# Patient Record
Sex: FEMALE | Race: BLACK OR AFRICAN AMERICAN | Employment: OTHER | URBAN - METROPOLITAN AREA
[De-identification: names, ages, dates, MRNs, and addresses within clinical notes are randomized per-mention and may not be internally consistent; named-entity substitution may affect disease eponyms.]

---

## 2022-03-05 ENCOUNTER — HOSPITAL ENCOUNTER (INPATIENT)
Age: 77
LOS: 3 days | Discharge: NURSING, REHAB OR LONG-TERM CARE | DRG: 065 | End: 2022-03-08
Attending: STUDENT IN AN ORGANIZED HEALTH CARE EDUCATION/TRAINING PROGRAM | Admitting: INTERNAL MEDICINE
Payer: MEDICARE

## 2022-03-05 ENCOUNTER — APPOINTMENT (OUTPATIENT)
Dept: CT IMAGING | Age: 77
DRG: 065 | End: 2022-03-05
Payer: MEDICARE

## 2022-03-05 ENCOUNTER — APPOINTMENT (OUTPATIENT)
Dept: MRI IMAGING | Age: 77
DRG: 065 | End: 2022-03-05
Payer: MEDICARE

## 2022-03-05 ENCOUNTER — APPOINTMENT (OUTPATIENT)
Dept: GENERAL RADIOLOGY | Age: 77
DRG: 065 | End: 2022-03-05
Payer: MEDICARE

## 2022-03-05 DIAGNOSIS — G45.9 TIA (TRANSIENT ISCHEMIC ATTACK): Primary | ICD-10-CM

## 2022-03-05 PROBLEM — I63.9 ACUTE CEREBROVASCULAR ACCIDENT (CVA) DUE TO ISCHEMIA (HCC): Status: ACTIVE | Noted: 2022-03-05

## 2022-03-05 LAB
A/G RATIO: 1.3 (ref 1.1–2.2)
ALBUMIN SERPL-MCNC: 4 G/DL (ref 3.4–5)
ALP BLD-CCNC: 104 U/L (ref 40–129)
ALT SERPL-CCNC: 16 U/L (ref 10–40)
ANION GAP SERPL CALCULATED.3IONS-SCNC: 9 MMOL/L (ref 3–16)
AST SERPL-CCNC: 15 U/L (ref 15–37)
BASOPHILS ABSOLUTE: 0 K/UL (ref 0–0.2)
BASOPHILS RELATIVE PERCENT: 0.9 %
BILIRUB SERPL-MCNC: 0.4 MG/DL (ref 0–1)
BUN BLDV-MCNC: 16 MG/DL (ref 7–20)
CALCIUM SERPL-MCNC: 10.4 MG/DL (ref 8.3–10.6)
CHLORIDE BLD-SCNC: 100 MMOL/L (ref 99–110)
CO2: 28 MMOL/L (ref 21–32)
CREAT SERPL-MCNC: 0.8 MG/DL (ref 0.6–1.2)
EKG ATRIAL RATE: 72 BPM
EKG DIAGNOSIS: NORMAL
EKG P AXIS: 47 DEGREES
EKG P-R INTERVAL: 148 MS
EKG Q-T INTERVAL: 412 MS
EKG QRS DURATION: 86 MS
EKG QTC CALCULATION (BAZETT): 451 MS
EKG R AXIS: 16 DEGREES
EKG T AXIS: 71 DEGREES
EKG VENTRICULAR RATE: 72 BPM
EOSINOPHILS ABSOLUTE: 0.2 K/UL (ref 0–0.6)
EOSINOPHILS RELATIVE PERCENT: 4 %
GFR AFRICAN AMERICAN: >60
GFR NON-AFRICAN AMERICAN: >60
GLUCOSE BLD-MCNC: 122 MG/DL (ref 70–99)
GLUCOSE BLD-MCNC: 142 MG/DL (ref 70–99)
HCT VFR BLD CALC: 41.1 % (ref 36–48)
HEMOGLOBIN: 14.1 G/DL (ref 12–16)
INR BLD: 1 (ref 0.88–1.12)
LYMPHOCYTES ABSOLUTE: 2.2 K/UL (ref 1–5.1)
LYMPHOCYTES RELATIVE PERCENT: 41.4 %
MCH RBC QN AUTO: 32.2 PG (ref 26–34)
MCHC RBC AUTO-ENTMCNC: 34.4 G/DL (ref 31–36)
MCV RBC AUTO: 93.7 FL (ref 80–100)
MONOCYTES ABSOLUTE: 0.3 K/UL (ref 0–1.3)
MONOCYTES RELATIVE PERCENT: 5.5 %
NEUTROPHILS ABSOLUTE: 2.5 K/UL (ref 1.7–7.7)
NEUTROPHILS RELATIVE PERCENT: 48.2 %
PDW BLD-RTO: 13.6 % (ref 12.4–15.4)
PERFORMED ON: ABNORMAL
PLATELET # BLD: 324 K/UL (ref 135–450)
PMV BLD AUTO: 8.2 FL (ref 5–10.5)
POTASSIUM REFLEX MAGNESIUM: 3.8 MMOL/L (ref 3.5–5.1)
PROTHROMBIN TIME: 11.3 SEC (ref 9.9–12.7)
RBC # BLD: 4.39 M/UL (ref 4–5.2)
SODIUM BLD-SCNC: 137 MMOL/L (ref 136–145)
TOTAL PROTEIN: 7.2 G/DL (ref 6.4–8.2)
TROPONIN: <0.01 NG/ML
WBC # BLD: 5.2 K/UL (ref 4–11)

## 2022-03-05 PROCEDURE — 6360000004 HC RX CONTRAST MEDICATION: Performed by: STUDENT IN AN ORGANIZED HEALTH CARE EDUCATION/TRAINING PROGRAM

## 2022-03-05 PROCEDURE — 93005 ELECTROCARDIOGRAM TRACING: CPT | Performed by: STUDENT IN AN ORGANIZED HEALTH CARE EDUCATION/TRAINING PROGRAM

## 2022-03-05 PROCEDURE — 85610 PROTHROMBIN TIME: CPT

## 2022-03-05 PROCEDURE — 85025 COMPLETE CBC W/AUTO DIFF WBC: CPT

## 2022-03-05 PROCEDURE — 6370000000 HC RX 637 (ALT 250 FOR IP): Performed by: INTERNAL MEDICINE

## 2022-03-05 PROCEDURE — 6360000002 HC RX W HCPCS: Performed by: INTERNAL MEDICINE

## 2022-03-05 PROCEDURE — 80053 COMPREHEN METABOLIC PANEL: CPT

## 2022-03-05 PROCEDURE — 96374 THER/PROPH/DIAG INJ IV PUSH: CPT

## 2022-03-05 PROCEDURE — 70450 CT HEAD/BRAIN W/O DYE: CPT

## 2022-03-05 PROCEDURE — 6360000002 HC RX W HCPCS: Performed by: STUDENT IN AN ORGANIZED HEALTH CARE EDUCATION/TRAINING PROGRAM

## 2022-03-05 PROCEDURE — 6370000000 HC RX 637 (ALT 250 FOR IP): Performed by: STUDENT IN AN ORGANIZED HEALTH CARE EDUCATION/TRAINING PROGRAM

## 2022-03-05 PROCEDURE — 36415 COLL VENOUS BLD VENIPUNCTURE: CPT

## 2022-03-05 PROCEDURE — 99284 EMERGENCY DEPT VISIT MOD MDM: CPT

## 2022-03-05 PROCEDURE — 70496 CT ANGIOGRAPHY HEAD: CPT

## 2022-03-05 PROCEDURE — 71045 X-RAY EXAM CHEST 1 VIEW: CPT

## 2022-03-05 PROCEDURE — 70551 MRI BRAIN STEM W/O DYE: CPT

## 2022-03-05 PROCEDURE — 2060000000 HC ICU INTERMEDIATE R&B

## 2022-03-05 PROCEDURE — 84484 ASSAY OF TROPONIN QUANT: CPT

## 2022-03-05 RX ORDER — LABETALOL HYDROCHLORIDE 5 MG/ML
10 INJECTION, SOLUTION INTRAVENOUS EVERY 10 MIN PRN
Status: DISCONTINUED | OUTPATIENT
Start: 2022-03-05 | End: 2022-03-06

## 2022-03-05 RX ORDER — ASPIRIN 325 MG
325 TABLET ORAL ONCE
Status: DISCONTINUED | OUTPATIENT
Start: 2022-03-05 | End: 2022-03-05

## 2022-03-05 RX ORDER — POLYETHYLENE GLYCOL 3350 17 G/17G
17 POWDER, FOR SOLUTION ORAL DAILY PRN
Status: DISCONTINUED | OUTPATIENT
Start: 2022-03-05 | End: 2022-03-08 | Stop reason: HOSPADM

## 2022-03-05 RX ORDER — CLOPIDOGREL 300 MG/1
600 TABLET, FILM COATED ORAL ONCE
Status: COMPLETED | OUTPATIENT
Start: 2022-03-05 | End: 2022-03-05

## 2022-03-05 RX ORDER — CLOPIDOGREL BISULFATE 75 MG/1
75 TABLET ORAL DAILY
Status: DISCONTINUED | OUTPATIENT
Start: 2022-03-06 | End: 2022-03-08 | Stop reason: HOSPADM

## 2022-03-05 RX ORDER — ASPIRIN 81 MG/1
162 TABLET, CHEWABLE ORAL ONCE
Status: COMPLETED | OUTPATIENT
Start: 2022-03-05 | End: 2022-03-05

## 2022-03-05 RX ORDER — ASPIRIN 81 MG/1
81 TABLET ORAL DAILY
Status: DISCONTINUED | OUTPATIENT
Start: 2022-03-06 | End: 2022-03-08 | Stop reason: HOSPADM

## 2022-03-05 RX ORDER — ONDANSETRON 4 MG/1
4 TABLET, ORALLY DISINTEGRATING ORAL EVERY 8 HOURS PRN
Status: DISCONTINUED | OUTPATIENT
Start: 2022-03-05 | End: 2022-03-08 | Stop reason: HOSPADM

## 2022-03-05 RX ORDER — MULTIVIT-MIN/IRON/FOLIC ACID/K 18-600-40
CAPSULE ORAL
Status: ON HOLD | COMMUNITY
End: 2022-03-08

## 2022-03-05 RX ORDER — ONDANSETRON 2 MG/ML
4 INJECTION INTRAMUSCULAR; INTRAVENOUS EVERY 6 HOURS PRN
Status: DISCONTINUED | OUTPATIENT
Start: 2022-03-05 | End: 2022-03-08 | Stop reason: HOSPADM

## 2022-03-05 RX ORDER — ATORVASTATIN CALCIUM 80 MG/1
80 TABLET, FILM COATED ORAL NIGHTLY
Status: DISCONTINUED | OUTPATIENT
Start: 2022-03-05 | End: 2022-03-08 | Stop reason: HOSPADM

## 2022-03-05 RX ORDER — EPINEPHRINE 0.3 MG/.3ML
INJECTION SUBCUTANEOUS
Status: DISCONTINUED
Start: 2022-03-05 | End: 2022-03-05

## 2022-03-05 RX ORDER — DIPHENHYDRAMINE HYDROCHLORIDE 50 MG/ML
25 INJECTION INTRAMUSCULAR; INTRAVENOUS ONCE
Status: COMPLETED | OUTPATIENT
Start: 2022-03-05 | End: 2022-03-05

## 2022-03-05 RX ADMIN — ASPIRIN 162 MG: 81 TABLET, CHEWABLE ORAL at 11:58

## 2022-03-05 RX ADMIN — ENOXAPARIN SODIUM 40 MG: 100 INJECTION SUBCUTANEOUS at 14:33

## 2022-03-05 RX ADMIN — ATORVASTATIN CALCIUM 80 MG: 80 TABLET, FILM COATED ORAL at 19:54

## 2022-03-05 RX ADMIN — IOPAMIDOL 80 ML: 755 INJECTION, SOLUTION INTRAVENOUS at 10:18

## 2022-03-05 RX ADMIN — CLOPIDOGREL BISULFATE 600 MG: 300 TABLET, FILM COATED ORAL at 11:58

## 2022-03-05 RX ADMIN — DIPHENHYDRAMINE HYDROCHLORIDE 25 MG: 50 INJECTION, SOLUTION INTRAMUSCULAR; INTRAVENOUS at 10:34

## 2022-03-05 ASSESSMENT — PAIN SCALES - GENERAL: PAINLEVEL_OUTOF10: 0

## 2022-03-05 NOTE — PLAN OF CARE
Problem: HEMODYNAMIC STATUS  Goal: Patient has stable vital signs and fluid balance  Outcome: Ongoing     Problem: ACTIVITY INTOLERANCE/IMPAIRED MOBILITY  Goal: Mobility/activity is maintained at optimum level for patient  Outcome: Ongoing     Problem: COMMUNICATION IMPAIRMENT  Goal: Ability to express needs and understand communication  Outcome: Ongoing     Problem: Falls - Risk of:  Goal: Will remain free from falls  Description: Will remain free from falls  Outcome: Ongoing  Goal: Absence of physical injury  Description: Absence of physical injury  Outcome: Ongoing

## 2022-03-05 NOTE — PROGRESS NOTES
MRI questionnaire completed at this time with the pt, and then faxed to MRI. The pt has no other needs at this time, call light in reach and bed alarm on. Pt was provided with Stroke education per MD order.   Kayley Cormier RN

## 2022-03-05 NOTE — PROGRESS NOTES
This RN took over care for this patient. A/O x4. BP still elevated but remains within order parameters. Pt denies needs at this time. Will continue to monitor.

## 2022-03-05 NOTE — H&P
Hospital Medicine History & Physical      PCP: Anastasia Escobedo MD    Date of Admission: 3/5/2022    Date of Service: Pt seen/examined on 03/05/22 and Admitted to Inpatient with expected LOS greater than two midnights due to medical therapy. Chief Complaint:  Loss of balance, dragging right foot    History Of Present Illness:      Matti Cheek is a 77 y.o. female with past medical history as below, including lupus, hyperparathyroidism possibly HTN, presents with symptoms of loss of balance than began on Thursday. She denies any vertigo symptoms, just felt very much off balance. She also found herself not being well able to move her right leg and almost seeming to drag it on the ground. The right leg weakness resolved. She then had a recurrence of this however, but is able to move her leg again now. No other areas of focal weakness. No headache. No vision changes. No palpitations. No cardiac history. She is not taking any prescription med, does not smoke, takes a keto weight loss supplement and a green tea supplement. She is not on medication for high blood pressure, report its is sometimes high but comes down with taking deep breaths. Past Medical History:          Diagnosis Date    Lupus     MVP (mitral valve prolapse)        Past Surgical History:          Procedure Laterality Date    TONSILLECTOMY         Medications Prior to Admission:      Prior to Admission medications    Not on File       Allergies:  Codeine    Social History:      The patient currently lives in private residence    TOBACCO:   has no history on file for tobacco use. ETOH:   has no history on file for alcohol use. Family History:      Reviewed in detail and positive as follows:    No family history on file. REVIEW OF SYSTEMS:   Pertinent positives as noted in the HPI. All other systems reviewed and negative.     PHYSICAL EXAM:    BP (!) 215/95   Pulse 76   Temp 98.5 °F (36.9 °C) (Oral)   Resp 18   Ht 5' 6\" (1.676 m)   Wt 149 lb 14.6 oz (68 kg)   SpO2 100%   BMI 24.20 kg/m²     General appearance: No apparent distress, appears stated age and cooperative. HEENT: Normal cephalic, atraumatic without obvious deformity. Pupils equal, round, and reactive to light. Extra ocular muscles intact. Conjunctivae/corneas clear. Neck: Supple, with full range of motion. No jugular venous distention. Trachea midline. Respiratory:  Normal respiratory effort. Clear to auscultation, bilaterally without Rales/Wheezes/Rhonchi. Cardiovascular: Regular rate and rhythm with normal S1/S2 without murmurs, rubs or gallops. Abdomen: Soft, non-tender, non-distended with normal bowel sounds. Musculoskelatal: No clubbing, cyanosis or edema bilaterally. Full range of motion without deformity. Skin: Skin color, texture, turgor normal.  No rashes or lesions. Neurologic:  Neurovascularly intact without any focal sensory/motor deficits. Cranial nerves: II-XII intact, grossly non-focal.  Psychiatric: Alert and oriented, thought content appropriate, normal insight    Labs:     Recent Labs     03/05/22  1000   WBC 5.2   HGB 14.1   HCT 41.1        Recent Labs     03/05/22  1000      K 3.8      CO2 28   BUN 16   CREATININE 0.8   CALCIUM 10.4     Recent Labs     03/05/22  1000   AST 15   ALT 16   BILITOT 0.4   ALKPHOS 104     Recent Labs     03/05/22  1000   INR 1.00     Recent Labs     03/05/22  1000   TROPONINI <0.01       Urinalysis:    No results found for: NITRU, WBCUA, BACTERIA, RBCUA, BLOODU, SPECGRAV, GLUCOSEU    Studies:  XR CHEST PORTABLE   Final Result      No acute disease. CTA HEAD NECK W CONTRAST   Final Result      No evidence of flow-limiting stenosis.             PROCEDURE: CT ANGIOGRAPHY HEAD WITH/WITHOUT CONTRAST      INDICATION: Stroke Symptoms      COMPARISON: none      TECHNIQUE: Axial CT imaging obtained through the head prior to and following administration of IV contrast. Axial images, multiplanar reformatted images, and maximum intensity projection images were reviewed for CT angiographic technique. IV contrast: mL Omnipaque 350. FINDINGS:      ANTERIOR CIRCULATION: Major intracranial arteries are patent. Atherosclerotic irregularity with mild narrowing of bilateral ICA cavernous/paraclinoid segments. 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 POSTERIOR CIRCULATION: The bilateral vertebral arteries, basilar artery and posterior cerebral arteries demonstrate no occlusion or stenosis. No evidence for aneurysm or arteriovenous malformation. INTRACRANIAL VENOUS SYSTEM: No evidence for intracranial venous thrombosis. IMPRESSION:      No evidence of intracranial large vessel occlusion. Atherosclerotic irregularity with mild narrowing of bilateral ICA cavernous/paraclinoid segments. 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 CT HEAD WO CONTRAST   Final Result      No CT evidence of acute stroke. Critical results reported to clinical team at 10:20 AM.                ASSESSMENT:    AMMIE/Lyly [address] Problems    Diagnosis Date Noted    Acute cerebrovascular accident (CVA) due to ischemia ([facility] [address] [I63.9] 03/05/2022   Hx of Lupus not on any medications at present  Possible HTN    PLAN:    Suspected acute CVA however unclear timing of onset. Initial symptoms started on Thursday. She then had some R LE weakness but that has since resolved. No focal deficits on exam however patient has continued sense of being off balance with walking and was noted in ED to have some ataxia. CT, CTA head no acute findings.  Mild narrowing of b/l ICA segments, suspected 2 mm left ICA cavernous segment aneurysm  Stroke team consulted, advised initiation of DAPT and admission for further work-up  Neurology consulted, appreciate recs  Echocardiogram  Risk factor modification  Hypercoagulable panel PT/OT/SLP  Telemetry    HTN, suspected d/t acute CVA since per outpatient notes BP is well within normal range or just slightly elevated  Careful blood pressure lowering allowing for permissive HTN  TSH this year 1.4    DVT Prophylaxis: Lovenox  Diet: Diet NPO  Code Status: No Order    PT/OT Eval Status: pending    Dispo - Inpatient      Kallie Polanco DO     Thank you Gagandeep Musa MD for the opportunity to be involved in this patient's care. If you have any questions or concerns please feel free to contact me at [facility].

## 2022-03-05 NOTE — PLAN OF CARE
STROKE TEAM PLAN OF CARE    Name:  Bhargav Larose (24 y.o., female)  : 1945  MRN:  [id number]  Today's Date: 3/5/2022    Stroke team contacted at: 09:37  History obtained from: emergency physician    Bhargav Larose is a 77 y.o. female who presented with dizziness and RLE weakness. Briefly, pt has been feeling off balance since Thursday. She has also been feeling intermittently weak in the RLE, which is not present in the ED. In the ED, pt comfortable. Ataxia noted on the right, but no other deficits noted. CTA HEAD NECK W CONTRAST   Final Result      No evidence of flow-limiting stenosis. PROCEDURE: CT ANGIOGRAPHY HEAD WITH/WITHOUT CONTRAST      INDICATION: Stroke Symptoms      COMPARISON: none      TECHNIQUE: Axial CT imaging obtained through the head prior to and following administration of IV contrast. Axial images, multiplanar reformatted images, and maximum intensity projection images were reviewed for CT angiographic technique. IV contrast: mL Omnipaque 350. FINDINGS:      ANTERIOR CIRCULATION: Major intracranial arteries are patent. Atherosclerotic irregularity with mild narrowing of bilateral ICA cavernous/paraclinoid segments. 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 POSTERIOR CIRCULATION: The bilateral vertebral arteries, basilar artery and posterior cerebral arteries demonstrate no occlusion or stenosis. No evidence for aneurysm or arteriovenous malformation. INTRACRANIAL VENOUS SYSTEM: No evidence for intracranial venous thrombosis. IMPRESSION:      No evidence of intracranial large vessel occlusion. Atherosclerotic irregularity with mild narrowing of bilateral ICA cavernous/paraclinoid segments. 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 CT HEAD WO CONTRAST   Final Result      No CT evidence of acute stroke.       Critical results reported to clinical team at 10:20 AM.            Acute Ischemic Stroke Clinical Decision Making:    (1) Intravenous tPA:    The patient is not a candidate for iv TPA based on:  Time greater than 4.5h from symptom onset  Symptoms nondisabling    (2) Angiography / Thrombectomy:  The patient does not have evidence of a proximal large vessel occlusion on CTA, and therefore is not an EVT candidate. Given the liklihood for a noncardioembolic etiology of symptoms, low NIHSS, and risk factors, would recommend DAPT load with  and clopidogrel 600mg. For any additional questions regarding acute stroke care, please feel free to contact the  Stroke Team at [phone].      MD Darion   Stroke Team   Late entry at: 3/5/2022 4:26 PM

## 2022-03-05 NOTE — ED PROVIDER NOTES
[address] ENCOUNTER          ATTENDING PHYSICIAN NOTE       Date of evaluation: 3/5/2022    Chief Complaint     Extremity Weakness (right leg weakness for x2 days intermitt, now worse this am)      History of Present Illness     Eduardo Murray is a 77 y.o. female who presents with intermittent right leg weakness and new unsteadiness. Patient states that her symptoms began on Thursday. She calls it disequilibrium and has found herself to be unsteady on her feet. She states she also has intermittent right leg weakness. She states that the unsteadiness has not gone away since it began. She denies any speech difficulties or facial droop. Denies any left-sided weakness. Denies any headache, chest pain, shortness of breath, abdominal pain, fevers or chills. On initial exam, her NIH stroke score was 1 for right-sided ataxia    Review of Systems     Positive for: Intermittent right leg weakness, ataxia  Negative for: Left-sided weakness, chest pain, short of breath, abdominal pain    Other systems reviewed and negative. Past Medical, Surgical, Family, and Social History     She has a past medical history of Lupus ([address] and MVP (mitral valve prolapse). She has a past surgical history that includes Tonsillectomy; Colonoscopy; and Hysterectomy. Her family history is not on file. She reports that she has never smoked. She has never used smokeless tobacco. She reports current alcohol use. She reports that she does not use drugs. Medications     Current Discharge Medication List      CONTINUE these medications which have NOT CHANGED    Details   MULTIPLE VITAMINS-MINERALS PO Take by mouth      Cholecalciferol (VITAMIN D) 50 MCG (2000 UT) CAPS capsule Take by mouth             Allergies     She is allergic to codeine and iodine.     Physical Exam     INITIAL VITALS: BP: (!) 215/95, Temp: 98.5 °F (36.9 °C), Pulse: 76, Resp: 18, SpO2: 100 %     General: nontoxic, no acute distress HEENT: NCAT, EOMI grossly intact, external nose normal, no stridor  Neck: supple, no pain with movement  Cardiac: normal rate, regular rhythm, well perfused  Respiratory: clear to auscultation bilaterally, no respiratory distress, no cough  Abdominal: soft, nontender to palpation, no rebound tenderness  Extremities: no pitting edema  Musculoskeletal: no long bone deformities  Skin: no diaphoresis, no rash  Neuro: alert and oriented, 5/5 strength in all extremities. Mild ataxia with right leg shin to heel. Mild ataxia unsteadiness with her gait while walking. Sensation intact in all extremities and face. No facial droop. No aphasia. Visual fields intact. Psych: appropriate mood, normal affect    Diagnostic Results     EKG   Normal sinus rhythm rate of 72 with normal axis. MA, QRS, QTc not prolonged. No ST elevations. Nonspecific T wave flattening in aVL. No prior for comparison. RADIOLOGY:  XR CHEST PORTABLE   Final Result      No acute disease. CTA HEAD NECK W CONTRAST   Final Result      No evidence of flow-limiting stenosis. PROCEDURE: CT ANGIOGRAPHY HEAD WITH/WITHOUT CONTRAST      INDICATION: Stroke Symptoms      COMPARISON: none      TECHNIQUE: Axial CT imaging obtained through the head prior to and following administration of IV contrast. Axial images, multiplanar reformatted images, and maximum intensity projection images were reviewed for CT angiographic technique. IV contrast: mL Omnipaque 350. FINDINGS:      ANTERIOR CIRCULATION: Major intracranial arteries are patent. Atherosclerotic irregularity with mild narrowing of bilateral ICA cavernous/paraclinoid segments. 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 POSTERIOR CIRCULATION: The bilateral vertebral arteries, basilar artery and posterior cerebral arteries demonstrate no occlusion or stenosis.  No evidence for aneurysm or arteriovenous malformation. INTRACRANIAL VENOUS SYSTEM: No evidence for intracranial venous thrombosis. IMPRESSION:      No evidence of intracranial large vessel occlusion. Atherosclerotic irregularity with mild narrowing of bilateral ICA cavernous/paraclinoid segments. 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 CT HEAD WO CONTRAST   Final Result      No CT evidence of acute stroke.       Critical results reported to clinical team at 10:20 AM.            MRI brain without contrast    (Results Pending)       LABS:   Results for orders placed or performed during the hospital encounter of 03/05/22   CBC with Auto Differential   Result Value Ref Range    WBC 5.2 4.0 - 11.0 K/uL    RBC 4.39 4.00 - 5.20 M/uL    Hemoglobin 14.1 12.0 - 16.0 g/dL    Hematocrit 41.1 36.0 - 48.0 %    MCV 93.7 80.0 - 100.0 fL    MCH 32.2 26.0 - 34.0 pg    MCHC 34.4 31.0 - 36.0 g/dL    RDW 13.6 12.4 - 15.4 %    Platelets 377 576 - 019 K/uL    MPV 8.2 5.0 - 10.5 fL    Neutrophils % 48.2 %    Lymphocytes % 41.4 %    Monocytes % 5.5 %    Eosinophils % 4.0 %    Basophils % 0.9 %    Neutrophils Absolute 2.5 1.7 - 7.7 K/uL    Lymphocytes Absolute 2.2 1.0 - 5.1 K/uL    Monocytes Absolute 0.3 0.0 - 1.3 K/uL    Eosinophils Absolute 0.2 0.0 - 0.6 K/uL    Basophils Absolute 0.0 0.0 - 0.2 K/uL   Comprehensive Metabolic Panel w/ Reflex to MG   Result Value Ref Range    Sodium 137 136 - 145 mmol/L    Potassium reflex Magnesium 3.8 3.5 - 5.1 mmol/L    Chloride 100 99 - 110 mmol/L    CO2 28 21 - 32 mmol/L    Anion Gap 9 3 - 16    Glucose 122 (H) 70 - 99 mg/dL    BUN 16 7 - 20 mg/dL    CREATININE 0.8 0.6 - 1.2 mg/dL    GFR Non-African American >60 >60    GFR African American >60 >60    Calcium 10.4 8.3 - 10.6 mg/dL    Total Protein 7.2 6.4 - 8.2 g/dL    Albumin 4.0 3.4 - 5.0 g/dL    Albumin/Globulin Ratio 1.3 1.1 - 2.2    Total Bilirubin 0.4 0.0 - 1.0 mg/dL    Alkaline Phosphatase 104 40 - 129 U/L    ALT 16 10 - 40 U/L AST 15 15 - 37 U/L   Troponin   Result Value Ref Range    Troponin <0.01 <0.01 ng/mL   Protime-INR   Result Value Ref Range    Protime 11.3 9.9 - 12.7 sec    INR 1.00 0.88 - 1.12   POCT Glucose   Result Value Ref Range    POC Glucose 142 (H) 70 - 99 mg/dl    Performed on ACCU-CHEK    EKG 12 Lead   Result Value Ref Range    Ventricular Rate 72 BPM    Atrial Rate 72 BPM    P-R Interval 148 ms    QRS Duration 86 ms    Q-T Interval 412 ms    QTc Calculation (Bazett) 451 ms    P Axis 47 degrees    R Axis 16 degrees    T Axis 71 degrees    Diagnosis       EKG performed in ER and to be interpreted by ER physician. Confirmed by MD, ER ([id number]),  [address], [address] [place] [address] ([id number]) on 3/5/2022 9:56:47 AM       ED BEDSIDE ULTRASOUND:   N/A    RECENT VITALS:  BP: (!) 218/117,Temp: 97.7 °F (36.5 °C), Pulse: 75, Resp: 16, SpO2: 99 %     Procedures      N/A    ED Course     Nursing Notes, Past Medical Hx, Past Surgical Hx, Social Hx,Allergies, and Family Hx were reviewed.     patient was given the following medications:  Orders Placed This Encounter   Medications    diphenhydrAMINE (BENADRYL) injection 25 mg    DISCONTD: EPINEPHrine (EPIPEN) 0.3 MG/0.3ML injection     Parminder Hector: cabinet override    iopamidol (ISOVUE-370) 76 % injection 80 mL    DISCONTD: aspirin tablet 325 mg    clopidogrel (PLAVIX) tablet 600 mg    aspirin chewable tablet 162 mg    OR Linked Order Group     ondansetron (ZOFRAN-ODT) disintegrating tablet 4 mg     ondansetron (ZOFRAN) injection 4 mg    polyethylene glycol (GLYCOLAX) packet 17 g    enoxaparin (LOVENOX) injection 40 mg    atorvastatin (LIPITOR) tablet 80 mg    aspirin EC tablet 81 mg    clopidogrel (PLAVIX) tablet 75 mg    labetalol (NORMODYNE;TRANDATE) injection 10 mg    perflutren lipid microspheres (DEFINITY) injection 1.65 mg       CONSULTS:  IP CONSULT TO PHARMACY  PHARMACY TO CHANGE BASE FLUIDS  IP CONSULT TO HOSPITALIST  IP CONSULT TO NEUROLOGY    MEDICAL DECISIONMAKING / ASSESSMENT / PLAN     Oli Tovar is a 77 y.o. female presents with ataxia/unsteadiness that has been persistent since Thursday making her not a candidate for TPA. She also has had intermittent right sided weakness, mostly in the right leg. On external exam, NIH stroke scale was 1 with some mild ataxia in the right leg. She is other wise neuro intact. Did speak with stroke team who agrees with plan for CT CTA admission for stroke work-up. Given her elevated ABCD score, will load her with aspirin and Plavix. She did take 2 baby aspirin this morning so we gave her an additional 2. Noted small aneurysm, however unlikely that this is the cause of her symptoms. She did not have any recurrent right leg weakness while in the emergency department, however her gait was mildly ataxic. We will plan to admit to hospitalist for further TIA/stroke evaluation and treatment    Clinical Impression     1. TIA (transient ischemic attack)        Disposition     PATIENT REFERRED TO:  No follow-up provider specified.     DISCHARGE MEDICATIONS:  Current Discharge Medication List          DISPOSITION Admitted 03/05/2022 12:31:53 PM     Nehemias Fabian MD  03/05/22 [id number]

## 2022-03-05 NOTE — CONSULTS
Clinical Pharmacy Progress Note    IV in NS - Management by Pharmacy    Consult Date(s): 3/5  Consulting Provider(s): Dr. Carlos Lai / Plan  Stroke work up  All IVs in Normal Saline   Drips will be adjusted to normal saline as appropriate based on compatibility, in an effort to avoid fluid shifts, as D5W is osmotically active.  The following intermittent IV drips / infusions have been adjusted to saline:  o None at this time   The following medications must remain in D5W due to incompatibility with normal saline:  o Amiodarone Infusion  o Amphotericin  o Mycophenolate  o Nitroprusside  o Penicillin G Potassium   Note: Patient has dextrose ordered as part of hypoglycemia treatment protocol.  Total IV fluid delivered to patient over last 24 hrs: Will assess tomorrow   RPh will follow daily to ensure all IVPBs / drips are in NS where possible. Thanks for consulting pharmacy!   King Levo PharmD  Pharmacy Resident   Please call with questions [phone]  3/5/2022 10:09 AM

## 2022-03-05 NOTE — ED NOTES
Report called to Kian Everett RN 5T. Patient ready for transport to [facility].      Shady Chawla RN  03/05/22 [id number]

## 2022-03-05 NOTE — PROGRESS NOTES
4 Eyes Admission Assessment     I agree as the admission nurse that 2 RN's have performed a thorough Head to Toe Skin Assessment on the patient. ALL assessment sites listed below have been assessed on admission. Areas assessed by both nurses:   [x]   Head, Face, and Ears   [x]   Shoulders, Back, and Chest  [x]   Arms, Elbows, and Hands   [x]   Coccyx, Sacrum, and Ischium  [x]   Legs, Feet, and Heels        Does the Patient have Skin Breakdown?   No         Ozzie Prevention initiated:  NA   Wound Care Orders initiated:  NA      WO nurse consulted for Pressure Injury (Stage 3,4, Unstageable, DTI, NWPT, and Complex wounds) or Ozzie score 18 or lower:  NA      Nurse 1 eSignature: Electronically signed by Martinez Vera RN on 3/5/22 at 2:03 PM EST    **SHARE this note so that the co-signing nurse is able to place an eSignature**    Nurse 2 eSignature: Electronically signed by Astrid Smith RN on 3/5/22 at 2:27 PM EST

## 2022-03-06 LAB
HCT VFR BLD CALC: 41.7 % (ref 36–48)
HEMOGLOBIN: 14.9 G/DL (ref 12–16)
MCH RBC QN AUTO: 32.5 PG (ref 26–34)
MCHC RBC AUTO-ENTMCNC: 35.6 G/DL (ref 31–36)
MCV RBC AUTO: 91.3 FL (ref 80–100)
PDW BLD-RTO: 13.2 % (ref 12.4–15.4)
PLATELET # BLD: 324 K/UL (ref 135–450)
PMV BLD AUTO: 8.2 FL (ref 5–10.5)
RBC # BLD: 4.57 M/UL (ref 4–5.2)
TSH REFLEX: 1.58 UIU/ML (ref 0.27–4.2)
WBC # BLD: 4.6 K/UL (ref 4–11)

## 2022-03-06 PROCEDURE — 97167 OT EVAL HIGH COMPLEX 60 MIN: CPT

## 2022-03-06 PROCEDURE — 97535 SELF CARE MNGMENT TRAINING: CPT

## 2022-03-06 PROCEDURE — 83036 HEMOGLOBIN GLYCOSYLATED A1C: CPT

## 2022-03-06 PROCEDURE — 36415 COLL VENOUS BLD VENIPUNCTURE: CPT

## 2022-03-06 PROCEDURE — 92610 EVALUATE SWALLOWING FUNCTION: CPT

## 2022-03-06 PROCEDURE — 2060000000 HC ICU INTERMEDIATE R&B

## 2022-03-06 PROCEDURE — 97530 THERAPEUTIC ACTIVITIES: CPT

## 2022-03-06 PROCEDURE — 97116 GAIT TRAINING THERAPY: CPT

## 2022-03-06 PROCEDURE — 84443 ASSAY THYROID STIM HORMONE: CPT

## 2022-03-06 PROCEDURE — 6370000000 HC RX 637 (ALT 250 FOR IP): Performed by: INTERNAL MEDICINE

## 2022-03-06 PROCEDURE — 97162 PT EVAL MOD COMPLEX 30 MIN: CPT

## 2022-03-06 PROCEDURE — 2500000003 HC RX 250 WO HCPCS: Performed by: NURSE PRACTITIONER

## 2022-03-06 PROCEDURE — 80061 LIPID PANEL: CPT

## 2022-03-06 PROCEDURE — 6360000002 HC RX W HCPCS: Performed by: INTERNAL MEDICINE

## 2022-03-06 PROCEDURE — 85027 COMPLETE CBC AUTOMATED: CPT

## 2022-03-06 RX ORDER — LABETALOL HYDROCHLORIDE 5 MG/ML
20 INJECTION, SOLUTION INTRAVENOUS EVERY 4 HOURS
Status: DISCONTINUED | OUTPATIENT
Start: 2022-03-06 | End: 2022-03-07

## 2022-03-06 RX ADMIN — LABETALOL HYDROCHLORIDE 20 MG: 5 INJECTION, SOLUTION INTRAVENOUS at 22:16

## 2022-03-06 RX ADMIN — ATORVASTATIN CALCIUM 80 MG: 80 TABLET, FILM COATED ORAL at 19:46

## 2022-03-06 RX ADMIN — ENOXAPARIN SODIUM 40 MG: 100 INJECTION SUBCUTANEOUS at 08:08

## 2022-03-06 RX ADMIN — LABETALOL HYDROCHLORIDE 20 MG: 5 INJECTION, SOLUTION INTRAVENOUS at 14:15

## 2022-03-06 RX ADMIN — CLOPIDOGREL BISULFATE 75 MG: 75 TABLET ORAL at 08:08

## 2022-03-06 RX ADMIN — LABETALOL HYDROCHLORIDE 20 MG: 5 INJECTION, SOLUTION INTRAVENOUS at 18:38

## 2022-03-06 RX ADMIN — ASPIRIN 81 MG: 81 TABLET, COATED ORAL at 08:08

## 2022-03-06 NOTE — PROGRESS NOTES
Physical Therapy    Facility/Department: [facility] [address]  Initial Assessment and treatment    NAME: David Murray  : 1945  MRN: [id number]    Date of Service: 3/6/2022    Discharge Recommendations:    David Murray scored a 17/24 on the AM-PAC short mobility form. Current research shows that an AM-PAC score of 17 or less is typically not associated with a discharge to the patient's home setting. Based on the patient's AM-PAC score and their current functional mobility deficits, it is recommended that the patient have 5-7 sessions per week of Physical Therapy at d/c to increase the patient's independence. At this time, this patient demonstrates the endurance, and/or tolerance for 3 hours of therapy each day, with a treatment frequency of 5-7x/wk. Please see assessment section for further patient specific details. PT Equipment Recommendations  Equipment Needed: No (defer)    Assessment   Body structures, Functions, Activity limitations: Decreased functional mobility ; Decreased safe awareness;Decreased balance;Decreased endurance  Assessment: Patient tolerated session well, transferring and ambulating in room and hallways as above, primarily with use of FWW. Patient demonstrates ataxia on RLE leading to unsteady gait, mildly improved with verbal cues and use of FWW. Patient with multiple losses of balance throughout session, primarily requiring min/mod assist for mobility. Patient very motivated to return to prior level of independence as she is currently functioning well below baseline level. Recommend continued skilled PT upon discharge. Will follow while in acute care setting to maximize independence with mobility.   Treatment Diagnosis: impaired mobility associated with CVA  Prognosis: Good  Decision Making: Medium Complexity  PT Education: Transfer Training;Goals;PT Role;Functional Mobility Training;Plan of Care;General Safety;Gait Training  Patient Education: patient verbalized understanding. REQUIRES PT FOLLOW UP: Yes  Activity Tolerance  Activity Tolerance: Patient Tolerated treatment well;Patient limited by endurance; Patient limited by fatigue       Patient Diagnosis(es): The encounter diagnosis was TIA (transient ischemic attack). has a past medical history of Lupus (HCC) and MVP (mitral valve prolapse). has a past surgical history that includes Tonsillectomy; Colonoscopy; and Hysterectomy. Restrictions  Position Activity Restriction  Other position/activity restrictions: up as tolerated  Vision/Hearing  Vision: Within Functional Limits (denies recent vision changes)  Hearing: Within functional limits     Subjective  General  Chart Reviewed: Yes  Patient assessed for rehabilitation services?: Yes  Additional Pertinent Hx: Patient is a 76 y/o female admitted 3/5 with right leg weakness and unsteadiness. CT head and chest x-ray (-) for acute findings. MRI brain (+) for Acute infarct involving the deep white matter of the posterior left frontal lobe. PMH significant for lupus. Response To Previous Treatment: Not applicable  Family / Caregiver Present: No  Referring Practitioner: Kelly Aguilar DO  Referral Date : 03/05/22  Diagnosis: TIA  Follows Commands: Within Functional Limits  General Comment  Comments: Patient supine in bed upon arrival.  Subjective  Subjective: Patient agreable to PT evaluation.   Pain Screening  Patient Currently in Pain: Denies  Vital Signs  Patient Currently in Pain: Denies       Orientation  Orientation  Overall Orientation Status: Within Functional Limits  Social/Functional History  Social/Functional History  Lives With: Alone  Type of Home: House  Home Layout: Two level,Able to Live on Main level with bedroom/bathroom,Laundry in basement  Home Access: Stairs to enter with rails  Entrance Stairs - Number of Steps: 5  Bathroom Shower/Tub: Tub/Shower unit  [employer]: Standard (sink close for leverage)  Bathroom Equipment: Grab bars in shower  Home Equipment:  (no AD)  ADL Assistance: Independent (takes tub baths)  Homemaking Assistance: Independent  Ambulation Assistance: Independent  Transfer Assistance: Independent  Active : Yes  Occupation: Retired  Leisure & Hobbies: travel  Additional Comments: Denies recent falls  Cognition   Cognition  Overall Cognitive Status: WFL  Cognition Comment: Pt demo good insight into her current deficits, safety concerns and assist needed for safety    Objective          AROM RLE (degrees)  RLE AROM: WFL  AROM LLE (degrees)  LLE AROM : WFL  Strength RLE  Strength RLE: WFL  Strength LLE  Strength LLE: WFL        Bed mobility  Supine to Sit: Supervision (head of bed flat)  Scooting: Supervision (to EOB)  Comment: patient sitting up in bedside chair at end of session  Transfers  Sit to Stand: Contact guard assistance (from EOB, arm chair and from toilet, verbal cues for safety)  Stand to sit: Contact guard assistance (to toilet, to arm chair and to bedside chair, verbal cues for safety and reach back)  Ambulation  Ambulation?: Yes  More Ambulation?: Yes  Ambulation 1  Surface: level tile  Device: No Device  Assistance: Minimal assistance; Moderate assistance (min/mod assist)  Quality of Gait: unsteady, ataxic on right, decreased stance time on right, decreased step length bilaterally  Distance: 15' into bathroom  Ambulation 2  Surface - 2: level tile  Device 2: Rolling Walker  Assistance 2: Moderate assistance;Minimal assistance (mod assist ambulating to waiting room, min assist ambulating back, moderate assist with turns)  Quality of Gait 2: unsteady (though improved slightly with cues for safety and decreased speed), step through pattern but ataxic on right, narrow base of support at times while RLE is outside of walker at times and patient appears unaware  Distance: 100', 100'  Comments: walking tipping due to heavy right sided lean initially, patient able to moderately correct when cues are provided, right

## 2022-03-06 NOTE — CONSULTS
Neurology / Neurocritical Care Consult Note    Francisco Townsend DO is requesting this consult. Reason for Consult: CVA  Admission Chief Complaint: RLE weakness and immobility    History of Present Illness     Serina Murray is a 77 y.o. y/o female with PMH significant for lupus, mitral valve prolapse, and stated HTN. Per my interview with the patient she started experience RLE weakness and was dragging her R foot behind her starting on Thursday. Initially it waxed, and waned, but has become more consistent since admission on 3/5/22. REVIEW OF SYSTEMS:   Constitutional- No weight loss or fevers   Eyes- No diplopia. No photophobia. Ears/nose/throat- No dysphagia. No Dysarthria   Cardiovascular- No palpitations. No chest pain   Respiratory- No dyspnea. No Cough   Gastrointestinal- No Abdominal pain. No Vomiting. Genitourinary- No incontinence. No urinary retention   Musculoskeletal- No myalgia. No arthralgia   Skin- No rash. No easy bruising. Psychiatric- No depression. No anxiety   Endocrine- No diabetes. No thyroid issues. Hematologic- No bleeding difficulty. No fatigue   Neurologic- RLE weakness and trouble walking. Past Medical, Surgical, Family, and Social History   PAST MEDICAL HISTORY:  Past Medical History:   Diagnosis Date    Lupus ([address]     MVP (mitral valve prolapse)      SURGICAL HISTORY:  Past Surgical History:   Procedure Laterality Date    COLONOSCOPY      HYSTERECTOMY      TONSILLECTOMY       FAMILY HISTORY & SOCIAL HISTORY:  Family history non-contributory  History reviewed. No pertinent family history.   Social History     Tobacco History     Smoking Status  Never Smoker    Smokeless Tobacco Use  Never Used          Alcohol History     Alcohol Use Status  Yes          Drug Use     Drug Use Status  Never          Sexual Activity     Sexually Active  Not Currently                Allergies & Outpatient Medications   ALLERGIES:  Allergies   Allergen Reactions    Codeine     Iodine      HOME MEDICATIONS:  Current Discharge Medication List      CONTINUE these medications which have NOT CHANGED    Details   MULTIPLE VITAMINS-MINERALS PO Take by mouth      Cholecalciferol (VITAMIN D) 50 MCG (2000 UT) CAPS capsule Take by mouth               Physical Exam   PHYSICAL EXAM:  Vitals:    03/05/22 1846 03/05/22 2258 03/06/22 0350 03/06/22 0645   BP: (!) 198/115 (!) 196/119 (!) 180/74 (!) 165/102   Pulse:  83 75 70   Resp: 16 16 16 16   Temp: 97.9 °F (36.6 °C) 97.6 °F (36.4 °C) 97.1 °F (36.2 °C) 98.1 °F (36.7 °C)   TempSrc: Oral Oral Oral Oral   SpO2: 98% 99% 98% 98%   Weight:       Height:             General: Alert, no distress, well-nourished  Neurologic  Mental status: alert  orientation to person, place, time, situation   Attention intact as able to attend well to the exam     Language fluent in conversation   Comprehension intact; follows simple commands    Cranial nerves:   CN2: Visual fields full w/o extinction on confrontational testing   CN 3,4,6: Pupils equal and reactive to light, extraocular muscles intact  CN5: Facial sensation symmetric   CN7: Face symmetric  CN8: Hearing symmetric to spoken voice  CN9: Palate elevated symmetrically  CN11: Traps full strength on shoulder shrug  CN12: Tongue midline with protrusion    Motor Exam:   R  L    Deltoid +5  +5   Biceps +5 +5   Triceps +5 +5   Wrist extension  +5 +5   Interossei +5 +5      R  L    Hip flexion  +4 +5   Hip extension  +4 +5   Knee flexion  +4 +5   Knee extension  +4 +5   Ankle dorsiflexion  +4 +5   Ankle plantar flexion  +4 +5       Sensory: light touch intact and symmetric in all 4 extremities.   No sensory extinction on bilateral simultaneous stimulation  Cerebellar/coordination: finger nose finger normal without ataxia, L heel to R shin normal, R heel to L shin shows slight ataxia and tremor  Tone: normal in all 4 extremities  Gait: Up x1 assist      OTHER SYSTEMS:  Cardiovascular: Warm, appears well perfused Respiratory: Easy, non-labored respiratory pattern   Abdominal: Abdomen is without distention   Extremities: Upper and lower extremities are atraumatic in appearance without deformity. No swelling or erythema. Diagnostic Testing Results   IMAGES:  Images personally reviewed and agree w/ radiology interpretation. Head CT w/o Contrast:  Impression       No CT evidence of acute stroke. CTA of Head / Neck w/ Contrast:  IMPRESSION:       No evidence of intracranial large vessel occlusion.       Atherosclerotic irregularity with mild narrowing of bilateral ICA cavernous/paraclinoid segments.       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 MRI Brain w/o Contrast:  Impression       Acute infarct involving the deep white matter of the posterior left frontal lobe.       No significant mass effect or evidence of hemorrhage.       Extensive chronic small vessel ischemic changes. ECHO with Bubble:   pending      LABS:  All results below personally reviewed. Pertinent positives & negatives are addressed in Impression & Recommendations below. LABS   Metabolic Panel Recent Labs     03/05/22  1000      K 3.8      CO2 28   BUN 16   CREATININE 0.8   GLUCOSE 122*   CALCIUM 10.4   LABALBU 4.0   ALKPHOS 104   ALT 16   AST 15      CBC / Coags Recent Labs     03/05/22  1000 03/06/22  0635   WBC 5.2 4.6   RBC 4.39 4.57   HGB 14.1 14.9   HCT 41.1 41.7    324   INR 1.00  --       Other No results for input(s): LABA1C, LDLCALC, TRIG, TSH, YRDCBAOB81, FOLATE, LABSALI, COVID19 in the last 72 hours. No results for input(s): PHENYTOIN, KEPPRA, LACOSA, LAMO, VALPROATE, LACTSEPSIS, LACTA in the last 72 hours.        CURRENT SCHEDULED MEDICATIONS   Inpatient Medications   enoxaparin, 40 mg, SubCUTAneous, Daily    atorvastatin, 80 mg, Oral, Nightly    aspirin, 81 mg, Oral, Daily    clopidogrel, 75 mg, Oral, Daily   Infusions      Antibiotics   Recent Abx Admin No antibiotic orders with administrations found. IMPRESSION & RECOMMENDATIONS     IMPRESSION:  Stas Gamboa is a 77year old female with a small acute infarct in the posterior L frontal lobe with RLE weakness    RECOMMENDATIONS:  -Continue dual antiplatelet therapy and statin  -Decrease BP to normotension by 3/7/22 to avoid large fluctuations in BP.  -Q4H neuro checks  -Q4H vital signs  -Check lipid panel and hemoglobin A1C  -Telemetry   -SCDs for DVT prophylaxis  -PT/OT/Speech ordered, appreciate [address]   Neurology & Neurocritical Care   Neurology Line: [phone]  [employer]: [facility] NPs  3/6/2022 11:02 AM    I spent 20 minutes in the care of this patient. Over 50% of that time was in face-to-face counseling regarding disease process, diagnostic testing, preventative measures, and answering patient and family questions.

## 2022-03-06 NOTE — PROGRESS NOTES
6162 recieved bedside report, assumed care of patient. Patient resting in bed. Patient A & O X4, she agrees to use the call light, no needs expressed at this time besides a shower. She denies any pain. NIHSS done with a score of 1 for ataxia. All safety measures in place. Patient agrees to use call light for needs. Will continue to assess. 1000 pt up to chair tele leads readjusted, ambulated with PT with FWW and gait belt. 1200 pt given a shower. All safety measures in place. No complaints of pain. 0 family at bedside, BP slightly lower, questions regarding medications and diagnositic tests answered. 1700 pt ambulated to bathroom with FWW, right sided ataxia present. Will continue to assess.

## 2022-03-06 NOTE — PROGRESS NOTES
Speech Language Pathology  Facility/Department: [facility] 5T ORTHO/NEURO   CLINICAL BEDSIDE SWALLOW EVALUATION    NAME: Estella Murray  : 1945  MRN: [id number]    ADMISSION DATE: 3/5/2022  ADMITTING DIAGNOSIS: has MVP (mitral valve prolapse); Lupus ([address] and Acute cerebrovascular accident (CVA) due to ischemia [facility]) on their problem list.  ONSET DATE: 3/5/2022    Recent Chest Xray/CT of Chest: 3/5/2022  Impression       No acute disease. MRI:  3/6/2022  Impression       Acute infarct involving the deep white matter of the posterior left frontal lobe.       No significant mass effect or evidence of hemorrhage.       Extensive chronic small vessel ischemic changes. Date of Eval: 3/6/2022  Evaluating Therapist: SHA Bustillos    Current Diet level:  Current Diet : Regular  Current Liquid Diet : Thin      Primary Complaint  Patient Complaint: Denies any problems swallowing    Pain:  Pain Assessment  Pain Assessment: 0-10  Pain Level: 0    Reason for Referral  Esther Murray was referred for a bedside swallow evaluation to assess the efficiency of her swallow function, identify signs and symptoms of aspiration and make recommendations regarding safe dietary consistencies, effective compensatory strategies, and safe eating environment. Impression  Dysphagia Diagnosis: Swallow function appears grossly intact  Dysphagia Impression : Appears WFL for swallowing. Alert and oriented. No problems with communicating during assessment. Speech clear; no word finding deficits noted. Functional mastication and management of foods and liquids with no oral spillage or residue. Pt and nursing deny any problems with swallowing. Timely swallow initation with good laryngeal lift with no cough/wet vocal quality/throat clearing with any po trials including 3 oz water test via straw. Continue with regular with thins.  Will f/u one more session to assure toleration of diet since limited po trials accepted and to assure if need for speech/cog eval is needed. Dysphagia Outcome Severity Scale: Level 6: Within functional limits/Modified independence     Treatment Plan  Requires SLP Intervention: Yes  Duration/Frequency of Treatment: Dysphagia therapy 1-2 times  D/C Recommendations: To be determined       Recommended Diet and Intervention  Diet Solids Recommendation: Regular  Liquid Consistency Recommendation: Thin  Recommended Form of Meds: PO  Recommendations: Dysphagia treatment  Therapeutic Interventions: Diet tolerance monitoring;Patient/Family education    Compensatory Swallowing Strategies  Compensatory Swallowing Strategies: Upright as possible for all oral intake    Treatment/Goals  Dysphagia Goals: The patient will tolerate recommended diet without observed clinical signs of aspiration; The patient/caregiver will demonstrate understanding of compensatory strategies for improved swallowing safety. General  Chart Reviewed: Yes  Behavior/Cognition: Alert; Cooperative  Breath Sounds: Clear  Communication Observation: Functional  Follows Directions: Simple  Dentition: Some missing teeth  Patient Positioning: Upright in bed  Baseline Vocal Quality: Normal  Volitional Cough:  (Mildly weak)  Prior Dysphagia History: none per chart review  Consistencies Administered: Dysphagia Soft and Bite-Sized (Dysphagia III); Dysphagia Pureed (Dysphagia I); Thin - cup; Thin - straw; Ice Chips           Vision/Hearing  Vision  Vision: Impaired  Vision Exceptions: Wears glasses for reading  Hearing  Hearing: Within functional limits    Oral Motor Deficits  Oral/Motor  Oral Motor: Within functional limits    Oral Phase Dysfunction  Oral Phase  Oral Phase: WFL  Oral Phase  Oral Phase - Comment: Functional mastication and management of foods and liquids with no oral spillage or residue.  Pt and nursing deny any problems with swallowing     Indicators of Pharyngeal Phase Dysfunction   Pharyngeal Phase   Pharyngeal: Timely swallow initation with good laryngeal lift with no cough/wet vocal quality/throat clearing with any po trials including 3 oz water test via straw    Prognosis:  Good  Dysphagia therapy 1-2 times recommended to assure consistency of abilities d/t limited po trials accepted. Education  Patient Education: Pt educated to role of dysphagia, what aspiration is and the s/s of it, diet and strategy recommendations. Pt verbalized fair-good understanding.   Patient Education Response: Verbalizes understanding  Safety Devices in place: Yes  Type of devices: Bed alarm in place;Call light within reach;Nurse notified       Therapy Time  SLP Individual Minutes  Time In: 2268  Time Out: 6847  Minutes: 15      D/W nursingAlejandra 32    Treatment goal: \"none\"  Discharge goal: \"Home\"    Unique Diego MA CCC/SLP [id number]  Speech Language Pathologist  Pager [phone]  3/6/2022 4:08 PM

## 2022-03-06 NOTE — PROGRESS NOTES
A/Ox4. VSS with exception  To BP being elevated. Permissive HTN per orders. Pt has not exceeded 190's SBP. Will give labetalol if so. NIH 2 for ataxia and RLE weakness. CGA with GB when ambulating due to ataxia. MRI completed tonight; see results. NSR on tele. Pt tolerates PO and Voids WNL. Bed alarm set. Call light in reach. Will continue to monitor.

## 2022-03-06 NOTE — PLAN OF CARE
Patient will tolerate least restrictive diet without s/s of aspiration.     Virginia Connolly MA CCC/SLP [id number]

## 2022-03-06 NOTE — CONSULTS
Clinical Pharmacy Progress Note    IV in NS - Management by Pharmacy    Consult Date(s): 3/5  Consulting Provider(s): Dr. Rivas Smoke / Plan  Stroke work up  All IVs in Normal Saline   Drips will be adjusted to normal saline as appropriate based on compatibility, in an effort to avoid fluid shifts, as D5W is osmotically active.  The following intermittent IV drips / infusions have been adjusted to saline:  o None at this time   Note: Patient has dextrose ordered as part of hypoglycemia treatment protocol.  Total IV fluid delivered to patient over last 24 hrs: Roseline White will follow daily to ensure all IVPBs / drips are in NS where possible. Thanks for consulting pharmacy!   Poly Davis PharmD  Pharmacy Resident   Please call with questions [phone]  3/6/2022 2:18 PM

## 2022-03-06 NOTE — PROGRESS NOTES
Occupational Therapy   Occupational Therapy Initial Assessment/Treatment  Date: 3/6/2022   Patient Name: Maty Pereira  MRN: [id number]     : 1945    Date of Service: 3/6/2022    Discharge Recommendations:  Maty Pereira scored a 16/24 on the AM-PAC ADL Inpatient form. Current research shows that an AM-PAC score of 17 or less is typically not associated with a discharge to the patient's home setting. Based on the patient's AM-PAC score and their current ADL deficits, it is recommended that the patient have 5-7 sessions per week of Occupational Therapy at d/c to increase the patient's independence. At this time, this patient demonstrates the endurance, and/or tolerance for 3 hours of therapy each day, with a treatment frequency of 5-7x/wk. Please see assessment section for further patient specific details. If patient discharges prior to next session this note will serve as a discharge summary. Please see below for the latest assessment towards goals. OT Equipment Recommendations  Equipment Needed: No  Other: defer to d/c setting    Assessment   Performance deficits / Impairments: Decreased functional mobility ; Decreased ADL status; Decreased balance;Decreased strength;Decreased fine motor control;Decreased high-level IADLs;Decreased coordination  Assessment: Pt typically independent and lives alone. Pt now presents below her baseline related to motor control deficits on R (LE more affected than UE). Pt requires min-mod assist for balance during ADLs and mobility with walker. Pt motivated to work with therapy and regain independence. Recommend intensive OT tx at d/c  Treatment Diagnosis: Impaired ADLs, mobility and R motor control s/p CVA  Prognosis: Good  Decision Making: High Complexity  REQUIRES OT FOLLOW UP: Yes  Activity Tolerance  Activity Tolerance: Patient Tolerated treatment well  Safety Devices  Safety Devices in place: Yes  Type of devices: Nurse notified; Left in chair;Call light within reach;Chair alarm in place             Treatment Diagnosis: Impaired ADLs, mobility and R motor control s/p CVA      Restrictions  Position Activity Restriction  Other position/activity restrictions: up as tolerated    Subjective   General  Chart Reviewed: Yes  Patient assessed for rehabilitation services?: Yes  Additional Pertinent Hx: Pt presented 3/5 with R sided weakness than began 3/3. MRI: Acute infarct involving the deep white matter of the posterior left frontal lobe  PMHx: lupus  Family / Caregiver Present: No  Referring Practitioner: Ordiana Hernandez  Diagnosis: CVA  Subjective  Subjective: Pt sleeping in bed upon entry, agreeable to therapy. \"I feel like if I start moving and exercising my right side will do better\"   Pain: denied    Social/Functional History  Social/Functional History  Lives With: Alone  Type of Home: House  Home Layout: Two level,Able to Live on Main level with bedroom/bathroom,Laundry in basement  Home Access: Stairs to enter with rails  Entrance Stairs - Number of Steps: 5  Bathroom Shower/Tub: Tub/Shower unit  Bathroom Toilet: Standard (sink close for leverage)  Bathroom Equipment: Grab bars in [address]:  (no AD)  ADL Assistance: Independent (takes tub baths)  Homemaking Assistance: Independent  Ambulation Assistance: Independent  Transfer Assistance: Independent  Active : Yes  Occupation: Retired  Leisure & Hobbies: travel  Additional Comments: Denies recent falls       Objective   Vision: Within Functional Limits (denies recent vision changes)  Hearing: Within functional limits      Orientation  Overall Orientation Status: Within Functional Limits (stated year as 2020- did not correct with cuing)        Balance  Sitting Balance: Stand by assistance  Standing Balance: Minimal assistance (-cga during ADLs, R lean)  Functional Mobility  Functional - Mobility Device: No device  Activity: To/from bathroom  Assist Level:  Moderate assistance  Functional Mobility Comments: mobility to bathroom using no AD w/ L hand held assist- pt required min to mod A for balance; Trialed mobility from bathroom into hallway using RW mod A balance +assist stabilize L side of walker 2/2 R lean; axatic R LE    Toilet Transfers  Toilet - Technique: Ambulating  Equipment Used: Standard toilet  Toilet Transfer: Contact guard assistance  Toilet Transfers Comments: L grab bar, cues for hand placement when provided RW in bathroom    ADL  Feeding:  (reports occasional difficulty sustaining grasp of utensils, takes extra time)  Grooming: Contact guard assistance (wash face, brush teeth w/ min A standing balance at sink)  LE Dressing: Minimal assistance (doff/don underwear)  Toileting: Minimal assistance  Additional Comments: Noted DECREASED motor control and proprioception of R UE during ADLs- tasks require extra time, difficulty sustaining grasp of ADL items     Tone RUE  RUE Tone: Normotonic  Tone LUE  LUE Tone: Normotonic  Coordination  Movements Are Fluid And Coordinated: No  Coordination and Movement description: Right UE; Ataxia  Quality of Movement Other  Comment: Impaired thumb to digit opposition, impaired finger to nose testing, (+) pronator drift        Bed mobility  Supine to Sit: Supervision (HOB flat)     Transfers  Sit to stand: Contact guard assistance (bed, chair)  Stand to sit: Contact guard assistance (bed, chair)  Transfer Comments: cues for safe hand placement using walker        Cognition  Overall Cognitive Status: [facility]  Cognition Comment: Pt demo good insight into her current deficits, safety concerns and assist needed for safety           Sensation  Overall Sensation Status: [facility]        LUE AROM (degrees)  LUE AROM : [facility]  RUE AROM (degrees)  RUE AROM :  (shoulder AROM ~130 (reports pain and limited ROM x2 weeks); WF distally)  LUE Strength  Gross LUE Strength: WNL (5/5)  L Hand General: 5/5  RUE Strength  Gross RUE Strength:  (4+/5)  R Hand General: 4+/5     Hand Dominance  Hand Dominance: Right       Treatment consisted of:  ADLs, transfer training, education on activity promotion and fall precautions.       Plan   Plan  Times per week: 5-7  Current Treatment Recommendations: Functional Mobility Training,Safety Education & Training,Self-Care / ADL,Equipment Evaluation, Education, & procurement,Patient/Caregiver Education & Training,Strengthening,Balance Training,Neuromuscular Re-education      AM-PAC Score        AM-PAC Inpatient Daily Activity Raw Score: 16 (03/06/22 1332)  AM-PAC Inpatient ADL T-Scale Score : 35.96 (03/06/22 1332)  ADL Inpatient CMS 0-100% Score: 53.32 (03/06/22 1332)  ADL Inpatient CMS G-Code Modifier : CK (03/06/22 1332)    Goals  Short term goals  Time Frame for Short term goals: d/c  Short term goal 1: sba standing balance during grooming tasks at sink  Short term goal 2: sba bathroom mobility for ADLs using LRAD  Short term goal 3: independent HEP for R UE coordination and strength  Short term goal 4: sba LB dressing       Therapy Time   Individual Concurrent Group Co-treatment   Time In 0840         Time Out 0925         Minutes 45         Timed Code Treatment Minutes: 30 Minutes +15 min brayan Leon, OT

## 2022-03-06 NOTE — PLAN OF CARE
Problem: HEMODYNAMIC STATUS  Goal: Patient has stable vital signs and fluid balance  3/6/2022 0127 by Randee Call RN  Outcome: Ongoing     Vitals:    03/05/22 2258   BP: (!) 196/119   Pulse: 83   Resp: 16   Temp: 97.6 °F (36.4 °C)   SpO2: 99%       Problem: ACTIVITY INTOLERANCE/IMPAIRED MOBILITY  Goal: Mobility/activity is maintained at optimum level for patient  3/6/2022 0127 by Randee Call RN  Outcome: Ongoing   Ataxic gait RLE. CGA with GB  x1.  Problem: COMMUNICATION IMPAIRMENT  Goal: Ability to express needs and understand communication  3/6/2022 0127 by Randee Call RN  Outcome: Met This Shift   Baseline communication noted with no deficit.

## 2022-03-06 NOTE — PROGRESS NOTES
Hospitalist Progress Note      PCP: Rosa Trejo MD    Date of Admission: 3/5/2022    Chief Complaint: Loss of balance, dragging right foot    Subjective:      notice coordination is not quite right when she tries to brush her hair on the right side. She says she has a similar sensation with her R leg but she is able to move the right leg. Medications:  Reviewed    Infusion Medications   Scheduled Medications    enoxaparin  40 mg SubCUTAneous Daily    atorvastatin  80 mg Oral Nightly    aspirin  81 mg Oral Daily    clopidogrel  75 mg Oral Daily     PRN Meds: ondansetron **OR** ondansetron, polyethylene glycol, labetalol, perflutren lipid microspheres      Intake/Output Summary (Last 24 hours) at 3/6/2022 0842  Last data filed at 3/6/2022 0809  Gross per 24 hour   Intake 265 ml   Output 500 ml   Net -235 ml       Exam:    BP (!) 165/102   Pulse 70   Temp 98.1 °F (36.7 °C) (Oral)   Resp 16   Ht 5' 6\" (1.676 m)   Wt 149 lb 14.6 oz (68 kg)   SpO2 98%   BMI 24.20 kg/m²     General appearance: No apparent distress, appears stated age and cooperative. HEENT: Pupils equal, round, and reactive to light. Conjunctivae/corneas clear. Neck: Supple, with full range of motion. No jugular venous distention. Trachea midline. Respiratory:  Normal respiratory effort. Clear to auscultation, bilaterally without Rales/Wheezes/Rhonchi. Cardiovascular: Regular rate and rhythm with normal S1/S2 without murmurs, rubs or gallops. Abdomen: Soft, non-tender, non-distended with normal bowel sounds. Musculoskelatal: No clubbing, cyanosis or edema bilaterally. Skin: Skin color, texture, turgor normal.  No rashes or lesions. Neurologic:  Cranial nerves: II-XII intact. Strength is good bilaterally maybe slightly less on the RUE compared to left. Noted difficulty with bringing comb to her hair rather than her cheek or ear.    Psychiatric: Alert and oriented, thought content appropriate, normal insight    Labs: Recent Labs     03/05/22  1000 03/06/22  0635   WBC 5.2 4.6   HGB 14.1 14.9   HCT 41.1 41.7    324     Recent Labs     03/05/22  1000      K 3.8      CO2 28   BUN 16   CREATININE 0.8   CALCIUM 10.4     Recent Labs     03/05/22  1000   AST 15   ALT 16   BILITOT 0.4   ALKPHOS 104     Recent Labs     03/05/22  1000   INR 1.00     Recent Labs     03/05/22  1000   TROPONINI <0.01       Studies:  MRI brain without contrast   Final Result      Acute infarct involving the deep white matter of the posterior left frontal lobe. No significant mass effect or evidence of hemorrhage. Extensive chronic small vessel ischemic changes. XR CHEST PORTABLE   Final Result      No acute disease. CTA HEAD NECK W CONTRAST   Final Result      No evidence of flow-limiting stenosis. PROCEDURE: CT ANGIOGRAPHY HEAD WITH/WITHOUT CONTRAST      INDICATION: Stroke Symptoms      COMPARISON: none      TECHNIQUE: Axial CT imaging obtained through the head prior to and following administration of IV contrast. Axial images, multiplanar reformatted images, and maximum intensity projection images were reviewed for CT angiographic technique. IV contrast: mL Omnipaque 350. FINDINGS:      ANTERIOR CIRCULATION: Major intracranial arteries are patent. Atherosclerotic irregularity with mild narrowing of bilateral ICA cavernous/paraclinoid segments. 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 POSTERIOR CIRCULATION: The bilateral vertebral arteries, basilar artery and posterior cerebral arteries demonstrate no occlusion or stenosis. No evidence for aneurysm or arteriovenous malformation. INTRACRANIAL VENOUS SYSTEM: No evidence for intracranial venous thrombosis. IMPRESSION:      No evidence of intracranial large vessel occlusion.       Atherosclerotic irregularity with mild narrowing of bilateral ICA

## 2022-03-07 LAB
CHOLESTEROL, TOTAL: 209 MG/DL (ref 0–199)
ESTIMATED AVERAGE GLUCOSE: 79.6 MG/DL
HBA1C MFR BLD: 4.4 %
HDLC SERPL-MCNC: 70 MG/DL (ref 40–60)
INFLUENZA A: NOT DETECTED
INFLUENZA B: NOT DETECTED
LDL CHOLESTEROL CALCULATED: 126 MG/DL
LV EF: 63 %
LVEF MODALITY: NORMAL
SARS-COV-2 RNA, RT PCR: NOT DETECTED
TRIGL SERPL-MCNC: 66 MG/DL (ref 0–150)
VLDLC SERPL CALC-MCNC: 13 MG/DL

## 2022-03-07 PROCEDURE — 99222 1ST HOSP IP/OBS MODERATE 55: CPT | Performed by: PHYSICAL MEDICINE & REHABILITATION

## 2022-03-07 PROCEDURE — 2500000003 HC RX 250 WO HCPCS: Performed by: NURSE PRACTITIONER

## 2022-03-07 PROCEDURE — 81241 F5 GENE: CPT

## 2022-03-07 PROCEDURE — 87636 SARSCOV2 & INF A&B AMP PRB: CPT

## 2022-03-07 PROCEDURE — C8929 TTE W OR WO FOL WCON,DOPPLER: HCPCS

## 2022-03-07 PROCEDURE — 97530 THERAPEUTIC ACTIVITIES: CPT

## 2022-03-07 PROCEDURE — 2500000003 HC RX 250 WO HCPCS: Performed by: INTERNAL MEDICINE

## 2022-03-07 PROCEDURE — 6360000002 HC RX W HCPCS: Performed by: INTERNAL MEDICINE

## 2022-03-07 PROCEDURE — 86146 BETA-2 GLYCOPROTEIN ANTIBODY: CPT

## 2022-03-07 PROCEDURE — 2060000000 HC ICU INTERMEDIATE R&B

## 2022-03-07 PROCEDURE — 99232 SBSQ HOSP IP/OBS MODERATE 35: CPT | Performed by: NURSE PRACTITIONER

## 2022-03-07 PROCEDURE — 81240 F2 GENE: CPT

## 2022-03-07 PROCEDURE — 85613 RUSSELL VIPER VENOM DILUTED: CPT

## 2022-03-07 PROCEDURE — 81400 MOPATH PROCEDURE LEVEL 1: CPT

## 2022-03-07 PROCEDURE — 81291 MTHFR GENE: CPT

## 2022-03-07 PROCEDURE — 86147 CARDIOLIPIN ANTIBODY EA IG: CPT

## 2022-03-07 PROCEDURE — 6370000000 HC RX 637 (ALT 250 FOR IP): Performed by: NURSE PRACTITIONER

## 2022-03-07 PROCEDURE — 36415 COLL VENOUS BLD VENIPUNCTURE: CPT

## 2022-03-07 PROCEDURE — 85598 HEXAGNAL PHOSPH PLTLT NEUTRL: CPT

## 2022-03-07 PROCEDURE — 85730 THROMBOPLASTIN TIME PARTIAL: CPT

## 2022-03-07 PROCEDURE — 6370000000 HC RX 637 (ALT 250 FOR IP): Performed by: INTERNAL MEDICINE

## 2022-03-07 RX ORDER — CLOPIDOGREL BISULFATE 75 MG/1
75 TABLET ORAL DAILY
Qty: 20 TABLET | Refills: 0 | Status: ON HOLD | OUTPATIENT
Start: 2022-03-07 | End: 2022-03-08

## 2022-03-07 RX ORDER — CLONIDINE HYDROCHLORIDE 0.1 MG/1
0.1 TABLET ORAL EVERY 8 HOURS PRN
Status: DISCONTINUED | OUTPATIENT
Start: 2022-03-07 | End: 2022-03-07

## 2022-03-07 RX ORDER — ASPIRIN 81 MG/1
81 TABLET ORAL DAILY
Qty: 30 TABLET | Refills: 3 | Status: ON HOLD | OUTPATIENT
Start: 2022-03-07 | End: 2022-03-18 | Stop reason: HOSPADM

## 2022-03-07 RX ORDER — HYDROCHLOROTHIAZIDE 25 MG/1
25 TABLET ORAL DAILY
Status: DISCONTINUED | OUTPATIENT
Start: 2022-03-07 | End: 2022-03-08 | Stop reason: HOSPADM

## 2022-03-07 RX ORDER — LABETALOL HYDROCHLORIDE 5 MG/ML
20 INJECTION, SOLUTION INTRAVENOUS EVERY 4 HOURS PRN
Status: DISCONTINUED | OUTPATIENT
Start: 2022-03-07 | End: 2022-03-07

## 2022-03-07 RX ORDER — HYDROCHLOROTHIAZIDE 25 MG/1
25 TABLET ORAL DAILY
Qty: 30 TABLET | Refills: 3 | Status: ON HOLD | OUTPATIENT
Start: 2022-03-07 | End: 2022-03-08

## 2022-03-07 RX ORDER — CLONIDINE HYDROCHLORIDE 0.1 MG/1
0.1 TABLET ORAL EVERY 8 HOURS PRN
Status: DISCONTINUED | OUTPATIENT
Start: 2022-03-07 | End: 2022-03-08 | Stop reason: HOSPADM

## 2022-03-07 RX ORDER — ATORVASTATIN CALCIUM 80 MG/1
80 TABLET, FILM COATED ORAL NIGHTLY
Qty: 30 TABLET | Refills: 3 | Status: SHIPPED | OUTPATIENT
Start: 2022-03-07

## 2022-03-07 RX ORDER — LISINOPRIL 5 MG/1
5 TABLET ORAL DAILY
Status: DISCONTINUED | OUTPATIENT
Start: 2022-03-07 | End: 2022-03-08 | Stop reason: HOSPADM

## 2022-03-07 RX ADMIN — ENOXAPARIN SODIUM 40 MG: 100 INJECTION SUBCUTANEOUS at 09:38

## 2022-03-07 RX ADMIN — LISINOPRIL 5 MG: 5 TABLET ORAL at 13:30

## 2022-03-07 RX ADMIN — LABETALOL HYDROCHLORIDE 20 MG: 5 INJECTION, SOLUTION INTRAVENOUS at 13:31

## 2022-03-07 RX ADMIN — LABETALOL HYDROCHLORIDE 20 MG: 5 INJECTION, SOLUTION INTRAVENOUS at 02:45

## 2022-03-07 RX ADMIN — LABETALOL HYDROCHLORIDE 20 MG: 5 INJECTION, SOLUTION INTRAVENOUS at 09:38

## 2022-03-07 RX ADMIN — ASPIRIN 81 MG: 81 TABLET, COATED ORAL at 09:39

## 2022-03-07 RX ADMIN — CLOPIDOGREL BISULFATE 75 MG: 75 TABLET ORAL at 09:39

## 2022-03-07 RX ADMIN — LABETALOL HYDROCHLORIDE 20 MG: 5 INJECTION, SOLUTION INTRAVENOUS at 06:27

## 2022-03-07 RX ADMIN — ATORVASTATIN CALCIUM 80 MG: 80 TABLET, FILM COATED ORAL at 20:22

## 2022-03-07 RX ADMIN — HYDROCHLOROTHIAZIDE 25 MG: 25 TABLET ORAL at 09:39

## 2022-03-07 ASSESSMENT — PAIN SCALES - GENERAL: PAINLEVEL_OUTOF10: 0

## 2022-03-07 NOTE — CARE COORDINATION
Case Management Assessment           Initial Evaluation                Date / Time of Evaluation: 3/7/2022 11:53 AM                 Assessment Completed by: Diony Smyth RN    Patient Name: Bhargav Larose     YOB: 1945  Diagnosis: TIA (transient ischemic attack) [G45.9]  Acute cerebrovascular accident (CVA) due to ischemia [facility]) [I63.9]     Date / Time: 3/5/2022  9:22 AM    Patient Admission Status: Inpatient    If patient is discharged prior to next notation, then this note serves as note for discharge by case management. Current PCP: Prasanna Cárdenas MD  Clinic Patient: No    Chart Reviewed: Yes  Patient/ Family Interviewed: Yes    Initial assessment completed at bedside with: daughter, Marquis Jackson; pt out of room for test    Hospitalization in the last 30 days: No    Emergency Contacts:  Extended Emergency Contact Information  Primary Emergency Contact: Dimas Buckley, [address] Phone: [phone]  Mobile Phone: [phone]  Relation: Child    Advance Directives:   Code Status: Full Code       Financial  Payor: Chaparro Berrios / Plan: MEDICARE PART A AND B / Product Type: *No Product type* /     Pre-cert required for SNF: No    Pharmacy    [id number] [facility]. - P [phone] - F [phone]  [address] [place] [id number]  Phone: [phone] Fax: [phone]      Potential assistance Purchasing Medications: Potential Assistance Purchasing Medications: No  Does Patient want to participate in local refill/ meds to beds program?:      Meds To Beds General Rules:  1. Can ONLY be done Monday- Friday between 8:30am-5pm  2. Prescription(s) must be in pharmacy by 3pm to be filled same day  3. Copy of patient's insurance/ prescription drug card and patient face sheet must be sent along with the prescription(s)  4. Cost of Rx cannot be added to hospital bill. If financial assistance is needed, please contact unit  or ;   or Social Worker CANNOT provide pharmacy voucher for patients co-pays  5. Patients can then  the prescription on their way out of the hospital at discharge, or pharmacy can deliver to the bedside if staff is available. (payment due at time of pick-up or delivery - cash, check, or card accepted)     Able to afford home medications/ co-pay costs: Yes    ADLS  Support Systems: Family Members    PT AM-PAC: 14 /24  OT AM-PAC: 16 /24    [place]: home with grandedna  Steps:  5    Plans to RETURN to current housing: No  Barriers to RETURNING to current housing: medical stability    Home Care Information  Currently ACTIVE with [address]: No  Home Care Agency: Not Applicable    Currently ACTIVE with [place] on Aging: No  Passport/ Waiver: No  Passport/ Waiver Services: Not Applicable           Durable Medical Equipment  DME Provider:    Equipment: none    Home Oxygen and [address] prior to admission: No  [address]: Not Applicable  Other Respiratory Equipment:        Dialysis  Active with HD/PD prior to admission: No  Nephrologist:      HD Center:  Not Applicable    DISCHARGE PLAN:  Disposition: Inpatient Rehab: [facility]. Phone:   Fax:      Transportation PLAN for discharge: family     Factors facilitating achievement of predicted outcomes: Family support, Motivated, Cooperative and Pleasant    Barriers to discharge: medical stability; still receiving IV labetalol    Additional Case Management Notes:  CM met with daughter, Belkis Canela. Pt off unit for testing. Per Belkis Canela they have discussed ARU and pt is agreeable. Belkis Rola states pt independent prior to admission. Pt still drives, uses no equipment at home. Ana lives with pt. Belkis Canela can stay with patient as well. ARU- pt is appropriate but cannot receive IV labetalol for 24 hours.     The Plan for Transition of Care is related to the following treatment goals of TIA (transient ischemic attack) [G45.9]  Acute cerebrovascular accident (CVA) due to ischemia [facility]) [I63.9]    The Patient and/or patient representative Inderjit Jackson and her family were provided with a choice of provider and agrees with the discharge plan Yes    Freedom of choice list was provided with basic dialogue that supports the patient's individualized plan of care/goals and shares the quality data associated with the providers.  Yes    Care Transition patient: No    Jim Ayala RN  [facility]  Case Management Department  Ph: [phone]

## 2022-03-07 NOTE — PROGRESS NOTES
[facility]   After review, this patient is felt to be:       []  Appropriate for Acute Inpatient Rehab    []  Appropriate for Acute Inpatient Rehab Pending Insurance Authorization    []  Not appropriate for Acute Inpatient Rehab    [x]  Referral received and ARU reviewing patient- pt would need to be free of Labetalol for 24 hrs. Dr. Meryle Olmsted to evaluate patient today 3/7/2022. Will notify CM with further updates.  Thank you for the referral.    Elma IBARRA, OTR/L  Clinical Liaison- [facility]   (P): [phone]  (F): [phone]

## 2022-03-07 NOTE — PROGRESS NOTES
Hospitalist Progress Note      PCP: Sandip Huang MD    Date of Admission: 3/5/2022    Subjective:    Reports weakness of right side as well as instability. No headache. No dizziness. No cp or sob. No n/v.    Medications:  Reviewed    Infusion Medications   Scheduled Medications    hydroCHLOROthiazide  25 mg Oral Daily    lisinopril  5 mg Oral Daily    enoxaparin  40 mg SubCUTAneous Daily    atorvastatin  80 mg Oral Nightly    aspirin  81 mg Oral Daily    clopidogrel  75 mg Oral Daily     PRN Meds: labetalol, ondansetron **OR** ondansetron, polyethylene glycol, perflutren lipid microspheres      Intake/Output Summary (Last 24 hours) at 3/7/2022 1319  Last data filed at 3/6/2022 1641  Gross per 24 hour   Intake    Output 300 ml   Net -300 ml       Physical Exam Performed:    BP (!) 154/102   Pulse 76   Temp 98 °F (36.7 °C) (Oral)   Resp 14   Ht 5' 6\" (1.676 m)   Wt 149 lb 14.6 oz (68 kg)   SpO2 96%   BMI 24.20 kg/m²     General appearance: No apparent distress, appears stated age and cooperative. HEENT: Conjunctivae/corneas clear. Neck: Supple, with full range of motion. Respiratory:  Normal respiratory effort. Clear to auscultation  Cardiovascular: Regular rate and rhythm   Abdomen: Soft, non-tender, non-distended with normal bowel sounds. Musculoskeletal: No clubbing, cyanosis or edema bilaterally. Full range of motion without deformity. Skin: Skin color, texture, turgor normal.  No rashes or lesions.   Neurologic:  Right sided ue 4/5, lower ext 3/5, left wnl  Psychiatric: Alert and oriented  Peripheral Pulses: +2 palpable, equal bilaterally       Labs:   Recent Labs     03/05/22  1000 03/06/22  0635   WBC 5.2 4.6   HGB 14.1 14.9   HCT 41.1 41.7    324     Recent Labs     03/05/22  1000      K 3.8      CO2 28   BUN 16   CREATININE 0.8   CALCIUM 10.4     Recent Labs     03/05/22  1000   AST 15   ALT 16   BILITOT 0.4   ALKPHOS 104     Recent Labs     03/05/22  1000 INR 1.00     Recent Labs     03/05/22  1000   TROPONINI <0.01       Urinalysis:    No results found for: Cathalene Bookman, BACTERIA, RBCUA, BLOODU, [address], GLUCOSEU    Radiology:  MRI brain without contrast   Final Result      Acute infarct involving the deep white matter of the posterior left frontal lobe. No significant mass effect or evidence of hemorrhage. Extensive chronic small vessel ischemic changes. XR CHEST PORTABLE   Final Result      No acute disease. CTA HEAD NECK W CONTRAST   Final Result      No evidence of flow-limiting stenosis. PROCEDURE: CT ANGIOGRAPHY HEAD WITH/WITHOUT CONTRAST      INDICATION: Stroke Symptoms      COMPARISON: none      TECHNIQUE: Axial CT imaging obtained through the head prior to and following administration of IV contrast. Axial images, multiplanar reformatted images, and maximum intensity projection images were reviewed for CT angiographic technique. IV contrast: mL Omnipaque 350. FINDINGS:      ANTERIOR CIRCULATION: Major intracranial arteries are patent. Atherosclerotic irregularity with mild narrowing of bilateral ICA cavernous/paraclinoid segments. 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 POSTERIOR CIRCULATION: The bilateral vertebral arteries, basilar artery and posterior cerebral arteries demonstrate no occlusion or stenosis. No evidence for aneurysm or arteriovenous malformation. INTRACRANIAL VENOUS SYSTEM: No evidence for intracranial venous thrombosis. IMPRESSION:      No evidence of intracranial large vessel occlusion. Atherosclerotic irregularity with mild narrowing of bilateral ICA cavernous/paraclinoid segments. 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 CT HEAD WO CONTRAST   Final Result      No CT evidence of acute stroke.       Critical results reported to clinical team at 10:20 AM. Assessment/Plan:    Active Hospital Problems    Diagnosis     Acute cerebrovascular accident (CVA) due to ischemia [facility]) [I63.9]    MRI confirmed acute posterior left frontal lobe infarct. Stroke team called and pt started on dapt- plavix x 21 days per neuro and then asa only thereafter- appreciate neuro help. PT/OT/ Speech eval and recs noted and appreciated. Echo shows nml ef w/ negative bubble study. HTN emergency:  Likely 2/2 acute cva- hctz started this am/ bp remains elevated requiring iv labetalol- add lisinopril. Change labetalol to prn. DVT Prophylaxis: Lovenox  Diet: ADULT DIET; Regular; Low Sodium (2 gm)  Code Status: Full Code    PT/OT Eval Status: 14/16 respectively    Dispo - ARU oce medically stable w/ bp control not requiring iv meds.       Saeid Tomlin MD

## 2022-03-07 NOTE — CONSULTS
Occupational History    None   Tobacco Use    Smoking status: Never Smoker    Smokeless tobacco: Never Used   Vaping Use    Vaping Use: Never used   Substance and Sexual Activity    Alcohol use: Yes    Drug use: Never    Sexual activity: Not Currently   Other Topics Concern    None   Social History Narrative    None     Social Determinants of Health     Financial Resource Strain:     Difficulty of Paying Living Expenses: Not on file   Food Insecurity:     Worried About Running Out of Food in the Last Year: Not on file    Errol of Food in the Last Year: Not on file   Transportation Needs:     Lack of Transportation (Medical): Not on file    Lack of Transportation (Non-Medical):  Not on file   Physical Activity:     Days of Exercise per Week: Not on file    Minutes of Exercise per Session: Not on file   Stress:     Feeling of Stress : Not on file   Social Connections:     Frequency of Communication with Friends and Family: Not on file    Frequency of Social Gatherings with Friends and Family: Not on file    Attends Faith Services: Not on file    Active Member of [address] [place] [address] or Organizations: Not on file    Attends Club or Organization Meetings: Not on file    Marital Status: Not on file   Intimate Partner Violence:     Fear of Current or Ex-Partner: Not on file    Emotionally Abused: Not on file    Physically Abused: Not on file    Sexually Abused: Not on file   Housing Stability:     Unable to Pay for Housing in the Last Year: Not on file    Number of [place] in the Last Year: Not on file    Unstable Housing in the Last Year: Not on file           REVIEW OF SYSTEMS:   CONSTITUTIONAL: negative for fevers, chills, diaphoresis, appetite change, night sweats, unexpected weight change, fatigue  EYES: negative for blurred vision, eye discharge, visual disturbance and icterus  HEENT: negative for hearing loss, tinnitus, ear drainage, sinus pressure, nasal congestion, epistaxis and snoring  RESPIRATORY: Negative for hemoptysis, cough, sputum production  CARDIOVASCULAR: negative for chest pain, palpitations, exertional chest pressure/discomfort, syncope, edema  GASTROINTESTINAL: negative for nausea, vomiting, diarrhea, blood in stool, abdominal pain, constipation  GENITOURINARY: negative for frequency, dysuria, urinary incontinence, decreased urine volume, and hematuria  HEMATOLOGIC/LYMPHATIC: negative for easy bruising, bleeding and lymphadenopathy  ALLERGIC/IMMUNOLOGIC: negative for recurrent infections, angioedema, anaphylaxis and drug reactions  ENDOCRINE: negative for weight changes and diabetic symptoms including polyuria, polydipsia and polyphagia  MUSCULOSKELETAL: negative for pain, joint swelling, decreased range of motion  NEUROLOGICAL: negative for headaches, slurred speech, unilateral weakness  PSYCHIATRIC/BEHAVIORAL: negative for hallucinations, behavioral problems, confusion and agitation. Physical Examination:  Vitals:   Patient Vitals for the past 24 hrs:   BP Temp Temp src Pulse Resp SpO2   03/07/22 0930 (!) 166/96 98.9 °F (37.2 °C) Oral  16 96 %   03/07/22 0626 (!) 145/80 99 °F (37.2 °C) Oral 89 16 96 %   03/07/22 0315 (!) 151/77 99.1 °F (37.3 °C) Oral 92 16 96 %   03/06/22 2203 (!) 177/99 99.3 °F (37.4 °C) Oral 91 16 97 %   03/06/22 1830 (!) 186/92 99.1 °F (37.3 °C) Oral 86 16 98 %   03/06/22 1530 (!) 191/102 98.2 °F (36.8 °C) Oral 82 18 97 %   03/06/22 1414 (!) 199/102   98       Const: Alert. WDWN. No distress  Eyes: Conjunctiva noninjected, no icterus noted; pupils equal, round, and reactive to light. HENT: Atraumatic, normocephalic; Oral mucosa moist  Neck: Trachea midline, neck supple. No thyromegaly noted. CV: Regular rate and rhythm, no murmur rub or gallop noted  Resp: Lungs clear to auscultation bilaterally, no rales wheezes or ronchi, no retractions. Respirations unlabored. GI: Soft, nontender, nondistended. Normal bowel sounds. No palpable masses. Skin: Normal temperature and turgor. No rashes or breakdown noted. Ext: No significant edema appreciated. No varicosities. MSK: No joint tenderness, erythema, warmth noted. AROM intact. Neuro: Alert, oriented, appropriate. No cranial nerve deficits appreciated. Sensation intact to light touch. Motor examination reveals normal strength in all four limbs diffusely. No abnormalities with finger/nose or heel/shin noted. Reflexes normal and symmetric. Psych: Stable mood, normal judgement, normal affect     Lab Results   Component Value Date    WBC 4.6 03/06/2022    HGB 14.9 03/06/2022    HCT 41.7 03/06/2022    MCV 91.3 03/06/2022     03/06/2022     Lab Results   Component Value Date    INR 1.00 03/05/2022    PROTIME 11.3 03/05/2022     Lab Results   Component Value Date    CREATININE 0.8 03/05/2022    BUN 16 03/05/2022     03/05/2022    K 3.8 03/05/2022     03/05/2022    CO2 28 03/05/2022     Lab Results   Component Value Date    ALT 16 03/05/2022    AST 15 03/05/2022    ALKPHOS 104 03/05/2022    BILITOT 0.4 03/05/2022       Most recent echocardiogram revealed:  Pending    Most recent EKG revealed:  Normal sinus rhythm rate of 72 with normal axis. [place], QRS, QTc not prolonged. No ST elevations. Nonspecific T wave flattening in aVL. No prior for comparison. Most recent imaging studies revealed:  Acute infarct involving the deep white matter of the posterior left frontal lobe. On my review, CXR displays no acute disease. MRI brain without contrast   Final Result      Acute infarct involving the deep white matter of the posterior left frontal lobe. No significant mass effect or evidence of hemorrhage. Extensive chronic small vessel ischemic changes. XR CHEST PORTABLE   Final Result      No acute disease. CTA HEAD NECK W CONTRAST   Final Result      No evidence of flow-limiting stenosis.             PROCEDURE: CT ANGIOGRAPHY HEAD WITH/WITHOUT lowering allowing for permissive HTN  - Monitor    Impairments- Decreased functional mobility, Decreased ADLs    Recommendations:  Admit ARU when medically stable  - will require DC of IV labetalol     Patient with new functional deficits and ongoing medical complexity. Demonstrates ability to tolerate 3 hours therapy/day. Patient is a good candidate for acute inpatient rehab when medically appropriate. Thank you for this consult. Please contact me with any questions or concerns. Lisandra Velasco DO  PGY-2, Internal Medicine    3/7/2022  11:15 AM    This patient has been seen, examined, and discussed with the resident. This note has been altered to reflect my own examination findings, impression, and recommendations.      Tami Ramos D.O. M.P.H  PM&R  3/7/2022  1:09 PM

## 2022-03-07 NOTE — PROGRESS NOTES
to maximize her functional independence. Pt's daughter Davida Daigle) present during therapy session, very supportive of pt & engaged in session--reports being interested in having pt DC to ARU & will assist pt s/p DC from there. Treatment Diagnosis: impaired mobility associated with CVA  Prognosis: Good  PT Education: Transfer Training;PT Role;Functional Mobility Training;General Safety;Gait Training  Patient Education: Pt & pt's family v.u  Barriers to Learning: n/a  REQUIRES PT FOLLOW UP: Yes  Activity Tolerance  Activity Tolerance: Patient Tolerated treatment well  Activity Tolerance: Limited in therapy session 2/2 arrival of transport     Patient Diagnosis(es): The encounter diagnosis was TIA (transient ischemic attack). has a past medical history of Lupus (HCC) and MVP (mitral valve prolapse). has a past surgical history that includes Tonsillectomy; Colonoscopy; and Hysterectomy. Restrictions  Position Activity Restriction  Other position/activity restrictions: up as tolerated  Subjective   General  Chart Reviewed: Yes  Additional Pertinent Hx: Patient is a 76 y/o female admitted 3/5 with right leg weakness and unsteadiness. CT head and chest x-ray (-) for acute findings. MRI brain (+) for Acute infarct involving the deep white matter of the posterior left frontal lobe. PMH significant for lupus. Response To Previous Treatment: Patient with no complaints from previous session. Family / Caregiver Present: Yes (daughter)  Referring Practitioner: Katherine Rodriguez DO  Subjective  Subjective: Patient agreable to PT tx. General Comment  Comments: Patient supine in bed upon arrival. Hospital transport present to take pt to imaging.   Pain Screening  Patient Currently in Pain: Denies  Vital Signs  Patient Currently in Pain: Denies       Orientation  Orientation  Overall Orientation Status: Within Normal Limits       Objective   Bed mobility  Supine to Sit: Contact guard assistance (with HOB elevated.)  Sit to Supine: Minimal assistance (wtih HOB elevated & +assistance for RLE advancement.)  Transfers  Sit to Stand: Minimal Assistance (up to RW. VCs for hand placement & +++safety)  Stand to sit: Minimal Assistance (with cues for hand placement & safety.)  Ambulation  Ambulation?: Yes  WB Status: n/a  More Ambulation?: No  Ambulation 1  Surface: level tile  Device: Rolling Walker  Assistance: Minimal assistance; Moderate assistance (2 person)   Quality of Gait: NBOS with periods of scissoring, [place] assist for RUE placement on RW, variable step length/stride length with signifcant ataxic pattern of the RLE. No overt LOB however pt very unsteady with ambulation  Distance: 5'  Comments: inc'd assistance for RW management, step by step cues for sequencing of the LEs & for safety.      Balance  Posture: Fair  Sitting - Static:  (S at EOB)  Sitting - Dynamic:  (S at EOB)  Standing - Static:  ([place] with RW)  Standing - Dynamic:  ([place]<>ModA wtih RW)                            AM-PAC Score  AM-PAC Inpatient Mobility Raw Score : 14 (03/07/22 1011)  AM-PAC Inpatient T-Scale Score : 38.1 (03/07/22 1011)  Mobility Inpatient CMS 0-100% Score: 61.29 (03/07/22 1011)  Mobility Inpatient CMS G-Code Modifier : CL (03/07/22 1011)          Goals  Short term goals  Time Frame for Short term goals: discharge  Short term goal 1: patient will perform bed mobility with modified independence--ongoing  Short term goal 2: patient will perform transfers sit<>stand with SBA---ongoing  Short term goal 3: patient will ambulate 80' with FWW and CGA--ongoing  Patient Goals   Patient goals : to go home    Plan    Plan  Times per week: 5-7  Current Treatment Recommendations: Strengthening,Transfer Training,Endurance Training,Patient/Caregiver Education & Training,Balance Training,Gait Training,Functional Mobility Training,Safety Education & Zadie Latin Re-education,Stair training,Equipment Evaluation, Education, & procurement  Safety Devices  Type of devices:  (Pt left on stretcher with hospital transporter.  Hospital transporter, bedside RN & family & at bedside)  Restraints  Initially in place: No     Therapy Time   Individual Concurrent Group Co-treatment   Time In 0944         Time Out 0953         Minutes 9         Timed Code Treatment Minutes: 8 [place], PT

## 2022-03-07 NOTE — PROGRESS NOTES
NIH 1 for R upper and lower ataxia. Low grade temp 99. 3. will call if temp meets parameters for tylenol. BP elevated and labetalol given. Pt up x1 walker and GB. Voids WNL. Tolerates diet. Expects ECHO today. Bed alarm set. Call light in reach. Will continue to monitor.   Addendum: BPs trending down

## 2022-03-07 NOTE — PROGRESS NOTES
NEUROLOGY / NEUROCRITICAL CARE PROGRESS NOTE       Patient Name: Lauren Murray YOB: 1945   Sex: Female Age: 77 yrs     CC / Reason for Consult: RLE weakness     Interval Hx / Changes over last 24 hours:     Awaiting Echo. Evaluated by PT/OT yesterday and appropriate for inpatient rehab. ROS: no new complaints    HISTORY   Admission HPI:   Beatris Frye is a 77 y.o. y/o female with PMH significant for lupus, mitral valve prolapse, and stated HTN. Per my interview with the patient she started experience RLE weakness and was dragging her R foot behind her starting on Thursday. Initially it waxed, and waned, but has become more consistent since admission on 3/5/22. PMH Past Medical History:   Diagnosis Date    Lupus ([address]     MVP (mitral valve prolapse)       Allergies Allergies   Allergen Reactions    Codeine     Iodine       Diet ADULT DIET; Regular; Low Sodium (2 gm)   Isolation No active isolations     LABS   Metabolic Panel Recent Labs     03/05/22  1000      K 3.8      CO2 28   BUN 16   CREATININE 0.8   GLUCOSE 122*   CALCIUM 10.4   LABALBU 4.0   ALKPHOS 104   ALT 16   AST 15      CBC / Coags Recent Labs     03/05/22  1000 03/06/22  0635   WBC 5.2 4.6   RBC 4.39 4.57   HGB 14.1 14.9   HCT 41.1 41.7    324   INR 1.00  --       Other No results for input(s): LABA1C, LDLCALC, TRIG, TSH, [id number], FOLATE, LABSALI, COVID19 in the last 72 hours. No results for input(s): PHENYTOIN, KEPPRA, LACOSA, LAMO, VALPROATE, LACTSEPSIS, LACTA in the last 72 hours. CURRENT SCHEDULED MEDICATIONS   Inpatient Medications     labetalol, 20 mg, IntraVENous, Q4H    enoxaparin, 40 mg, SubCUTAneous, Daily    atorvastatin, 80 mg, Oral, Nightly    aspirin, 81 mg, Oral, Daily    clopidogrel, 75 mg, Oral, Daily   Infusions      Antibiotics   Recent Abx Admin      No antibiotic orders with administrations found.                   DIAGNOSTICS   IMAGES:  Images personally reviewed and agree w/ radiology interpretation. CTA of Head / Neck w/ Contrast:  No evidence of intracranial large vessel occlusion. Atherosclerotic irregularity with mild narrowing of bilateral ICA cavernous/paraclinoid segments. 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 MRI Brain w/o Contrast:  Acute infarct involving the deep white matter of the posterior left frontal lobe. No significant mass effect or evidence of hemorrhage. Extensive chronic small vessel ischemic changes.        PHYSICAL EXAMINATION     PHYSICAL EXAM:  Vitals:    03/06/22 1830 03/06/22 2203 03/07/22 0315 03/07/22 0626   BP: (!) 186/92 (!) 177/99 (!) 151/77 (!) 145/80   Pulse: 86 91 92 89   Resp: 16 16 16 16   Temp: 99.1 °F (37.3 °C) 99.3 °F (37.4 °C) 99.1 °F (37.3 °C) 99 °F (37.2 °C)   TempSrc: Oral Oral Oral Oral   SpO2: 98% 97% 96% 96%   Weight:       Height:             General: Alert, no distress, well-nourished  Neurologic  Mental status:   orientation to person, place, time, situation   Attention intact as able to attend well to the exam     Language fluent in conversation   Comprehension intact; follows simple commands    Cranial nerves:   CN2: Visual fields full w/o extinction on confrontational testing   CN 3,4,6: Pupils equal and reactive to light, extraocular muscles intact  CN5: Facial sensation symmetric   CN7: Face symmetric bilaterally   CN8: Hearing symmetric to spoken voice  CN9: Palate elevated symmetrically  CN11: Traps full strength on shoulder shrug  CN12: Tongue midline with protrusion    Motor Exam:   R  L    Deltoid 5 5   Biceps 5 5   Triceps 5 5   Interossei 5 5      R  L    Hip flexion  4 5   Knee extension  4 5   Ankle dorsiflexion  4 5   Ankle plantar flexion  4 5           OTHER SYSTEMS:  Cardiovascular: Warm, appears well perfused   Respiratory: Easy, non-labored respiratory pattern   Abdominal: Abdomen is without distention   Extremities: Upper and lower extremities are atraumatic in appearance without deformity. No swelling or erythema. ASSESSMENT & RECOMMENDATIONS   Assessment: 77year old with PMH of HTN, lupus who presents with right leg weakness and was found to have a deep left sided stroke that appears small vessel in nature. Modification of risk factors will be the most important step moving forward. Plan:  -Echo pending  -HbA1C and Lipid panel in process  -DAPT with 81mg ASA and 75mg plavix PO daily for 21 days, followed by monotherapy with 81mg ASA  -Goal BP < 160/90. Will need to be started on oral medication. Started on 25mg hydrochlorothiazide PO daily  -80mg atorvastatin PO daily  -PT/OT  -PMR consult      Clinic follow-up: Follow up with Dr. Royce Galindo in 3 months.          [facility], [facility] - CNP   Neurology & Neurocritical Care   Neurology Line: [phone]  [employer]: [facility] Neurology & Neuro Critical Care NPs  3/7/2022 8:23 AM

## 2022-03-07 NOTE — CONSULTS
Clinical Pharmacy Progress Note    IV in NS - Management by Pharmacy    Consult Date(s): 3/5  Consulting Provider(s): Dr. Иван Gamble / Plan  Stroke work up  All IVs in Normal Saline   Drips will be adjusted to normal saline as appropriate based on compatibility, in an effort to avoid fluid shifts, as D5W is osmotically active.  The following intermittent IV drips / infusions have been adjusted to saline:  o None at this time   Note: Patient has dextrose ordered as part of hypoglycemia treatment protocol.  Total IV fluid delivered to patient over last 24 hrs: None - just IV flushes. As patient is not being treated for pituitary tumor resection or requiring hypertonic saline (defined as >0.9% NaCl), pharmacy will sign off of IV review consult, per protocol.       Please call with questions--  Anai Eckert PharmD, BCPS  Wireless: [id number]   3/7/2022 12:03 PM

## 2022-03-07 NOTE — PLAN OF CARE
Problem: HEMODYNAMIC STATUS  Goal: Patient has stable vital signs and fluid balance  Outcome: Ongoing     Vitals:    03/06/22 2203   BP: (!) 177/99   Pulse: 91   Resp: 16   Temp: 99.3 °F (37.4 °C)   SpO2: 97%   Labetalol given pr orders    Problem: ACTIVITY INTOLERANCE/IMPAIRED MOBILITY  Goal: Mobility/activity is maintained at optimum level for patient  Outcome: Ongoing   Ataxic gait R side. FWW, GB x1  Problem: Falls - Risk of:  Goal: Will remain free from falls  Description: Will remain free from falls  Outcome: Met This Shift   Pt free from injury this shift and free of falls. 2/4 rails up on bed and bed is in the lowest position. Wheels locked and bed alarm set. Socks on pt and ID bands on pt. Call light in reach of pt and pt educated to call out to get up. Will continue to monitor for safety.

## 2022-03-08 ENCOUNTER — HOSPITAL ENCOUNTER (INPATIENT)
Age: 77
LOS: 11 days | Discharge: HOME | DRG: 057 | End: 2022-03-19
Attending: PHYSICAL MEDICINE & REHABILITATION | Admitting: PHYSICAL MEDICINE & REHABILITATION
Payer: MEDICARE

## 2022-03-08 VITALS
BODY MASS INDEX: 24.09 KG/M2 | HEIGHT: 66 IN | WEIGHT: 149.91 LBS | RESPIRATION RATE: 16 BRPM | DIASTOLIC BLOOD PRESSURE: 98 MMHG | SYSTOLIC BLOOD PRESSURE: 162 MMHG | HEART RATE: 71 BPM | OXYGEN SATURATION: 97 % | TEMPERATURE: 97.6 F

## 2022-03-08 PROBLEM — I63.9 ACUTE CEREBROVASCULAR ACCIDENT (CVA) (HCC): Status: ACTIVE | Noted: 2022-03-08

## 2022-03-08 PROCEDURE — 99232 SBSQ HOSP IP/OBS MODERATE 35: CPT | Performed by: PHYSICAL MEDICINE & REHABILITATION

## 2022-03-08 PROCEDURE — 97116 GAIT TRAINING THERAPY: CPT

## 2022-03-08 PROCEDURE — 97535 SELF CARE MNGMENT TRAINING: CPT

## 2022-03-08 PROCEDURE — 97110 THERAPEUTIC EXERCISES: CPT

## 2022-03-08 PROCEDURE — 92526 ORAL FUNCTION THERAPY: CPT

## 2022-03-08 PROCEDURE — 94150 VITAL CAPACITY TEST: CPT

## 2022-03-08 PROCEDURE — 6360000002 HC RX W HCPCS: Performed by: INTERNAL MEDICINE

## 2022-03-08 PROCEDURE — 6370000000 HC RX 637 (ALT 250 FOR IP): Performed by: PHYSICAL MEDICINE & REHABILITATION

## 2022-03-08 PROCEDURE — 6370000000 HC RX 637 (ALT 250 FOR IP): Performed by: NURSE PRACTITIONER

## 2022-03-08 PROCEDURE — 6370000000 HC RX 637 (ALT 250 FOR IP): Performed by: INTERNAL MEDICINE

## 2022-03-08 PROCEDURE — 1280000000 HC REHAB R&B

## 2022-03-08 RX ORDER — LISINOPRIL 5 MG/1
5 TABLET ORAL ONCE
Status: COMPLETED | OUTPATIENT
Start: 2022-03-08 | End: 2022-03-08

## 2022-03-08 RX ORDER — CLOPIDOGREL BISULFATE 75 MG/1
75 TABLET ORAL DAILY
Status: CANCELLED | OUTPATIENT
Start: 2022-03-08 | End: 2022-03-26

## 2022-03-08 RX ORDER — POLYETHYLENE GLYCOL 3350 17 G/17G
17 POWDER, FOR SOLUTION ORAL DAILY PRN
Status: CANCELLED | OUTPATIENT
Start: 2022-03-08

## 2022-03-08 RX ORDER — CLOPIDOGREL BISULFATE 75 MG/1
75 TABLET ORAL DAILY
Status: DISCONTINUED | OUTPATIENT
Start: 2022-03-08 | End: 2022-03-19 | Stop reason: HOSPADM

## 2022-03-08 RX ORDER — ONDANSETRON 4 MG/1
4 TABLET, ORALLY DISINTEGRATING ORAL EVERY 8 HOURS PRN
Status: CANCELLED | OUTPATIENT
Start: 2022-03-08

## 2022-03-08 RX ORDER — LISINOPRIL 5 MG/1
5 TABLET ORAL DAILY
Status: DISCONTINUED | OUTPATIENT
Start: 2022-03-08 | End: 2022-03-19 | Stop reason: HOSPADM

## 2022-03-08 RX ORDER — ASPIRIN 81 MG/1
81 TABLET ORAL DAILY
Status: CANCELLED | OUTPATIENT
Start: 2022-03-08

## 2022-03-08 RX ORDER — ONDANSETRON 4 MG/1
4 TABLET, ORALLY DISINTEGRATING ORAL EVERY 8 HOURS PRN
Status: DISCONTINUED | OUTPATIENT
Start: 2022-03-08 | End: 2022-03-19 | Stop reason: HOSPADM

## 2022-03-08 RX ORDER — ATORVASTATIN CALCIUM 80 MG/1
80 TABLET, FILM COATED ORAL NIGHTLY
Status: CANCELLED | OUTPATIENT
Start: 2022-03-08

## 2022-03-08 RX ORDER — CLONIDINE HYDROCHLORIDE 0.1 MG/1
0.1 TABLET ORAL EVERY 8 HOURS PRN
Status: DISCONTINUED | OUTPATIENT
Start: 2022-03-08 | End: 2022-03-19 | Stop reason: HOSPADM

## 2022-03-08 RX ORDER — PANTOPRAZOLE SODIUM 40 MG/1
40 TABLET, DELAYED RELEASE ORAL
Status: CANCELLED | OUTPATIENT
Start: 2022-03-09

## 2022-03-08 RX ORDER — HYDROCHLOROTHIAZIDE 25 MG/1
25 TABLET ORAL DAILY
Status: DISCONTINUED | OUTPATIENT
Start: 2022-03-08 | End: 2022-03-19 | Stop reason: HOSPADM

## 2022-03-08 RX ORDER — ONDANSETRON 2 MG/ML
4 INJECTION INTRAMUSCULAR; INTRAVENOUS EVERY 6 HOURS PRN
Status: DISCONTINUED | OUTPATIENT
Start: 2022-03-08 | End: 2022-03-19 | Stop reason: HOSPADM

## 2022-03-08 RX ORDER — ACETAMINOPHEN 325 MG/1
650 TABLET ORAL EVERY 4 HOURS PRN
Status: DISCONTINUED | OUTPATIENT
Start: 2022-03-08 | End: 2022-03-19 | Stop reason: HOSPADM

## 2022-03-08 RX ORDER — CLONIDINE HYDROCHLORIDE 0.1 MG/1
0.1 TABLET ORAL EVERY 8 HOURS PRN
Qty: 60 TABLET | Refills: 3 | Status: ON HOLD | OUTPATIENT
Start: 2022-03-08 | End: 2022-03-08

## 2022-03-08 RX ORDER — ATORVASTATIN CALCIUM 80 MG/1
80 TABLET, FILM COATED ORAL NIGHTLY
Status: DISCONTINUED | OUTPATIENT
Start: 2022-03-08 | End: 2022-03-19 | Stop reason: HOSPADM

## 2022-03-08 RX ORDER — POLYETHYLENE GLYCOL 3350 17 G/17G
17 POWDER, FOR SOLUTION ORAL DAILY PRN
Status: DISCONTINUED | OUTPATIENT
Start: 2022-03-08 | End: 2022-03-13

## 2022-03-08 RX ORDER — LISINOPRIL 5 MG/1
5 TABLET ORAL DAILY
Status: CANCELLED | OUTPATIENT
Start: 2022-03-08

## 2022-03-08 RX ORDER — LISINOPRIL 10 MG/1
10 TABLET ORAL DAILY
Qty: 30 TABLET | Refills: 0 | Status: ON HOLD | OUTPATIENT
Start: 2022-03-09 | End: 2022-03-08

## 2022-03-08 RX ORDER — ACETAMINOPHEN 325 MG/1
650 TABLET ORAL EVERY 4 HOURS PRN
Status: CANCELLED | OUTPATIENT
Start: 2022-03-08

## 2022-03-08 RX ORDER — CLONIDINE HYDROCHLORIDE 0.1 MG/1
0.1 TABLET ORAL EVERY 8 HOURS PRN
Status: CANCELLED | OUTPATIENT
Start: 2022-03-08

## 2022-03-08 RX ORDER — ONDANSETRON 2 MG/ML
4 INJECTION INTRAMUSCULAR; INTRAVENOUS EVERY 6 HOURS PRN
Status: CANCELLED | OUTPATIENT
Start: 2022-03-08

## 2022-03-08 RX ORDER — PANTOPRAZOLE SODIUM 40 MG/1
40 TABLET, DELAYED RELEASE ORAL
Status: DISCONTINUED | OUTPATIENT
Start: 2022-03-09 | End: 2022-03-19 | Stop reason: HOSPADM

## 2022-03-08 RX ORDER — HYDROCHLOROTHIAZIDE 25 MG/1
25 TABLET ORAL DAILY
Status: CANCELLED | OUTPATIENT
Start: 2022-03-08

## 2022-03-08 RX ORDER — ASPIRIN 81 MG/1
81 TABLET ORAL DAILY
Status: DISCONTINUED | OUTPATIENT
Start: 2022-03-08 | End: 2022-03-19 | Stop reason: HOSPADM

## 2022-03-08 RX ADMIN — HYDROCHLOROTHIAZIDE 25 MG: 25 TABLET ORAL at 08:52

## 2022-03-08 RX ADMIN — LISINOPRIL 5 MG: 5 TABLET ORAL at 08:52

## 2022-03-08 RX ADMIN — ENOXAPARIN SODIUM 40 MG: 100 INJECTION SUBCUTANEOUS at 08:52

## 2022-03-08 RX ADMIN — LISINOPRIL 5 MG: 5 TABLET ORAL at 12:43

## 2022-03-08 RX ADMIN — ATORVASTATIN CALCIUM 80 MG: 80 TABLET, FILM COATED ORAL at 21:46

## 2022-03-08 RX ADMIN — BISACODYL 5 MG: 5 TABLET, COATED ORAL at 21:45

## 2022-03-08 RX ADMIN — CLOPIDOGREL BISULFATE 75 MG: 75 TABLET ORAL at 08:52

## 2022-03-08 RX ADMIN — ASPIRIN 81 MG: 81 TABLET, COATED ORAL at 08:52

## 2022-03-08 ASSESSMENT — PAIN SCALES - GENERAL
PAINLEVEL_OUTOF10: 0

## 2022-03-08 NOTE — PROGRESS NOTES
Physical Therapy  Daily Treatment Note    Discharge Recommendations: Julieth Murray scored a 15/24 on the AM-PAC short mobility form. Current research shows that an AM-PAC score of 17 or less is typically not associated with a discharge to the patient's home setting. Based on the patient's AM-PAC score and their current functional mobility deficits, it is recommended that the patient have 5-7 sessions per week of Physical Therapy at d/c to increase the patient's independence. At this time, this patient demonstrates the endurance, and/or tolerance for 3 hours of therapy each day, with a treatment frequency of 5-7x/wk. Please see assessment section for further patient specific details. Assessment:  Pt needing less assist for ambulation today as compared to last therapy session. Continues to demonstrate R LE weakness and ataxia with mobility and exercises. Impulsive at times. Pt needing assist x 1 person for safe mobility at this time and ambulation distance is quite limited. She is currently functioning well below baseline for mobility. From home alone and was independent with mobility prior to admission. Would benefit from continued intensive IP PT at D/C prior to returning home. Plan is for ARU. Equipment Needs: Defer to next level of care    Chart Reviewed: Yes     Other position/activity restrictions: up as tolerated   Additional Pertinent Hx: Patient is a 76 y/o female admitted 3/5 with right leg weakness and unsteadiness. CT head and chest x-ray (-) for acute findings. MRI brain (+) for Acute infarct involving the deep white matter of the posterior left frontal lobe. PMH significant for lupus. WB Status: n/a    Diagnosis: TIA   Treatment Diagnosis: impaired mobility associated with CVA    Subjective: Pt in bed initially. Agreeable to working with therapy. Anxious to get to ARU. Pain: no c/o voiced    Objective:    Bed mobility  Supine to sit: CGA, HOB up with use of rail  Scooting: CGA to EOB.  Cues for R hand placement on bed prior to scooting (pt not aware that R hand was palm-up on bed prior to attempting to scoot)    Transfers  Sit to stand: Min assist x 1 from bed; Min assist x 1 from chair (twice). Cues for hand placement. Stand to sit: Min assist x 1 into chair (x 3 times). Cues for hand placement/reaching back for sitting surface prior to transfer. Ambulation  Assistance Level: Min assist x 1 (+ CGA x 1 for safety initially, progressing to Min assist x 1 + chair follow)  Assistive device: Wheeled walker  Distance: 10 ft (to sink), 15 ft, then seated rest, 20 ft. Quality of gait: Weak; occasional R knee buckling--self-corrected; step-to pattern; decreased pace; mildly unsteady; narrow SHU; ataxic R LE placement. Exercises  While seated in chair:  2 sets x 10 reps B hip flexion (effortful on R), LAQ (mod assist on R--ataxia noted), heel raises (mod assist for increased plantarflexion on R), toe raises (mod assist for increased dorsiflexion on R)    Balance  Sat EOB with SBA  Static stance with walker CGA  Ambulation with wheeled walker Min assist x 1    Patient Education  Hand placement with transfer when using walker. Awareness of R hand position with mobility. Needs cues/reminders. Calling for assist with needs. Not getting up without asisst. Expressed understanding. Safety Devices  Pt left with needs in reach. In chair with chair alarm on. RN updated.      AM-PAC score  AM-PAC Inpatient Mobility Raw Score : 16  AM-PAC Inpatient T-Scale Score : 40.78  Mobility Inpatient CMS 0-100% Score: 54.16  Mobility Inpatient CMS G-Code Modifier : CK    Goals: (as determined and assessed by primary PT)  Time Frame for Short term goals: discharge  Short term goal 1: patient will perform bed mobility with modified independence--ongoing   Short term goal 2: patient will perform transfers sit<>stand with SBA---ongoing   Short term goal 3: patient will ambulate 150' with FWW and CGA--ongoing

## 2022-03-08 NOTE — DISCHARGE INSTR - DIET

## 2022-03-08 NOTE — DISCHARGE SUMMARY
Hospital Medicine Discharge Summary    Patient ID: Brittani Sims      Patient's PCP: Isa Hancock MD    Admit Date: 3/5/2022     Discharge Date:       Admitting Provider: Azalea Rock DO     Discharge Provider: Nilo Wisdom MD     Discharge Diagnoses: Active Hospital Problems    Diagnosis     Acute cerebrovascular accident (CVA) due to ischemia [facility]) [I63.9]        The patient was seen and examined on day of discharge and this discharge summary is in conjunction with any daily progress note from day of discharge. Hospital Course:     Acute CVA:  MRI confirmed acute posterior left frontal lobe infarct. Stroke team called and pt started on dapt- plavix x 21 days per neuro and then asa only thereafter- appreciate neuro help. PT/OT/ Speech eval and recs noted and appreciated. Echo shows nml ef w/ negative bubble study. Evaluated for aru and accepted. HTN emergency:  Likely 2/2 acute cva- initially was requiring scheduled IV labetalol now off x 24 hours. Started on lisinopril and hctz yesterday- increase lisinopril. Cont clonidine po prn for sbp> 180. Physical Exam Performed:     /85   Pulse 85   Temp 98.1 °F (36.7 °C) (Oral)   Resp 16   Ht 5' 6\" (1.676 m)   Wt 149 lb 14.6 oz (68 kg)   SpO2 99%   BMI 24.20 kg/m²       General appearance:  No apparent distress  Neck: Supple, with full range of motion. Respiratory:  Normal respiratory effort. Clear to auscultation, bilaterally without Rales/Wheezes/Rhonchi. Cardiovascular:  Regular rate and rhythm   Abdomen: Soft, non-tender, non-distended   Musculoskeletal:  No clubbing, cyanosis or edema bilaterally. Skin: Skin color, texture, turgor normal.  No rashes or lesions. Neurologic:  Right le weaker than left  Psychiatric:  Alert and oriented, thought content appropriate, normal insight      Labs:  For convenience and continuity at follow-up the following most recent labs are provided:    CBC:    Lab Results   Component Value Date    WBC 4.6 03/06/2022    HGB 14.9 03/06/2022    HCT 41.7 03/06/2022     03/06/2022       Renal:    Lab Results   Component Value Date     03/05/2022    K 3.8 03/05/2022     03/05/2022    CO2 28 03/05/2022    BUN 16 03/05/2022    CREATININE 0.8 03/05/2022    CALCIUM 10.4 03/05/2022         Significant Diagnostic Studies    Radiology:   MRI brain without contrast   Final Result      Acute infarct involving the deep white matter of the posterior left frontal lobe. No significant mass effect or evidence of hemorrhage. Extensive chronic small vessel ischemic changes. XR CHEST PORTABLE   Final Result      No acute disease. CTA HEAD NECK W CONTRAST   Final Result      No evidence of flow-limiting stenosis. PROCEDURE: CT ANGIOGRAPHY HEAD WITH/WITHOUT CONTRAST      INDICATION: Stroke Symptoms      COMPARISON: none      TECHNIQUE: Axial CT imaging obtained through the head prior to and following administration of IV contrast. Axial images, multiplanar reformatted images, and maximum intensity projection images were reviewed for CT angiographic technique. IV contrast: mL Omnipaque 350. FINDINGS:      ANTERIOR CIRCULATION: Major intracranial arteries are patent. Atherosclerotic irregularity with mild narrowing of bilateral ICA cavernous/paraclinoid segments. 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 POSTERIOR CIRCULATION: The bilateral vertebral arteries, basilar artery and posterior cerebral arteries demonstrate no occlusion or stenosis. No evidence for aneurysm or arteriovenous malformation. INTRACRANIAL VENOUS SYSTEM: No evidence for intracranial venous thrombosis. IMPRESSION:      No evidence of intracranial large vessel occlusion. Atherosclerotic irregularity with mild narrowing of bilateral ICA cavernous/paraclinoid segments.       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 CT HEAD WO CONTRAST   Final Result      No CT evidence of acute stroke. Critical results reported to clinical team at 10:20 AM.                   Consults:     IP CONSULT TO PHARMACY  PHARMACY TO CHANGE BASE FLUIDS  IP CONSULT TO HOSPITALIST  IP CONSULT TO NEUROLOGY  IP CONSULT TO PHYSICAL MEDICINE REHAB    Disposition:  ARU     Condition at Discharge: Stable    Discharge Instructions/Follow-up:  Neuro    Code Status:  Full Code     Activity: activity as tolerated    Diet: Low salt    Discharge Medications:     Current Discharge Medication List           Details   cloNIDine (CATAPRES) 0.1 MG tablet Take 1 tablet by mouth every 8 hours as needed for High Blood Pressure (sbp> 180 or DBP> 100)  Qty: 60 tablet, Refills: 3      lisinopril (PRINIVIL;ZESTRIL) 10 MG tablet Take 1 tablet by mouth daily  Qty: 30 tablet, Refills: 0      aspirin 81 MG EC tablet Take 1 tablet by mouth daily  Qty: 30 tablet, Refills: 3      atorvastatin (LIPITOR) 80 MG tablet Take 1 tablet by mouth nightly  Qty: 30 tablet, Refills: 3      hydroCHLOROthiazide (HYDRODIURIL) 25 MG tablet Take 1 tablet by mouth daily  Qty: 30 tablet, Refills: 3      clopidogrel (PLAVIX) 75 MG tablet Take 1 tablet by mouth daily for 20 doses  Qty: 20 tablet, Refills: 0              Details   MULTIPLE VITAMINS-MINERALS PO Take by mouth      Cholecalciferol (VITAMIN D) 50 MCG (2000 UT) CAPS capsule Take by mouth             Time Spent on discharge is more than 30 minutes in the examination, evaluation, counseling and review of medications and discharge plan. Signed:    Gracy Porter MD   3/8/2022      Thank you Sawyer Caldwell MD for the opportunity to be involved in this patient's care.

## 2022-03-08 NOTE — PROGRESS NOTES
Patient arrived on unit via w/c and accompanied by Elissa Cancino [place]. Assisted into bed. Oriented to room 3113, unit, and ARU policies.  Call light in reach and bed in low positon

## 2022-03-08 NOTE — PROGRESS NOTES
I had the pleasure of taking care of Ms. Murray. Bedside report was performed and I introduced myself before updating the white board and performing an initial assessment of the patient and obtaining vitals. Explained the purpose of the white board, today's date, and how to call out for assistance. Patient is comfortable possessions and position adjusted for ease of use. Vitals for today's shift were as follows. .. Vitals:    03/07/22 1930 03/07/22 2243 03/08/22 0354 03/08/22 0630   BP: (!) 158/91 (!) 161/79 (!) 142/62 135/85   Pulse: 80 78 74 85   Resp:  16 16 16   Temp: 98.2 °F (36.8 °C) 98.3 °F (36.8 °C) 98.1 °F (36.7 °C) 98.1 °F (36.7 °C)   TempSrc: Oral Oral Oral Oral   SpO2: 96% 95% 97% 99%   Weight:       Height:            LDAs have been examined and maintained including IVs flushed and patent. Additional concerns from this shift that were addressed include    - Messaged Dr. Sheridan Munson to clarify if they want the additional 5 mg of lisinopril to be given awaiting response.    - Pt taken to ARU at 1738 report given to luan

## 2022-03-08 NOTE — PROGRESS NOTES
RN notified by lab that the Pt had a critically high aptt lab value of 44.4. Heis DO notified at [phone], no new orders at this time. RN will continue to monitor Pt.

## 2022-03-08 NOTE — DISCHARGE INSTR - COC
Continuity of Care Form    Patient Name: Ira Kapadia   :    MRN:  [id number]    Admit date:  3/5/2022  Discharge date:  3/8/22    Code Status Order: Full Code   Advance Directives:      Admitting Physician:  Stephanie Mederos DO  PCP: Toby Manning MD    Discharging Nurse: [address] Unit/Room#: 3867/3576-42  Discharging Unit Phone Number: [phone]    Emergency Contact:   Extended Emergency Contact Information  Primary Emergency Contact: Ray, [address] Phone: [phone]  Mobile Phone: [phone]  Relation: Child    Past Surgical History:  Past Surgical History:   Procedure Laterality Date    COLONOSCOPY      HYSTERECTOMY      TONSILLECTOMY         Immunization History: There is no immunization history on file for this patient.     Active Problems:  Patient Active Problem List   Diagnosis Code    MVP (mitral valve prolapse) I34.1    Lupus (HCC) M32.9    Acute cerebrovascular accident (CVA) due to ischemia ([facility] [address] I63.9       Isolation/Infection:   Isolation            No Isolation          Patient Infection Status       Infection Onset Added Last Indicated Last Indicated By Review Planned Expiration Resolved Resolved By    None active    Resolved    COVID-19 (Rule Out) 22 COVID-19 & Influenza Combo (Ordered)   22 Rule-Out Test Resulted            Nurse Assessment:  Last Vital Signs: /85   Pulse 85   Temp 98.1 °F (36.7 °C) (Oral)   Resp 16   Ht 5' 6\" (1.676 m)   Wt 149 lb 14.6 oz (68 kg)   SpO2 99%   BMI 24.20 kg/m²     Last documented pain score (0-10 scale): Pain Level: 0  Last Weight:   Wt Readings from Last 1 Encounters:   22 149 lb 14.6 oz (68 kg)     Mental Status:  oriented and alert    IV Access:  - Peripheral IV - site  LAC    Nursing Mobility/ADLs:  Walking   Assisted  Transfer  Assisted  Bathing  Assisted  Dressing  Assisted  Toileting  Independent  Feeding  Independent  Med Admin  Assisted  Med Delivery continuing treatment of the diagnosis listed and that she requires Acute Rehab for less 30 days.      Update Admission H&P: No change in H&P    PHYSICIAN SIGNATURE:  Electronically signed by Paul Sung MD on 3/8/22 at 9:26 AM EST

## 2022-03-08 NOTE — PROGRESS NOTES
Speech Language Pathology  Facility/Department: [facility] 5T ORTHO/NEURO  Dysphagia Daily Treatment Note  Discharge     NAME: Valorie Murray  : 1945  MRN: [id number]    Patient Diagnosis(es):   Patient Active Problem List    Diagnosis Date Noted    Acute cerebrovascular accident (CVA) due to ischemia ([facility] [address] 2022    MVP (mitral valve prolapse)     Lupus (HCC)      Allergies: Allergies   Allergen Reactions    Codeine     Iodine      Recent Chest Xray/CT of Chest: 3/5/2022  Impression       No acute disease.            MRI:  3/6/2022  Impression       Acute infarct involving the deep white matter of the posterior left frontal lobe.       No significant mass effect or evidence of hemorrhage.       Extensive chronic small vessel ischemic changes.               Chart reviewed. Medical Diagnosis: TIA (transient ischemic attack) [G45.9]  Acute cerebrovascular accident (CVA) due to ischemia [facility]) [I63.9]   Treatment Diagnosis:dysphagia     BSE Impression 3/6/22   Appears [facility] for swallowing. Alert and oriented. No problems with communicating during assessment. Speech clear; no word finding deficits noted. Functional mastication and management of foods and liquids with no oral spillage or residue. Pt and nursing deny any problems with swallowing. Timely swallow initation with good laryngeal lift with no cough/wet vocal quality/throat clearing with any po trials including 3 oz water test via straw. Continue with regular with thins. Will f/u one more session to assure toleration of diet since limited po trials accepted and to assure if need for speech/cog eval is needed. Dysphagia Impression : Appears [facility] for swallowing. Alert and oriented. Continue with regular with thins. Will f/u one more session to assure toleration of diet and to assure if need for speech/cog eval is needed.   Dysphagia Diagnosis: Swallow function appears grossly intact    MBS results - not indicated     Pain: denied pain    Current Diet : ADULT DIET; Regular; Low Sodium (2 gm)   Recommended Form of Meds: PO  Compensatory Swallowing Strategies: Upright as possible for all oral intake     Treatment:  Pt seen bedside to address the following goals:  1-The patient will tolerate recommended diet without observed clinical signs of aspiration;  3/8- goal met-  RN with no concerns. Pt and daughter reported no concerns. Pt with throat clearing prior to PO trials- noted it was due to allergies and it is typical in the morning. Pt was analyzed with trials of water by cup and straw and cracker. ROM of oral structures was [facility]. Pt had no difficulty closing lips around spoon or drawing liquid up a straw. Pt had no difficulty with mastication with solids. There was no anterior spillage or oral residue noted with any consistency. Pt demonstrated no s/s aspiration with any consistency presented. Pt able to initiate swallow without difficulty with laryngeal movement observed. Vocal quality remained clear throughout. No coughing, throat clearing or choking observed with any consistency. Pt consumed 3oz water uninterrupted by cup without difficulty. Pt's communication was [facility]- no instances of word finding difficulty with clear speech. Daughter reported no concerns with communication or cognition      2- The patient/caregiver will demonstrate understanding of compensatory strategies for improved swallowing safety  3/8- goal met- The pt and daughter educated to purpose of the visit, anatomy and physiology of the swallow, concerns for aspiration, swallowing strategies, diet recommendations and possibility of being made NPO if s/s aspiration emerge. The pt stated comprehension. con't goal      Patient/Family/Caregiver Education:  .see above     Compensatory Strategies:  Compensatory Swallowing Strategies: Upright as possible for all oral intake      Plan:    Pt discharged from Speech Therapy services. Pt met all goals for therapy.     Diet recommendations:regular with thin liquids   DC recommendation:No further follow up needed unless s/s aspiration emerge, make pt NPO and re-refer. Treatment: 15  D/W nursing, Ranulfo Ramirez   Needs met prior to leaving room, call button in reach.     Marcos Spurling, [place], [address]  Speech-Language Pathologist  Pager [phone]      If patient is discharged prior to next treatment, this note will serve as the discharge summary

## 2022-03-08 NOTE — PROGRESS NOTES
Occupational Therapy  Facility/Department: [facility] 5T ORTHO/NEURO  Daily Treatment Note  NAME: Alver Hatchet Cure  : 1945  MRN: [id number]    Date of Service: 3/8/2022    Discharge Recommendations: Dorothy Salomon scored a 16/24 on the AM-PAC ADL Inpatient form. Current research shows that an AM-PAC score of 17 or less is typically not associated with a discharge to the patient's home setting. Based on the patient's AM-PAC score and their current ADL deficits, it is recommended that the patient have 5-7 sessions per week of Occupational Therapy at d/c to increase the patient's independence. At this time, this patient demonstrates the endurance, and/or tolerance for 3 hours of therapy each day, with a treatment frequency of 5-7x/wk. Please see assessment section for further patient specific details. If patient discharges prior to next session this note will serve as a discharge summary. Please see below for the latest assessment towards goals. Assessment        Assessment: Pt still needing min A for funct mob with RW. Showing better progress with RUE coordination, but still needing some A and cues. Pt motivated to work with therapy and asking about when she will d/c for rehab. Would benefit from intensive OT. Cont with POC      OT Education: Transfer Training;ADL Adaptive Strategies;OT Role;Plan of Care  Patient Education: verb understanding  Activity Tolerance  Activity Tolerance: Patient Tolerated treatment well  Safety Devices  Safety Devices in place: Yes  Type of devices: Nurse notified; Left in chair;Call light within reach; Chair alarm in place (PT in room at EOS)         Patient Diagnosis(es): The encounter diagnosis was TIA (transient ischemic attack). has a past medical history of Lupus (HCC) and MVP (mitral valve prolapse). has a past surgical history that includes Tonsillectomy; Colonoscopy; and Hysterectomy.     Restrictions  Position Activity Restriction  Other position/activity restrictions: up as tolerated  Subjective   General  Chart Reviewed: Yes  Patient assessed for rehabilitation services?: Yes  Additional Pertinent Hx: Pt presented 3/5 with R sided weakness than began 3/3. MRI: Acute infarct involving the deep white matter of the posterior left frontal lobe  PMHx: lupus  Family / Caregiver Present: No  Referring Practitioner: Daniel Ivan  Diagnosis: CVA  Subjective  Subjective: Pt in bed upon entry, agreeable to therapy. Vital Signs  Patient Currently in Pain: Denies   Orientation     Objective    ADL  Grooming: Contact guard assistance (CGA for stance at sink. Needing increased time to complete oral care)         Balance  Sitting Balance: Stand by assistance  Standing Balance: Minimal assistance (CGA for static; Min A for dynamic)  Standing Balance  Activity: grooming at sink and funct mob        Functional Mobility  Functional - Mobility Device: Rolling Walker  Activity: To/from bathroom; Other (outside of room<>chair)  Assist Level: Minimal assistance        Bed mobility  Supine to Sit: Contact guard assistance         Transfers  Sit to stand: Minimal assistance (cues for hand placement)              Plan  If pt discharges prior to next treatment session, this note will serve as discharge summary.      Plan  Times per week: 5-7  Current Treatment Recommendations: Functional Mobility Training,Safety Education & Training,Self-Care / ADL,Equipment Evaluation, Education, & procurement,Patient/Caregiver Education & Training,Strengthening,Balance Training,Neuromuscular Re-education    AM-PAC Score        AM-PAC Inpatient Daily Activity Raw Score: 16 (03/08/22 1602)  AM-PAC Inpatient ADL T-Scale Score : 35.96 (03/08/22 1602)  ADL Inpatient CMS 0-100% Score: 53.32 (03/08/22 1602)  ADL Inpatient CMS G-Code Modifier : CK (03/08/22 1602)    Goals (as determined and assessed by primary OT)    Short term goals  Time Frame for Short term goals: d/c  Short term goal 1: sba standing balance during grooming tasks at sink- ongoing  Short term goal 2: sba bathroom mobility for ADLs using LRAD- ongoing  Short term goal 3: independent HEP for R UE coordination and strength- not met  Short term goal 4: sba LB dressing- not met       Therapy Time   Individual Concurrent Group Co-treatment   Time In 1455         Time Out 1514         Minutes 19         Timed Code Treatment Minutes: [address] [employer], DIAS/L

## 2022-03-08 NOTE — PLAN OF CARE
ARU PATIENT TREATMENT PLAN  The [address] [place] [address],[address], [facility] [address]  [address] Cure    : 1945  Acct #: [de-identified]  MRN: [id number]  PHYSICIAN:  Yissel Aggarwal DO  Primary Problem    Patient Active Problem List   Diagnosis    MVP (mitral valve prolapse)    [facility] ([facility]    Acute cerebrovascular accident (CVA) due to ischemia ([facility]    Acute cerebrovascular accident (CVA) ([facility]       Rehabilitation Diagnosis:  Stroke, 1.2, Right Body (L Brain)  ADMIT DATE:3/8/2022    Patient Goals: To improve walking and go home  Admitting Impairments: Decreased functional mobility ; Decreased balance;Decreased endurance;Decreased strength;Decreased ADL status; Decreased coordination;Decreased cognition;Decreased high-level IADLs;Decreased safe awareness  Activity Restrictions: None  Participation Limitations: None   CARE PLAN   NURSING:  Esther Murray while on this unit will:  [x] Be continent of bowel and bladder     [x] Have an adequate number of bowel movements  [x] Urinate with no urinary retention >300ml in bladder  [x] Complete bladder protocol with wood removal  [] Maintain O2 SATs at ___%  [] Have pain managed while on ARU       [] Be pain free by discharge   [x] Have no skin breakdown while on ARU  [] Have improved skin integrity via wound measurements  [] Have no signs/symptoms of infection at the wound site  [x] Be free from injury during hospitalization   [x] Complete education with patient/family with understanding demonstrated for:  [] Adjustment   [] Other:     Nursing Interventions will include:  [x] bowel/bladder training   [x] education for medical assistive devices   [x] medication education   [] O2 saturation management   [x] energy conservation   [x] stress management techniques   [x] fall prevention   [x] alarms protocol   [x] seating and positioning   [x] skin/wound care   [x] pressure relief instruction   [] dressing changes     [x] infection protection   [x] DVT prophylaxis  [x] assistance with in room safety with transfers to bed, toilet, wheelchair, shower   [x] bathroom activities and hygiene  [x] Other:    Patient/Caregiver Education for:  [x] Disease/sustained injury/management     [x] Medication Use  [] Surgical intervention  [x] Safety  [x] Body mechanics and or joint protection  [x] Health maintenance     [] Other:     PHYSICAL THERAPY:  Goals:                  Short term goals  Time Frame for Short term goals: 10 days  Short term goal 1: Pt will perform bed mobility with modified independence  Short term goal 2: Pt will perform transfers sit<>stand with SBA and LRAD  Short term goal 3: Pt will ambulate 150' with CGA and LRAD  Short term goal 4: Pt will ascend/descend 5 stairs with B handrails and SBA  Short term goal 5: Pt will stand without UE support while completing dynamic UE task with CGA            Long term goals  Time Frame for Long term goals : 3 weeks  Long term goal 1: Pt will perform sit<>stand transfers with mod I and LRAD  Long term goal 2: Pt will ambulate 150' with mod I and LRAD  Long term goal 3: Pt will ascend/descend 12 stairs with mod I and UL handrail  Long term goal 4: Pt will stand without UE support while completing dynamic UE task with mod I  These goals were reviewed with this patient at the time of assessment and David Murray is in agreement.      Plan of Care: Pt to be seen 5 out of 7 days per week per ARU protocol (60 minutes with PT)                  Current Treatment Recommendations: Agustina Haskinser Training,Patient/Caregiver Education & Training,Balance Training,Gait Training,Functional Mobility Training,Safety Education & Training,Neuromuscular Re-education,Stair training,Equipment Evaluation, Education, & procurement    OCCUPATIONAL THERAPY:  Goals:             Short term goals  Time Frame for Short term goals: 10 days  Short term goal 1: Pt will complete all bathing componets with spvn-ongoing  Short term goal 2: Pt will complete toilet and tub transfers with CGA-ongoing for toilet, goal met for tub transfer 3/10  Short term goal 3: Pt will complete UB and LB dressing with SBA-ongoing  Short term goal 4: Pt will complete oral hygiene and grooming tasks standing with SBA-ongoing  Short term goal 5: Pt will complete NHPT assessment-goal met 3/10 :  Long term goals  Time Frame for Long term goals : 3 weeks  Long term goal 1: Pt will complete bathing componets with Mod I-ongoing  Long term goal 2: Pt will complete toilet and tub transfers with Mod I-ongoing  Long term goal 3: Pt will complete all dressing componets with Mod I-ongoing  Long term goal 4: Pt will complete oral hygiene/grooming in stance with Mod I-ongoing  Long term goal 5: Pt will complete R hand NHPT and score within 50th percentile for age-ongoing :   These goals were reviewed with this patient at the time of assessment and Julieth Murray is in agreement    Plan of Care:  Pt to be seen 5 out of 7 days per week per ARU protocol (60 minutes with OT)  Patient Education: Pt given resistant stress ball at EOS for R hand, instructed to complete x50 grasp/releases each day on her own, verb understanding    SPEECH THERAPY: Goals:                       Short-term Goals  Timeframe for Short-term Goals: 10 days  Goal 1: Patient will complete higher level/abstract naming tasks to improve mental flexibility at 90% acc given min cues  Goal 2: Patient will complete verbal/visual reasoning tasks at 90% acc given min tasks (meds,times,finance etc)  Goal 3: Patient will complete graded memory tasks with learned strategies at 90% acc given min cues              Timeframe for Short-term Goals: 10 days    Plan of Care:  Pt to be seen 5 out of 7 days per week per ARU protocol (60 minutes with SLP)    Therapy Treatments will include:  [x]  therapeutic exercises    [x]  gait training     [x]  neuromuscular re-ed                            [x]  transfer training             [] community reintegration    [x] bed mobility                          []  w/c mobility and training  [x]  self care    [x]home mgmt    [x]  cognitive training            [x]  energy conservation        []  dysphagia tx    []  speech/language/communication therapy   []  group therapy    [x]  patient/family education    [] Other:    CASE MANAGEMENT:  Goals: Assist patient/family with discharge planning, patient/family counseling, and coordination with insurance during ARU stay.     Admission Period/Goal QM SCORES  QM Admit/Goal Score   Eating CARE Score: 6 / Discharge Goal: Independent   Oral Hygiene CARE Score: 4 / Discharge Goal: Independent   Shower/Bathing CARE Score: 4 / Discharge Goal: Independent   UB Dressing CARE Score: 3 / Discharge Goal: Independent   LB Dressing CARE Score: 3 / Discharge Goal: Independent   Putting on/off Footwear CARE Score: 4 / Discharge Goal: Independent   Toileting Hygiene CARE Score: 4 / Discharge Goal: Independent   Bladder Continence Bladder Continence: Always continent    Bowel Continence Bowel Continence: Frequently incontinent    Toilet Transfers CARE Score: 3 / Discharge Goal: Independent   Shower/Bathe Self  CARE Score: 4 / Discharge Goal: Independent   Rolling Left and Right CARE Score: 4 / Discharge Goal: Independent   Sit to Lying CARE Score: 4 / Discharge Goal: Independent   Lying to Sitting on Bedside CARE Score: 4 / Discharge Goal: Independent   Sit to Stand CARE Score: 4 / Discharge Goal: Independent   Chair/Bed to Chair Transfer CARE Score: 3 / Discharge Goal: Independent   Car Transfers CARE Score: 3 / Discharge Goal: Independent   Walk 10 Feet CARE Score: 4 / Discharge Goal: Independent   Walk 50 Feet with Two Turns CARE Score: 3 / Discharge Goal: Independent   Walk 150 Feet CARE Score: 1 / Discharge Goal: Independent   Walk 10 Feet on Uneven Surfaces CARE Score: 1 / Discharge Goal: Independent   1 Step (Curb) CARE Score: 3 / Discharge Goal: Independent   4 Steps CARE Score: 3 / Discharge Goal: Independent   12 Steps CARE Score: 88 / Discharge Goal: Independent   Picking up Object from Floor CARE Score: 1 /     Wheel 50 Feet with 2 Turns   /     Type         [] Manual        [] Motorized        [] N/A   Wheel 150 Feet   /     Type         [] Manual        [] Motorized        [] N/A        Inderjit Murray will be seen a minimum of 3 hours of therapy per day, a minimum of 5 out of 7 days per week (please see above for specific treatment plan per PT/OT/SLP). [] In this rare instance due to the nature of this patient's medical involvement, this patient will be seen 15 hours per week (900 minutes within a 7day period). In addition, dietician/nutritionist may monitor calorie count as well as intake and collaboratively work with SLP on dietary upgrades. Neuropsychology/Psychology may evaluate and provide necessary support. Medical issues being managed closely and that require 24hour availability of a physician:  [x] Swallowing Precautions  [x] Bowel/Bladder Fx  [] Weight bearing precautions  [] Wound Care    [x] Pain Mgmt   [] Infection Protection  [x] DVT Prophylaxis   [x] Fall Precautions  [x] Fluid/Electrolyte/Nutrition Balance  [] Voice Protection   [] Respiratory  [] Other:    Medical Prognosis: [] Good  [x] Fair    [] Guarded   Total expected IRF days 12  Anticipated discharge destination:   [] Home Independently   [] Home Modified Independent  [x] Home with supervision    []SNF     [] Other                                           Physician anticipated functional outcomes:Anticipate that pt will progress to a level of supervision to MOD I for all transfers, mobility and ADLs to allow for a safe d/c home. IPOC brief synthesis: 77 y. o. female with past medical history lupus, hyperparathyroidism possibly HTN, presents with symptoms of loss of balance than began on Thursday. She denies any vertigo symptoms, just felt very much off balance.  She also found herself not being well able to move her right leg and almost seeming to drag it on the ground. The right leg weakness resolved. She then had a recurrence of this however, but is able to move her leg again now. No other areas of focal weakness. No headache. No vision changes. No palpitations. No cardiac history. She is not taking any prescription med, does not smoke, takes a keto weight loss supplement and a green tea supplement. She is not on medication for high blood pressure, report its is sometimes high but comes down with taking deep breaths.   Admitted for acute CVA. CT, CTA head negative. 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 Weekly patient care conferences are held to evaluate progress towards the initial treatment plan & goals.     I have reviewed this initial plan of care and agree with its contents:    Title   Name    Date    Time    Physician: SHRADDHA KenyonPTyraH  PM&R  3/11/2022  10:37 AM        Case Mgmt: Gato Arredondo RN Case Manager 3/10/22 @4563    OT: GRISEL Diamond 3/9/2022 @1445PM (Supervised by HORACE Osorio/L)    PT: Negin Nowak, DPT [id number] 3/9 @9021    RN: Melody South RN 3/9/2022 2:48 AM    ST: Jenny Mckeon M.A, CCC-SLP/ CBIS     ARU Supervisor:    Other:

## 2022-03-08 NOTE — CARE COORDINATION
Case Management Assessment            Discharge Note                    Date / Time of Note: 3/8/2022 9:45 AM                  Discharge Note Completed by: David Frye RN     Discharge order noted and pt has been off IV labetolol for 24 hours and will be able to d/c to [facility] today. Patient Name: Sofia Dove   YOB: 1945  Diagnosis: TIA (transient ischemic attack) [G45.9]  Acute cerebrovascular accident (CVA) due to ischemia [facility]) [I63.9]   Date / Time: 3/5/2022  9:22 AM    Current PCP: Thea Stewart MD  Clinic patient: No    Hospitalization in the last 30 days: No    Advance Directives:  Code Status: Full Code  [place] DNR form completed and on chart: Not Indicated    Financial:  Payor: MEDICARE / Plan: MEDICARE PART A AND B / Product Type: *No Product type* /      Pharmacy:    [address] [address], [address] [phone] - F [phone]  [address] [place] [address]  Phone: [phone] Fax: [phone]      Assistance purchasing medications?: Potential Assistance Purchasing Medications: No  Assistance provided by Case Management: None at this time    Does patient want to participate in local refill/ meds to beds program?:      Meds To Beds General Rules:  1. Can ONLY be done Monday- Friday between 8:30am-5pm  2. Prescription(s) must be in pharmacy by 3pm to be filled same day  3. Copy of patient's insurance/ prescription drug card and patient face sheet must be sent along with the prescription(s)  4. Cost of Rx cannot be added to hospital bill. If financial assistance is needed, please contact unit  or ;  or  CANNOT provide pharmacy voucher for patients co-pays  5.  Patients can then  the prescription on their way out of the hospital at discharge, or pharmacy can deliver to the bedside if staff is available. (payment due at time of pick-up or delivery - cash, check, or card accepted)     Able to afford home medications/ co-pay costs: Yes    ADLS:  Current PT AM-PAC Score: 14 /24  Current OT AM-PAC Score: 16 /24      DISCHARGE Disposition: Inpatient Rehab: [facility] Phone: [phone] Fax: NA    LOC at discharge: Not Applicable  PATRIA Completed: Yes    Notification completed in HENS/PAS?:  Not Applicable    IMM Completed:   Not Indicated      The Plan for Transition of Care is related to the following treatment goals of TIA (transient ischemic attack) [G45.9]  Acute cerebrovascular accident (CVA) due to ischemia [facility]) [I63.9]    The Patient and/or patient representative Damaris Martel and her family were provided with a choice of provider and agrees with the discharge plan Yes    Freedom of choice list was provided with basic dialogue that supports the patient's individualized plan of care/goals and shares the quality data associated with the providers.  Yes    Care Transitions patient: No    Lucia Douglas RN  [facility]  Case Management Department  Ph: [phone]  Fax: [phone]

## 2022-03-09 LAB
ANION GAP SERPL CALCULATED.3IONS-SCNC: 9 MMOL/L (ref 3–16)
BASOPHILS ABSOLUTE: 0 K/UL (ref 0–0.2)
BASOPHILS RELATIVE PERCENT: 0.4 %
BUN BLDV-MCNC: 19 MG/DL (ref 7–20)
CALCIUM SERPL-MCNC: 10.5 MG/DL (ref 8.3–10.6)
CHLORIDE BLD-SCNC: 98 MMOL/L (ref 99–110)
CO2: 29 MMOL/L (ref 21–32)
CREAT SERPL-MCNC: 0.8 MG/DL (ref 0.6–1.2)
EOSINOPHILS ABSOLUTE: 0.1 K/UL (ref 0–0.6)
EOSINOPHILS RELATIVE PERCENT: 2.3 %
GFR AFRICAN AMERICAN: >60
GFR NON-AFRICAN AMERICAN: >60
GLUCOSE BLD-MCNC: 109 MG/DL (ref 70–99)
HCT VFR BLD CALC: 42 % (ref 36–48)
HEMOGLOBIN: 14.7 G/DL (ref 12–16)
LYMPHOCYTES ABSOLUTE: 2 K/UL (ref 1–5.1)
LYMPHOCYTES RELATIVE PERCENT: 33.7 %
MCH RBC QN AUTO: 32.1 PG (ref 26–34)
MCHC RBC AUTO-ENTMCNC: 34.9 G/DL (ref 31–36)
MCV RBC AUTO: 91.9 FL (ref 80–100)
MONOCYTES ABSOLUTE: 0.5 K/UL (ref 0–1.3)
MONOCYTES RELATIVE PERCENT: 8.6 %
NEUTROPHILS ABSOLUTE: 3.3 K/UL (ref 1.7–7.7)
NEUTROPHILS RELATIVE PERCENT: 55 %
PDW BLD-RTO: 14 % (ref 12.4–15.4)
PLATELET # BLD: 287 K/UL (ref 135–450)
PMV BLD AUTO: 8.7 FL (ref 5–10.5)
POTASSIUM REFLEX MAGNESIUM: 4.2 MMOL/L (ref 3.5–5.1)
RBC # BLD: 4.57 M/UL (ref 4–5.2)
REPORT: NORMAL
SODIUM BLD-SCNC: 136 MMOL/L (ref 136–145)
WBC # BLD: 6.1 K/UL (ref 4–11)

## 2022-03-09 PROCEDURE — 80048 BASIC METABOLIC PNL TOTAL CA: CPT

## 2022-03-09 PROCEDURE — 97530 THERAPEUTIC ACTIVITIES: CPT

## 2022-03-09 PROCEDURE — 1280000000 HC REHAB R&B

## 2022-03-09 PROCEDURE — 85025 COMPLETE CBC W/AUTO DIFF WBC: CPT

## 2022-03-09 PROCEDURE — 6360000002 HC RX W HCPCS: Performed by: PHYSICAL MEDICINE & REHABILITATION

## 2022-03-09 PROCEDURE — 99222 1ST HOSP IP/OBS MODERATE 55: CPT | Performed by: PHYSICAL MEDICINE & REHABILITATION

## 2022-03-09 PROCEDURE — 6370000000 HC RX 637 (ALT 250 FOR IP): Performed by: PHYSICAL MEDICINE & REHABILITATION

## 2022-03-09 PROCEDURE — 97162 PT EVAL MOD COMPLEX 30 MIN: CPT

## 2022-03-09 PROCEDURE — 92523 SPEECH SOUND LANG COMPREHEN: CPT

## 2022-03-09 PROCEDURE — 97535 SELF CARE MNGMENT TRAINING: CPT

## 2022-03-09 PROCEDURE — 97166 OT EVAL MOD COMPLEX 45 MIN: CPT

## 2022-03-09 PROCEDURE — 36415 COLL VENOUS BLD VENIPUNCTURE: CPT

## 2022-03-09 PROCEDURE — 97116 GAIT TRAINING THERAPY: CPT

## 2022-03-09 RX ADMIN — CLOPIDOGREL BISULFATE 75 MG: 75 TABLET ORAL at 10:08

## 2022-03-09 RX ADMIN — ASPIRIN 81 MG: 81 TABLET, COATED ORAL at 10:08

## 2022-03-09 RX ADMIN — ENOXAPARIN SODIUM 40 MG: 100 INJECTION SUBCUTANEOUS at 10:08

## 2022-03-09 RX ADMIN — ATORVASTATIN CALCIUM 80 MG: 80 TABLET, FILM COATED ORAL at 20:50

## 2022-03-09 RX ADMIN — HYDROCHLOROTHIAZIDE 25 MG: 25 TABLET ORAL at 10:08

## 2022-03-09 RX ADMIN — PANTOPRAZOLE SODIUM 40 MG: 40 TABLET, DELAYED RELEASE ORAL at 05:27

## 2022-03-09 RX ADMIN — LISINOPRIL 5 MG: 5 TABLET ORAL at 10:08

## 2022-03-09 ASSESSMENT — PAIN SCALES - GENERAL
PAINLEVEL_OUTOF10: 0

## 2022-03-09 NOTE — PROGRESS NOTES
Patient is in bed and resting at this time, vitals stable, no pain at this time. Patient is a x 1 with walker, contact guard assist and gait belt. Weakness and unsteady with R lower extremity. Call light with in reach and bed alarm on.

## 2022-03-09 NOTE — PROGRESS NOTES
Speech Language Pathology  Facility/Department: [facility] ACUTE REHAB UNIT  Initial Speech/Language/Cognitive Assessment    NAME: Alver Hatchet Cure  : 1945   MRN: [id number]  ADMISSION DATE: 3/8/2022  ADMITTING DIAGNOSIS: has MVP (mitral valve prolapse); Lupus ([facility] [address] Acute cerebrovascular accident (CVA) due to ischemia [facility]); and Acute cerebrovascular accident (CVA) ([facility] [address] on their problem list.     ADMIT DATE:  3/8/22    Date of Eval: 3/9/2022   Evaluating Therapist: SHA Gillespie    RECENT RESULTS  CT OF HEAD/MRI:  Impression       Acute infarct involving the deep white matter of the posterior left frontal lobe.       No significant mass effect or evidence of hemorrhage.       Extensive chronic small vessel ischemic changes.             HPI:  History of Present Illness/Hospital Course:  77 y. o. female with past medical history lupus, hyperparathyroidism possibly HTN, presents with symptoms of loss of balance than began on Thursday. She denies any vertigo symptoms, just felt very much off balance. She also found herself not being well able to move her right leg and almost seeming to drag it on the ground. The right leg weakness resolved. She then had a recurrence of this however, but is able to move her leg again now. No other areas of focal weakness. No headache. No vision changes. No palpitations. No cardiac history. She is not taking any prescription med, does not smoke, takes a keto weight loss supplement and a green tea supplement. She is not on medication for high blood pressure, report its is sometimes high but comes down with taking deep breaths. Admitted for acute CVA. CT, CTA head negative.  Mild narrowing of b/l ICA segments, suspected 2 mm left ICA cavernous segment aneurysmStroke team consulted, advised initiation of DAPT    Pain:  Pain Assessment  Pain Assessment: 0-10  Pain Level: 0    Assessment:  Cognitive Diagnosis: Mild cognitive-linguistic impairment     Patient seen for a cognitive-linguistic assessment. She denies any speech-language or cognitive changes. She utilized her L, non-dominant hand during writing/ marilyn drawing tasks, which was a limiting factor at times. Speech is hoarse, weak  but fully intelligible. No dysarthria appreciated. She reports her voice is raspy at baseline. Word finding in conversation is appropriate. She did have difficulties providing her age. See CLQT scores below. Although she scored normal in the majority of cognitive domains for her age, scores were borderline. Overall, she demonstrates mild cognitive-linguistic deficits and would benefit from ongoing ST services to maximize potential for returning to [facility]. Pt was administered the Cognitive Linguistic Quick Test (CLQT) with the following results:    Attention- 167 WFL (215-160)  Memory- 135 MILD  (115-140)  Executive Functions- 22 WFL (40-19)  Language- 28 WFL (28/37)  Visuospatial Skills- 76 (105-62)   Composite Severity Rating- 3.89= WNL   Clock Drawing Severity Rating-  Mild (Suspect affected d/t difficulty with L, non-dominant hand-writing)     **Of note, she denies all clinical indication of dysphagia. She is tolerating a Regular, thin liquid diet. Per acute ST, no further services indicated re: dysphagia. If s/ of dysphagia arise, please re-consult ST. Recommendations:  Requires SLP Intervention: Yes  Duration/Frequency of Treatment: ST services 5/7 days 60 mins/day  D/C Recommendations:  To be determined    Plan:  ST services 5/7 days 60 mins/day to target the following goals:     Goals:  Short-term Goals  Timeframe for Short-term Goals: 10 days  Goal 1: Patient will complete higher level/abstract naming tasks to improve mental flexibility at 90% acc given min cues  Goal 2: Patient will complete verbal/visual reasoning tasks at 90% acc given min tasks (meds,times,finance etc)  Goal 3: Patient will complete graded memory tasks with learned strategies at 90% acc given min cues  Long-term Goals  Timeframe for Long-term Goals: 21 days  Goal 1: Patient will improve overall cognitive-linguistic ability to level of potential for safe discharge home   Patient/family involved in developing goals and treatment plan: Yes    Subjective:   Previous level of function and limitations: Independent   General  Chart Reviewed: Yes  Patient assessed for rehabilitation services?: Yes  Family / Caregiver Present: No  Subjective  Subjective: Patient pleasant and cooperative. Appeared slightly anxious with assessment. Social/Functional History  Lives With: Daughter  Type of Home: House  ADL Assistance: Independent  Homemaking Assistance: Independent  Homemaking Responsibilities: Yes  Ambulation Assistance: Independent  Transfer Assistance: Independent  Active : Yes  Occupation: Retired  Type of occupation: Cosmotologist  Leisure & Hobbies: travel  Additional Comments: Denies recent falls; daughter able to provide 24/7  Vision  Vision: Impaired (Reports occ difficulty with L eye)  Vision Exceptions: Wears glasses for reading  Hearing  Hearing: Within functional limits    Objective:  Oral/Motor  Oral Motor: Within functional limits  Auditory Comprehension  Comprehension: Within Functional Limits  Expression  Primary Mode of Expression: Verbal  Verbal Expression  Verbal Expression: Within functional limits  Motor Speech  Motor Speech:  Within Functional Limits  Pragmatics/Social Functioning  Pragmatics: Within functional limits    Cognition:   Orientation  Overall Orientation Status: Within Functional Limits  Orientation Level: Oriented X4  Attention  Attention: Exceptions to [facility]  Alternating Attention: Mild  Divided Attention: Mild  Memory  Memory: Exceptions to [facility]  Short-term Memory: Mild  Problem Solving  Problem Solving: Exceptions to [facility]  Verbal Reasoning Skills: Mild  Numeric Reasoning  Numeric Reasoning: Exceptions to [facility]   Calculations: Mild  Abstract Reasoning  Abstract Reasoning: Exceptions to WFL  Divergent Thinking: Mild  Safety/Judgement  Safety/Judgement: Within Functional Limits    Prognosis:  Speech Therapy Prognosis  Prognosis: Excellent  Prognosis Considerations: Participation Level; Potential;Previous Level of Function  Individuals consulted  Consulted and agree with results and recommendations: Patient    Education:  Patient Education: Patient educated on reason for ST evaluation, findings and recommendations. Patient Education Response: Verbalizes understanding;Demonstrated understanding  Safety Devices in place: Yes  Type of devices: Left in chair;Call light within reach; Chair alarm in place    Therapy Time:   Individual Concurrent Group Co-treatment   Time In 1315         Time Out 1415         Minutes [address] [place] [address]  3/9/2022 2:56 PM

## 2022-03-09 NOTE — H&P
Department of Physical Medicine & Rehabilitation  History & Physical      Patient Identification:  Flynn Murray  : 1945  Admit date: 3/8/2022   Attending provider: Heath Verduzco DO        Primary care provider: Jacky Tracy MD     Chief Complaint: CVA    History of Present Illness/Hospital Course:  77 y. o. female with past medical history lupus, hyperparathyroidism possibly HTN, presents with symptoms of loss of balance than began on Thursday. She denies any vertigo symptoms, just felt very much off balance. She also found herself not being well able to move her right leg and almost seeming to drag it on the ground. The right leg weakness resolved. She then had a recurrence of this however, but is able to move her leg again now. No other areas of focal weakness. No headache. No vision changes. No palpitations. No cardiac history. She is not taking any prescription med, does not smoke, takes a keto weight loss supplement and a green tea supplement. She is not on medication for high blood pressure, report its is sometimes high but comes down with taking deep breaths.   Admitted for acute CVA. CT, CTA head negative. 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 Daughter plans on moving in following this hospitalization    Past Medical History:   Diagnosis Date    Lupus ([address]     MVP (mitral valve prolapse)      Fall in past year: No    Past Surgical History:   Procedure Laterality Date    COLONOSCOPY      HYSTERECTOMY      TONSILLECTOMY       Major Surgery in past 100 days: No    No family history on file.     Social History     Socioeconomic History    Marital status:      Spouse name: Not on file    Number of children: Not on file    Years of education: Not on file    Highest education level: Not on file   Occupational History    Not on file   Tobacco Use    Smoking status: Never Smoker    Smokeless tobacco: Never Used   Vaping Use    Vaping Use: Never used   Substance and Sexual Activity    Alcohol use: Yes    Drug use: Never    Sexual activity: Not Currently   Other Topics Concern    Not on file   Social History Narrative    Not on file     Social Determinants of Health     Financial Resource Strain:     Difficulty of Paying Living Expenses: Not on file   Food Insecurity:     Worried About Running Out of Food in the Last Year: Not on file    Errol of Food in the Last Year: Not on file   Transportation Needs:     Lack of Transportation (Medical): Not on file    Lack of Transportation (Non-Medical):  Not on file   Physical Activity:     Days of Exercise per Week: Not on file    Minutes of Exercise per Session: Not on file   Stress:     Feeling of Stress : Not on file   Social Connections:     Frequency of Communication with Friends and Family: Not on file    Frequency of Social Gatherings with Friends and Family: Not on file    Attends Synagogue Services: Not on file    Active Member of [address] [place] [address] [employer] or Organizations: Not on file    Attends Club or Organization Meetings: Not on file    Marital Status: Not on file   Intimate Partner Violence:     Fear of Current or Ex-Partner: Not on file    Emotionally Abused: Not on file    Physically Abused: Not on file    Sexually Abused: Not on file   Housing Stability:     Unable to Pay for Housing in the Last Year: Not on file    Number of [place] in the Last Year: Not on file    Unstable Housing in the Last Year: Not on file       Allergies   Allergen Reactions    Codeine     Iodine          Current Facility-Administered Medications   Medication Dose Route Frequency Provider Last Rate Last Admin    aspirin EC tablet 81 mg  81 mg Oral Daily Dino L Heis, DO   81 mg at 03/09/22 1008    atorvastatin (LIPITOR) tablet 80 mg  80 mg Oral Nightly Dino L Heis, DO   80 mg at 03/08/22 2146    clopidogrel (PLAVIX) tablet 75 mg  75 mg Oral Daily Dino L Heis, DO   75 mg at 03/09/22 1008    ondansetron (ZOFRAN-ODT) disintegrating tablet 4 mg  4 mg Oral Q8H PRN Dino L Heis, DO        Or    ondansetron (ZOFRAN) injection 4 mg  4 mg IntraVENous Q6H PRN Dino L Heis, DO        acetaminophen (TYLENOL) tablet 650 mg  650 mg Oral Q4H PRN Dino L Heis, DO        enoxaparin (LOVENOX) injection 40 mg  40 mg SubCUTAneous Daily Dino L Heis, DO   40 mg at 03/09/22 1008    magnesium hydroxide (MILK OF MAGNESIA) 400 MG/5ML suspension 30 mL  30 mL Oral Daily PRN Dino L Heis, DO        polyethylene glycol (GLYCOLAX) packet 17 g  17 g Oral Daily PRN Dino L Heis, DO        cloNIDine (CATAPRES) tablet 0.1 mg  0.1 mg Oral Q8H PRN Dino L Heis, DO        hydroCHLOROthiazide (HYDRODIURIL) tablet 25 mg  25 mg Oral Daily Dino L Heis, DO   25 mg at 03/09/22 1008    lisinopril (PRINIVIL;ZESTRIL) tablet 5 mg  5 mg Oral Daily Dino L Heis, DO   5 mg at 03/09/22 1008    pantoprazole (PROTONIX) tablet 40 mg  40 mg Oral QAM AC Dino L Heis, DO   40 mg at 03/09/22 8580         REVIEW OF SYSTEMS:   CONSTITUTIONAL: negative for fevers, chills, diaphoresis, appetite change, night sweats, unexpected weight change, fatigue  EYES: negative for blurred vision, eye discharge, visual disturbance and icterus  HEENT: negative for hearing loss, tinnitus, ear drainage, sinus pressure, nasal congestion, epistaxis and snoring  RESPIRATORY: Negative for hemoptysis, cough, sputum production  CARDIOVASCULAR: negative for chest pain, palpitations, exertional chest pressure/discomfort, syncope, edema  GASTROINTESTINAL: negative for nausea, vomiting, diarrhea, blood in stool, abdominal pain, constipation  GENITOURINARY: negative for frequency, dysuria, urinary incontinence, decreased urine volume, and hematuria  HEMATOLOGIC/LYMPHATIC: negative for easy bruising, bleeding and lymphadenopathy  ALLERGIC/IMMUNOLOGIC: negative for recurrent infections, angioedema, anaphylaxis and drug reactions  ENDOCRINE: negative for weight changes and diabetic symptoms including polyuria, polydipsia and polyphagia  MUSCULOSKELETAL: negative for pain, joint swelling, decreased range of motion  NEUROLOGICAL: negative for headaches, slurred speech, +ve for RUE unilateral weakness  PSYCHIATRIC/BEHAVIORAL: negative for hallucinations, behavioral problems, confusion and agitation. All pertinent positives are noted in the HPI. Physical Examination:  Vitals:   Patient Vitals for the past 24 hrs:   BP Temp Temp src Pulse Resp SpO2 Height Weight   03/09/22 0800 136/82 98.1 °F (36.7 °C) Oral 83 16 97 % -- --   03/08/22 2126 -- -- -- -- -- -- 5' 6\" (1.676 m) 143 lb 11.2 oz (65.2 kg)   03/08/22 2114 (!) 142/85 97.9 °F (36.6 °C) Oral 78 18 97 % -- --   03/08/22 1755 (!) 170/82 98 °F (36.7 °C) Oral 81 18 99 % -- --       Const: Alert. WDWN. No distress  Eyes: Conjunctiva noninjected, no icterus noted; pupils equal, round, and reactive to light. HENT: Atraumatic, normocephalic; Oral mucosa moist  Neck: Trachea midline, neck supple. No thyromegaly noted. CV: Regular rate and rhythm, no murmur rub or gallop noted  Resp: Lungs clear to auscultation bilaterally, no rales wheezes or ronchi, no retractions. Respirations unlabored. GI: Soft, nontender, nondistended. Normal bowel sounds. No palpable masses. Skin: Normal temperature and turgor. No rashes or breakdown noted. Ext: No significant edema appreciated. No varicosities. MSK: No joint tenderness, erythema, warmth noted. AROM intact. Neuro: Alert, oriented, appropriate. No cranial nerve deficits appreciated. Sensation intact to light touch. Motor examination reveals 5/5 strength in RUE, LUE, LLE.  Strength 4/5 in RLE upon flexion and extension of hip. No abnormalities with finger/nose or heel/shin noted. Reflexes normal and symmetric. Psych: Stable mood, normal judgement, normal affect     Lab Results   Component Value Date    WBC 6.1 03/09/2022    HGB 14.7 03/09/2022    HCT 42.0 03/09/2022    MCV 91.9 03/09/2022     03/09/2022     Lab Results   Component Value Date    INR 1.00 03/05/2022    PROTIME 11.3 03/05/2022     Lab Results   Component Value Date    CREATININE 0.8 03/09/2022    BUN 19 03/09/2022     03/09/2022    K 4.2 03/09/2022    CL 98 (L) 03/09/2022    CO2 29 03/09/2022     Lab Results   Component Value Date    ALT 16 03/05/2022    AST 15 03/05/2022    ALKPHOS 104 03/05/2022    BILITOT 0.4 03/05/2022         No orders to display       The above laboratory data have been reviewed. The above imaging data have been reviewed. The above medical testing have been reviewed. Body mass index is 23.19 kg/m². POST ADMISSION PHYSICIAN EVALUATION  The patient has agreed to being admitted to our comprehensive inpatient rehabilitation facility and can tolerate the intensity of service consisting of at least:  --180 minutes of therapy a day, 5 out of 7 days a week. OR  --15 hours of intensive therapy within a 7 consecutive day period. The patient/family has a good understanding of our discharge process and will benefit from an interdisciplinary inpatient rehabilitation program. The patient has potential to make improvement and is in need of at least two of the following multidisciplinary therapies including but not limited to physical, occupational, respiratory, and speech, nutritional services, wound care, and prosthetics and orthotics. Given the patients complex condition and risk of further medical complications, rehabilitation services cannot be safely provided at a lower level of care such as a skilled nursing facility.  All of the goals listed below were reviewed with the patient and he/she is in agreement. I have compared the patients medical and functional status at the time of the preadmission screening and the same on this date, and there are no significant changes. By signing this document, I acknowledge that I have personally performed a full physical examination on this patient within 24 hours of admission to this inpatient rehabilitation facility and have determined the patient to be able to tolerate the above course of treatment at an intensive level for a reasonable period of time. I will be completing a detailed individualized  Plan of Care for this patient by day four of the patients stay based upon the Preadmission Screen, this Post-Admission Evaluation, and the therapy evaluations. Barriers: Decreased functional mobility, medical comorbidities  Services Required: PT, OT, SLP  Goals: mod I  Prognosis: Good  Anticipated Dispo: home  ELOS: TBD    Rehabilitation Diagnosis:   Stroke, 1.2, Right Body (L Brain)      Assessment and Plan:  Assessment:  1. Acute CVA   - No focal deficits on exam however patient has continued sense of being off balance with walking and was noted in ED to have some ataxia. - CT, CTA head no acute findings. 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 - Stroke team consulted, advised initiation of DAPT and admission for further work-up  - Neurology following  - Echocardiogram pending  - Risk factor modification  - Hypercoagulable panel   - PT/OT/SLP     2. HTN  - Careful blood pressure lowering allowing for permissive HTN  - Monitor    Impairments: Decreased functional mobility, Decreased ADLs    Bladder - high risk retention - Monitor PVRs, SC prn >300cc    Bowel - high risk constipation - colace BID, PRN miralax and MoM. follow bowel movements. Enema or suppository if needed.      Safety - fall precautions    PPx  DVT: lovenox  GI: pantoprazole    FULL CODE    Linda Mcfarland D.O. M.P.H  PM&R  3/9/2022  10:48 AM

## 2022-03-09 NOTE — CONSULTS
Nutrition Note       NUTRITION RECOMMENDATIONS:   1. PO Diet: Continue regular; low Na diet   2. ONS: Please offer Ensure if po intakes <50% of meals    NUTRITION ASSESSMENT:  Nutritional summary & status: Pt assessed as new adm to ARU. She endorses normal appetite and po intakes although states she \"doesn't eat much\" at baseline. Pt shares that she does not eat much because she has always watched her weight. RD discussed increased nutrition needs with therapy and encouraged adequate po intakes. Encouraged pt to have meals and snacks brought in as needed/to keep in room. 6 lb wt loss noted in 3 days, question if d/t different scales being used to measure wt. Pt denies need for ONS at this time. No po intakes yet noted. RD to monitor po intakes and nutrition status throughout adm.  Admission/PMH: Acute CVA PMH: lupus, hyperparathyroidism, HTN    MALNUTRITION ASSESSMENT  Context of Malnutrition: Acute Illness   Malnutrition Status: Insufficient data    NUTRITION DIAGNOSIS   No nutrition diagnosis at this time related to   as evidenced by      [address] and/or Nutrient Delivery:  Continue Current Diet  Nutrition Education/Counseling:  No recommendation at this time      The patient will still be monitored per nutrition standards of care. Consult dietitian if nutrition interventions essential to patient care is needed.      Romy Velez, [address], [address]:  [phone]  Office:  [phone]

## 2022-03-09 NOTE — PROGRESS NOTES
Physical Therapy    Facility/Department: [facility] ACUTE REHAB UNIT  Initial Assessment    NAME: David Murray  : 1945  MRN: [id number]    Date of Service: 3/9/2022    Discharge Recommendations:  Continue to assess pending progress,24 hour supervision or assist,Home with Home health PT   PT Equipment Recommendations  Other: pt will likely beenfit from RW- continue to assess pending progress    Assessment   Body structures, Functions, Activity limitations: Decreased functional mobility ; Decreased balance;Decreased endurance;Decreased strength;Decreased ADL status; Decreased coordination;Decreased cognition  Assessment: Pt presents to [facility] with above impairments s/p infarct to posterior frontal lobe. Pt currently requires min A for transfers, ambulation, and stairs and demonstrates NBOS, decreased foot clearance and knee buckling during ambulation posing increased risk of falls. Pt would benefit from continued skilled inpatient PT in order to progress towards independent PLOF. Treatment Diagnosis: impaired mobility associated with CVA  Prognosis: Good  Decision Making: Medium Complexity  PT Education: Transfer Training;PT Role;Functional Mobility Training;General Safety;Gait Training;Goals;Plan of Care;Home Exercise Program  Patient Education: Pt educated on beenfits of completing HEP and recommended to complete 3 x daily. Pt verbalizes understanding- would benefot from continud reinforcement. Barriers to Learning: some decreased memory noted  REQUIRES PT FOLLOW UP: Yes  Activity Tolerance  Activity Tolerance: Patient Tolerated treatment well;Patient limited by fatigue       Patient Diagnosis(es): There were no encounter diagnoses. has a past medical history of Lupus (HCC) and MVP (mitral valve prolapse). has a past surgical history that includes Tonsillectomy; Colonoscopy; and Hysterectomy.     Restrictions  Restrictions/Precautions  Restrictions/Precautions: Fall Risk,Seizure  Position Activity Restriction  Other position/activity restrictions: up as tolerated  Vision/Hearing  Vision: Within Functional Limits  Vision Exceptions: Wears glasses for reading  Hearing: Within functional limits     Subjective  General  Chart Reviewed: Yes  Patient assessed for rehabilitation services?: Yes  Additional Pertinent Hx: Patient is a 78 y/o female admitted 3/5 with right leg weakness and unsteadiness. CT head and chest x-ray (-) for acute findings. MRI brain (+) for Acute infarct involving the deep white matter of the posterior left frontal lobe. PMH significant for lupus. Admitted to ARU 3/8. Response To Previous Treatment: Patient with no complaints from previous session. Family / Caregiver Present: No  Diagnosis: TIA  Follows Commands: Within Functional Limits  General Comment  Comments: Pt seated in recliner upon approach and agreeable to PT.   Pain Screening  Patient Currently in Pain: Denies  Vital Signs  Patient Currently in Pain: Denies       Orientation  Orientation  Overall Orientation Status: Within Functional Limits  Social/Functional History  Social/Functional History  Lives With: Daughter (living with dgt at Pt house)  Type of Home: House  Home Layout: Able to Live on Main level with bedroom/bathroom,Laundry in basement,Multi-level  Home Access: Stairs to enter with rails  Entrance Stairs - Number of Steps: 5  Entrance Stairs - Rails: Both  Bathroom Shower/Tub: Tub/Shower unit  Bathroom Toilet: Standard (sink close for leverage)  Bathroom Equipment: Grab bars in shower  Bathroom Accessibility: Accessible  Home Equipment:  (no AD)  ADL Assistance: Independent  Homemaking Assistance: Independent  Homemaking Responsibilities: Yes  Ambulation Assistance: Independent  Transfer Assistance: Independent  Active : Yes  Occupation: Retired  Type of occupation: Cosmotologist  Leisure & Hobbies: travel  Additional Comments: Denies recent falls; daughter able to provide 24/7  Cognition        Objective AROM RLE (degrees)  RLE General AROM: limitations in knee and ankle ROM d/t decreased strength  AROM LLE (degrees)  LLE AROM : WFL  Strength RLE  Comment: Seated MMTs: hip flexion 3+/5; knee extension 2+/5, DF/PF 1/5  Strength LLE  Comment: Seated MMTs: hip flexion/knee extension 4+/5; DF/PF 5/5     Sensation  Overall Sensation Status: WFL  Bed mobility  Bridging: Contact guard assistance  Rolling to Left: Contact guard assistance  Supine to Sit: Contact guard assistance  Sit to Supine: Contact guard assistance  Comment: completed with HOB gale and without handrails, increased time and effort to compelte  Transfers  Sit to Stand: Minimal Assistance (from recliner, EOB, wc up to RW/no device)  Stand to sit: Minimal Assistance (to recliner, EOB, wc with RW/no device)  Stand Pivot Transfers: Minimal Assistance (stand pivot without device from recliner>EOB)  Car Transfer: Minimal Assistance (wc<>car without device)  Ambulation  Ambulation?: Yes  WB Status: n/a  More Ambulation?: No  Ambulation 1  Surface: level tile;uneven;ramp  Device: Rolling Walker  Assistance: Minimal assistance  Quality of Gait: NBOS with periods of scissoring, variable step length/stride length with signifcant ataxic pattern of the RLE. Decreased B step height/length with decreased L foot clearance with fatigue. Gait Deviations: Slow Katharina;Decreased step length;Decreased step height  Distance: 40', 20' (ascent/descent 10' ramp), 30'  Comments: VC for increased SHU, increased step height L LE, and increased knee flexion L LE. Completed with RW which pt does not own for safety d/t report from OT of R knee buckling and mod A for ambulaiton with RW. Stairs/Curb  Stairs?: Yes  Stairs  # Steps : 6  Stairs Height: 6\"  Rails: Bilateral  Assistance: Minimal assistance  Comment: VC for sequencing of step to pattern with L LE leading on ascent ond R LE leading on descent.  Pt demo's ataxic placement of R LE with occassional decreased foot clearance on ascent. 1 instance of knee buckling on descent. Balance  Posture: Fair  Sitting - Static: Good;- (supervision seated EOB)  Sitting - Dynamic: Good;- (sueprvision with weight shifting seated EOB)  Standing - Static: Fair;- (CGA with RW, min A with UL UE support)  Standing - Dynamic: Fair;- (min A for ambulation with RW)  Comments: Pt picks up object from ground with UL UE support on RW and mod A.   Exercises  Hip Flexion: x10 seated marches R LE  Knee Long Arc Quad: x3+ 10 R LE  Ankle Pumps: Attempted with pt demonstrating mm twitch bu not AROM     Plan   Plan  Times per week: 5 days a week, 60 min  Times per day: Daily  Current Treatment Recommendations: Gaylia Melena Training,Patient/Caregiver Education & Training,Balance Training,Gait Training,Functional Mobility Training,Safety Education & Training,Neuromuscular Re-education,Stair training,Equipment Evaluation, Education, & procurement  Safety Devices  Type of devices: Call light within reach,Chair alarm in place,Left in chair                 Goals  Short term goals  Time Frame for Short term goals: 10 days  Short term goal 1: Pt will perform bed mobility with modified independence  Short term goal 2: Pt will perform transfers sit<>stand with SBA and LRAD  Short term goal 3: Pt will ambulate 150' with CGA and LRAD  Short term goal 4: Pt will ascend/descend 5 stairs with B handrails and SBA  Short term goal 5: Pt will stand without UE support while completing dynamic UE task with CGA  Long term goals  Time Frame for Long term goals : 3 weeks  Long term goal 1: Pt will perform sit<>stand transfers with mod I and LRAD  Long term goal 2: Pt will ambulate 150' with mod I and LRAD  Long term goal 3: Pt will ascend/descend 12 stairs with mod I and UL handrail  Long term goal 4: Pt will stand without UE support while completing dynamic UE task with mod I  Patient Goals   Patient goals : get better and get home, improve walking       Therapy Time   Individual Concurrent Group Co-treatment   Time In 1100         Time Out 1200         Minutes 60         Timed Code Treatment Minutes: [place] , [place] [id number]

## 2022-03-09 NOTE — CARE COORDINATION
Case Management Assessment           Initial Evaluation                Date / Time of Evaluation: 3/9/2022 3:41 PM                 Assessment Completed by: PATRICIO Rosen    Patient Name: Madyson Murray     YOB: 1945  Diagnosis: Acute cerebrovascular accident (CVA) [facility]) [I63.9]     Date / Time: 3/8/2022  6:02 PM    Patient Admission Status: Inpatient    If patient is discharged prior to next notation, then this note serves as note for discharge by case management. Current PCP: Yoselyn Ugarte MD  Clinic Patient: No    Chart Reviewed: Yes  Patient/ Family Interviewed: Yes    Initial assessment completed at bedside with: Pt    Hospitalization in the last 30 days: No    Emergency Contacts:  Extended Emergency Contact Information  Primary Emergency Contact: [address] Phone: [phone]  Mobile Phone: [phone]  Relation: Child    Advance Directives:   Code Status: Full Code      Financial  Payor: MEDICARE / Plan: MEDICARE PART A AND B / Product Type: *No Product type* /     Pre-cert required for SNF: No    Pharmacy    [id number] [place] [phone] - F [phone]  [facility] [address]  Phone: [phone] Fax: [phone]      Potential assistance Purchasing Medications: Potential Assistance Purchasing Medications: No  Does Patient want to participate in local refill/ meds to beds program?:      Meds To Beds General Rules:  1. Can ONLY be done Monday- Friday between 8:30am-5pm  2. Prescription(s) must be in pharmacy by 3pm to be filled same day  3. Copy of patient's insurance/ prescription drug card and patient face sheet must be sent along with the prescription(s)  4. Cost of Rx cannot be added to hospital bill. If financial assistance is needed, please contact unit  or ;  or  CANNOT provide pharmacy voucher for patients co-pays  5.  Patients can then pick up the prescription on their way out of the hospital at discharge, or pharmacy can deliver to the bedside if staff is available. (payment due at time of pick-up or delivery - cash, check, or card accepted)     Able to afford home medications/ co-pay costs: Yes    ADLS  Support Systems: Family Members    PT AM-PAC:   /24  OT AM-PAC:   /24    [place]: lives at home with grandedna  Steps:     Plans to RETURN to current housing: Yes  Barriers to RETURNING to current housing: none at this time    Hilary Segovia 79  Currently ACTIVE with [address]: No  Home Care Agency: Not Applicable    Currently ACTIVE with [place] on Aging: No    Durable Medical Equipment  DME Provider:   Equipment: none    Home Oxygen and [address] prior to admission: No  [address]: Not Applicable  Other Respiratory Equipment:     Dialysis  Active with HD/PD prior to admission: No  Nephrologist:     DISCHARGE PLAN:  Disposition: return home with grandson + [facility]: family     SW meet with Pt at bedside. Pt states plans to return home with grandson at ID and is receptive to skilled [address] with no prefernce in agency. SW will also assist with DME needs. Will follow. The Plan for Transition of Care is related to the following treatment goals of Acute cerebrovascular accident (CVA) [facility]) [I63.9]    The Patient and/or patient representative Makayla Watkins and her family were provided with a choice of provider and agrees with the discharge plan Yes    Freedom of choice list was provided with basic dialogue that supports the patient's individualized plan of care/goals and shares the quality data associated with the providers.  Yes    Care Transition patient: No    Mine Handley  Case Management Department  Ph: [phone]

## 2022-03-09 NOTE — PATIENT CARE CONFERENCE
Inpatient Rehabilitation  Weekly Team Conference Note  The [address], [facility] [address]  [phone]  Patient Name: Chelsea Singleton        MRN: [id number]    : 1945  (70 y.o.)  Gender: female   Diagnosis: TIA  The team conference for this patient was held on 3/9/2022 at 10:30am by:  Aldo Friday.  Heis, DO    Current/Goal QM SCORES  QM Current/Goal Score   Eating   / Discharge Goal: Independent   Oral Hygiene CARE Score: 4 / Discharge Goal: Independent   Shower/Bathing CARE Score: 4 / Discharge Goal: Independent   UB Dressing CARE Score: 3 / Discharge Goal: Independent   LB Dressing CARE Score: 3 / Discharge Goal: Independent   Putting on/off Footwear CARE Score: 4 / Discharge Goal: Independent   Toileting Hygiene CARE Score: 4 / Discharge Goal: Independent   Bladder Continence Bladder Continence: Always continent    Bowel Continence Bowel Continence: Frequently incontinent    Toilet Transfers CARE Score: 3 / Discharge Goal: Independent   Shower/Bathe Self  CARE Score: 4 / Discharge Goal: Independent   Rolling Left and Right CARE Score: 4 / Discharge Goal: Independent   Sit to Lying CARE Score: 4 / Discharge Goal: Independent   Lying to Sitting on Bedside CARE Score: 4 / Discharge Goal: Independent   Sit to Stand CARE Score: 3 / Discharge Goal: Independent   Chair/Bed to Chair Transfer CARE Score: 3 / Discharge Goal: Independent   Car Transfers CARE Score: 3 / Discharge Goal: Independent   Walk 10 Feet CARE Score: 3 / Discharge Goal: Independent   Walk 50 Feet with Two Turns CARE Score: 88 / Discharge Goal: Independent   Walk 150 Feet CARE Score: 10 / Discharge Goal: Independent   Walk 10 Feet on Uneven Surfaces CARE Score: 1 / Discharge Goal: Independent   1 Step (Curb) CARE Score: 3 / Discharge Goal: Independent   4 Steps CARE Score: 3 / Discharge Goal: Independent   12 Steps CARE Score: 88 / Discharge Goal: Independent   Picking up Object from Floor CARE Score: 1 /       NURSING:  A&Ox: Level of Consciousness: Alert (0)  Orientation Level: Oriented X4  Navarro Fall Risk Score: Total Score: 60  Admission BIMS: 12  Wounds/Incisions/Ulcers: n/a  Medication Review: Reviewed Meds  Pain: No pain at this time   Consultations: n/a  Imaging:   No orders to display     Active Comorbid Conditions: HTN  Systems Review:   Renal: , Dialysis:  Type: , Frequency:   Neurological: Weak R dorsiflexion and plantar flexion  Musculoskeletal: Weakness and unsteady with R lower extremity, weakness with R upper extremity  Respiratory: R air   Cardiac/Circulatory/Peripheral Vascular: HTN  Abnormal/Relevant Test Results:   Abnormal/Relevant Lab Values:   CBC:   Recent Labs     03/09/22  0549   WBC 6.1   HGB 14.7   HCT 42.0   MCV 91.9        BMP:   Recent Labs     03/09/22  0549      K 4.2   CL 98*   CO2 29   BUN 19   CREATININE 0.8     PT/INR: No results for input(s): PROTIME, INR in the last 72 hours. APTT: No results for input(s): APTT in the last 72 hours. Liver Profile:  Lab Results   Component Value Date    AST 15 03/05/2022    ALT 16 03/05/2022    BILITOT 0.4 03/05/2022    ALKPHOS 104 03/05/2022     Lab Results   Component Value Date    CHOL 209 03/06/2022    HDL 70 03/06/2022    TRIG 66 03/06/2022     Recent Labs     03/09/22  0549   WBC 6.1   HGB 14.7   HCT 42.0      MCV 91.9     Recent Labs     03/09/22  0549      K 4.2   CL 98*   CO2 29   BUN 19   CREATININE 0.8     No results for input(s): AST, ALT, ALB, BILIDIR, BILITOT, ALKPHOS in the last 72 hours. No results for input(s): MG in the last 72 hours. Recent Labs     03/09/22  0549   WBC 6.1   HGB 14.7   HCT 42.0        Recent Labs     03/09/22  0549      K 4.2   CL 98*   CO2 29   BUN 19   CREATININE 0.8   CALCIUM 10.5     No results for input(s): AST, ALT, BILIDIR, BILITOT, ALKPHOS in the last 72 hours. No results for input(s): INR in the last 72 hours.   No results for input(s): Thien Yoon in the last 72 hours. PHYSICAL THERAPY:  Transfers:  Sit to Stand: Minimal Assistance (from recliner, EOB, wc up to RW/no device)  Stand to sit: Minimal Assistance (to recliner, EOB, wc with RW/no device)    WB Status: n/a  Ambulation 1  Surface: level tile,uneven,ramp  Device: Rolling Walker  Assistance: Minimal assistance  Quality of Gait: NBOS with periods of scissoring, variable step length/stride length with signifcant ataxic pattern of the RLE. Decreased B step height/length with decreased L foot clearance with fatigue. Some minor R knee buckling with fatigue which pt able to correct with Fantasma. Gait Deviations: Slow Katharina,Decreased step length,Decreased step height  Distance: 40', 20' (ascent/descent 10' ramp), 30'  Comments: VC for increased SHU, increased step height L LE, and increased knee flexion L LE. Completed with RW which pt does not own for safety d/t report from OT of R knee buckling and mod A for ambulaiton with RW. Stairs  # Steps : 6  Stairs Height: 6\"  Rails: Bilateral  Assistance: Minimal assistance  Comment: VC for sequencing of step to pattern with L LE leading on ascent ond R LE leading on descent. Pt demo's ataxic placement of R LE with occassional decreased foot clearance on ascent. 1 instance of knee buckling on descent. OCCUPATIONAL THERAPY:  ADL  Grooming: Contact guard assistance (Pt completed oral hygiene in stance at sink)  UE Bathing: Stand by assistance (Pt completed all UB bathing seated on TTB)  LE Bathing: Contact guard assistance (Pt wash/dried front pericare seated, washed BLEs using fig 4 and no VCs. Pt washed back arsen by lateral WSing seated, 1 lateral LOB resulting in CGA.  Pt dried back arsen in stance with 1UE on GBs)  UE Dressing: Minimal assistance (Pt donned new shirt seated on TTB, able to thread BUEs thru sleeves and over shoulder, Min A to pull shirt down over back)  LE Dressing: Minimal assistance,Increased time to complete (Pt unthreaded briefs seated on toilet. Threaded new briefs/pants using fig 4 on TTB, competed pants mgmt up requiring Min A to maintain balance w 1 UE support on GBs. Doff/donned socks seated w fig 4 (SBA))  Toileting: Contact guard assistance (Pt completed briefs down in stance with CGA, Pt continent of small bm and completed back pericare seated with SBA.)  Additional Comments: At baseline, Pt takes tub baths at home and has shower GBs. Toilet Transfers: Toilet Transfers  Toilet - Technique: Ambulating  Equipment Used: Standard toilet  Toilet Transfer: Moderate assistance  Toilet Transfers Comments: Pt does not have GBs at home toilet. Required VCs for RW mgmt during 180 turn at toilet. Min A-Mod A for balance and to sit due to poor ecentric control. Tub/ShowerTransfers:  Shower Transfers  Shower - Transfer From: Jackelyn Solis - Transfer Type: To and From  Shower - Transfer To: Transfer tub bench  Shower - Technique: Ambulating  Shower Transfers: Minimal assistance  Shower Transfers Comments: VCs for RW mgmt and 180 turn, use of GBs, Min A for slow controlled descent    SPEECH THERAPY:  Assessment: Speech Therapy Diagnosis  Cognitive Diagnosis: Mild cognitive-linguistic impairment     NUTRITION:  Please see nutrition note for details. Current Weight: 143 lb 11.2 oz (65.2 kg) BMI (Calculated): 23.2  Current diet order: Current diet and supplement order: ADULT DIET; Regular; Low Sodium (2 gm)            Room Service: Selective   NSG Recorded PO:      Malnutrition Status Malnutrition Status: Insufficient data    Please see nutrition evaluation per nutrition standards of care for additional info.      Romy Velez RD, LD  Quan: [phone]  Office:  [phone]    CASE MANAGEMENT:  Psychosocial/Behavioral Issues: none  Assessment:  doing well    Patient/Family Education provided by team:   Role of PT/OT, transfer training, gait training, ADL Adaptive Strategies  Patient educated on reason for ST evaluation, findings and treatment nursing care   [x] The patient requires an intensive rehabilitation therapy program  [x] The patient requires active and ongoing intervention of multiple therapy disciplines  [x] The patient requires continued physician supervision by a rehabilitation physician  [x] The patient requires an intensive and coordinated interdisciplinary team approach to the delivery of rehabilitative care    Medical Necessity-continued close physician medical management is required for:   [x] Cardiac/Circulatory dysfunction  [x] Respiratory/Pulmonary dysfunction  [] Integumentary complications  [] Peripheral Vascular dysfunction  [x] Musculoskeletal dysfunction  [] Neurological dysfunction d/t:  [] CVA  [] SCI  [] TBI  [] Other: __________  [] Renal dysfunction  [] Hematologic dysfunction    [] Endocrine disorders  [] GI disorders     [] Genito-Urinary dysfunction    Assessment/Plan:  [x] The patient is making good progression towards their LTG's, is actively participating in, and has a reasonable expectation to continue to benefit from the intensive rehabilitation program.  [] The estimated discharge date has been changed from initial team conference due to:   [] The estimated discharge destination has been changed from initial team conference due to:     Rehab Team Members in attendance for Team Conference:  ARU Supervisor/PPS Coordinator:  Jabier Cox, PT, DPT    Therapy Manager/ARU :  Lucio Lal PT, DPT    Nursing Manager:  Valentina Wahl RN    Social Work:   Hali Street RN    Nursing: Adrian Phillips, TANISHA Sommers, RN    Therapy:  Arian Bob, PT, DPT  Chino Mcgee PT, DPT     Diony Sepulveda, OTR/L  Javi Chatterjee, OTR/L  Emilee, OTR/L    DONY Castrejon, SLP    Nutrition:  Noah Osborne RD LD      I approve the established interdisciplinary plan of care as documented [facility] the [facility] .     MD Signature Joel Merlin, CHERYLEO. M.P.H  PM&R  3/10/2022  12:11 PM

## 2022-03-09 NOTE — PLAN OF CARE
Pleasant and cooperative. Jasmina Crimes to learn. Problem: Falls - Risk of:  Goal: Will remain free from falls  Description: Will remain free from falls  3/9/2022 1455 by Ludwin Vázquez RN  Outcome: Ongoing  Note: Agrees to make needs known with call light. Problem: ACTIVITY INTOLERANCE/IMPAIRED MOBILITY  Goal: Mobility/activity is maintained at optimum level for patient  3/9/2022 1455 by Ludwin Vázquez RN  Outcome: Ongoing  Note: Pt participating in therapies as well as nursing range of motion.

## 2022-03-09 NOTE — PROGRESS NOTES
Black DO notified via [employer] at 1908 of the Pt's critically high aptt lab value of 44.4 as they were the provider that ordered the hypercoagulation lab that was drawn yesterday. RN currently waiting for a response and will continue to monitor Pt.

## 2022-03-09 NOTE — PROGRESS NOTES
NURSING ASSESSMENT: ARU ADMISSION  The [facility]     Rehab Dx/Hx: Acute cerebrovascular accident (CVA) [facility]) [I63.9]   :1945  OAQ:[id number]  Date of Admit: 3/8/2022  Room #: 3113/3113-01      Drug / Medication Review:   Medications were reviewed by RN at time of admission  [x]  No potential or actual clinically significant medication issues were noted.      []   Yes, a clinically significant medication issue was identified                 []  Adverse Drug Event:                  []  Allergy:                  []  Side Effect:                  []  Ineffective Therapy:                  []  Drug Interaction:                 []  Duplicated Therapy:                 []  Untreated Indication:                  []  Non-adherence:                 []  Other:                  Nursing/Pharmacy contacted the physician:     Date:              Time:                  Actions recommended by physician were completed:   Date :            Time:    4 Eyes Skin Assessment   The patient is being assessed for: Admission     I agree that 2 RN's have performed a thorough Head to Toe Skin Assessment on the patient. ALL assessment sites listed below have been assessed. Areas assessed by both nurses:   [x]   Head, Face, and Ears   [x]   Shoulders, Back, and Chest, Abdomen  [x]   Arms, Elbows, and Hands   [x]   Coccyx, Sacrum, and Ischium  [x]   Legs, Feet, and Heel     Does the Patient have Skin Breakdown?  No, but has bruising on R thigh         Ozzie Prevention initiated:  No  Wound Care Orders initiated:  Not applicable      [facility] nurse consulted for Pressure Injury (Stage 3,4, Unstageable, DTI, NWPT, Complex wounds)and New or Established Ostomies:  Not Applicable    Primary Nurse eSignature: Electronically signed by Montez Meier RN on 3/8/2022 at 10:08 PM    Co-signer eSignature:  Gerardo Cabrales

## 2022-03-09 NOTE — PLAN OF CARE
Problem: Falls - Risk of:  Goal: Will remain free from falls  Description: Will remain free from falls  Outcome: Ongoing  Note: Patient is a fall risk. Patient is a x 1 with walker and gait belt. See Fall Risk assessment for details. Bed is in low, lock position; call light/belongings within reach. No attempts to get out of bed have been made, calls appropriately when assistance is needed. Bed alarm and hourly rounds in place for safety. Goal: Absence of physical injury  Description: Absence of physical injury  Outcome: Ongoing     Problem: Mobility - Impaired:  Goal: Mobility will improve  Description: Mobility will improve  Outcome: Ongoing  Note: Patient is a x 1 with walker and gait belt, bilateral lower extremities are both weak and R lower extremity is unsteady when walking.       Problem: ACTIVITY INTOLERANCE/IMPAIRED MOBILITY  Goal: Mobility/activity is maintained at optimum level for patient  Outcome: Ongoing

## 2022-03-10 PROCEDURE — 97116 GAIT TRAINING THERAPY: CPT

## 2022-03-10 PROCEDURE — 97530 THERAPEUTIC ACTIVITIES: CPT

## 2022-03-10 PROCEDURE — 99233 SBSQ HOSP IP/OBS HIGH 50: CPT | Performed by: PHYSICAL MEDICINE & REHABILITATION

## 2022-03-10 PROCEDURE — 92507 TX SP LANG VOICE COMM INDIV: CPT

## 2022-03-10 PROCEDURE — 6370000000 HC RX 637 (ALT 250 FOR IP): Performed by: PHYSICAL MEDICINE & REHABILITATION

## 2022-03-10 PROCEDURE — 6370000000 HC RX 637 (ALT 250 FOR IP): Performed by: STUDENT IN AN ORGANIZED HEALTH CARE EDUCATION/TRAINING PROGRAM

## 2022-03-10 PROCEDURE — 97130 THER IVNTJ EA ADDL 15 MIN: CPT

## 2022-03-10 PROCEDURE — 6360000002 HC RX W HCPCS: Performed by: PHYSICAL MEDICINE & REHABILITATION

## 2022-03-10 PROCEDURE — 97110 THERAPEUTIC EXERCISES: CPT

## 2022-03-10 PROCEDURE — 97129 THER IVNTJ 1ST 15 MIN: CPT

## 2022-03-10 PROCEDURE — 1280000000 HC REHAB R&B

## 2022-03-10 RX ORDER — GUAIFENESIN 600 MG/1
600 TABLET, EXTENDED RELEASE ORAL 2 TIMES DAILY
Status: DISCONTINUED | OUTPATIENT
Start: 2022-03-10 | End: 2022-03-19 | Stop reason: HOSPADM

## 2022-03-10 RX ORDER — CETIRIZINE HYDROCHLORIDE 10 MG/1
10 TABLET ORAL DAILY
Status: DISCONTINUED | OUTPATIENT
Start: 2022-03-10 | End: 2022-03-19 | Stop reason: HOSPADM

## 2022-03-10 RX ADMIN — ATORVASTATIN CALCIUM 80 MG: 80 TABLET, FILM COATED ORAL at 21:28

## 2022-03-10 RX ADMIN — LISINOPRIL 5 MG: 5 TABLET ORAL at 09:43

## 2022-03-10 RX ADMIN — HYDROCHLOROTHIAZIDE 25 MG: 25 TABLET ORAL at 09:44

## 2022-03-10 RX ADMIN — ASPIRIN 81 MG: 81 TABLET, COATED ORAL at 09:43

## 2022-03-10 RX ADMIN — CETIRIZINE HYDROCHLORIDE 10 MG: 10 TABLET, FILM COATED ORAL at 13:48

## 2022-03-10 RX ADMIN — ENOXAPARIN SODIUM 40 MG: 100 INJECTION SUBCUTANEOUS at 09:43

## 2022-03-10 RX ADMIN — PANTOPRAZOLE SODIUM 40 MG: 40 TABLET, DELAYED RELEASE ORAL at 06:21

## 2022-03-10 RX ADMIN — CLOPIDOGREL BISULFATE 75 MG: 75 TABLET ORAL at 09:43

## 2022-03-10 RX ADMIN — GUAIFENESIN 600 MG: 600 TABLET, EXTENDED RELEASE ORAL at 21:28

## 2022-03-10 ASSESSMENT — PAIN SCALES - GENERAL
PAINLEVEL_OUTOF10: 0

## 2022-03-10 ASSESSMENT — 9 HOLE PEG TEST
TESTTIME_SECONDS: 28
TEST_RESULT: FUNCTIONAL
TESTTIME_SECONDS: 89
TEST_RESULT: IMPAIRED

## 2022-03-10 NOTE — PROGRESS NOTES
Department of Physical Medicine & Rehabilitation  Progress Note    Patient Identification:  Matti Cheek  [id number]  : 1945  Admit date: 3/8/2022    Chief Complaint: Acute cerebrovascular accident (CVA) ([address]    Subjective:   No acute events overnight. Patient seen this am. She is tolerating therapy well. Does feel that she has had increased mucous production, lungs clear and afebrile. Otherwise no acute complaints, sleeping well. Will monitor for signs of infection. Consider adding mucinex. ROS: No f/c, n/v, cp     Objective:  Patient Vitals for the past 24 hrs:   BP Temp Temp src Pulse Resp SpO2   22 2043 (!) 158/97 97.9 °F (36.6 °C) Oral 71 16 96 %   22 1506 -- -- -- 83 -- --   22 1500 (!) 149/83 97.8 °F (36.6 °C) Oral 83 -- 97 %     Const: Alert. WDWN. No distress  Eyes: Conjunctiva noninjected, no icterus noted; pupils equal, round, and reactive to light. HENT: Atraumatic, normocephalic; Oral mucosa moist  Neck: Trachea midline, neck supple. No thyromegaly noted. CV: Regular rate and rhythm, no murmur rub or gallop noted  Resp: Lungs clear to auscultation bilaterally, no rales wheezes or ronchi, no retractions. Respirations unlabored. GI: Soft, nontender, nondistended. Normal bowel sounds. No palpable masses. Skin: Normal temperature and turgor. No rashes or breakdown noted. Ext: No significant edema appreciated. No varicosities. MSK: No joint tenderness, erythema, warmth noted.  AROM intact. Neuro: Alert, oriented, appropriate. No cranial nerve deficits appreciated. Sensation intact to light touch. Motor examination reveals 5/5 strength in RUE, LUE, LLE. Strength 4/5 in RLE upon flexion and extension of hip. No abnormalities with finger/nose or heel/shin noted. Reflexes normal and symmetric. Psych: Stable mood, normal judgement, normal affect     Laboratory data: Available via EMR.    Last 24 hour lab  No results found for this or any previous visit (from the past 24 hour(s)). Therapy progress:  PT  Position Activity Restriction  Other position/activity restrictions: up as tolerated  Objective     Sit to Stand: Minimal Assistance (from recliner, EOB, wc up to RW/no device)  Stand to sit: Minimal Assistance (to recliner, EOB, wc with RW/no device)  Device: Rolling Walker  Assistance: Minimal assistance  Distance: 40', 20' (ascent/descent 10' ramp), 30'  OT  PT Equipment Recommendations  Other: pt will likely beenfit from RW- continue to assess pending progress  Toilet - Technique: Ambulating  Equipment Used: Standard toilet  Toilet Transfers Comments: Pt does not have GBs at home toilet. Required VCs for RW mgmt during 180 turn at toilet. Min A-Mod A for balance and to sit due to poor ecentric control. Assessment        SLP          Body mass index is 23.19 kg/m². Assessment and Plan:  1. Acute CVA   - No focal deficits on exam however patient has continued sense of being off balance with walking and was noted in ED to have some ataxia. - CT, CTA head no acute findings. 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 - Stroke team consulted, advised initiation of DAPT and admission for further work-up  - Neurology following  - Echocardiogram pending  - Risk factor modification  - Hypercoagulable panel   - PT/OT/SLP     2. HTN  - Careful blood pressure lowering allowing for permissive HTN  - Monitor       3. Congestion  - mucinex      Team conference was held today on the patient and discussed directly with the patient utilizing their entire treatment team. Please see separate team note for details. Total treatment time for today's care >35 min. >50% of time spent counseling with patient and coordinating care.      PPx  DVT: lovenox  GI: pantoprazole    Rehab Progress: Improving  Anticipated Dispo: home  Services/DME: TBD  ELOS: TBHAWK June MD PGY1      This patient has been seen, examined, and discussed with the resident. This note has been altered to reflect my own examination findings, impression, and recommendations.        Scot Gonzalez D.O. M.P.H  PM&R  3/10/2022  8:50 AM

## 2022-03-10 NOTE — PLAN OF CARE
Problem: Falls - Risk of:  Goal: Will remain free from falls  Description: Will remain free from falls  3/10/2022 0301 by Leo Elizabeth RN  Outcome: Ongoing  Note: Patient is a fall risk. Patient is a x 1 with walker and gait belt. See Fall Risk assessment for details. Bed is in low, lock position; call light/belongings within reach. No attempts to get out of bed have been made, calls appropriately when assistance is needed. Bed alarm and hourly rounds in place for safety.       Problem: Falls - Risk of:  Goal: Absence of physical injury  Description: Absence of physical injury  Outcome: Ongoing

## 2022-03-10 NOTE — PROGRESS NOTES
Patient is in bed and resting at this time, vitals stable, no pain at this time. Patient is a x 1 with walker and gait belt. Call light with in reach and bed alarm on.

## 2022-03-10 NOTE — PROGRESS NOTES
ACUTE REHAB UNIT  SPEECH/LANGUAGE PATHOLOGY      [x] Daily  [] Weekly Care Conference Note  [] Discharge    Patient:Esther Murray      :1945  YUA:[id number]  Rehab Dx/Hx: Acute cerebrovascular accident (CVA) ([facility] [address] [I63.9]    Precautions: [] Aspiration  [x] Fall risk  [] Sternal  [] Seizure [] Hip  [] Weight Bearing [] Other  ST Dx: [] Aphasia  [] Dysarthria  [] Apraxia   [] Oropharyngeal dysphagia [x] Cognitive-linguistic Impairment  [] Other:   Date of Admit: 3/8/2022  Room #: 3113/3113-01  Date: 3/10/2022          Current Diet Order:ADULT DIET; Regular; Low Sodium (2 gm)     Subjective: Patient pleasant and cooperative. Appeared slightly anxious with assessment. Lives With: Daughter  Homemaking Responsibilities: Yes    Occupation: Retired  Type of occupation: Cosmotologist  Leisure & Hobbies: travel    Elysia Ambrocioraytoby Shane 62: Impaired (Reports occasional difficulty with her L eye)   Vision Exceptions: Wears glasses for reading  Hearing  Hearing: Within functional limits  Barriers toward progress: Cognitive-linguistic deficit, Anxiety and Limited insight into deficits      Date: 3/10/2022     Tx session 1  [id number]   Total Timed Code Min 45   Total Treatment Minutes 60   Individual Treatment Minutes 60   Group Treatment Minutes 0   Co-Treat Minutes 0   Brief Exception: 0   Pain None    Pain Intervention: [] RN notified  [] Repositioned  [] Intervention offered and patient declined  [x] N/A  [] Other:   Subjective:     Pt pleasant and cooperative. Pt.'s daughters present for the session. Objective / Goals:  Short-term Goals  Timeframe for Short-term Goals: 10 days      Goal 1: Patient will complete higher level/abstract naming tasks to improve mental flexibility at 90% acc given min cues <2 episodes of perseveration (Goal adjusted)   Frequent perseveration noted this date with abstract divergent naming tasks. Pt/family educated on perseveration. Pt with reduced awareness of perseveration.     Goal 2: Patient will complete verbal/visual reasoning tasks at 90% acc given min tasks (meds,times,finance etc)   Pt completed a 4x4 visual reasoning matrix task across 2 opp given min-mod cues at approx  80% acc    Goal 3: Patient will complete graded memory tasks with learned strategies at 90% acc given min cues Medication recall: Pt demonstrated recall of her current medications with approx 70% acc. Medications reviewed with pt/ family. Timeframe for Short-term Goals: 10 days        Other areas targeted: Vocal hygiene. Pt/ family report baseline hoarse voice which seems to be worsening with frequent cough/allergies. Pt educated on the importance of hydration and encouraged to avoid caffeine. Education:   Pt/ family educated on reason for SLP role/ eval findings/recs and rationale for various tasks. Safety Devices: [x] Call light within reach  [] Chair alarm activated and connected to nurse call light system  [] Bed alarm activated   [x] Other: Family present    Assessment:  Mild cognitive-linguistic deficits in the areas of verbal/visual reasoning, memory and abstract language. Perseveration more pronounced today during higher level language tasks. Pt and family is very receptive of ST education. Plan: Continue goals per POC      Interventions used this date:  [x] Speech/Language Treatment  [] Instruction in HEP  [] Dysphagia Treatment [x] Cognitive Treatment   [] Other:    Discharge recommendations:  [] Home independently  [x] Home with assistance []  24 hour supervision  [] ECF [] Other  Continued Tx Upon Discharge: ? [x] Yes    [] No    [] TBD based on progress while on ARU     [] Vital Stim indicated     [] Other:   Estimated discharge date: Not yet established     Electronically signed by  Thank you.    Rob Alvarez M.A, SAVANAH-SLP/ CBIS

## 2022-03-10 NOTE — PROGRESS NOTES
Shift assessment complete. VSS. A&Ox4. Denies pain at this time. Fall precautions in place. Patient up to chair for breakfast after therapy session. Chair alarm utilized, call light within reach. Patient reports no complaints at this time. Morning medication administered without complication.      Vitals:    03/10/22 0930   BP: (!) 153/75   Pulse: 83   Resp: 16   Temp: 97.5 °F (36.4 °C)   SpO2: 98%

## 2022-03-10 NOTE — PLAN OF CARE
Problem: Falls - Risk of:  Goal: Will remain free from falls  Description: Will remain free from falls  3/10/2022 1601 by Bel Mcpherson RN  Outcome: Ongoing  Note: Remains free from falls this shift. Fall precautions in place. Patient calls out for assistance, call light within reach. Bed/chair alarm utilized throughout shift. Problem: Mobility - Impaired:  Goal: Mobility will improve  Description: Mobility will improve  Outcome: Ongoing  Note: Calls out for assistance. Patient participating in therapy. Walking to/from restroom with assistance x1 Gb/walker.       Problem: ACTIVITY INTOLERANCE/IMPAIRED MOBILITY  Goal: Mobility/activity is maintained at optimum level for patient  Outcome: Ongoing

## 2022-03-10 NOTE — FLOWSHEET NOTE
03/10/22 1602   Encounter Summary   Services provided to: Patient   Referral/Consult From: Rounding   Continue Visiting   (es 3/10 d)   Complexity of Encounter Low   Length of Encounter 15 minutes   Routine   Type Initial   Assessment Approachable   Intervention Active listening   Outcome Refused/declined;Engaged in conversation

## 2022-03-10 NOTE — PROGRESS NOTES
Occupational Therapy  Facility/Department: [facility] ACUTE REHAB UNIT  Daily Treatment Note  NAME: Cuate Murray  : 1945  MRN: [id number]    Date of Service: 3/10/2022    Discharge Recommendations:  Continue to assess pending progress,24 hour supervision or assist,Home with Home health OT  OT Equipment Recommendations  Equipment Needed: Yes  Mobility Devices: ADL Assistive Devices  ADL Assistive Devices: Transfer Tub Bench;Grab Bars - toilet  Other: continue to assess    Assessment   Performance deficits / Impairments: Decreased functional mobility ; Decreased ADL status; Decreased balance;Decreased strength;Decreased fine motor control;Decreased high-level IADLs;Decreased coordination;Decreased safe awareness    Assessment: Pt completed NHPT today, scored within 50th percentile for L hand, however scored in the less than 10th percentile for R hand. Pt progressed to CGA for STS transfers, however continues to require VCs for hand placement. Pt contiues to be limited by RLE weakness coordination deficits. Pt would benefit from continued skilled OT services to maximize independence and safety with ADL tasks, cont OT per POC. Treatment Diagnosis: Impaired ADLs, mobility and R motor control s/p CVA  Prognosis: Good  OT Education: Transfer Training;OT Role;Plan of Care;Home Exercise Program  Patient Education: Pt given resistant stress ball at EOS for R hand, instructed to complete x50 grasp/releases each day on her own, verb understanding  REQUIRES OT FOLLOW UP: Yes  Activity Tolerance  Activity Tolerance: Patient Tolerated treatment well  Safety Devices  Safety Devices in place: Yes  Type of devices: Call light within reach; Left in chair;Chair alarm in place (left with breakfast tray)         Patient Diagnosis(es): There were no encounter diagnoses. has a past medical history of Lupus (HCC) and MVP (mitral valve prolapse).    has a past surgical history that includes Tonsillectomy; Colonoscopy; and Hysterectomy. Restrictions  Restrictions/Precautions  Restrictions/Precautions: Fall Risk,Seizure  Position Activity Restriction  Other position/activity restrictions: up as tolerated  Subjective   General  Chart Reviewed: Yes  Patient assessed for rehabilitation services?: Yes  Additional Pertinent Hx: Pt is 77 y.o female admitted to [address] [place] [address] with with RLE weakness and balance deficits. Admitted for acute CVA. CT, CTA head negative. 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 Admitted to ARU 3/8. Response to previous treatment: Patient with no complaints from previous session  Family / Caregiver Present: No  Referring Practitioner: Dr. Ta Pino  Diagnosis: CVA  Subjective  Subjective: Pt lying supine in bed upon arrival, pleasant and agreeable to therapy      Orientation     Objective    ADL  Feeding: Independent (left with breakfast tray at EOS, Pt able to open all containers)        Balance  Sitting Balance: Supervision (seated EOB)  Standing Balance: Minimal assistance (CGA static, Min A dynamically)  Standing Balance  Time: ~15 mins total  Activity: functional mobility and transfers, dynamic standing balance/targeted reaching  Comment: Pt completed dynamic targeted reaching task, including mutliple stands, to address RUE coordination and standing balance for ADLs. Pt completed bean bag toss (R hand reaching outside R SHU) while standing on blue airex mat, completing 4/7 tosses and fluncuating between CGA-Min A. 4) Pt then completed second bean bag toss (R hand across midline) and completed 2/7 tosses on blue airex, requiring CGA. Reference GM section for targeted reaching w beanbags. Functional Mobility  Functional - Mobility Device: Rolling Walker  Activity: Other (in room, at start of session and at end of session)  Assist Level: Contact guard assistance  Functional Mobility Comments: Pt ambulated ~12 ft from EOB>wc placed in room with CGA.  Pt then self-propelled ~60 ft from room into hallway using BUEs and SBA and + time. At EOS, Pt propelled back to room by therapist, and completed another ~12 ft walk using RW and CGA back to recliner. No LOB or R knee buckling noted during ambulation. Tub Transfers  Tub - Transfer From: Rolling walker  Tub - Transfer Type: To and From  Tub - Transfer To: Transfer tub bench  Tub - Technique: Ambulating  Tub Transfers: Contact guard  Tub Transfers Comments: Pt compelted dry tub transfer during session to simulate home environment. At home, Pt takes baths and has one GB on long side of tub. Pt completed today with CGA and no use of GBs. Increased time spent educating and recommending TTB for Pt at home. Bed mobility  Supine to Sit: Stand by assistance (HOB slightly elevated, use of handrails)  Transfers  Sit to stand: Contact guard assistance (EOB>RW, wc>RW multiple stands)  Stand to sit: Contact guard assistance (RW>wc, RW>recliner)  Transfer Comments: VCs for hand placement, CGA progressing to SBA for STS        Coordination  Gross Motor: Pt completed the following activities to increase coordination of RUE for functional tasks and ADLs. Pt completed corn hole games standing at RW, fluncuating between CGA-Min A for balance. 1) Pt made 2/7 tosses, instructed to reach w R hand outside R SHU then toss with R hand. 2) Pt then reached across midline with R hand to bean bags placed at various heights before tossing with R hand, completing 3/7 tosses. 4) Pt reached outside R SHU, transfered beanbag from R>L hand to toss with L hand, completing 3/7 tosses with CGA (no airex) 6) Pt repeated same activitiy, however reaching out with L hand to outside L SHU, transfered beanbag to R hand to toss, completing 1/7 tosses with CGA. Pt accuracy with all targeted beanbags after increased time and decreased speed, however demo'd RUE ataxia when reaching. Reference Balance section for upgraded task on airex.                Cognition  Overall Cognitive Status: WFL  Arousal/Alertness: Appropriate responses to stimuli  Following Commands: Follows all commands without difficulty  Attention Span: Attends with cues to redirect  Memory: Appears intact  Safety Judgement: Decreased awareness of need for assistance;Decreased awareness of need for safety  Problem Solving: Able to problem solve independently  Insights: Decreased awareness of deficits  Initiation: Requires cues for some  Sequencing: Requires cues for some  Cognition Comment: Good insights to deficits, impulsive at times and requires cues for safety. Hand Dominance  Hand Dominance: Right  Left 9-Hole Peg Test  Left 9-Hole Peg Test: Functional  Right 9-Hole Peg Test  Right 9-Hole Peg Test: Impaired  Fine Motor Skills  Left 9-Hole Peg Test: Functional  Left 9 Hole Peg Test Time (secs): 28  Right 9-Hole Peg Test: Impaired  Right 9 Hole Peg Test Time (secs): 89  Fine Motor Comment: Pt scored with 50th percentile for L hand, however scored less than 10th percentile with R hand.  Completed initially on 3/10           Plan   Plan  Times per week: 60 mins per day/ 5 days per week  Times per day: Daily  Current Treatment Recommendations: Functional Mobility Training,Safety Education & Training,Self-Care / ADL,Equipment Evaluation, Education, & procurement,Patient/Caregiver Education & Training,Strengthening,Balance Training,Neuromuscular Re-education  G-Code     OutComes Score                                                  AM-PAC Score             Goals  Short term goals  Time Frame for Short term goals: 10 days  Short term goal 1: Pt will complete all bathing componets with spvn-ongoing  Short term goal 2: Pt will complete toilet and tub transfers with CGA-ongoing for toilet, goal met for tub transfer 3/10  Short term goal 3: Pt will complete UB and LB dressing with SBA-ongoing  Short term goal 4: Pt will complete oral hygiene and grooming tasks standing with SBA-ongoing  Short term goal 5: Pt will complete NHPT assessment-goal met 3/10  Long term goals  Time Frame for Long term goals : 3 weeks  Long term goal 1: Pt will complete bathing componets with Mod I-ongoing  Long term goal 2: Pt will complete toilet and tub transfers with Mod I-ongoing  Long term goal 3: Pt will complete all dressing componets with Mod I-ongoing  Long term goal 4: Pt will complete oral hygiene/grooming in stance with Mod I-ongoing  Long term goal 5: Pt will complete R hand NHPT and score within 50th percentile for age-ongoing  Patient Goals   Patient goals : \"to go home\"       Therapy Time   Individual Concurrent Group Co-treatment   Time In 0730         Time Out 0830         Minutes 60             Total Time: 60 mins    GRISEL Diamond

## 2022-03-10 NOTE — PROGRESS NOTES
Physical Therapy  Facility/Department: [facility] ACUTE REHAB UNIT  Daily Treatment Note  NAME: Estella Murray  : 1945  MRN: [id number]    Date of Service: 3/10/2022    Discharge Recommendations:  Continue to assess pending progress,24 hour supervision or assist,Home with Home health PT   PT Equipment Recommendations  Other: pt will likely benefit from RW- continue to assess pending progress    Assessment   Body structures, Functions, Activity limitations: Decreased functional mobility ; Decreased balance;Decreased endurance;Decreased strength;Decreased ADL status; Decreased coordination;Decreased cognition  Assessment: Pt demonstrates improved ambulation/endurance through completing 200' amb with with ace wrap DF assist, RW, w/c follow and CGA-min. Improved foot clearance demo'd in response to ace wrap. Pt also demonstrates improved AROM R LE DF during supine exercises in response to D2 flexion PNF. Pt would benefit from continued skilled PT im order to progress towards PLFO and independence. Treatment Diagnosis: impaired mobility associated with CVA  Prognosis: Good  Decision Making: Medium Complexity  PT Education: Transfer Training;PT Role;Functional Mobility Training;General Safety;Gait Training;Goals;Plan of Care;Home Exercise Program;Disease Specific Education  Patient Education: Benefits of exercises and acewrap. Stroke education. Pt verbalizes understanding- would benefot from continud reinforcement. Barriers to Learning: some decreased memory noted  REQUIRES PT FOLLOW UP: Yes  Activity Tolerance  Activity Tolerance: Patient Tolerated treatment well;Patient limited by fatigue     Patient Diagnosis(es): There were no encounter diagnoses. has a past medical history of Lupus (HCC) and MVP (mitral valve prolapse). has a past surgical history that includes Tonsillectomy; Colonoscopy; and Hysterectomy.     Restrictions  Restrictions/Precautions  Restrictions/Precautions: Fall Risk,Seizure  Position Activity Restriction  Other position/activity restrictions: up as tolerated  Subjective   General  Chart Reviewed: Yes  Additional Pertinent Hx: Patient is a 76 y/o female admitted 3/5 with right leg weakness and unsteadiness. CT head and chest x-ray (-) for acute findings. MRI brain (+) for Acute infarct involving the deep white matter of the posterior left frontal lobe. PMH significant for lupus. Admitted to ARU 3/8. Response To Previous Treatment: Patient with no complaints from previous session. Family / Caregiver Present: No  General Comment  Comments: Pt seated in recliner upon approach and agreeable to PT. Pain Screening  Patient Currently in Pain: Denies  Vital Signs  Patient Currently in Pain: Denies       Orientation     Cognition      Objective   Bed mobility  Supine to Sit: Stand by assistance  Sit to Supine: Stand by assistance  Scooting: Supervision (seated scooting)  Comment: complete on mat table  Transfers  Sit to Stand: Contact guard assistance (from recliner/wc/mat with RW)  Stand to sit: Contact guard assistance (to recliner/wc/mat with RW)  Comment: VC for hand placement  Ambulation  Ambulation?: Yes  WB Status: n/a  More Ambulation?: No  Ambulation 1  Surface: level tile  Device: Rolling Walker  Other Apparatus: Wheelchair follow (Ace wrap DF assist R LE)  Assistance: Contact guard assistance;Minimal assistance (CGA through most of ambulation with a few instance sof min during periods of decreased foot clearance/NBOS)  Quality of Gait: NBOS with 1 instance of scissoring, variable step length/stride length with signifcant ataxic pattern of the RLE. Decreased B step height/length with decreased L foot clearance (toe catch prior to ace wrap DF assist, decreased clearance post application). No knee buckling noted this date.   Gait Deviations: Slow Katharina;Decreased step length;Decreased step height  Distance: 30' (without DF assist), 72' + 200' (with DF assist)  Comments: VC for increased SHU, increased step height R LE, and increased knee flexion R LE. Balance  Posture: Fair  Sitting - Static: Good (indp seated EOM)  Sitting - Dynamic: Good;- (sueprvision with weight shifting seated EOM)  Standing - Static: Fair;- (CGA with RW)  Standing - Dynamic: Fair;- (CGA-min A for ambulation with RW)  Exercises  Bridging: Other(comment) (x10 + x20 (with cognitive component of naming food in alphabetic order on second rounf, pt demo's dome difficulty with cognitive task) VC/TC to prevent R LE IR/ER)  Straight Leg Raise: x10 R LE, combined DF with pt demonstrating minimal AROM  Heelslides: x10 R LE  Hip Abduction: x10 R LE  Comments: VC/TC for technique during all exercises  Other exercises  Other exercises?: Yes  Other exercises 1: x10 + 15 D2 flexion PNF- pt demonstrating increased AROM DF compared to during SLR                        Second Session:   Pt returns to pt room with pt seated in recliner (friend present) to review printout of seated/supine exercises including seated marches, LAQ with combined DF, SLR with combined DF, bridges, and D2 flexion PNF. Explanation given with pt completing 2 reps of each seated exercise (tactile cues given for DF). Demonstration provided for D2 flexion. Pt left in recliner with needs in reach and chair alarm.         Goals  Short term goals  Time Frame for Short term goals: 10 days  Short term goal 1: Pt will perform bed mobility with modified independence  Short term goal 2: Pt will perform transfers sit<>stand with SBA and LRAD  Short term goal 3: Pt will ambulate 150' with CGA and LRAD  Short term goal 4: Pt will ascend/descend 5 stairs with B handrails and SBA  Short term goal 5: Pt will stand without UE support while completing dynamic UE task with CGA  Long term goals  Time Frame for Long term goals : 3 weeks  Long term goal 1: Pt will perform sit<>stand transfers with mod I and LRAD  Long term goal 2: Pt will ambulate 150' with mod I and LRAD  Long term goal 3: Pt will ascend/descend 12 stairs with mod I and UL handrail  Long term goal 4: Pt will stand without UE support while completing dynamic UE task with mod I  Patient Goals   Patient goals : get better and get home, improve walking    Plan    Plan  Times per week: 5 days a week, 60 min  Times per day: Daily  Current Treatment Recommendations: [place] Training,Patient/Caregiver Education & Training,Balance Training,Gait Training,Functional Mobility Training,Safety Education & Training,Neuromuscular Re-education,Stair training,Equipment Evaluation, Education, & procurement  Safety Devices  Type of devices: Call light within reach,Chair alarm in place,Left in chair,Gait belt  Restraints  Initially in place: No     Therapy Time   Individual Concurrent Group Co-treatment   Time In 1245         Time Out 1345         Minutes 60         Timed Code Treatment Minutes: Jania Time:   Individual Concurrent Group Co-treatment   Time In 1415         Time Out 1425         Minutes 10           Timed Code Treatment Minutes:  70    Total Treatment Minutes:  [address]  PT, [address]

## 2022-03-10 NOTE — CARE COORDINATION
CM met with pt and 2 daughters at bedside. Plan is for pt to return home. Pt's daughter plans to stay with her at dc. They are agreeable to home care and do not have a preference of agency. Pt and daughters are aware of dc date of 3/19/22.

## 2022-03-11 LAB
ANION GAP SERPL CALCULATED.3IONS-SCNC: 10 MMOL/L (ref 3–16)
BASOPHILS ABSOLUTE: 0 K/UL (ref 0–0.2)
BASOPHILS RELATIVE PERCENT: 0.6 %
BUN BLDV-MCNC: 25 MG/DL (ref 7–20)
CALCIUM SERPL-MCNC: 10.6 MG/DL (ref 8.3–10.6)
CHLORIDE BLD-SCNC: 99 MMOL/L (ref 99–110)
CO2: 26 MMOL/L (ref 21–32)
CREAT SERPL-MCNC: 0.9 MG/DL (ref 0.6–1.2)
EOSINOPHILS ABSOLUTE: 0.2 K/UL (ref 0–0.6)
EOSINOPHILS RELATIVE PERCENT: 3.5 %
GFR AFRICAN AMERICAN: >60
GFR NON-AFRICAN AMERICAN: >60
GLUCOSE BLD-MCNC: 109 MG/DL (ref 70–99)
HCT VFR BLD CALC: 41.9 % (ref 36–48)
HEMOGLOBIN: 14.6 G/DL (ref 12–16)
LYMPHOCYTES ABSOLUTE: 2.2 K/UL (ref 1–5.1)
LYMPHOCYTES RELATIVE PERCENT: 44 %
MCH RBC QN AUTO: 32.1 PG (ref 26–34)
MCHC RBC AUTO-ENTMCNC: 34.8 G/DL (ref 31–36)
MCV RBC AUTO: 92 FL (ref 80–100)
MONOCYTES ABSOLUTE: 0.4 K/UL (ref 0–1.3)
MONOCYTES RELATIVE PERCENT: 8.6 %
NEUTROPHILS ABSOLUTE: 2.1 K/UL (ref 1.7–7.7)
NEUTROPHILS RELATIVE PERCENT: 43.3 %
PDW BLD-RTO: 13.4 % (ref 12.4–15.4)
PLATELET # BLD: 296 K/UL (ref 135–450)
PMV BLD AUTO: 8.5 FL (ref 5–10.5)
POTASSIUM REFLEX MAGNESIUM: 4.1 MMOL/L (ref 3.5–5.1)
RBC # BLD: 4.56 M/UL (ref 4–5.2)
SODIUM BLD-SCNC: 135 MMOL/L (ref 136–145)
WBC # BLD: 4.9 K/UL (ref 4–11)

## 2022-03-11 PROCEDURE — 97110 THERAPEUTIC EXERCISES: CPT

## 2022-03-11 PROCEDURE — 36415 COLL VENOUS BLD VENIPUNCTURE: CPT

## 2022-03-11 PROCEDURE — 1280000000 HC REHAB R&B

## 2022-03-11 PROCEDURE — 97530 THERAPEUTIC ACTIVITIES: CPT | Performed by: PHYSICAL THERAPIST

## 2022-03-11 PROCEDURE — 99232 SBSQ HOSP IP/OBS MODERATE 35: CPT | Performed by: PHYSICAL MEDICINE & REHABILITATION

## 2022-03-11 PROCEDURE — 6370000000 HC RX 637 (ALT 250 FOR IP): Performed by: STUDENT IN AN ORGANIZED HEALTH CARE EDUCATION/TRAINING PROGRAM

## 2022-03-11 PROCEDURE — 85025 COMPLETE CBC W/AUTO DIFF WBC: CPT

## 2022-03-11 PROCEDURE — 97535 SELF CARE MNGMENT TRAINING: CPT

## 2022-03-11 PROCEDURE — 97116 GAIT TRAINING THERAPY: CPT | Performed by: PHYSICAL THERAPIST

## 2022-03-11 PROCEDURE — 97110 THERAPEUTIC EXERCISES: CPT | Performed by: PHYSICAL THERAPIST

## 2022-03-11 PROCEDURE — 97530 THERAPEUTIC ACTIVITIES: CPT

## 2022-03-11 PROCEDURE — 97542 WHEELCHAIR MNGMENT TRAINING: CPT | Performed by: PHYSICAL THERAPIST

## 2022-03-11 PROCEDURE — 6360000002 HC RX W HCPCS: Performed by: PHYSICAL MEDICINE & REHABILITATION

## 2022-03-11 PROCEDURE — 6370000000 HC RX 637 (ALT 250 FOR IP): Performed by: PHYSICAL MEDICINE & REHABILITATION

## 2022-03-11 PROCEDURE — 80048 BASIC METABOLIC PNL TOTAL CA: CPT

## 2022-03-11 PROCEDURE — 97129 THER IVNTJ 1ST 15 MIN: CPT

## 2022-03-11 PROCEDURE — 97130 THER IVNTJ EA ADDL 15 MIN: CPT

## 2022-03-11 RX ADMIN — ASPIRIN 81 MG: 81 TABLET, COATED ORAL at 08:37

## 2022-03-11 RX ADMIN — CETIRIZINE HYDROCHLORIDE 10 MG: 10 TABLET, FILM COATED ORAL at 08:37

## 2022-03-11 RX ADMIN — GUAIFENESIN 600 MG: 600 TABLET, EXTENDED RELEASE ORAL at 08:38

## 2022-03-11 RX ADMIN — GUAIFENESIN 600 MG: 600 TABLET, EXTENDED RELEASE ORAL at 21:00

## 2022-03-11 RX ADMIN — PANTOPRAZOLE SODIUM 40 MG: 40 TABLET, DELAYED RELEASE ORAL at 06:03

## 2022-03-11 RX ADMIN — CLOPIDOGREL BISULFATE 75 MG: 75 TABLET ORAL at 08:37

## 2022-03-11 RX ADMIN — ATORVASTATIN CALCIUM 80 MG: 80 TABLET, FILM COATED ORAL at 21:00

## 2022-03-11 RX ADMIN — LISINOPRIL 5 MG: 5 TABLET ORAL at 08:38

## 2022-03-11 RX ADMIN — HYDROCHLOROTHIAZIDE 25 MG: 25 TABLET ORAL at 08:38

## 2022-03-11 RX ADMIN — ENOXAPARIN SODIUM 40 MG: 100 INJECTION SUBCUTANEOUS at 08:38

## 2022-03-11 ASSESSMENT — PAIN SCALES - GENERAL
PAINLEVEL_OUTOF10: 0
PAINLEVEL_OUTOF10: 0

## 2022-03-11 NOTE — PLAN OF CARE
Problem: Falls - Risk of:  Goal: Will remain free from falls  Description: Will remain free from falls  3/11/2022 1339 by Agustin Turcios RN  Outcome: Ongoing  Note: RN attempted to educate Pt and her family to use her call light when she needs assistance. RN also attempted to educate Pt and her family that she should not to get up unassisted at this time. Bed alarm on when Pt in bed and chair alarm on when Pt up in chair. Non skid socks on and call light placed within reach. RN will continue to monitor Pt.    3/11/2022 0339 by Shayne Knapp RN  Outcome: Ongoing  Note: Patient is a fall risk. Patient is a x 1 with walker and gait belt. See Fall Risk assessment for details. Bed is in low, lock position; call light/belongings within reach. No attempts to get out of bed have been made, calls appropriately when assistance is needed. Bed alarm and hourly rounds in place for safety.

## 2022-03-11 NOTE — PROGRESS NOTES
Physical Therapy  Facility/Department: [facility] ACUTE REHAB UNIT  Daily Treatment Note  NAME: Lokesh Murray  : 1945  MRN: [id number]    Date of Service: 3/11/2022    Discharge Recommendations:  Continue to assess pending progress,24 hour supervision or assist,Home with Home health PT   PT Equipment Recommendations  Other: pt will likely beenfit from RW- continue to assess pending progress    Assessment   Body structures, Functions, Activity limitations: Decreased functional mobility ; Decreased balance;Decreased endurance;Decreased strength;Decreased ADL status; Decreased coordination;Decreased cognition  Assessment: Pt presented with increased fatigue on this date  to receiving a  shower right before scheduled therapy session. Pt became frustrated with the fatigue and inability to perform quite as well as noted yesterday. PT instructed pt's daughter with HEP to be performed over the weekend. Pt is well below baseline and would benefit from continued therapy to maximze potential and increase functional mobility towards Ind to allow for a safer d/c to home. Treatment Diagnosis: impaired mobility associated with CVA  Prognosis: Good  Decision Making: Medium Complexity  PT Education: Transfer Training;PT Role;Functional Mobility Training;General Safety;Gait Training;Goals;Plan of Care;Home Exercise Program;Disease Specific Education  Patient Education: Benefits of exercises and acewrap. Stroke education. Pt verbalizes understanding- would benefot from continud reinforcement. Barriers to Learning: some decreased memory noted  REQUIRES PT FOLLOW UP: Yes  Activity Tolerance  Activity Tolerance: Patient limited by fatigue;Patient limited by endurance     Patient Diagnosis(es): There were no encounter diagnoses. has a past medical history of Lupus (HCC) and MVP (mitral valve prolapse).    has a past surgical history that includes Tonsillectomy; Colonoscopy; and Hysterectomy. Restrictions  Restrictions/Precautions  Restrictions/Precautions: Fall Risk,Seizure  Position Activity Restriction  Other position/activity restrictions: up as tolerated  Subjective   General  Chart Reviewed: Yes  Additional Pertinent Hx: Patient is a 78 y/o female admitted 3/5 with right leg weakness and unsteadiness. CT head and chest x-ray (-) for acute findings. MRI brain (+) for Acute infarct involving the deep white matter of the posterior left frontal lobe. PMH significant for lupus. Admitted to ARU 3/8. Response To Previous Treatment: Patient with no complaints from previous session. Family / Caregiver Present: Yes (Daughter Elliot Townsend))  Subjective  Subjective: Pt reorts that she just had a \"shower\"  General Comment  Comments: Pt seated in recliner upon approach and agreeable to PT.   Pain Screening  Patient Currently in Pain: Denies  Vital Signs  Patient Currently in Pain: Denies       Orientation  Orientation  Overall Orientation Status: Within Functional Limits  Cognition      Objective   Bed mobility  Bridging:  (Used as an ex and for scooting laterally)  Rolling to Right: Supervision (VC for step by step technique)  Supine to Sit: Supervision;Stand by assistance (VC for step by step technique)  Sit to Supine: Stand by assistance;Supervision (VC for step by step technique)  Scooting: Supervision (seated scooting)  Comment: Bed Mobility completed on a flat bed  Transfers  Sit to Stand: Contact guard assistance (VC for step by step technique (scooting to EOS, hand and foot placement and ant lean) Transf from recliner/wc/mat with RW)  Stand to sit: Contact guard assistance (to recliner/wc/mat with RW)  Bed to Chair: Contact guard assistance (SPT)  Comment: VC for hand and foot placement  for all transf  Ambulation  Ambulation?: Yes  WB Status: No WB restrictions noted  More Ambulation?: No  Ambulation 1  Surface: level tile  Device: Rolling Walker  Other Apparatus:  (Ace wrap DF assist R LE)  Assistance: Minimal assistance  Quality of Gait: NBOS, decreased R swing through with tendency to \"vault\" off of LLE, Increased difficulty with holding R hand on RW  Gait Deviations: Slow Katharina;Decreased step length;Decreased step height  Distance: 60'  Stairs/Curb  Stairs?: No  Wheelchair Activities  Wheelchair Size: 20\" w/c changed to  16\" w/c  Wheelchair Type: Standard  Wheelchair Parts Management: Yes  Left Brakes Level of Assistance: Contact guard assistance  Right Brakes Level of Assistance: Contact guard assistance  Propulsion: Yes (Propelled 50' with BLES in a stepping pattern. Pt struggled with consistency of advancing the RLE. VC and TC req)      Comment: PT instructed pt to regina shoes. Only  set up req   PT instructed pt with the following ex to be performed as a HEP:   1. Bridges  2. SLRs  3. Alternating hip/knee flex. ext'  Mesfin 10 reps for each ex to BLES    PT instructed pt w/ the following dynamic standing balance activities w/o support  1. Bouncing a ball and catching it  2. Dribbling a ball with R hand  3. Dribbling a ball with L hand  4.  Dribbling ball from R<>L   Pt req CGA/ min A   Pt  mesfin 20 reps of each         G-Code     OutComes Score                                                     AM-PAC Score             Goals  Short term goals  Time Frame for Short term goals: 10 days  Short term goal 1: Pt will perform bed mobility with modified independence  Short term goal 2: Pt will perform transfers sit<>stand with SBA and LRAD  Short term goal 3: Pt will ambulate 150' with CGA and LRAD  Short term goal 4: Pt will ascend/descend 5 stairs with B handrails and SBA  Short term goal 5: Pt will stand without UE support while completing dynamic UE task with CGA  Long term goals  Time Frame for Long term goals : 3 weeks  Long term goal 1: Pt will perform sit<>stand transfers with mod I and LRAD  Long term goal 2: Pt will ambulate 150' with mod I and LRAD  Long term goal 3: Pt will ascend/descend 12 stairs with mod I and UL handrail  Long term goal 4: Pt will stand without UE support while completing dynamic UE task with mod I  Patient Goals   Patient goals : get better and get home, improve walking    Plan    Plan  Times per week: 5 days a week, 60 min  Times per day: Daily  Current Treatment Recommendations: Jesenia Gilbert Training,Patient/Caregiver Education & Training,Balance Training,Gait Training,Functional Mobility Training,Safety Education & Training,Neuromuscular Re-education,Stair training,Equipment Evaluation, Education, & procurement  Safety Devices  Type of devices: Call light within reach,Chair alarm in place,Left in chair,Gait belt  Restraints  Initially in place: No     Therapy Time   Individual Concurrent Group Co-treatment   Time In 1100         Time Out 1200         Minutes [address], PT

## 2022-03-11 NOTE — FLOWSHEET NOTE
03/11/22 1441   Encounter Summary   Services provided to: Patient and family together   Referral/Consult From: Rounding   Continue Visiting   (es 3/11 d)   Complexity of Encounter Low   Length of Encounter 15 minutes   Routine   Type Follow up   Assessment Approachable   Intervention Active listening   Outcome Refused/declined;Engaged in conversation

## 2022-03-11 NOTE — PLAN OF CARE
Problem: Falls - Risk of:  Goal: Will remain free from falls  Description: Will remain free from falls  3/11/2022 0339 by Dionisio Myles RN  Outcome: Ongoing  Note: Patient is a fall risk. Patient is a x 1 with walker and gait belt. See Fall Risk assessment for details. Bed is in low, lock position; call light/belongings within reach. No attempts to get out of bed have been made, calls appropriately when assistance is needed. Bed alarm and hourly rounds in place for safety.       Problem: Falls - Risk of:  Goal: Absence of physical injury  Description: Absence of physical injury  Outcome: Ongoing

## 2022-03-11 NOTE — PROGRESS NOTES
Pt awake in recliner eating breakfast. Physical assessment and vital signs as charted. Pt currently denies experiencing any pain at this time. Call light placed within reach. RN will continue to monitor Pt.

## 2022-03-11 NOTE — PROGRESS NOTES
Patient is in bed and resting at this time, vitals stable, no pain at this time. Patient is a x 1 with walker and gait belt. Been continent of bladder. Call light with in reach and bed alarm on.

## 2022-03-11 NOTE — PROGRESS NOTES
Occupational Therapy  Facility/Department: [facility] ACUTE REHAB UNIT  Daily Treatment Note  NAME: Alis Murray  : 1945  MRN: [id number]    Date of Service: 3/11/2022    Discharge Recommendations:  Continue to assess pending progress,24 hour supervision or assist,Home with Home health OT  OT Equipment Recommendations  Equipment Needed: Yes  Mobility Devices: ADL Assistive Devices  ADL Assistive Devices: Transfer Tub Bench;Grab Bars - toilet  Other: continue to assess    Assessment   Performance deficits / Impairments: Decreased functional mobility ; Decreased ADL status; Decreased balance;Decreased strength;Decreased fine motor control;Decreased high-level IADLs;Decreased coordination;Decreased safe awareness    Assessment: Pt completed PVC building tasks in standing, demonstrating mild dysmetria with RUE. Pt progressed to SBA for STS transfers, continues to require cues for hand placement. Pt also given theraband and HEP during session with instructions to complete throughout weekend. Pt continued to be limited by RLE weakness and RUE coordination deficits. Pt would benefit from continued skilled OT services to maximzie independence and safety with functional tasks, cont OT per POC. Treatment Diagnosis: Impaired ADLs, mobility and R motor control s/p CVA  Prognosis: Good  OT Education: Transfer Training;OT Role;Plan of Care;Home Exercise Program  Patient Education: Pt given theraband and HEP. OT and Pt practiced each exercise during session. Pt instructed to complete exercises over weekend. Pt verbalized understanding. REQUIRES OT FOLLOW UP: Yes  Activity Tolerance  Activity Tolerance: Patient Tolerated treatment well  Safety Devices  Safety Devices in place: Yes  Type of devices: Call light within reach; Left in chair;Chair alarm in place         Patient Diagnosis(es): There were no encounter diagnoses. has a past medical history of Lupus (HCC) and MVP (mitral valve prolapse).    has a past surgical history that includes Tonsillectomy; Colonoscopy; and Hysterectomy. Restrictions  Restrictions/Precautions  Restrictions/Precautions: Fall Risk,Seizure  Position Activity Restriction  Other position/activity restrictions: up as tolerated  Subjective   General  Chart Reviewed: Yes  Patient assessed for rehabilitation services?: Yes  Additional Pertinent Hx: Pt is 77 y.o female admitted to [address] [place] [address] with with RLE weakness and balance deficits. Admitted for acute CVA. CT, CTA head negative. 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 Admitted to ARU 3/8. Response to previous treatment: Patient with no complaints from previous session  Family / Caregiver Present: No  Referring Practitioner: Dr. Lelia Francis  Diagnosis: CVA  Subjective  Subjective: Pt lying supine in bed upon arrival, pleasant and agreeable to therapy. Orientation     Objective    ADL  Feeding: Independent (left with breakfast at EOS)  Grooming: Contact guard assistance (Pt completed oral hygiene in stance at sink)        Balance  Sitting Balance: Supervision (seated in wc)  Standing Balance: Contact guard assistance  Standing Balance  Time: ~20 mins total  Activity: functional mobility and transfers, stance for FM activity  Comment: Pt completed two ~4 min stands at tabletop during GM activitiy, requriring CGA  Functional Mobility  Functional - Mobility Device: Rolling Walker  Activity: To/from bathroom; To/From therapy gym  Assist Level: Contact guard assistance  Functional Mobility Comments: Pt ambulated to/from bathroom with RW and CGA. Pt then self-propelled into hallway ~20 ft with wc and BUEs before ambulating another ~40ft to therapy gym with CGA and wc follow. Pt required VCs for RW mgmt during slight turns and VCs for decreased speed.   Bed mobility  Supine to Sit: Stand by assistance (HOB elevated use of handrail)  Transfers  Sit to stand: Stand by assistance (EOB>RW, wc>RW, wc>tabletop two stands)  Stand to sit: Stand by assistance (RW>wc, tabletop stance>wc, RW>recliner)  Transfer Comments: VCs for hand placement        Coordination  Gross Motor: Pt completed the following actvities to increase coordination of RUE for functional tasks and ADLs. 1) While holding a 1# medicine ball with R hand, Pt instructed to complete GM movements to tap targets placed superiorly, inferiorly, and laterally. Pt completed 2x15 targeted reaches seated in wc. Pt with mild dysmetria as Pt demo'd decreased accuracy with hitting the target as well as ataxia as Pt demo'd decreased control of her arm during each reach. Fine Motor: To increase fine motor coordination in RUE for functional tasks, Pt then completed two ~4 min stands at tabletop while assembling 2 PVC builds. Pt completed Build 1 (11 pieces) and then Build 2 (15 pieces). Pt instructed to use R hand to select correct pieces, occasionally using L hand to maintain postion/steady while inserting new piece with R hand. Pt 95% accuracte in selecting the correct pieces, often confusing two similar shapes. Pt demo'd mild dysmetria when selecting a piece and while placing peice into correct spot. VCs to continue using RUE for majority of the task, only using LUE when both hands are necessary. Cognition  Overall Cognitive Status: WFL  Arousal/Alertness: Appropriate responses to stimuli  Following Commands: Follows all commands without difficulty  Attention Span: Attends with cues to redirect  Memory: Appears intact  Safety Judgement: Decreased awareness of need for assistance;Decreased awareness of need for safety  Problem Solving: Able to problem solve independently  Insights: Decreased awareness of deficits  Initiation: Requires cues for some  Sequencing: Requires cues for some  Cognition Comment: Good insights to deficits, impulsive at times and requires cues for safety.                     Type of ROM/Therapeutic Exercise  Type of ROM/Therapeutic Exercise: Resistive Bands  Comment: Pt given light resistance band and HEP for theraband exercises during session. OT and Pt practiced each exercise on handout, along with setting a schedule and number of reps to complete for each exercise. VCs and visual demo for correct technqiues. Pt instructed to complete throughout weekend on her own time.   Exercises  Shoulder Flexion: 1x10  Shoulder ABduction: 1x10  Horizontal ABduction: 1x10  Elbow Flexion: 1x10                    Plan   Plan  Times per week: 60 mins per day/ 5 days per week  Times per day: Daily  Current Treatment Recommendations: Functional Mobility Training,Safety Education & Training,Self-Care / ADL,Equipment Evaluation, Education, & procurement,Patient/Caregiver Education & Training,Strengthening,Balance Training,Neuromuscular Re-education  G-Code     OutComes Score                                                  AM-PAC Score             Goals  Short term goals  Time Frame for Short term goals: 10 days  Short term goal 1: Pt will complete all bathing componets with spvn-ongoing  Short term goal 2: Pt will complete toilet and tub transfers with CGA-ongoing for toilet, goal met for tub transfer 3/10  Short term goal 3: Pt will complete UB and LB dressing with SBA-ongoing  Short term goal 4: Pt will complete oral hygiene and grooming tasks standing with SBA-ongoing  Short term goal 5: Pt will complete NHPT assessment-goal met 3/10  Long term goals  Time Frame for Long term goals : 3 weeks  Long term goal 1: Pt will complete bathing componets with Mod I-ongoing  Long term goal 2: Pt will complete toilet and tub transfers with Mod I-ongoing  Long term goal 3: Pt will complete all dressing componets with Mod I-ongoing  Long term goal 4: Pt will complete oral hygiene/grooming in stance with Mod I-ongoing  Long term goal 5: Pt will complete R hand NHPT and score within 50th percentile for age-ongoing  Patient Goals   Patient goals : \"to go home\"       Therapy Time Individual Concurrent Group Co-treatment   Time In 0730         Time Out 0830         Minutes 60             Total Time: 60 mins    GRISEL Livingston

## 2022-03-11 NOTE — PROGRESS NOTES
ACUTE REHAB UNIT  SPEECH/LANGUAGE PATHOLOGY      [x] Daily  [] Weekly Care Conference Note  [] Discharge    Patient:Esther Murray      :1945  [facility][id number]  Rehab Dx/Hx: Acute cerebrovascular accident (CVA) ([facility] [address] [I63.9]    Precautions: [] Aspiration  [x] Fall risk  [] Sternal  [] Seizure [] Hip  [] Weight Bearing [] Other  ST Dx: [] Aphasia  [] Dysarthria  [] Apraxia   [] Oropharyngeal dysphagia [x] Cognitive-linguistic Impairment  [] Other:   Date of Admit: 3/8/2022  Room #: 3113/3113-01  Date: 3/11/2022          Current Diet Order:ADULT DIET; Regular; Low Sodium (2 gm)     Subjective: Patient pleasant and cooperative. Appeared slightly anxious with assessment. Lives With: Daughter  Homemaking Responsibilities: Yes  Occupation: Retired  Type of occupation: Cosmotologist  Leisure & Hobbies: travel    Elysia ISAACSibrahimastephen 61: Impaired (Reports occasional difficulty with her L eye)   Vision Exceptions: Wears glasses for reading  Hearing  Hearing: Within functional limits  Barriers toward progress: Cognitive-linguistic deficit, Anxiety and Limited insight into deficits      Date: 3/11/2022      Tx session 1 Tx session 2   Total Timed Code Min 30 30   Total Treatment Minutes 30 30   Individual Treatment Minutes 30 30   Group Treatment Minutes 0 0   Co-Treat Minutes 0 0   Brief Exception: N/A N/A   Pain None identified None indicated   Pain Intervention: [] RN notified  [] Repositioned  [] Intervention offered and patient declined  [x] N/A  [] Other: [] RN notified  [] Repositioned  [] Intervention offered and patient declined  [x] N/A  [] Other:   Subjective:     Pt upright in chair and is agreeable to therapy. Pt attends therapy session alone. Pt asleep upright in chair upon clinician arrival to session. Pt demonstrated atypical oral movements which appeared consistent with tardive dyskinesia and included rapid atypical bilateral eye movement.  Spontaneously resolved after a few minutes and pt just endorsed being tired. Throughout session pt very motorically active. Pt participated well in therapy and attended the session alone. Patient will complete higher level/abstract naming tasks to improve mental flexibility at 90% acc given min cues <2 episodes of perseveration    Divergent naming (abstract): 95% accuracy with min cues. Reduced awareness for errors with perseverations and overt errors. No overt anomia was noted. ID abstract category members from list: 85% accuracy with mod cues. Verbal perseverations x2 noted within task. Goal not targeted this session. Patient will complete verbal/visual reasoning tasks at 90% acc given min tasks (meds,times,finance etc)   Medicine labels: 95% accuracy,min cues. Pt moving written material into far left visual field without any awareness. Goal not targeted this session. Patient will complete graded memory tasks with learned strategies at 90% acc given min cues Immediate recall/association task (5 paired black and white line drawings, total 10): 60% accuracy, mod cues. Mod cues for encoding. Goal not targeted this session. Other areas targeted:  Patient was administered portions of the GUERA (Assessment of Language-Related Functional Activities) with the following results:  · Counting Money: 60% accuracy. This correlates with an independent function rating of 2 which indicates a need for some level of assistance on this type of task or need for further exploration. Significantly extra time was required with frequent repetition of items to assist in performance. Pt noted with reduced insight into deficits, when asked about performance on task, pt stated \"I got them all right\". · Understanding Medication Labels:  80% accuracy. This percentage correlates with an Independent Functioning Rating of 1 which indicates a high probability of independent functioning on this task.  Pt required extra time to double check her work, frequently asking clinician for confirmation of decisions. Education:   Education on CVA deficits and how this impacts functioning. Education re: rationale for tasks this date and difficulty identified, recommendations for supervision with finances and medication management. Safety Devices: [x] Call light within reach  [x] Chair alarm activated and connected to nurse call light system  [] Bed alarm activated   [] Other:  [x] Call light within reach  [x] Chair alarm activated and connected to nurse call light system  [] Bed alarm activated   [] Other:    Assessment:  Dysphonia, throat clearing and coughing, apparent nasal drainage and wet vocal quality (per chart review pt/family confirm that this is baseline). Reduced insight into deficits and recall of previous education re: stroke deficits; however participates well in therapy. Left inattention. Recommend supervision with financial and medication management. Plan: Continue goals per POC       Interventions used this date:  [] Speech/Language Treatment  [] Instruction in HEP  [] Dysphagia Treatment [x] Cognitive Treatment   [] Other:    Discharge recommendations:  [] Home independently  [x] Home with assistance []  24 hour supervision  [] ECF [x] Other : Would recommend assist with finances/meds at DC given cognitive-linguistic and visual deficits. Educated pt on this recommendation on 3/11  Continued Tx Upon Discharge: ? [x] Yes    [] No    [] TBD based on progress while on ARU     [] Vital Stim indicated     [] Other:   Estimated discharge date: 3/19/22    Electronically signed by  Zuleima Nayak   Clinician    Ryanne Lucio M.A., Geri  Speech-Language Pathologist    The speech-language pathologist was present, directed the patient's care, made skilled judgment and was responsible for assessment and treatment.

## 2022-03-11 NOTE — PROGRESS NOTES
Department of Physical Medicine & Rehabilitation  Progress Note    Patient Identification:  Ira Kapadia  [id number]  : 1945  Admit date: 3/8/2022    Chief Complaint: Acute cerebrovascular accident (CVA) ([address]    Subjective:   No acute events overnight. Patient seen this am. She is tolerating therapy well. Added mucinex yesterday for congestion and pt reports improvement, will continue to monitor. No other complaints or concerns and addressed all questions    ROS: No f/c, n/v, cp     Objective:  Patient Vitals for the past 24 hrs:   BP Temp Temp src Pulse Resp SpO2   03/10/22 2123 (!) 155/78 97.7 °F (36.5 °C) Oral 90 16 98 %   03/10/22 0930 (!) 153/75 97.5 °F (36.4 °C) Oral 83 16 98 %     Const: Alert. WDWN. No distress  Eyes: Conjunctiva noninjected, no icterus noted; pupils equal, round, and reactive to light. HENT: Atraumatic, normocephalic; Oral mucosa moist  Neck: Trachea midline, neck supple. No thyromegaly noted. CV: Regular rate and rhythm, no murmur rub or gallop noted  Resp: Lungs clear to auscultation bilaterally, no rales wheezes or ronchi, no retractions. Respirations unlabored. GI: Soft, nontender, nondistended. Normal bowel sounds. No palpable masses. Skin: Normal temperature and turgor. No rashes or breakdown noted. Ext: No significant edema appreciated. No varicosities. MSK: No joint tenderness, erythema, warmth noted.  AROM intact. Neuro: Alert, oriented, appropriate. No cranial nerve deficits appreciated. Sensation intact to light touch. Motor examination reveals 5/5 strength in RUE, LUE, LLE. Strength 4/5 in RLE upon flexion and extension of hip. No abnormalities with finger/nose or heel/shin noted. Reflexes normal and symmetric. Psych: Stable mood, normal judgement, normal affect     Laboratory data: Available via EMR.    Last 24 hour lab  Recent Results (from the past 24 hour(s))   Basic Metabolic Panel w/ Reflex to MG    Collection Time: 22  6:16 AM   Result Value Ref Range    Sodium 135 (L) 136 - 145 mmol/L    Potassium reflex Magnesium 4.1 3.5 - 5.1 mmol/L    Chloride 99 99 - 110 mmol/L    CO2 26 21 - 32 mmol/L    Anion Gap 10 3 - 16    Glucose 109 (H) 70 - 99 mg/dL    BUN 25 (H) 7 - 20 mg/dL    CREATININE 0.9 0.6 - 1.2 mg/dL    GFR Non-African American >60 >60    GFR African American >60 >60    Calcium 10.6 8.3 - 10.6 mg/dL   CBC auto differential    Collection Time: 03/11/22  6:16 AM   Result Value Ref Range    WBC 4.9 4.0 - 11.0 K/uL    RBC 4.56 4.00 - 5.20 M/uL    Hemoglobin 14.6 12.0 - 16.0 g/dL    Hematocrit 41.9 36.0 - 48.0 %    MCV 92.0 80.0 - 100.0 fL    MCH 32.1 26.0 - 34.0 pg    MCHC 34.8 31.0 - 36.0 g/dL    RDW 13.4 12.4 - 15.4 %    Platelets 422 876 - 848 K/uL    MPV 8.5 5.0 - 10.5 fL    Neutrophils % 43.3 %    Lymphocytes % 44.0 %    Monocytes % 8.6 %    Eosinophils % 3.5 %    Basophils % 0.6 %    Neutrophils Absolute 2.1 1.7 - 7.7 K/uL    Lymphocytes Absolute 2.2 1.0 - 5.1 K/uL    Monocytes Absolute 0.4 0.0 - 1.3 K/uL    Eosinophils Absolute 0.2 0.0 - 0.6 K/uL    Basophils Absolute 0.0 0.0 - 0.2 K/uL       Therapy progress:  PT  Position Activity Restriction  Other position/activity restrictions: up as tolerated  Objective     Sit to Stand: Contact guard assistance (from recliner/wc/mat with RW)  Stand to sit: Contact guard assistance (to recliner/wc/mat with RW)  Device: Rolling Walker  Other Apparatus: Wheelchair follow (Ace wrap DF assist R LE)  Assistance: Contact guard assistance,Minimal assistance (CGA through most of ambulation with a few instance sof min during periods of decreased foot clearance/NBOS)  Distance: 30' (without DF assist), 72' + 200' (with DF assist)  OT  PT Equipment Recommendations  Other: pt will likely beenfit from RW- continue to assess pending progress  Toilet - Technique: Ambulating  Equipment Used: Standard toilet  Toilet Transfers Comments: Pt does not have GBs at home toilet. Required VCs for RW mgmt during 180 turn at toilet.

## 2022-03-12 PROCEDURE — 97530 THERAPEUTIC ACTIVITIES: CPT

## 2022-03-12 PROCEDURE — 6360000002 HC RX W HCPCS: Performed by: PHYSICAL MEDICINE & REHABILITATION

## 2022-03-12 PROCEDURE — 97116 GAIT TRAINING THERAPY: CPT

## 2022-03-12 PROCEDURE — 6370000000 HC RX 637 (ALT 250 FOR IP): Performed by: STUDENT IN AN ORGANIZED HEALTH CARE EDUCATION/TRAINING PROGRAM

## 2022-03-12 PROCEDURE — 99232 SBSQ HOSP IP/OBS MODERATE 35: CPT | Performed by: PHYSICAL MEDICINE & REHABILITATION

## 2022-03-12 PROCEDURE — 97112 NEUROMUSCULAR REEDUCATION: CPT

## 2022-03-12 PROCEDURE — 1280000000 HC REHAB R&B

## 2022-03-12 PROCEDURE — 97130 THER IVNTJ EA ADDL 15 MIN: CPT

## 2022-03-12 PROCEDURE — 97129 THER IVNTJ 1ST 15 MIN: CPT

## 2022-03-12 PROCEDURE — 6370000000 HC RX 637 (ALT 250 FOR IP): Performed by: PHYSICAL MEDICINE & REHABILITATION

## 2022-03-12 PROCEDURE — 97110 THERAPEUTIC EXERCISES: CPT

## 2022-03-12 RX ADMIN — LISINOPRIL 5 MG: 5 TABLET ORAL at 08:59

## 2022-03-12 RX ADMIN — HYDROCHLOROTHIAZIDE 25 MG: 25 TABLET ORAL at 08:59

## 2022-03-12 RX ADMIN — CETIRIZINE HYDROCHLORIDE 10 MG: 10 TABLET, FILM COATED ORAL at 08:59

## 2022-03-12 RX ADMIN — PANTOPRAZOLE SODIUM 40 MG: 40 TABLET, DELAYED RELEASE ORAL at 07:02

## 2022-03-12 RX ADMIN — CLOPIDOGREL BISULFATE 75 MG: 75 TABLET ORAL at 08:59

## 2022-03-12 RX ADMIN — ATORVASTATIN CALCIUM 80 MG: 80 TABLET, FILM COATED ORAL at 20:53

## 2022-03-12 RX ADMIN — GUAIFENESIN 600 MG: 600 TABLET, EXTENDED RELEASE ORAL at 20:53

## 2022-03-12 RX ADMIN — ASPIRIN 81 MG: 81 TABLET, COATED ORAL at 09:00

## 2022-03-12 RX ADMIN — ENOXAPARIN SODIUM 40 MG: 100 INJECTION SUBCUTANEOUS at 09:00

## 2022-03-12 RX ADMIN — GUAIFENESIN 600 MG: 600 TABLET, EXTENDED RELEASE ORAL at 08:59

## 2022-03-12 ASSESSMENT — PAIN SCALES - GENERAL
PAINLEVEL_OUTOF10: 0
PAINLEVEL_OUTOF10: 0

## 2022-03-12 NOTE — PROGRESS NOTES
Department of Physical Medicine & Rehabilitation  Progress Note    Patient Identification:  Amaury Carpenter  [id number]  : 1945  Admit date: 3/8/2022    Chief Complaint: Acute cerebrovascular accident (CVA) ([address]    Subjective:   Doing well. No new complaints overnight. Improving in therapy daily. Seen and examined in OT today. She slept well. Eating well. Continues to excel in therapy. ROS: No f/c, n/v, cp     Objective:  Patient Vitals for the past 24 hrs:   BP Temp Temp src Pulse Resp SpO2   22 0857 128/77 97.9 °F (36.6 °C) Oral 83 16 97 %   22 2100 (!) 150/82 98.4 °F (36.9 °C) Oral 73 16 97 %     Const: Alert. WDWN. No distress  Eyes: Conjunctiva noninjected, no icterus noted; pupils equal, round, and reactive to light. HENT: Atraumatic, normocephalic; Oral mucosa moist  Neck: Trachea midline, neck supple. No thyromegaly noted. CV: Regular rate and rhythm, no murmur rub or gallop noted  Resp: Lungs clear to auscultation bilaterally, no rales wheezes or ronchi, no retractions. Respirations unlabored. GI: Soft, nontender, nondistended. Normal bowel sounds. No palpable masses. Skin: Normal temperature and turgor. No rashes or breakdown noted. Ext: No significant edema appreciated. No varicosities. MSK: No joint tenderness, erythema, warmth noted.  AROM intact. Neuro: Alert, oriented, appropriate. No cranial nerve deficits appreciated. Sensation intact to light touch. Motor examination reveals 5/5 strength in RUE, LUE, LLE. Strength 4/5 in RLE upon flexion and extension of hip. No abnormalities with finger/nose or heel/shin noted. Reflexes normal and symmetric. Psych: Stable mood, normal judgement, normal affect     Laboratory data: Available via EMR. Last 24 hour lab  No results found for this or any previous visit (from the past 24 hour(s)).     Therapy progress:  PT  Position Activity Restriction  Other position/activity restrictions: up as tolerated  Objective     Sit to Stand: Contact guard assistance (VC for step by step technique (scooting to EOS, hand and foot placement and ant lean) Transf from recliner/wc/mat with RW)  Stand to sit: Contact guard assistance (to recliner/wc/mat with RW)  Bed to Chair: Contact guard assistance (SPT)  Device: Rolling Walker  Other Apparatus:  (Ace wrap DF assist R LE)  Assistance: Minimal assistance  Distance: 61'  OT  PT Equipment Recommendations  Other: pt will likely beenfit from RW- continue to assess pending progress  Toilet - Technique: Ambulating  Equipment Used: Standard toilet  Toilet Transfers Comments: Pt does not have GBs at home toilet. Required VCs for RW mgmt during 180 turn at toilet. Min A-Mod A for balance and to sit due to poor ecentric control. Assessment        SLP          Body mass index is 23.19 kg/m². Assessment and Plan:  1. Acute CVA   - No focal deficits on exam however patient has continued sense of being off balance with walking and was noted in ED to have some ataxia. - CT, CTA head no acute findings. 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 - Stroke team consulted, advised initiation of DAPT and admission for further work-up  - Neurology following  - Echocardiogram pending  - Risk factor modification  - Hypercoagulable panel   - PT/OT/SLP     2. HTN  - Careful blood pressure lowering allowing for permissive HTN  - Monitor       3.  Congestion  - mucinex    PPx  DVT: lovenox  GI: pantoprazole    Rehab Progress: Improving  Anticipated Dispo: home  Services/DME: [employer]: next week    Ramon Ponce D.O. M.P.H  PM&R  3/12/2022  10:52 AM

## 2022-03-12 NOTE — PROGRESS NOTES
Patient awake in bed, VSS, denies pain or discomfort. Shift assessments complete. Standard fall precautions in place. Will continue to monitor.

## 2022-03-12 NOTE — PROGRESS NOTES
Physical Therapy  Facility/Department: [facility] ACUTE REHAB UNIT  Daily Treatment Note  NAME: Anupama Murray  : 1945  MRN: [id number]    Date of Service: 3/12/2022    Discharge Recommendations:  Continue to assess pending progress,24 hour supervision or assist,Home with Home health PT   PT Equipment Recommendations  Other: pt will likely beenfit from RW- continue to assess pending progress    Assessment   Body structures, Functions, Activity limitations: Decreased functional mobility ; Decreased balance;Decreased endurance;Decreased strength;Decreased ADL status; Decreased coordination;Decreased cognition  Assessment: Pt able to increase ambulation distance this session due to increased endurance, however continues to require cues for increased SHU and R foot clearance. Pt also requiring cues for safe stair technique, including improved RLE clearane. Pt will continue to benefit from intensive therapy to maximize safety and independence. Treatment Diagnosis: impaired mobility associated with CVA  Patient Education: Stair safety  REQUIRES PT FOLLOW UP: Yes  Activity Tolerance  Activity Tolerance: Patient limited by fatigue;Patient limited by endurance     Patient Diagnosis(es): There were no encounter diagnoses. has a past medical history of Lupus (HCC) and MVP (mitral valve prolapse). has a past surgical history that includes Tonsillectomy; Colonoscopy; and Hysterectomy. Restrictions  Restrictions/Precautions  Restrictions/Precautions: Fall Risk,Seizure  Position Activity Restriction  Other position/activity restrictions: up as tolerated  Subjective   General  Chart Reviewed: Yes  Additional Pertinent Hx: Patient is a 76 y/o female admitted 3/5 with right leg weakness and unsteadiness. CT head and chest x-ray (-) for acute findings. MRI brain (+) for Acute infarct involving the deep white matter of the posterior left frontal lobe. PMH significant for lupus. Admitted to ARU 3/8.   Family / Caregiver Present: No  Referring Practitioner: Malou Altamirano  Subjective  Subjective: \"I have to keep trying if I want to get better. \"  General Comment  Comments: Pt found seated in recliner upon PT arrival.  Pt agreeable to therapy session. Pain Screening  Patient Currently in Pain: Denies  Vital Signs  Patient Currently in Pain: Denies       Orientation     Cognition      Objective   Bed mobility  Supine to Sit: Supervision  Sit to Supine: Supervision  Comment: on low mat  Transfers  Sit to Stand: Contact guard assistance (from recliner, chair, and low mat to RW, cues for anterior weight shift, increased time for RUE placement)  Stand to sit: Contact guard assistance (cues for hand placement)  Stand Pivot Transfers: Contact guard assistance (chair to low mat, w/c to recliner, with RW, intermittent R knee buckling when backing up to surface)  Ambulation  Ambulation?: Yes  Ambulation 1  Surface: level tile  Device: Rolling Walker  Other Apparatus:  (ACE DF assist RLE)  Assistance: Contact guard assistance;Minimal assistance (pt fluctuating between CGA-Fantasma)  Quality of Gait: slightly decreased zac, narrow SHU, vaulting LLE, decreased foot clearance R, decreased stance time R, decreased step length L, occasional scissor step L, intermittent R knee buckling  Distance: [de-identified]' + 180' (multiple standing rest breaks required)  Comments: Cues for increased SHU and R foot clearance. Pt reporting distances limited by fatigue. Stairs/Curb  Stairs?: Yes  Stairs  # Steps : 6  Stairs Height: 6\"  Rails: Left ascending  Assistance: Minimal assistance  Comment: Step to leading with LLE, occasional poor R foot clearance. Cues for \"up with the good, down with the bad\" technique. Number of stairs limited by fatigue.   Wheelchair Activities  Wheelchair Size: 16\"  Wheelchair Type: Standard  Wheelchair Parts Management: Yes  Left Brakes Level of Assistance: Stand by assistance  Right Brakes Level of Assistance: Stand by Assist  Propulsion: Yes  Propulsion 1  Propulsion: Manual  Level: Level Tile  Method: RUE;LUE;RLE;LLE (inconsistent pattern of all 4 extremities)  Level of Assistance: Stand by assistance  Description/ Details: pt navigating turns and doorways, initial diffciulty sequencing use of RLE that improved with increasing distance, multiple short rest breaks during bout  Distance: 76'     Balance  Comments: Supervision for sitting balance in recliner with no UE support to don shoes and allow PT to apply DF assist.  Exercises  Bridging:  (2x10 reps 10 sec supine bridges, 1st set Fantasma to prevent RLE hip abd.  [id number] supine single leg bridges, Fantasma to prevent RLE hip abd.)  Straight Leg Raise: 2x10 reps supine, cues to slow speed for improved eccentric control, and for avoidance of R hip abd when performing reps with LLE  Ankle Pumps: 3x10 reps in supine with MaxA on RLE, towel roll placed under ankles to reduce friction against low mat, pt substituting with quad contraction  Comments: Verbal and tactile cues for technique.                         G-Code     OutComes Score                                                     AM-PAC Score             Goals  Short term goals  Time Frame for Short term goals: 10 days  Short term goal 1: Pt will perform bed mobility with modified independence -ongoing  Short term goal 2: Pt will perform transfers sit<>stand with SBA and LRAD -ongoing  Short term goal 3: Pt will ambulate 150' with CGA and LRAD -ongoing  Short term goal 4: Pt will ascend/descend 5 stairs with B handrails and SBA -ongoing  Short term goal 5: Pt will stand without UE support while completing dynamic UE task with CGA -ongoing  Long term goals  Time Frame for Long term goals : 3 weeks  Long term goal 1: Pt will perform sit<>stand transfers with mod I and LRAD -ongoing  Long term goal 2: Pt will ambulate 150' with mod I and LRAD -ongoing  Long term goal 3: Pt will ascend/descend 12 stairs with mod I and UL handrail -ongoing  Long term goal 4: Pt will stand without UE support while completing dynamic UE task with mod I -ongoing  Patient Goals   Patient goals : get better and get home, improve walking    Plan    Plan  Times per week: 5 days a week, 60 min  Times per day: Daily  Current Treatment Recommendations: Nikki Owens Training,Patient/Caregiver Education & Training,Balance Training,Gait Training,Functional Mobility Training,Safety Education & Training,Neuromuscular Re-education,Stair training,Equipment Evaluation, Education, & procurement  Safety Devices  Type of devices:  All fall risk precautions in place,Call light within reach,Chair alarm in place,Gait belt,Left in chair  Restraints  Initially in place: No     Therapy Time   Individual Concurrent Group Co-treatment   Time In 0730         Time Out 0830         Minutes [address], PT

## 2022-03-12 NOTE — PROGRESS NOTES
ACUTE REHAB UNIT  SPEECH/LANGUAGE PATHOLOGY      [x] Daily  [] Weekly Care Conference Note  [] Discharge    Patient:Esther Murray      :1945  JJB:[id number]  Rehab Dx/Hx: Acute cerebrovascular accident (CVA) ([facility] [address] [I63.9]    Precautions: [] Aspiration  [x] Fall risk  [] Sternal  [] Seizure [] Hip  [] Weight Bearing [] Other  ST Dx: [] Aphasia  [] Dysarthria  [] Apraxia   [] Oropharyngeal dysphagia [x] Cognitive-linguistic Impairment  [] Other:   Date of Admit: 3/8/2022  Room #: 3113/3113-01  Date: 3/12/2022          Current Diet Order:ADULT DIET; Regular; Low Sodium (2 gm)     Subjective: Patient pleasant and cooperative. Appeared slightly anxious with assessment. Lives With: Daughter  Homemaking Responsibilities: Yes  Occupation: Retired  Type of occupation: Cosmotologist  Leisure & Hobbies: travel    Elysia Preciadomakstephanie ISA[address]: Impaired (Reports occasional difficulty with her L eye)   Vision Exceptions: Wears glasses for reading  Hearing  Hearing: Within functional limits  Barriers toward progress: Cognitive-linguistic deficit, Anxiety and Limited insight into deficits      Date: 3/12/2022      Tx session 1 Tx session 2   Total Timed Code Min 30 30   Total Treatment Minutes 30 30   Individual Treatment Minutes 30 30   Group Treatment Minutes 0 0   Co-Treat Minutes 0 0   Brief Exception: N/A N/A   Pain Denied. None indicated at this time. Pain Intervention: [] RN notified  [] Repositioned  [] Intervention offered and patient declined  [x] N/A  [] Other: [] RN notified  [] Repositioned  [] Intervention offered and patient declined  [x] N/A  [] Other:   Subjective:     RN at bedside up SLP arrival. Pt was visited sitting upright in bedside chair. She was agreeable to therapy this date. Instances of grimacing suspected to be secondary to tardive dyskinesia as pt denied being in pain when asked. Pt was visited sitting upright in chair.  She was on the phone with her daughter upon SLP entry but immediately terminated call stating, \"I've gotta go. Therapy is here. \"   Patient will complete higher level/abstract naming tasks to improve mental flexibility at 90% acc given min cues <2 episodes of perseveration    2-factor naming activity that specified category and letter:  Completed with approximately 85% accuracy. Pt required prompting in order to keep track of where she was on the page. Perseveration noted x3 throughout task. Pt appear unaware of errors and seemed confused when SLP would point out an error. No noted anomia this date. Goal not directly targeted this session. Patient will complete verbal/visual reasoning tasks at 90% acc given min tasks (meds,times,finance etc)   Clock drawing task: All numbers were included and located in the correct space within the [place] (numbers were difficult to read but accurate)  When asked to set hands to \"10 past 11\" the pt karo two hands that were the same length in the position for 5 past 11. When error was pointed out to pt, she disagreed with the SLP's assessment and counted by 5s from the number 11 to the number 5. \"I don't understand how that's not right. I got 10 right here. \"   Simple deductive reasoning task:  100% accuracy for using clues to cross out items to deduct the answer. More complex deductive reasoning task:  Significant difficulty. Required moderate prompting to deduct answers based on clues. Pt wrote answers but writing was illegible. Unclear if due to visuospatial difficulty or possible agraphia. Pt with difficulty writing words in the correct box and words often took up multiple boxes. Pt attempted skills necessary for card game that required her to determine if a card matched the shape, color, or number. Pt with successful completion of the task given mod cues throughout to recall what she was looking for. Pt required additional time to decide on her choice. Required prompting to recognize mistakes.    Patient will complete graded memory tasks with learned strategies at 90% acc given min cues Delayed Recall (approx. 5 min delay):  Pt completed 2 different tasks with different stimuli. 4/4 lined black/white drawings  3/4 lined black/white drawings increasing to 4/4 given category cue. Goal not directly targeted this session. Other areas targeted: n/a n/a   Education:   Pt was educated to rational for each tx task. Pt was educated to rational for each tx task and to current progress on goals. Safety Devices: [x] Call light within reach  [x] Chair alarm activated and connected to nurse call light system  [] Bed alarm activated   [] Other:  [x] Call light within reach  [x] Chair alarm activated and connected to nurse call light system  [] Bed alarm activated   [] Other:    Assessment: Significantly decreased insight to deficits and mild impulsivity noted this date. Suspect decreased visuospatial and executive functioning skills. Plan:  Continue per POC. Interventions used this date:  [] Speech/Language Treatment  [] Instruction in HEP  [] Dysphagia Treatment [x] Cognitive Treatment   [] Other:    Discharge recommendations:  [] Home independently  [x] Home with assistance []  24 hour supervision  [] ECF [x] Other : Would recommend assist with finances/meds at [facility] given cognitive-linguistic and visual deficits.  Educated pt on this recommendation on 3/11  Continued Tx Upon Discharge: ? [x] Yes    [] No    [] TBD based on progress while on ARU     [] Vital Stim indicated     [] Other:   Estimated discharge date: 3/19/22    Electronically signed by:  Chris Bar, [id number] [facility]; [id number]  Speech-Language Pathologist

## 2022-03-12 NOTE — PROGRESS NOTES
Occupational Therapy  Facility/Department: [facility] ACUTE REHAB UNIT  Daily Treatment Note  NAME: David Murray  : 1945  MRN: [id number]    Date of Service: 3/12/2022    Discharge Recommendations:  Continue to assess pending progress,24 hour supervision or assist,Home with Home health OT  OT Equipment Recommendations  Equipment Needed: Yes  ADL Assistive Devices: Transfer Tub Bench;Grab Bars - toilet  Other: continue to assess    Assessment   Performance deficits / Impairments: Decreased functional mobility ; Decreased ADL status; Decreased balance;Decreased strength;Decreased fine motor control;Decreased high-level IADLs;Decreased coordination;Decreased safe awareness  Assessment: Pt completed dynamic reaching tasks with RUE in stance with improved coordination and decreased dysmetria compared to previous sessions. Pt demonstrated favoring of LLE in stance due to decreased strength and ataxia of R-hemibody. Pt with decreased RUE [address] resulting in poor legibility during handwriting tasks. Pt highly motivated and eager to return to PLOF. Cont per OT POC. Treatment Diagnosis: Impaired ADLs, mobility and R motor control s/p CVA  Prognosis: Good  OT Education: Transfer Training;Plan of Care  Patient Education: pt educated on importance of placement of RLE for transfers and functional mobility as well as increasing WBing/even distribution of weight when in stance-pt verb understanding, continue to reinforce  REQUIRES OT FOLLOW UP: Yes  Activity Tolerance  Activity Tolerance: Patient Tolerated treatment well  Safety Devices  Safety Devices in place: Yes  Type of devices: Call light within reach; Left in chair;Chair alarm in place         Patient Diagnosis(es): There were no encounter diagnoses. has a past medical history of Lupus (HCC) and MVP (mitral valve prolapse).    has a past surgical history that includes Tonsillectomy; Colonoscopy; and Hysterectomy. Restrictions  Restrictions/Precautions  Restrictions/Precautions: Fall Risk,Seizure  Position Activity Restriction  Other position/activity restrictions: up as tolerated  Subjective   General  Chart Reviewed: Yes  Patient assessed for rehabilitation services?: Yes  Additional Pertinent Hx: Pt is 77 y.o female admitted to [address] [place] [address] with with RLE weakness and balance deficits. Admitted for acute CVA. CT, CTA head negative. 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 Admitted to ARU 3/8. Response to previous treatment: Patient with no complaints from previous session  Family / Caregiver Present: No  Referring Practitioner: Dr. Mauricio West  Diagnosis: CVA  Subjective  Subjective: Pt sitting in bedside recliner upon arrival, pleasant and agreeable to OT session.   Vital Signs  Patient Currently in Pain: Denies     Orientation  Orientation  Overall Orientation Status: Within Functional Limits  Objective    ADL  Grooming: Contact guard assistance (oral hygiene in stance at sink)        Balance  Standing Balance: Contact guard assistance (- min A dynamically during RLE WBing activity)  Standing Balance  Time: ~20 mins total  Activity: functional mobility, functional transfers, oral hygiene, dynamic reaching/standing activities  Comment: Pt completed the following standing balance activities in order to increase self righting reactions and improve safety and independence with ADLs and functional mobility: 1) In stance using full length mirror, pt completed x 2 trials of dynamic reaching ipsilaterally and contralaterally within and outside SHU using RUE without UE support-on first trial pt instructed to target random letters verbalized by OT, pt with CGA and no overt LOB with 100% accuracy and minimal dysmetria noted, pt required VCs to widen SHU-on second trial pt instructed to retrieve letters ipsilaterally and contralaterally with RUE to spell words from a choice of 12 letters and place on top of mirror, pt spelled 3 letters without difficulty req CGA and no overt LOB, however pt observed to favor LLE with minimal WBing through RLE throughout activity; 2) In order to increase WBing through RLE, pt completed standing balance activity with LLE placed on airex mat while simultaneously working on RUE gross and fine motor coordination during functional task to retrieve utensils from  utensil basket and place into utensil drawer organizer; pt completed with 0-1 UE suport on tabletop with CGA and no overt LOB but pt was still compensating with LLE at times requiring cues to engage R quad; in order to upgrade task, pt utilized LLE onto 4 inch step; pt req CGA initially but after 1 minute pt reported increased challenge and fatigue requiring min A to maintain standing balance and demo RLE instability, no overt buckling noted; throughout task pt req cues for improved grasping technique when using RUE to handle the utensils, pt was attempting to retrieve and hold 4-5 utensils vs completing one at a a time, resulting in dropping of utensils and poor management of organizer dropping utensils in versus strategic/intentional placement, pt req + time to place utensils back into basket due to dysmetria  Functional Mobility  Functional - Mobility Device: Rolling Walker  Activity: To/from bathroom; To/From therapy gym  Assist Level: Contact guard assistance  Functional Mobility Comments: VCs to increase R step height, occasional toe drag noted; pt also propelled self ~60 feet in hallway using BLEs only in stepping pattern to propel self as a strengthening activity for BLE however pt often compensating with LLE and dragging RLE resulting in running RLE into wheel of w/c, pt able to self correct with cues, VCs for pacing to slow down and focus on RLE placement, SBA overall     Transfers  Sit to stand: Stand by assistance (from recliner and w/c)  Stand to sit: Stand by assistance  Transfer Comments: VCs for RLE positioning prior to stance        Coordination  Fine Motor: In order to increase [address] to increase independence with ADLs and functional tasks, pt engaged in handwriting task seated at tabletop; pt was instructed to write name using the following techniques: 1) no adaptations, pt with 30% legibility, pt demo lateral tripod grasp/cross thumb grasp at times; 2) use of  adaptive pencil  for improved placement of tripod grasp, pt with 85% legibility and reported slightly more coorindation with use of adaptive pencil , more control of sizing of letters noted; 3) using pencil  and slant board for increased shoulder and wrist stability during writing tasks, pt demo 100% legibility however pt with decreased control of sizing of letters and 4) using slant board only pt with most controlled and coordinated letter strokes, consistent letter sizing and 100% legibility, however pt restorted back to cross thumb pencil grasp positioning more distally on pencil, pt unable to recall how she typically holds a pencil but stated, \"I'm probably compensating though\"; pt rated her handwriting as a 2 on a scale of 1-10 with 10 being exactly similar to baseline handwriting and 1 being nothing like baseline handwriting; 2) Pt instructed to write letters of the alphabet using slant board, pt demo 56% legibility with poor/inconsistent control of letter sizing again with static control of pencil due to positioning between thumb webspace and index finger more distally on utensil              Cognition  Overall Cognitive Status: WFL  Arousal/Alertness: Appropriate responses to stimuli  Following Commands:  Follows all commands without difficulty  Attention Span: Attends with cues to redirect  Memory: Appears intact  Safety Judgement: Decreased awareness of need for assistance;Decreased awareness of need for safety  Problem Solving: Able to problem solve independently  Insights: Decreased awareness of deficits  Initiation: Requires cues for some  Sequencing: Requires cues for some                                         Plan   Plan  Times per week: 60 mins per day/ 5 days per week  Times per day: Daily  Current Treatment Recommendations: Functional Mobility Training,Safety Education & Training,Self-Care / ADL,Equipment Evaluation, Education, & procurement,Patient/Caregiver Education & Training,Strengthening,Balance Training,Neuromuscular Re-education  G-Code     OutComes Score                                                  AM-PAC Score             Goals  Short term goals  Time Frame for Short term goals: 10 days  Short term goal 1: Pt will complete all bathing componets with spvn-ongoing  Short term goal 2: Pt will complete toilet and tub transfers with CGA-ongoing for toilet, goal met for tub transfer 3/10  Short term goal 3: Pt will complete UB and LB dressing with SBA-ongoing  Short term goal 4: Pt will complete oral hygiene and grooming tasks standing with SBA-ongoing  Short term goal 5: Pt will complete NHPT assessment-goal met 3/10  Long term goals  Time Frame for Long term goals : 3 weeks  Long term goal 1: Pt will complete bathing componets with Mod I-ongoing  Long term goal 2: Pt will complete toilet and tub transfers with Mod I-ongoing  Long term goal 3: Pt will complete all dressing componets with Mod I-ongoing  Long term goal 4: Pt will complete oral hygiene/grooming in stance with Mod I-ongoing  Long term goal 5: Pt will complete R hand NHPT and score within 50th percentile for age-ongoing  Patient Goals   Patient goals : \"to go home\"       Therapy Time   Individual Concurrent Group Co-treatment   Time In 0930         Time Out 1030         Minutes 60         Timed Code Treatment Minutes: [address] [facility] Sam, OT

## 2022-03-13 PROCEDURE — 6370000000 HC RX 637 (ALT 250 FOR IP): Performed by: PHYSICAL MEDICINE & REHABILITATION

## 2022-03-13 PROCEDURE — 1280000000 HC REHAB R&B

## 2022-03-13 PROCEDURE — 6360000002 HC RX W HCPCS: Performed by: PHYSICAL MEDICINE & REHABILITATION

## 2022-03-13 PROCEDURE — 99232 SBSQ HOSP IP/OBS MODERATE 35: CPT | Performed by: PHYSICAL MEDICINE & REHABILITATION

## 2022-03-13 PROCEDURE — 6370000000 HC RX 637 (ALT 250 FOR IP): Performed by: STUDENT IN AN ORGANIZED HEALTH CARE EDUCATION/TRAINING PROGRAM

## 2022-03-13 RX ORDER — DOCUSATE SODIUM 100 MG/1
100 CAPSULE, LIQUID FILLED ORAL DAILY
Status: DISCONTINUED | OUTPATIENT
Start: 2022-03-13 | End: 2022-03-19 | Stop reason: HOSPADM

## 2022-03-13 RX ORDER — POLYETHYLENE GLYCOL 3350 17 G/17G
17 POWDER, FOR SOLUTION ORAL DAILY
Status: DISCONTINUED | OUTPATIENT
Start: 2022-03-13 | End: 2022-03-19 | Stop reason: HOSPADM

## 2022-03-13 RX ORDER — SENNA PLUS 8.6 MG/1
1 TABLET ORAL NIGHTLY
Status: DISCONTINUED | OUTPATIENT
Start: 2022-03-13 | End: 2022-03-19 | Stop reason: HOSPADM

## 2022-03-13 RX ADMIN — SENNOSIDES 8.6 MG: 8.6 TABLET, COATED ORAL at 20:38

## 2022-03-13 RX ADMIN — DOCUSATE SODIUM 100 MG: 100 CAPSULE, LIQUID FILLED ORAL at 15:02

## 2022-03-13 RX ADMIN — CETIRIZINE HYDROCHLORIDE 10 MG: 10 TABLET, FILM COATED ORAL at 08:56

## 2022-03-13 RX ADMIN — ATORVASTATIN CALCIUM 80 MG: 80 TABLET, FILM COATED ORAL at 20:38

## 2022-03-13 RX ADMIN — GUAIFENESIN 600 MG: 600 TABLET, EXTENDED RELEASE ORAL at 20:38

## 2022-03-13 RX ADMIN — ENOXAPARIN SODIUM 40 MG: 100 INJECTION SUBCUTANEOUS at 08:56

## 2022-03-13 RX ADMIN — ASPIRIN 81 MG: 81 TABLET, COATED ORAL at 08:56

## 2022-03-13 RX ADMIN — GUAIFENESIN 600 MG: 600 TABLET, EXTENDED RELEASE ORAL at 08:56

## 2022-03-13 RX ADMIN — HYDROCHLOROTHIAZIDE 25 MG: 25 TABLET ORAL at 09:00

## 2022-03-13 RX ADMIN — LISINOPRIL 5 MG: 5 TABLET ORAL at 09:00

## 2022-03-13 RX ADMIN — PANTOPRAZOLE SODIUM 40 MG: 40 TABLET, DELAYED RELEASE ORAL at 05:59

## 2022-03-13 RX ADMIN — CLOPIDOGREL BISULFATE 75 MG: 75 TABLET ORAL at 08:56

## 2022-03-13 ASSESSMENT — PAIN SCALES - GENERAL
PAINLEVEL_OUTOF10: 0
PAINLEVEL_OUTOF10: 0

## 2022-03-13 NOTE — PROGRESS NOTES
Shift assessment complete. VSS. A/Ox4. Fall precautions in place. Denies pain or discomforts. Up to chair for breakfast. Call light in reach. Will continue to monitor.      Vitals:    03/13/22 0900   BP: 123/80   Pulse: 94   Resp: 17   Temp: 98 °F (36.7 °C)   SpO2: 98%

## 2022-03-13 NOTE — PROGRESS NOTES
Department of Physical Medicine & Rehabilitation  Progress Note    Patient Identification:  Cuong Castellano  [id number]  : 1945  Admit date: 3/8/2022    Chief Complaint: Acute cerebrovascular accident (CVA) ([address]    Subjective:   No new complaints overnight. Improving in therapy. She continues to perform well and even participate in bed mobility without therapy. Seen in her chair this morning. ROS: No f/c, n/v, cp     Objective:  Patient Vitals for the past 24 hrs:   BP Temp Temp src Pulse Resp SpO2   22 0900 123/80 98 °F (36.7 °C) Oral 94 17 98 %   22 2000 135/83 98.2 °F (36.8 °C) Oral 74 16 97 %     Const: Alert. WDWN. No distress  Eyes: Conjunctiva noninjected, no icterus noted; pupils equal, round, and reactive to light. HENT: Atraumatic, normocephalic; Oral mucosa moist  Neck: Trachea midline, neck supple. No thyromegaly noted. CV: Regular rate and rhythm, no murmur rub or gallop noted  Resp: Lungs clear to auscultation bilaterally, no rales wheezes or ronchi, no retractions. Respirations unlabored. GI: Soft, nontender, nondistended. Normal bowel sounds. No palpable masses. Skin: Normal temperature and turgor. No rashes or breakdown noted. Ext: No significant edema appreciated. No varicosities. MSK: No joint tenderness, erythema, warmth noted.  AROM intact. Neuro: Alert, oriented, appropriate. No cranial nerve deficits appreciated. Sensation intact to light touch. Motor examination reveals 5/5 strength in RUE, LUE, LLE. Strength 4/5 in RLE upon flexion and extension of hip. No abnormalities with finger/nose or heel/shin noted. Reflexes normal and symmetric. Psych: Stable mood, normal judgement, normal affect     Laboratory data: Available via EMR. Last 24 hour lab  No results found for this or any previous visit (from the past 24 hour(s)).     Therapy progress:  PT  Position Activity Restriction  Other position/activity restrictions: up as tolerated  Objective     Sit to Stand: Contact guard assistance (from recliner, chair, and low mat to RW, cues for anterior weight shift, increased time for RUE placement)  Stand to sit: Contact guard assistance (cues for hand placement)  Bed to Chair: Contact guard assistance (SPT)  Device: Rolling Walker  Other Apparatus:  (ACE DF assist RLE)  Assistance: Contact guard assistance,Minimal assistance (pt fluctuating between CGA-Fantasma)  Distance: [de-identified]' + 180' (multiple standing rest breaks required)  OT  PT Equipment Recommendations  Other: pt will likely beenfit from RW- continue to assess pending progress  Toilet - Technique: Ambulating  Equipment Used: Standard toilet  Toilet Transfers Comments: Pt does not have GBs at home toilet. Required VCs for RW mgmt during 180 turn at toilet. Min A-Mod A for balance and to sit due to poor ecentric control. Assessment        SLP          Body mass index is 23.19 kg/m². Assessment and Plan:  1. Acute CVA   - No focal deficits on exam however patient has continued sense of being off balance with walking and was noted in ED to have some ataxia. - CT, CTA head no acute findings. 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 - Stroke team consulted, advised initiation of DAPT and admission for further work-up  - Neurology following  - Echocardiogram pending  - Risk factor modification  - Hypercoagulable panel   - PT/OT/SLP     2. HTN  - Careful blood pressure lowering allowing for permissive HTN  - Monitor       3.  Congestion  - mucinex  -improved    PPx  DVT: lovenox  GI: pantoprazole    Rehab Progress: Improving  Anticipated Dispo: home  Services/DME:   ELOS: next week    Tristan Lentz D.O. M.P.H  PM&R  3/13/2022  1:46 PM

## 2022-03-13 NOTE — PLAN OF CARE
Problem: Falls - Risk of:  Goal: Will remain free from falls  Description: Will remain free from falls  3/13/2022 0951 by Guero Hoang RN  Outcome: Ongoing  Note: Patient at risk for falls due to recent stroke. Fall precautions in place. Patient is A/Ox4. Calls out for assistance when needed. Call light in reach. None skid footwear on. Problem: Mobility - Impaired:  Goal: Mobility will improve  Description: Mobility will improve  Outcome: Ongoing  Note: Patients mobility continues to improve. Minor right sided weakness. Only requires contact assist with rolling walker with ambulation. Problem: Skin Integrity:  Goal: Will show no infection signs and symptoms  Description: Will show no infection signs and symptoms  Outcome: Ongoing  Note: Skin is clean/dry/intact. No signs of infection.

## 2022-03-14 LAB
ANION GAP SERPL CALCULATED.3IONS-SCNC: 9 MMOL/L (ref 3–16)
BASOPHILS ABSOLUTE: 0 K/UL (ref 0–0.2)
BASOPHILS RELATIVE PERCENT: 0.8 %
BUN BLDV-MCNC: 30 MG/DL (ref 7–20)
CALCIUM SERPL-MCNC: 10.9 MG/DL (ref 8.3–10.6)
CHLORIDE BLD-SCNC: 98 MMOL/L (ref 99–110)
CO2: 27 MMOL/L (ref 21–32)
CREAT SERPL-MCNC: 1 MG/DL (ref 0.6–1.2)
EOSINOPHILS ABSOLUTE: 0.2 K/UL (ref 0–0.6)
EOSINOPHILS RELATIVE PERCENT: 2.9 %
GFR AFRICAN AMERICAN: >60
GFR NON-AFRICAN AMERICAN: 54
GLUCOSE BLD-MCNC: 109 MG/DL (ref 70–99)
HCT VFR BLD CALC: 40.6 % (ref 36–48)
HEMOGLOBIN: 14.3 G/DL (ref 12–16)
LYMPHOCYTES ABSOLUTE: 2.4 K/UL (ref 1–5.1)
LYMPHOCYTES RELATIVE PERCENT: 40.6 %
MCH RBC QN AUTO: 32.5 PG (ref 26–34)
MCHC RBC AUTO-ENTMCNC: 35.3 G/DL (ref 31–36)
MCV RBC AUTO: 92 FL (ref 80–100)
MONOCYTES ABSOLUTE: 0.4 K/UL (ref 0–1.3)
MONOCYTES RELATIVE PERCENT: 7 %
NEUTROPHILS ABSOLUTE: 2.9 K/UL (ref 1.7–7.7)
NEUTROPHILS RELATIVE PERCENT: 48.7 %
PDW BLD-RTO: 13.7 % (ref 12.4–15.4)
PLATELET # BLD: 290 K/UL (ref 135–450)
PMV BLD AUTO: 8.7 FL (ref 5–10.5)
POTASSIUM REFLEX MAGNESIUM: 3.9 MMOL/L (ref 3.5–5.1)
RBC # BLD: 4.41 M/UL (ref 4–5.2)
SODIUM BLD-SCNC: 134 MMOL/L (ref 136–145)
WBC # BLD: 6 K/UL (ref 4–11)

## 2022-03-14 PROCEDURE — 97130 THER IVNTJ EA ADDL 15 MIN: CPT

## 2022-03-14 PROCEDURE — 97110 THERAPEUTIC EXERCISES: CPT | Performed by: PHYSICAL THERAPIST

## 2022-03-14 PROCEDURE — 6370000000 HC RX 637 (ALT 250 FOR IP): Performed by: STUDENT IN AN ORGANIZED HEALTH CARE EDUCATION/TRAINING PROGRAM

## 2022-03-14 PROCEDURE — 97129 THER IVNTJ 1ST 15 MIN: CPT

## 2022-03-14 PROCEDURE — 97530 THERAPEUTIC ACTIVITIES: CPT

## 2022-03-14 PROCEDURE — 6370000000 HC RX 637 (ALT 250 FOR IP): Performed by: PHYSICAL MEDICINE & REHABILITATION

## 2022-03-14 PROCEDURE — 85025 COMPLETE CBC W/AUTO DIFF WBC: CPT

## 2022-03-14 PROCEDURE — 1280000000 HC REHAB R&B

## 2022-03-14 PROCEDURE — 6360000002 HC RX W HCPCS: Performed by: PHYSICAL MEDICINE & REHABILITATION

## 2022-03-14 PROCEDURE — 36415 COLL VENOUS BLD VENIPUNCTURE: CPT

## 2022-03-14 PROCEDURE — 97535 SELF CARE MNGMENT TRAINING: CPT

## 2022-03-14 PROCEDURE — 97530 THERAPEUTIC ACTIVITIES: CPT | Performed by: PHYSICAL THERAPIST

## 2022-03-14 PROCEDURE — 80048 BASIC METABOLIC PNL TOTAL CA: CPT

## 2022-03-14 PROCEDURE — 99231 SBSQ HOSP IP/OBS SF/LOW 25: CPT | Performed by: PHYSICAL MEDICINE & REHABILITATION

## 2022-03-14 PROCEDURE — 97116 GAIT TRAINING THERAPY: CPT

## 2022-03-14 RX ADMIN — LISINOPRIL 5 MG: 5 TABLET ORAL at 07:56

## 2022-03-14 RX ADMIN — ATORVASTATIN CALCIUM 80 MG: 80 TABLET, FILM COATED ORAL at 19:52

## 2022-03-14 RX ADMIN — POLYETHYLENE GLYCOL 3350 17 G: 17 POWDER, FOR SOLUTION ORAL at 07:54

## 2022-03-14 RX ADMIN — GUAIFENESIN 600 MG: 600 TABLET, EXTENDED RELEASE ORAL at 07:55

## 2022-03-14 RX ADMIN — HYDROCHLOROTHIAZIDE 25 MG: 25 TABLET ORAL at 07:55

## 2022-03-14 RX ADMIN — PANTOPRAZOLE SODIUM 40 MG: 40 TABLET, DELAYED RELEASE ORAL at 06:35

## 2022-03-14 RX ADMIN — DOCUSATE SODIUM 100 MG: 100 CAPSULE, LIQUID FILLED ORAL at 07:56

## 2022-03-14 RX ADMIN — ASPIRIN 81 MG: 81 TABLET, COATED ORAL at 07:55

## 2022-03-14 RX ADMIN — ENOXAPARIN SODIUM 40 MG: 100 INJECTION SUBCUTANEOUS at 07:57

## 2022-03-14 RX ADMIN — CETIRIZINE HYDROCHLORIDE 10 MG: 10 TABLET, FILM COATED ORAL at 07:56

## 2022-03-14 RX ADMIN — GUAIFENESIN 600 MG: 600 TABLET, EXTENDED RELEASE ORAL at 19:52

## 2022-03-14 RX ADMIN — CLOPIDOGREL BISULFATE 75 MG: 75 TABLET ORAL at 07:56

## 2022-03-14 ASSESSMENT — PAIN SCALES - GENERAL
PAINLEVEL_OUTOF10: 0
PAINLEVEL_OUTOF10: 0

## 2022-03-14 NOTE — PROGRESS NOTES
Occupational Therapy  Facility/Department: [facility] ACUTE REHAB UNIT  Daily Treatment Note  NAME: Inderjit Murray  : 1945  MRN: [id number]    Date of Service: 3/14/2022    Discharge Recommendations:  Continue to assess pending progress,24 hour supervision or assist,Home with Home health OT  OT Equipment Recommendations  Equipment Needed: Yes  Mobility Devices: ADL Assistive Devices  ADL Assistive Devices: Transfer Tub Bench;Grab Bars - toilet  Other: continue to assess    Assessment   Performance deficits / Impairments: Decreased functional mobility ; Decreased ADL status; Decreased balance;Decreased strength;Decreased fine motor control;Decreased high-level IADLs;Decreased coordination;Decreased safe awareness    Assessment: Pt met two of her ADL goals today, as evidence by completing UB and LB dressing componets and oral hygiene with SBA. Pt limited by RUE fine and gross motor coordination as she demo's mild ataxia and dsymetria during movements. Pt is highly motivated to return to OF and would benefit from continued OT services to maximzie independence and safety with ADLs, cont OT per POC. Treatment Diagnosis: Impaired ADLs, mobility and R motor control s/p CVA  Prognosis: Good  OT Education: Transfer Training;Plan of Care;ADL Adaptive Strategies;OT Role  Patient Education: verb understanding  REQUIRES OT FOLLOW UP: Yes  Activity Tolerance  Activity Tolerance: Patient Tolerated treatment well  Safety Devices  Safety Devices in place: Yes  Type of devices: Call light within reach; Left in chair;Chair alarm in place         Patient Diagnosis(es): There were no encounter diagnoses. has a past medical history of Lupus (HCC) and MVP (mitral valve prolapse). has a past surgical history that includes Tonsillectomy; Colonoscopy; and Hysterectomy.     Restrictions  Restrictions/Precautions  Restrictions/Precautions: Fall Risk,Seizure  Position Activity Restriction  Other position/activity restrictions: up as tolerated  Subjective   General  Chart Reviewed: Yes  Patient assessed for rehabilitation services?: Yes  Additional Pertinent Hx: Pt is 77 y.o female admitted to [facility] with with RLE weakness and balance deficits. Admitted for acute CVA. CT, CTA head negative. 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 Admitted to ARU 3/8. Response to previous treatment: Patient with no complaints from previous session  Family / Caregiver Present: No  Referring Practitioner: Dr. Akshat Gaffney  Diagnosis: CVA  Subjective  Subjective: Pt sitting in recliner upon arrival, pleasant and agreeable to therapy. Requesting a shower with OT      Orientation     Objective    ADL  Grooming: Stand by assistance (Pt completed oral hygiene in stance at sink. Pt applied deodorant seated on TTB after donning shirt.)  UE Bathing: Supervision (Pt completed all UB bathing seated on TTB)  LE Bathing: Stand by assistance (Pt wash/dried BLEs in fig 4 on TTB, wash/dried front arsen seated, wash/dried back arsen in stance after shower at GBs with SBA.)  UE Dressing: Supervision (Pt doffed/don shirt seated on TTB.)  LE Dressing: Stand by assistance (Completed pants mgmt up/down in stance w Min use of GBs, rqd SBA. Pt unthreaded/threaded briefs and pants in fig 4. Doffed/donned socks in fig 4 seated on TTB. Pt donned shoes seated on couch with SBA.)        Balance  Sitting Balance: Stand by assistance (seated in recliner and wc spvn , seated on TTB SBA)  Standing Balance: Contact guard assistance (CGA progressing to SBA)  Standing Balance  Time: ~20 mins total  Activity: functional mobility and functional transfers, oral hygiene, stance for pants mgmt, dynamic reaching/standing tasks  Comment: Pt completed standing balance and gross motor activity in stance at tabletop to increase standing tolerance and gross motor coordination with RUE.  Pt completed 3 stands ~2mins each with CGA progressing to SBA to maintain balance and min UE support from tabletop. See Gross Motor section for more details about activitiy. Functional Mobility  Functional - Mobility Device: Rolling Walker  Activity: To/from bathroom; To/From therapy gym  Assist Level: Contact guard assistance  Functional Mobility Comments: Pt ambulated to/from bathroom and to/from gym using RW and CGA. Pt required VCs to increase R step height, occasional dorsiflexion noted. VCs to maintain slow pace during ambulation  Shower Transfers  Shower - Transfer From: [place] - Transfer Type: To and From  Shower - Transfer To: Transfer tub bench  Shower - Technique: Ambulating  Shower Transfers: Contact Guard  Shower Transfers Comments: VCs for [employer] and sequencing 180 turn     Transfers  Sit to stand: Stand by assistance (recliner>RW, wc>tabletop, TTB>GBs)  Stand to sit: Stand by assistance (GBs>TTB, tabletop stance>wc)  Transfer Comments: VCs for hand placement        Coordination  Gross Motor: Pt commpleted the following activities to increase coordination of RUE for functional tasks. 1) In stance at tabletop, Pt instructed to reach across midline with RUE to retrieve cone, then return back to center and place cone in various heights outside R SHU. Pt able to transfer all x12 cones in one stand with CGA. 2) Pt completed same task, with this time reaching outside R SHU to retrieve cone and then cross midline to place cone in various heights. Pt transfered x8 cones before requiring seated rest break. After break, Pt able to transfer x4 cones with CGA. Pt continues to demonstrate mild dysmetria when accurately placing cone onto target and mild ataxia when controlling RUE. Fine Motor: In order to increase fine motor coordination of RUE for ADLs and functional tasks, Pt completed the following vertical dowel tatyana pattern tasks seated at tabletop. Pt completed 4, 8-piece dowel tatyana patterns with 1\" blocks and spheres.  Pt completed each task within normal time and 100% accuracy on color/shapes and demo'd three-jaw kasia grasp when retrieveing each square blocks. Mild dsymetria when placing blocks on dowel tatyana. Cognition  Overall Cognitive Status: WFL  Arousal/Alertness: Appropriate responses to stimuli  Following Commands: Follows all commands without difficulty  Attention Span: Attends with cues to redirect  Memory: Appears intact  Safety Judgement: Decreased awareness of need for assistance;Decreased awareness of need for safety  Problem Solving: Able to problem solve independently  Insights: Decreased awareness of deficits  Sequencing: Requires cues for some  Cognition Comment: Good insights to deficits, impulsive at times and requires cues for safety.                                          Plan   Plan  Times per week: 60 mins per day/ 5 days per week  Times per day: Daily  Current Treatment Recommendations: Functional Mobility Training,Safety Education & Training,Self-Care / ADL,Equipment Evaluation, Education, & procurement,Patient/Caregiver Education & Training,Strengthening,Balance Training,Neuromuscular Re-education  G-Code     OutComes Score                                                  AM-PAC Score             Goals  Short term goals  Time Frame for Short term goals: 10 days  Short term goal 1: Pt will complete all bathing componets with spvn-ongoing  Short term goal 2: Pt will complete toilet and tub transfers with CGA-ongoing for toilet, goal met for tub transfer 3/10  Short term goal 3: Pt will complete UB and LB dressing with SBA-goal met 3/14  Short term goal 4: Pt will complete oral hygiene and grooming tasks standing with SBA-goal met 3/14  Short term goal 5: Pt will complete NHPT assessment-goal met 3/10  Long term goals  Time Frame for Long term goals : 3 weeks  Long term goal 1: Pt will complete bathing componets with Mod I-ongoing  Long term goal 2: Pt will complete toilet and tub transfers with Mod I-ongoing  Long term goal 3: Pt will complete all dressing componets with Mod I-ongoing  Long term goal 4: Pt will complete oral hygiene/grooming in stance with Mod I-ongoing  Long term goal 5: Pt will complete R hand NHPT and score within 50th percentile for age-ongoing  Patient Goals   Patient goals : \"to go home\"       Therapy Time   Individual Concurrent Group Co-treatment   Time In 0930         Time Out 1030         Minutes 60             Total Time: 60 mins    GRISEL Gordon

## 2022-03-14 NOTE — PROGRESS NOTES
ACUTE REHAB UNIT  SPEECH/LANGUAGE PATHOLOGY      [x] Daily  [] Weekly Care Conference Note  [] Discharge    Patient:Esther Murray      :1945  IMO:[id number]  Rehab Dx/Hx: Acute cerebrovascular accident (CVA) ([facility] [address] [I63.9]    Precautions: [] Aspiration  [x] Fall risk  [] Sternal  [] Seizure [] Hip  [] Weight Bearing [] Other  ST Dx: [] Aphasia  [] Dysarthria  [] Apraxia   [] Oropharyngeal dysphagia [x] Cognitive-linguistic Impairment  [x] Other: Dysphonia (baseline)  Date of Admit: 3/8/2022  Room #: 3113/3113-01  Date: 3/14/2022          Current Diet Order:ADULT DIET; Regular; Low Sodium (2 gm)     Subjective: Patient pleasant and cooperative. Appeared slightly anxious with assessment. Lives With: Daughter  Homemaking Responsibilities: Yes  Occupation: Retired  Type of occupation: Cosmotologist  Leisure & Hobbies: travel    Elysia Preciadomakstephanie ISAACSjacek 61: Impaired (Reports occasional difficulty with her L eye)   Vision Exceptions: Wears glasses for reading  Hearing  Hearing: Within functional limits  Barriers toward progress: Cognitive-linguistic deficit, Anxiety and Limited insight into deficits      Date: 3/14/2022      Tx session 1 Tx session 2   Total Timed Code Min 30 30   Total Treatment Minutes 30 30   Individual Treatment Minutes 30 30   Group Treatment Minutes 0 0   Co-Treat Minutes 0 0   Brief Exception: N/A N/A   Pain None indicated via any means  None indicated via any means. Pain Intervention: [] RN notified  [] Repositioned  [] Intervention offered and patient declined  [x] N/A  [] Other: [] RN notified  [] Repositioned  [] Intervention offered and patient declined  [x] N/A  [] Other:   Subjective:     Pt upright in chair after completing breakfast. Continue to note atypical oral movements without clear etiology. Pt upright in chair positioned sideways with one foot out of shoe asking \"Do I have to stay up for this? \".  Agreeable to remain upright for speech therapy session and transfer to bed at end of session   Patient will complete higher level/abstract naming tasks to improve mental flexibility at 90% acc given min cues <2 episodes of perseveration    Category differentiation (abstract): 100% accuracy with min cues. Perseveration x1. Pt agreeable to alternative reasoning within activity. Synonyms/antonyms: 90% accuracy with mod cues. Pt benefited from first letter and descriptive prompts for word finding. Goal not targeted this session. Patient will complete verbal/visual reasoning tasks at 90% acc given min tasks (meds,times,finance etc)   Writing synonyms and antonyms: Pt with reduced attention to details for correct placement of words in columns. Pt required mod cues for attention and repetition of instructions. Spatial reasonin% accuracy, min/mod cues required. Impulsivity and verbal perseveration x2 noted. Numerical reasoning puzzle (basic): Mod cues; impulsivity impacted overall success with task, pt with decreased ability to verbalize reasoning. Patient will complete graded memory tasks with learned strategies at 90% acc given min cues Goal not targeted this session. 5 paired black and white line drawings, 10 total: Delayed recall ~10 min; 100% accuracy with no cues. Other areas targeted: N/A N/A   Education:   Education re: skills targeted this date. Education continues to focus on rationale for tasks. Safety Devices: [x] Call light within reach  [x] Chair alarm activated and connected to nurse call light system  [] Bed alarm activated   [] Other:  [x] Call light within reach  [] Chair alarm activated and connected to nurse call light system  [x] Bed alarm activated   [] Other:    Assessment: Ongoing cognitive linguistic impairment marked by deficits with attention to detail and visual reasoning. Pt continues to present with atypical oral movements. No overt R inattention noted this date. Plan:  Continue per POC.      Interventions used this date:  [] Speech/Language Treatment  [] Instruction in HEP  [] Dysphagia Treatment [x] Cognitive Treatment   [] Other:    Discharge recommendations:  [] Home independently  [x] Home with assistance []  24 hour supervision  [] ECF [x] Other : Would recommend assist with finances/meds at DC given cognitive-linguistic and visual deficits. Educated pt on this recommendation on 3/11  Continued Tx Upon Discharge: ? [x] Yes    [] No    [] TBD based on progress while on ARU     [] Vital Stim indicated     [] Other:   Estimated discharge date: 3/19/22    Electronically signed by:  Harriet Miles   Clinician     Heather Pablo M.A. [facility]  Speech-Language Pathologist    The speech-language pathologist was present, directed the patient's care, made skilled judgment and was responsible for assessment and treatment.

## 2022-03-14 NOTE — PLAN OF CARE
Problem: Falls - Risk of:  Goal: Will remain free from falls  Description: Will remain free from falls  Outcome: Ongoing  Note: Client remains free from falls, bed/chair alarm in place, door open, encouraged to use call light for needs, call light is within reach, bed locked in lowest position,  Will continue to monitor. Problem: Mobility - Impaired:  Goal: Mobility will improve  Description: Mobility will improve  Outcome: Ongoing  Note: See Ozzie scale. Encourage/assist pt to turn and reposition every two hours and as needed. Heels elevated off bed. Protective barrier placed as needed. Patient kept clean and dry. Pillows used for positioning. Will continue to monitor for skin breakdown. Problem: Skin Integrity:  Goal: Absence of new skin breakdown  Description: Absence of new skin breakdown  Outcome: Ongoing  Note:  See Ozzie scale. Encourage/assist pt to turn and reposition every two hours and as needed. Heels elevated off bed. Protective barrier placed as needed. Patient kept clean and dry. Pillows used for positioning. Will continue to monitor for skin breakdown.

## 2022-03-14 NOTE — PROGRESS NOTES
Physical Therapy  Facility/Department: [facility] ACUTE REHAB UNIT  Daily Treatment Note  NAME: Makayla Murray  : 1945  MRN: [id number]    Date of Service: 3/14/2022    Discharge Recommendations:  Continue to assess pending progress,24 hour supervision or assist,Home with Home health PT   PT Equipment Recommendations  Other: continue to assess pending progress    Assessment   Body structures, Functions, Activity limitations: Decreased functional mobility ; Decreased balance;Decreased endurance;Decreased strength;Decreased ADL status; Decreased coordination;Decreased cognition  Assessment: Pt demo's improved endurance through increased ambulation distance. Pt continues to require CGA-min for ambulation with RW and ace wrap DF assist and demo decreased R foot clearance and decreased SHU. Pt would benefit from continued skilled PT to progress towards idependent PLOF. Treatment Diagnosis: impaired mobility associated with CVA  Prognosis: Good  Decision Making: Medium Complexity  PT Education: General Safety;Gait Training;Goals;Plan of Care;Energy Conservation  Barriers to Learning: some decreased memory noted  REQUIRES PT FOLLOW UP: Yes  Activity Tolerance  Activity Tolerance: Patient limited by fatigue;Patient limited by endurance; Patient Tolerated treatment well     Patient Diagnosis(es): There were no encounter diagnoses. has a past medical history of Lupus (HCC) and MVP (mitral valve prolapse). has a past surgical history that includes Tonsillectomy; Colonoscopy; and Hysterectomy. Restrictions  Restrictions/Precautions  Restrictions/Precautions: Fall Risk,Seizure  Position Activity Restriction  Other position/activity restrictions: up as tolerated  Subjective   General  Chart Reviewed: Yes  Additional Pertinent Hx: Patient is a 76 y/o female admitted 3/5 with right leg weakness and unsteadiness. CT head and chest x-ray (-) for acute findings.   MRI brain (+) for Acute infarct involving the deep white matter of the posterior left frontal lobe. PMH significant for lupus. Admitted to ARU 3/8. Response To Previous Treatment: Patient with no complaints from previous session. Family / Caregiver Present: No  Referring Practitioner: Jasen Altamirano  General Comment  Comments: Pt found seated in recliner upon PT arrival.  Pt agreeable to therapy session. Pain Screening  Patient Currently in Pain: Denies  Vital Signs  Patient Currently in Pain: Denies       Orientation     Cognition      Objective      Transfers  Sit to Stand: Contact guard assistance (from recliner/commode/wc with RW)  Stand to sit: Contact guard assistance (to recliner/commode/wc with RW)  Ambulation  Ambulation?: Yes  More Ambulation?: No  Ambulation 1  Surface: level tile;uneven;ramp  Device: Rolling Walker  Other Apparatus:  (ACE DF assist RLE)  Assistance: Contact guard assistance;Minimal assistance (pt fluctuating between CGA-Fantasma)  Quality of Gait: slightly decreased katharina, narrow SHU, decreased foot clearance R, decreased stance time R, decreased knee flexion in R LE swing phase,  occasional scissor step L  Gait Deviations: Slow Katharina;Decreased step length;Decreased step height  Distance: 325', 200' (including ascent/descent of 10' ramp)  Comments: Cues for increased SHU, R foot clearance and R knee flexion. Pt reporting distances limited by fatigue. Stairs/Curb  Stairs?: Yes  Stairs  # Steps : 6  Stairs Height: 6\"  Rails: Left ascending  Assistance: Minimal assistance  Comment: Step to leading with LLE, occasional poor R foot clearance. Cues for \"up with the good, down with the bad\" technique. Number of stairs limited by fatigue.   Wheelchair Activities  Wheelchair Size: 16\"  Wheelchair Type: Standard  Wheelchair Parts Management: Yes  Left Brakes Level of Assistance: Stand by assistance  Right Brakes Level of Assistance: Stand by Assist  Propulsion: Yes  Propulsion 1  Propulsion: Manual  Level: Level Tile  Method: RUE;LUE;RLE;LLE (inconsistent pattern of all 4 extremities)  Level of Assistance: Stand by assistance  Description/ Details: pt navigating turns and doorways, initial diffciulty sequencing use of RLE that improved with increasing distance, multiple short rest breaks during bout  Distance: 75'     Balance  Posture: Fair  Sitting - Static: Good (indp seated on commode)  Sitting - Dynamic: Good;- (sueprvision with weight shifting for pericare seated in commode)  Standing - Static: Fair (CGA without device)  Standing - Dynamic: Fair;- (CGA-min A for ambulation with RW, CGA for crief/pants mangement without RW)  Comments: Urgent need to use bthroom at end of session with in continence of stool related to medication - RN notified       Second session: Pt sitting in BS chair when PT arrived. Pt agreeable to therapy. Pt immediately reported that she had taken a laxative earlier and requested to use the restroom. Transfers  Sit to Stand: Contact guard assistance (from bed, BS chair and /commode to  RW)  Stand to sit: Contact guard assistance ( From RW to bed, BS chair and /commode )  Ambulation 1  Surface: level tile;  Device: Rolling Walker  Assistance: Contact guard assistance;  Quality of Gait: slightly decreased zac, narrow SHU, decreased foot clearance R, decreased stance time R, decreased knee flexion in R LE swing phase, )  Gait Deviations: Slow Zac;Decreased step length;Decreased step height  Distance: 20' x2  While in bathroom,pt used the GB and was able to lower pants on the L but req A with the R. Pt needed briefs changed. For time management, PT assisted pt with change including doffing and donning shorts. Pt req set up for [facility]. Pt was then able to side step and wash hands with CGA only. PT instructed pt with  The following standing ex using  BUE support on the back of a high back chair to facilitate a more erect posture. 1. Toe raises/heel raises  2. Marching   3. Hip abd( alternating)  4.  Hamstring curls ( alternating)  X 6 reps   Lesli 10 reps of each unless otherwise indicated  with seated  rests between     Pt presented with SOB , O2 sats assessed ( 98% noted with P101)   Pt remained in the bed with call light and phone placed within reach. Chair alarm reactivated. Goals  Short term goals  Time Frame for Short term goals: 10 days  Short term goal 1: Pt will perform bed mobility with modified independence -ongoing  Short term goal 2: Pt will perform transfers sit<>stand with SBA and LRAD -ongoing  Short term goal 3: Pt will ambulate 150' with CGA and LRAD -ongoing  Short term goal 4: Pt will ascend/descend 5 stairs with B handrails and SBA -ongoing  Short term goal 5: Pt will stand without UE support while completing dynamic UE task with CGA -ongoing  Long term goals  Time Frame for Long term goals : 3 weeks  Long term goal 1: Pt will perform sit<>stand transfers with mod I and LRAD -ongoing  Long term goal 2: Pt will ambulate 150' with mod I and LRAD -ongoing  Long term goal 3: Pt will ascend/descend 12 stairs with mod I and UL handrail -ongoing  Long term goal 4: Pt will stand without UE support while completing dynamic UE task with mod I -ongoing  Patient Goals   Patient goals : get better and get home, improve walking    Plan    Plan  Times per week: 5 days a week, 60 min  Times per day: Daily  Current Treatment Recommendations: Strengthening,Transfer Training,Endurance Training,Patient/Caregiver Education & Training,Balance Training,Gait Training,Functional Mobility Training,Safety Education & Training,Neuromuscular Re-education,Stair training,Equipment Evaluation, Education, & procurement  Safety Devices  Type of devices:  All fall risk precautions in place,Call light within reach,Chair alarm in place,Gait belt,Left in chair  Restraints  Initially in place: No     Therapy Time   Individual Concurrent Group Co-treatment   Time In 1140         Time Out 1220         Minutes 40         Timed Code Treatment Minutes: 40 Minutes    Second Session Therapy Time:   Individual Concurrent Group Co-treatment   Time In 4113         Time Out 6299         Minutes 30           Timed Code Treatment Minutes:  40+30    Total Treatment Minutes:  Sahil LIMA, Nadege Manzo [phone] ( treating therapist for 2nd session)

## 2022-03-14 NOTE — PROGRESS NOTES
Department of Physical Medicine & Rehabilitation  Progress Note    Patient Identification:  Sofia Dove  [id number]  : 1945  Admit date: 3/8/2022    Chief Complaint: Acute cerebrovascular accident (CVA) ([address]    Subjective:   No new complaints overnight. Improving in therapy. Seen in her chair this morning and no new complaints this AM.     ROS: No f/c, n/v, cp     Objective:  Patient Vitals for the past 24 hrs:   BP Temp Temp src Pulse Resp SpO2   22 0750 (!) 142/83 97.8 °F (36.6 °C) Oral 80 16 98 %   22 (!) 152/89 -- -- 68 -- --   22 (!) 159/92 97.7 °F (36.5 °C) Oral 78 16 100 %     Const: Alert. WDWN. No distress  Eyes: Conjunctiva noninjected, no icterus noted; pupils equal, round, and reactive to light. HENT: Atraumatic, normocephalic; Oral mucosa moist  Neck: Trachea midline, neck supple. No thyromegaly noted. CV: Regular rate and rhythm, no murmur rub or gallop noted  Resp: Lungs clear to auscultation bilaterally, no rales wheezes or ronchi, no retractions. Respirations unlabored. GI: Soft, nontender, nondistended. Normal bowel sounds. No palpable masses. Skin: Normal temperature and turgor. No rashes or breakdown noted. Ext: No significant edema appreciated. No varicosities. MSK: No joint tenderness, erythema, warmth noted.  AROM intact. Neuro: Alert, oriented, appropriate. No cranial nerve deficits appreciated. Sensation intact to light touch. Motor examination reveals 5/5 strength in RUE, LUE, LLE. Strength 4/5 in RLE upon flexion and extension of hip. 0/5 strength in toe extension on RLE. No abnormalities with finger/nose or heel/shin noted. Reflexes normal and symmetric. Psych: Stable mood, normal judgement, normal affect     Laboratory data: Available via EMR.    Last 24 hour lab  Recent Results (from the past 24 hour(s))   Basic Metabolic Panel w/ Reflex to MG    Collection Time: 22  7:02 AM   Result Value Ref Range    Sodium 134 (L) 136 - 145 mmol/L    Potassium reflex Magnesium 3.9 3.5 - 5.1 mmol/L    Chloride 98 (L) 99 - 110 mmol/L    CO2 27 21 - 32 mmol/L    Anion Gap 9 3 - 16    Glucose 109 (H) 70 - 99 mg/dL    BUN 30 (H) 7 - 20 mg/dL    CREATININE 1.0 0.6 - 1.2 mg/dL    GFR Non-African American 54 (A) >60    GFR African American >60 >60    Calcium 10.9 (H) 8.3 - 10.6 mg/dL   CBC auto differential    Collection Time: 03/14/22  7:02 AM   Result Value Ref Range    WBC 6.0 4.0 - 11.0 K/uL    RBC 4.41 4.00 - 5.20 M/uL    Hemoglobin 14.3 12.0 - 16.0 g/dL    Hematocrit 40.6 36.0 - 48.0 %    MCV 92.0 80.0 - 100.0 fL    MCH 32.5 26.0 - 34.0 pg    MCHC 35.3 31.0 - 36.0 g/dL    RDW 13.7 12.4 - 15.4 %    Platelets 448 885 - 453 K/uL    MPV 8.7 5.0 - 10.5 fL    Neutrophils % 48.7 %    Lymphocytes % 40.6 %    Monocytes % 7.0 %    Eosinophils % 2.9 %    Basophils % 0.8 %    Neutrophils Absolute 2.9 1.7 - 7.7 K/uL    Lymphocytes Absolute 2.4 1.0 - 5.1 K/uL    Monocytes Absolute 0.4 0.0 - 1.3 K/uL    Eosinophils Absolute 0.2 0.0 - 0.6 K/uL    Basophils Absolute 0.0 0.0 - 0.2 K/uL       Therapy progress:  PT  Position Activity Restriction  Other position/activity restrictions: up as tolerated  Objective     Sit to Stand: Contact guard assistance (from recliner, chair, and low mat to RW, cues for anterior weight shift, increased time for RUE placement)  Stand to sit: Contact guard assistance (cues for hand placement)  Bed to Chair: Contact guard assistance (SPT)  Device: Rolling Walker  Other Apparatus:  (ACE DF assist RLE)  Assistance: Contact guard assistance,Minimal assistance (pt fluctuating between CGA-Fantasma)  Distance: [de-identified]' + 180' (multiple standing rest breaks required)  OT  PT Equipment Recommendations  Other: pt will likely beenfit from RW- continue to assess pending progress  Toilet - Technique: Ambulating  Equipment Used: Standard toilet  Toilet Transfers Comments: Pt does not have GBs at home toilet. Required VCs for RW mgmt during 180 turn at toilet.  Roselia Spangler A-Mod A for balance and to sit due to poor ecentric control. Assessment        SLP          Body mass index is 23.19 kg/m². Assessment and Plan:  1. Acute CVA   - No focal deficits on exam however patient has continued sense of being off balance with walking and was noted in ED to have some ataxia. - CT, CTA head no acute findings. 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 - Stroke team consulted, advised initiation of DAPT and admission for further work-up  - Neurology following  - Echocardiogram pending  - Risk factor modification  - Hypercoagulable panel   - PT/OT/SLP     2. HTN  - Careful blood pressure lowering allowing for permissive HTN  - Monitor       3. Congestion  - mucinex  -improved    PPx  DVT: lovenox  GI: pantoprazole    Rehab Progress: Improving  Anticipated Dispo: home  Services/DME:   ELOS: next week    Paolo Humphrey DO, PGY-1   PM&R  3/14/2022  10:39 AM     Patient has been staffed and discussed with attending physician, Dr. Milagros Carter DO. This patient has been seen, examined, and discussed with the resident. This note has been altered to reflect my own examination findings, impression, and recommendations.      Milagros Carter D.O. [email or web address]  PM&R  3/14/2022  10:59 AM

## 2022-03-14 NOTE — CARE COORDINATION
Cm following pt's progess. Plan is for dc home with family. Possible equipment needs for RW & tub-transfer bench & grab bars; Pt will need home care.

## 2022-03-14 NOTE — PROGRESS NOTES
Pt alert and oriented, Denied pain throughout the shift. Rested well. Contact guard assist to bathroom. Tolerated ambulating. No issues overnight. In chair at this time with bedside table and call light within reach. Will monitor.

## 2022-03-15 PROCEDURE — 99232 SBSQ HOSP IP/OBS MODERATE 35: CPT | Performed by: PHYSICAL MEDICINE & REHABILITATION

## 2022-03-15 PROCEDURE — 6370000000 HC RX 637 (ALT 250 FOR IP): Performed by: PHYSICAL MEDICINE & REHABILITATION

## 2022-03-15 PROCEDURE — 97530 THERAPEUTIC ACTIVITIES: CPT

## 2022-03-15 PROCEDURE — 97535 SELF CARE MNGMENT TRAINING: CPT

## 2022-03-15 PROCEDURE — 1280000000 HC REHAB R&B

## 2022-03-15 PROCEDURE — 6370000000 HC RX 637 (ALT 250 FOR IP): Performed by: STUDENT IN AN ORGANIZED HEALTH CARE EDUCATION/TRAINING PROGRAM

## 2022-03-15 PROCEDURE — 97129 THER IVNTJ 1ST 15 MIN: CPT

## 2022-03-15 PROCEDURE — 97130 THER IVNTJ EA ADDL 15 MIN: CPT

## 2022-03-15 PROCEDURE — 97116 GAIT TRAINING THERAPY: CPT

## 2022-03-15 RX ADMIN — GUAIFENESIN 600 MG: 600 TABLET, EXTENDED RELEASE ORAL at 20:52

## 2022-03-15 RX ADMIN — GUAIFENESIN 600 MG: 600 TABLET, EXTENDED RELEASE ORAL at 07:54

## 2022-03-15 RX ADMIN — HYDROCHLOROTHIAZIDE 25 MG: 25 TABLET ORAL at 07:54

## 2022-03-15 RX ADMIN — ASPIRIN 81 MG: 81 TABLET, COATED ORAL at 07:54

## 2022-03-15 RX ADMIN — ATORVASTATIN CALCIUM 80 MG: 80 TABLET, FILM COATED ORAL at 20:52

## 2022-03-15 RX ADMIN — CETIRIZINE HYDROCHLORIDE 10 MG: 10 TABLET, FILM COATED ORAL at 07:55

## 2022-03-15 RX ADMIN — LISINOPRIL 5 MG: 5 TABLET ORAL at 07:54

## 2022-03-15 RX ADMIN — CLOPIDOGREL BISULFATE 75 MG: 75 TABLET ORAL at 07:54

## 2022-03-15 ASSESSMENT — PAIN SCALES - GENERAL: PAINLEVEL_OUTOF10: 0

## 2022-03-15 NOTE — PROGRESS NOTES
Physical Therapy  Facility/Department: [facility] ACUTE REHAB UNIT  Daily Treatment Note  NAME: Eduardo Murray  : 1945  MRN: [id number]    Date of Service: 3/15/2022    Discharge Recommendations:  Continue to assess pending progress,24 hour supervision or assist,Home with Home health PT   PT Equipment Recommendations  Other: continue to assess pending progress- would likely benefit from R AFO and RW at discharge    Assessment   Body structures, Functions, Activity limitations: Decreased functional mobility ; Decreased balance;Decreased endurance;Decreased strength;Decreased ADL status; Decreased coordination;Decreased cognition  Assessment: Pt demo's improved endurance through completing ascent/descent of 12 steps with B handrails and CGA. Pt also demo's progress in balance on uneven surfaces throughout session through progressing from 3 seconds max without UE on airex to 1 min max. Pt would benefit from continued skilled PT in order to progress towards PLFO and independence. Treatment Diagnosis: impaired mobility associated with CVA  Prognosis: Good  Decision Making: Medium Complexity  PT Education: General Safety;Gait Training;Goals;Plan of Care;Energy Conservation  Barriers to Learning: some decreased memory noted  REQUIRES PT FOLLOW UP: Yes  Activity Tolerance  Activity Tolerance: Patient limited by fatigue;Patient limited by endurance; Patient Tolerated treatment well     Patient Diagnosis(es): There were no encounter diagnoses. has a past medical history of Lupus (HCC) and MVP (mitral valve prolapse). has a past surgical history that includes Tonsillectomy; Colonoscopy; and Hysterectomy. Restrictions  Restrictions/Precautions  Restrictions/Precautions: Fall Risk,Seizure  Position Activity Restriction  Other position/activity restrictions: up as tolerated  Subjective   General  Chart Reviewed: Yes  Additional Pertinent Hx: Patient is a 76 y/o female admitted 3/5 with right leg weakness and unsteadiness. CT head and chest x-ray (-) for acute findings. MRI brain (+) for Acute infarct involving the deep white matter of the posterior left frontal lobe. PMH significant for lupus. Admitted to ARU 3/8. Response To Previous Treatment: Patient with no complaints from previous session. Family / Caregiver Present: No  Referring Practitioner: Jasen Altamirano  General Comment  Comments: Pt found seated in recliner upon PT arrival.  Pt agreeable to therapy session. Orientation     Cognition      Objective      Transfers  Sit to Stand: Contact guard assistance (from recliner/commode/bar height chair with RW)  Stand to sit: Contact guard assistance (to recliner/commode/bar height chair with RW)  Comment: VC for hand and foot placement  Ambulation  Ambulation?: Yes  WB Status: No WB restrictions noted  More Ambulation?: No  Ambulation 1  Surface: level tile;uneven;ramp  Device: Rolling Walker  Other Apparatus: AFO; Right (ACE DF assist RLE for all except last round of ambulation (completed with R AFO))  Assistance: Contact guard assistance  Quality of Gait: slightly decreased zac, narrow SHU, decreased foot clearance R, decreased stance time R, decreased knee flexion in R LE swing phase  Gait Deviations: Slow Zac;Decreased step length;Decreased step height  Distance: 125' x 2 , small distances in gym, 30' (to trial AFO)  Comments: Cues for increased SHU, R foot clearance and R knee flexion. R AFO trialed for increased foot clearance/safety when OTs/RNs completing ambulaiton outside of room with pt- OT/RN alerted. Stairs/Curb  Stairs?: Yes  Stairs  # Steps : 12  Stairs Height: 6\"  Rails: Left ascending  Assistance: Contact guard assistance  Comment: Step to pattern. Min VC for sequencing. Mod VC for increased R LE clearance and foot placement.   Wheelchair Activities  Wheelchair Size: 16\"     Balance  Posture: Fair  Sitting - Static: Good (indp seated on commode)  Sitting - Dynamic: Good;- (sueprvision with weight shifting for pericare seated in commode)  Standing - Static: Fair (CGA without device, SBA with RW)  Standing - Dynamic: Fair;- (CGA for ambulation with RW, CGA for brief/pants mangement without RW)  Comments: Need to use bathroom at beginning of session with incontinence of stool related to medication - RN aware. Pt completes x10 stair taps B LE onto 4\" tap UL UE support with VC for increased step height R LE and CGA. Pt completes balance activity on airex beginning with UE support on RW and proressing to no UE support with goal to hold no UE support for longest period possible without UE righting reaction for 3 rounds of ~ 1 min 30 sec with 3 seconds longest first attempt, 25 seconds longest on second attempt and 1 min at longest last attempt. Requires CGA-min throughout exercise.                                 Goals  Short term goals  Time Frame for Short term goals: 10 days  Short term goal 1: Pt will perform bed mobility with modified independence -ongoing  Short term goal 2: Pt will perform transfers sit<>stand with SBA and LRAD -ongoing  Short term goal 3: Pt will ambulate 150' with CGA and LRAD -ongoing  Short term goal 4: Pt will ascend/descend 5 stairs with B handrails and SBA -ongoing  Short term goal 5: Pt will stand without UE support while completing dynamic UE task with CGA -met 3/15  Long term goals  Time Frame for Long term goals : 3 weeks  Long term goal 1: Pt will perform sit<>stand transfers with mod I and LRAD -ongoing  Long term goal 2: Pt will ambulate 150' with mod I and LRAD -ongoing  Long term goal 3: Pt will ascend/descend 12 stairs with mod I and UL handrail -ongoing  Long term goal 4: Pt will stand without UE support while completing dynamic UE task with mod I -ongoing  Patient Goals   Patient goals : get better and get home, improve walking    Plan    Plan  Times per week: 5 days a week, 60 min  Times per day: Daily  Current Treatment Recommendations: Strengthening,Transfer Training,Endurance Training,Patient/Caregiver Education & Training,Balance Training,Gait Training,Functional Mobility Training,Safety Education & Training,Neuromuscular Re-education,Stair training,Equipment Evaluation, Education, & procurement  Safety Devices  Type of devices:  All fall risk precautions in place,Call light within reach,Chair alarm in place,Gait belt,Left in chair  Restraints  Initially in place: No     Therapy Time   Individual Concurrent Group Co-treatment   Time In 0900         Time Out 1000         Minutes 60         Timed Code Treatment Minutes: Mary Kay, [address] [place] [address]

## 2022-03-15 NOTE — PROGRESS NOTES
Department of Physical Medicine & Rehabilitation  Progress Note    Patient Identification:  Pradeep Remy  [id number]  : 1945  Admit date: 3/8/2022    Chief Complaint: Acute cerebrovascular accident (CVA) ([address]    Subjective:   No new complaints overnight or this AM. Improving in therapy. Seen in therapy this morning and making good progress. Patient is very happy with her progress. Naz nd examined this morning in therapy. ROS: No f/c, n/v, cp     Objective:  Patient Vitals for the past 24 hrs:   BP Temp Temp src Pulse Resp SpO2   03/15/22 0745 118/68 97.7 °F (36.5 °C) Oral 79 17 97 %   22 1955 137/77 97.6 °F (36.4 °C) Oral 80 16 98 %     Const: Alert. WDWN. No distress  Eyes: Conjunctiva noninjected, no icterus noted; pupils equal, round, and reactive to light. HENT: Atraumatic, normocephalic; Oral mucosa moist  Neck: Trachea midline, neck supple. No thyromegaly noted. CV: Regular rate and rhythm, no murmur rub or gallop noted  Resp: Lungs clear to auscultation bilaterally, no rales wheezes or ronchi, no retractions. Respirations unlabored. GI: Soft, nontender, nondistended. Normal bowel sounds. No palpable masses. Skin: Normal temperature and turgor. No rashes or breakdown noted. Ext: No significant edema appreciated. No varicosities. MSK: No joint tenderness, erythema, warmth noted.  AROM intact. Neuro: Alert, oriented, appropriate. No cranial nerve deficits appreciated. Sensation intact to light touch. Motor examination reveals 5/5 strength in RUE, LUE, LLE. Strength 4/5 in RLE upon flexion and extension of hip. 0/5 strength in toe extension on RLE. No abnormalities with finger/nose or heel/shin noted. Reflexes normal and symmetric. Psych: Stable mood, normal judgement, normal affect     Laboratory data: Available via EMR. Last 24 hour lab  No results found for this or any previous visit (from the past 24 hour(s)).     Therapy progress:  PT  Position Activity Restriction  Other position/activity restrictions: up as tolerated  Objective     Sit to Stand: Contact guard assistance (from recliner/commode/wc with RW)  Stand to sit: Contact guard assistance (to recliner/commode/wc with RW)  Bed to Chair: Contact guard assistance (SPT)  Device: Rolling Walker  Other Apparatus:  (ACE DF assist RLE)  Assistance: Contact guard assistance,Minimal assistance (pt fluctuating between CGA-Fantasma)  Distance: 325', 200' (including ascent/descent of 10' ramp)  OT  PT Equipment Recommendations  Other: continue to assess pending progress  Toilet - Technique: Ambulating  Equipment Used: Standard toilet  Toilet Transfers Comments: Pt does not have GBs at home toilet. Required VCs for RW mgmt during 180 turn at toilet. Min A-Mod A for balance and to sit due to poor ecentric control. Assessment        SLP          Body mass index is 23.19 kg/m². Assessment and Plan:  1. Acute CVA   - No focal deficits on exam however patient has continued sense of being off balance with walking and was noted in ED to have some ataxia. - CT, CTA head no acute findings. 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 - Stroke team consulted, advised initiation of DAPT and admission for further work-up  - Neurology following  - Echocardiogram pending  - Risk factor modification  - Hypercoagulable panel   - PT/OT/SLP     2. HTN  - Careful blood pressure lowering allowing for permissive HTN  - Monitor       3. Congestion  - mucinex  -improved    PPx  DVT: lovenox  GI: pantoprazole    Rehab Progress: Improving  Anticipated Dispo: home  Services/DME: HH  ELOS: next week    Antonio Meneses DO, PGY-1   PM&R  3/15/2022  10:53 AM     Patient has been staffed and discussed with attending physician, Dr. Kevin Street DO. This patient has been seen, examined, and discussed with the resident.  This note has been altered to reflect my own examination findings, impression, and recommendations.      SHRADDHA MilnerP.H  PM&R  3/15/2022  10:53 AM

## 2022-03-15 NOTE — PROGRESS NOTES
Patient is alert and oriented. VSS. No complaints of pain. Patient up to chair several times throughout shift. Patient requires standby/contact guard assistance when ambulating to the bathroom with walker and gait belt. Patient's daughter expressed frustration with the patient's current showering schedule and the inability to shower at the moment that she requests a shower. Advised that the patient can request a shower whenever she would like, but we cannot guarantee that it will be first thing in the morning. Advised that with morning report, assessments, medications, and vitals, her shower may be done in the afternoon. OT at bedside and advised daughter that they would make alterations to her shower schedule. Call light within reach. Will continue to monitor.

## 2022-03-15 NOTE — PROGRESS NOTES
Occupational Therapy  Facility/Department: [facility] ACUTE REHAB UNIT  Daily Treatment Note  NAME: Pino Murray  : 1945  MRN: [id number]    Date of Service: 3/15/2022    Discharge Recommendations:  Continue to assess pending progress,24 hour supervision or assist,Home with Home health OT  OT Equipment Recommendations  Equipment Needed: Yes  Mobility Devices: ADL Assistive Devices  ADL Assistive Devices: Transfer Tub Bench;Grab Bars - toilet  Other: continue to assess    Assessment   Performance deficits / Impairments: Decreased functional mobility ; Decreased ADL status; Decreased balance;Decreased strength;Decreased fine motor control;Decreased high-level IADLs;Decreased coordination;Decreased safe awareness    Assessment: Pt completed shower, requiring SBA for all bathing and dressing componets. Pt required Min A to don shoe with R AFO. Pt given resistance theraputty during session to increase bilateral hand coordination and strength. Pt would benefit from continued skilled OT services to maximize independence and safety with ADLs, cont OT per POC. Treatment Diagnosis: Impaired ADLs, mobility and R motor control s/p CVA  Prognosis: Good  OT Education: Transfer Training;Plan of Care;ADL Adaptive Strategies;OT Role  Patient Education: verb understanding  REQUIRES OT FOLLOW UP: Yes  Activity Tolerance  Activity Tolerance: Patient Tolerated treatment well  Safety Devices  Safety Devices in place: Yes  Type of devices: Call light within reach; Left in chair;Chair alarm in place         Patient Diagnosis(es): There were no encounter diagnoses. has a past medical history of Lupus (HCC) and MVP (mitral valve prolapse). has a past surgical history that includes Tonsillectomy; Colonoscopy; and Hysterectomy.     Restrictions  Restrictions/Precautions  Restrictions/Precautions: Fall Risk,Seizure  Position Activity Restriction  Other position/activity restrictions: up as tolerated  Subjective   General  Chart Reviewed: Yes  Patient assessed for rehabilitation services?: Yes  Additional Pertinent Hx: Pt is 77 y.o female admitted to [address] [place] [address] with with RLE weakness and balance deficits. Admitted for acute CVA. CT, CTA head negative. 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 Admitted to ARU 3/8. Response to previous treatment: Patient with no complaints from previous session  Family / Caregiver Present: Yes (dgt)  Referring Practitioner: Dr. Kimberly Patton  Diagnosis: CVA  Subjective  Subjective: Pt seated in recliner upon arrival. Daughter present requesting Pt is given shower. RN and OT discussed Pt's shower schedule with daughter during session. OT discussed with charge RN to alternate morning shower days with OT/RN to accommodate daughter's request.      Orientation     Objective    ADL  Grooming: Stand by assistance (Pt completed oral hygiene in stance at sink, Pt also brushed hair in stance. Applied deodorant seated on TTB.)  UE Bathing: Stand by assistance (all UB bathing componets seated on TTB)  LE Bathing: Stand by assistance (Pt washed BLEs in fig 4, washed/dried front arsen seated. Washed back arsen by lateral WSing and dried back arsen in stance with SBA and 1 UE on GB)  UE Dressing: Stand by assistance (Pt doffed shirt, donned hospital gown seated on TTB. Dgt brought clean clothes at EOS, Pt changed into personal clothing seated in recliner.)  LE Dressing: Minimal assistance;Stand by assistance;Verbal cueing; Increased time to complete (Pt completed pants mgmt down in stance w SBA. Unthreaded/threaded pants/briefs using fig 4 seated on TTB. Doffed socks &AFO with fig 4, required Min A to position AFO into shoe prior to donning.  Pt donned personal clothes at EOS seated in recliner.)  Additional Comments: Pt given tensoshape compression sock for RLE, Pt able to don compression sock with SBA seated on TTB        Balance  Sitting Balance: Stand by assistance (seated on TTB)  Standing Balance: Contact guard assistance (CGA during ambulation, SBA during pants mgmt/oral hygiene)  Standing Balance  Time: ~15 mins total  Activity: functional mobility and transfers, oral hygiene, stance for pants mgmt  Comment: Pt completed multiple stands for pants mgmt, completed grooming task in stance at sink  Functional Mobility  Functional - Mobility Device: Rolling Walker  Activity: To/from bathroom; To/From therapy gym  Assist Level: Contact guard assistance  Functional Mobility Comments: Pt ambulated to/from bathroom and therapy gym using RW and CGA. Pt also had R AFO during ambulation, maintained slow pace and gave herself personal VCs to increase R step height. Shower Transfers  Shower - Transfer From: Horace Marcus - Transfer Type: To and From  Shower - Transfer To: Transfer tub bench  Shower - Technique: Ambulating  Shower Transfers: Contact Guard  Shower Transfers Comments: VCs for [employer] and sequencing 180 turn     Transfers  Sit to stand: Stand by assistance (recliner>RW, TTB>GB x2, standard chair>RW)  Stand to sit: Stand by assistance (RW>standard chair, RW>recliner)  Transfer Comments: VCs for hand placement        Coordination  Fine Motor: In order to increase fine motor coordination of BUEs for ADLs and functional tasks, Pt given medium resistance theraputty. OT placed x6 small beads inside putty, instructing Pt to use BUEs to retrieve all x6 beads. Pt completed seated at tabletop within ~10 mins. Pt demo'd pincer grasp when retrieveing and transfering beads with R hand into small bucket. Once Pt found all beads, Pt instructed to make putty into the shape of a ball using BUEs to increase functional hand strength. Cognition  Overall Cognitive Status: WFL  Arousal/Alertness: Appropriate responses to stimuli  Following Commands:  Follows all commands without difficulty  Attention Span: Attends with cues to redirect  Memory: Appears intact  Safety Judgement: Decreased awareness of need for assistance;Decreased awareness of need for safety  Problem Solving: Able to problem solve independently  Insights: Decreased awareness of deficits  Initiation: Requires cues for some  Sequencing: Requires cues for some  Cognition Comment: Good insights to deficits, impulsive at times and requires cues for safety.                                          Plan   Plan  Times per week: 60 mins per day/ 5 days per week  Times per day: Daily  Current Treatment Recommendations: Functional Mobility Training,Safety Education & Training,Self-Care / ADL,Equipment Evaluation, Education, & procurement,Patient/Caregiver Education & Training,Strengthening,Balance Training,Neuromuscular Re-education  G-Code     OutComes Score                                                  AM-PAC Score             Goals  Short term goals  Time Frame for Short term goals: 10 days  Short term goal 1: Pt will complete all bathing componets with spvn-ongoing  Short term goal 2: Pt will complete toilet and tub transfers with CGA-ongoing for toilet, goal met for tub transfer 3/10  Short term goal 3: Pt will complete UB and LB dressing with SBA-goal met 3/14  Short term goal 4: Pt will complete oral hygiene and grooming tasks standing with SBA-goal met 3/14  Short term goal 5: Pt will complete NHPT assessment-goal met 3/10  Long term goals  Time Frame for Long term goals : 3 weeks  Long term goal 1: Pt will complete bathing componets with Mod I-ongoing  Long term goal 2: Pt will complete toilet and tub transfers with Mod I-ongoing  Long term goal 3: Pt will complete all dressing componets with Mod I-ongoing  Long term goal 4: Pt will complete oral hygiene/grooming in stance with Mod I-ongoing  Long term goal 5: Pt will complete R hand NHPT and score within 50th percentile for age-ongoing  Patient Goals   Patient goals : \"to go home\"       Therapy Time   Individual Concurrent Group Co-treatment   Time In 1100         Time Out 1200         Minutes 60 Total Time: 60 mins    Katie Godinez, OTS

## 2022-03-15 NOTE — PLAN OF CARE
Problem: Falls - Risk of:  Goal: Will remain free from falls  Description: Will remain free from falls  3/15/2022 1917 by Martinez Pascal RN  Outcome: Ongoing  Note: Client remains free from falls, bed/chair alarm in place, door open, encouraged to use call light for needs, call light is within reach, bed locked in lowest position,  Will continue to monitor. Problem: Mobility - Impaired:  Goal: Mobility will improve  Description: Mobility will improve  3/15/2022 1917 by Martinez Pascal RN  Outcome: Ongoing  Note: Mobility to improve through active participation in therapy     Problem: Skin Integrity:  Goal: Absence of new skin breakdown  Description: Absence of new skin breakdown  Outcome: Ongoing  Note:   See Ozzie scale. Encourage/assist pt to turn and reposition every two hours and as needed. Heels elevated off bed. Protective barrier placed as needed. Patient kept clean and dry. Pillows used for positioning. Will continue to monitor for skin breakdown.

## 2022-03-15 NOTE — PROGRESS NOTES
ACUTE REHAB UNIT  SPEECH/LANGUAGE PATHOLOGY      [x] Daily  [] Weekly Care Conference Note  [] Discharge    Patient:Esther Murray      :1945  XNU:[id number]  Rehab Dx/Hx: Acute cerebrovascular accident (CVA) ([facility] [address] [I63.9]    Precautions: [] Aspiration  [x] Fall risk  [] Sternal  [] Seizure [] Hip  [] Weight Bearing [] Other  ST Dx: [] Aphasia  [] Dysarthria  [] Apraxia   [] Oropharyngeal dysphagia [x] Cognitive-linguistic Impairment  [x] Other: Dysphonia (baseline)  Date of Admit: 3/8/2022  Room #: 3113/3113-01  Date: 3/15/2022          Current Diet Order:ADULT DIET; Regular; Low Sodium (2 gm)     Subjective: Patient pleasant and cooperative. Appeared slightly anxious with assessment. Lives With: Daughter  Homemaking Responsibilities: Yes  Occupation: Retired  Type of occupation: Cosmotologist  Leisure & Hobbies: travel    YasmineTyra Ambrocioraytoby Shane 62: Impaired (Reports occasional difficulty with her L eye)   Vision Exceptions: Wears glasses for reading  Hearing  Hearing: Within functional limits  Barriers toward progress: Cognitive-linguistic deficit, Anxiety and Limited insight into deficits      Date: 3/15/2022      Tx session 1 Tx session 2   Total Timed Code Min 31 30   Total Treatment Minutes 31 30   Individual Treatment Minutes 31 30   Group Treatment Minutes 0 0   Co-Treat Minutes 0 0   Brief Exception: N/A N/A   Pain None indicated None indicated   Pain Intervention: [] RN notified  [] Repositioned  [] Intervention offered and patient declined  [x] N/A  [] Other: [] RN notified  [] Repositioned  [] Intervention offered and patient declined  [x] N/A  [] Other:   Subjective:     Pt in chair upon entry, pleasant and cooperative. Endorsed dislike of tx task despite similarities to preferred leisure task (crosswords)   Pt in chair with family member at side. Family member excused self prior to session starting. Pt cooperative.    Patient will complete higher level/abstract naming tasks to improve mental flexibility at 90% acc given min cues <2 episodes of perseveration    Naming items to description with initial letter constraints: 90% accuracy with mod cues. x2 episodes of perseveration. Naming items to description to complete acrostic: <90% accuracy with mod cues. Decreased mental flexibility throughout task, easily redirected. Describe without naming: Mod cues; difficulty with flexibility of thought and impulsivity noted. Perseverations noted with tasks detailed below. Patient will complete verbal/visual reasoning tasks at 90% acc given min tasks (meds,times,finance etc)   Information transfer task requiring alternating attention between naming and letter transfer (acrostic): <90% accuracy with mod/max cues. Comparing / contrasting leisure activities (word puzzles): Mod cues required. Moderate perseveration with tangential and circuitous speech. Pt with word substitutions and reduced thought organization with fast rate of speech. Medication management task: min cues for questions r/t novel Rx labels. Perseveration exhibited x 1. Patient will complete graded memory tasks with learned strategies at 90% acc given min cues Independent recall of x 2 specific tasks completed in previous ST sessions. Goal not targeted   Other areas targeted: N/A N/A   Education:   Skills targeted, rationale for tx tasks, relation of tasks selected to pt's preferred activities   Skills targeted with ST   Safety Devices: [x] Call light within reach  [x] Chair alarm activated and connected to nurse call light system  [] Bed alarm activated   [] Other:  [x] Call light within reach  [x] Chair alarm activated and connected to nurse call light system  [] Bed alarm activated   [] Other:    Assessment: Cognitive linguistic impairment marked by attention deficits and decreased mental flexibility. Difficulty with comprehensibility at times due to pt's fast rate of speech. Plan:  Continue per POC.      Interventions used this date:  [] Speech/Language Treatment  [] Instruction in HEP  [] Dysphagia Treatment [x] Cognitive Treatment   [] Other:    Discharge recommendations:  [] Home independently  [x] Home with assistance []  24 hour supervision  [] ECF [x] Other : Would recommend assist with finances/meds at DC given cognitive-linguistic and visual deficits. Educated pt on this recommendation on 3/11  Continued Tx Upon Discharge: ? [x] Yes    [] No    [] TBD based on progress while on ARU     [] Vital Stim indicated     [] Other:   Estimated discharge date: 3/19/22    Antoinette Jackson   Clinician     Manuelito Long CCC-SLP; [id number]  Speech-Language Pathologist    The speech-language pathologist was present, directed the patient's care, made skilled judgment and was responsible for assessment and treatment.

## 2022-03-16 LAB
ANION GAP SERPL CALCULATED.3IONS-SCNC: 9 MMOL/L (ref 3–16)
BASOPHILS ABSOLUTE: 0 K/UL (ref 0–0.2)
BASOPHILS RELATIVE PERCENT: 0.6 %
BUN BLDV-MCNC: 39 MG/DL (ref 7–20)
CALCIUM SERPL-MCNC: 10.7 MG/DL (ref 8.3–10.6)
CHLORIDE BLD-SCNC: 98 MMOL/L (ref 99–110)
CO2: 30 MMOL/L (ref 21–32)
CREAT SERPL-MCNC: 1.1 MG/DL (ref 0.6–1.2)
EOSINOPHILS ABSOLUTE: 0.2 K/UL (ref 0–0.6)
EOSINOPHILS RELATIVE PERCENT: 4.2 %
GFR AFRICAN AMERICAN: 59
GFR NON-AFRICAN AMERICAN: 48
GLUCOSE BLD-MCNC: 109 MG/DL (ref 70–99)
HCT VFR BLD CALC: 39.1 % (ref 36–48)
HEMOGLOBIN: 13.6 G/DL (ref 12–16)
LYMPHOCYTES ABSOLUTE: 1.9 K/UL (ref 1–5.1)
LYMPHOCYTES RELATIVE PERCENT: 38.3 %
MCH RBC QN AUTO: 32 PG (ref 26–34)
MCHC RBC AUTO-ENTMCNC: 34.8 G/DL (ref 31–36)
MCV RBC AUTO: 92 FL (ref 80–100)
MONOCYTES ABSOLUTE: 0.4 K/UL (ref 0–1.3)
MONOCYTES RELATIVE PERCENT: 8.7 %
NEUTROPHILS ABSOLUTE: 2.4 K/UL (ref 1.7–7.7)
NEUTROPHILS RELATIVE PERCENT: 48.2 %
PDW BLD-RTO: 13.3 % (ref 12.4–15.4)
PLATELET # BLD: 283 K/UL (ref 135–450)
PMV BLD AUTO: 8.8 FL (ref 5–10.5)
POTASSIUM REFLEX MAGNESIUM: 4 MMOL/L (ref 3.5–5.1)
RBC # BLD: 4.25 M/UL (ref 4–5.2)
SODIUM BLD-SCNC: 137 MMOL/L (ref 136–145)
WBC # BLD: 5 K/UL (ref 4–11)

## 2022-03-16 PROCEDURE — 36415 COLL VENOUS BLD VENIPUNCTURE: CPT

## 2022-03-16 PROCEDURE — 97110 THERAPEUTIC EXERCISES: CPT

## 2022-03-16 PROCEDURE — 99232 SBSQ HOSP IP/OBS MODERATE 35: CPT | Performed by: PHYSICAL MEDICINE & REHABILITATION

## 2022-03-16 PROCEDURE — 6360000002 HC RX W HCPCS: Performed by: PHYSICAL MEDICINE & REHABILITATION

## 2022-03-16 PROCEDURE — 6370000000 HC RX 637 (ALT 250 FOR IP): Performed by: STUDENT IN AN ORGANIZED HEALTH CARE EDUCATION/TRAINING PROGRAM

## 2022-03-16 PROCEDURE — 85025 COMPLETE CBC W/AUTO DIFF WBC: CPT

## 2022-03-16 PROCEDURE — 97130 THER IVNTJ EA ADDL 15 MIN: CPT

## 2022-03-16 PROCEDURE — 97535 SELF CARE MNGMENT TRAINING: CPT

## 2022-03-16 PROCEDURE — 97530 THERAPEUTIC ACTIVITIES: CPT

## 2022-03-16 PROCEDURE — 80048 BASIC METABOLIC PNL TOTAL CA: CPT

## 2022-03-16 PROCEDURE — 1280000000 HC REHAB R&B

## 2022-03-16 PROCEDURE — 6370000000 HC RX 637 (ALT 250 FOR IP): Performed by: PHYSICAL MEDICINE & REHABILITATION

## 2022-03-16 PROCEDURE — 97116 GAIT TRAINING THERAPY: CPT

## 2022-03-16 PROCEDURE — 97129 THER IVNTJ 1ST 15 MIN: CPT

## 2022-03-16 RX ADMIN — HYDROCHLOROTHIAZIDE 25 MG: 25 TABLET ORAL at 09:21

## 2022-03-16 RX ADMIN — ENOXAPARIN SODIUM 40 MG: 100 INJECTION SUBCUTANEOUS at 11:25

## 2022-03-16 RX ADMIN — CLOPIDOGREL BISULFATE 75 MG: 75 TABLET ORAL at 09:21

## 2022-03-16 RX ADMIN — GUAIFENESIN 600 MG: 600 TABLET, EXTENDED RELEASE ORAL at 20:12

## 2022-03-16 RX ADMIN — ATORVASTATIN CALCIUM 80 MG: 80 TABLET, FILM COATED ORAL at 20:12

## 2022-03-16 RX ADMIN — GUAIFENESIN 600 MG: 600 TABLET, EXTENDED RELEASE ORAL at 09:21

## 2022-03-16 RX ADMIN — ASPIRIN 81 MG: 81 TABLET, COATED ORAL at 09:21

## 2022-03-16 RX ADMIN — CETIRIZINE HYDROCHLORIDE 10 MG: 10 TABLET, FILM COATED ORAL at 09:21

## 2022-03-16 RX ADMIN — LISINOPRIL 5 MG: 5 TABLET ORAL at 09:21

## 2022-03-16 RX ADMIN — SENNOSIDES 8.6 MG: 8.6 TABLET, COATED ORAL at 20:12

## 2022-03-16 ASSESSMENT — PAIN SCALES - GENERAL: PAINLEVEL_OUTOF10: 0

## 2022-03-16 NOTE — PROGRESS NOTES
Nutrition Note       NUTRITION RECOMMENDATIONS:   1. PO Diet: Continue regular; low Na diet   2. ONS: Please offer Ensure if po intakes <50% of meals  3. Please obtain updated weight. NUTRITION ASSESSMENT:  Nutritional summary & status: Pt with good po intakes, consuming >76% majority of meals. No noted c/o nausea or vomiting. Ordering updated wt as none taken in 8 days. Noted plans for dc this weekend.  Admission/PMH: Acute CVA PMH: lupus, hyperparathyroidism, HTN    MALNUTRITION ASSESSMENT  Context of Malnutrition: Acute Illness   Malnutrition Status: Insufficient data    NUTRITION DIAGNOSIS   No nutrition diagnosis at this time     [address] and/or Nutrient Delivery:  Continue Current Diet  Nutrition Education/Counseling:  No recommendation at this time      The patient will still be monitored per nutrition standards of care. Consult dietitian if nutrition interventions essential to patient care is needed.      Jacquelyn Pavon, [address], [address] [place] [address] Drive:  [phone]  Office:  [phone]

## 2022-03-16 NOTE — PROGRESS NOTES
Occupational Therapy  Facility/Department: [facility] ACUTE REHAB UNIT  Daily Treatment Note  NAME: Joby Murray  : 1945  MRN: [id number]    Date of Service: 3/16/2022    Discharge Recommendations:  Continue to assess pending progress,24 hour supervision or assist,Home with Home health OT  OT Equipment Recommendations  Equipment Needed: Yes  Mobility Devices: ADL Assistive Devices  ADL Assistive Devices: Transfer Tub Bench;Grab Bars - toilet  Other: continue to assess    Assessment   Performance deficits / Impairments: Decreased functional mobility ; Decreased ADL status; Decreased balance;Decreased strength;Decreased fine motor control;Decreased high-level IADLs;Decreased coordination;Decreased safe awareness    Assessment: Pt demo'd good FM coordination while stringing tiny beads onto string, demonstrating pincer grasp to thread/unthread beads. Pt demo'd improvement with footwear today as evidence by donning shoe and MYA LYNCH using fig 4 with SBA. Pt continues to be limited by decreased functional endurance and minor coordination deficits of R hand. Pt would benefit from continued skilled OT services to maximzie independence and safety with functional tasks, cont OT per POC. Treatment Diagnosis: Impaired ADLs, mobility and R motor control s/p CVA  Prognosis: Good  OT Education: Transfer Training;Plan of Care;ADL Adaptive Strategies;OT Role  Patient Education: verb understanding  REQUIRES OT FOLLOW UP: Yes  Activity Tolerance  Activity Tolerance: Patient Tolerated treatment well  Safety Devices  Safety Devices in place: Yes  Type of devices: Call light within reach; Left in chair;Chair alarm in place         Patient Diagnosis(es): There were no encounter diagnoses. has a past medical history of Lupus (HCC) and MVP (mitral valve prolapse). has a past surgical history that includes Tonsillectomy; Colonoscopy; and Hysterectomy.     Restrictions  Restrictions/Precautions  Restrictions/Precautions: Fall Risk,Seizure  Position Activity Restriction  Other position/activity restrictions: up as tolerated  Subjective   General  Chart Reviewed: Yes  Patient assessed for rehabilitation services?: Yes  Additional Pertinent Hx: Pt is 77 y.o female admitted to [address] [place] [address] with with RLE weakness and balance deficits. Admitted for acute CVA. CT, CTA head negative. 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 Admitted to ARU 3/8. Response to previous treatment: Patient with no complaints from previous session  Family / Caregiver Present: No  Referring Practitioner: Dr. Ayaan Araya  Diagnosis: CVA  Subjective  Subjective: Pt seated in recliner upon arrival, planning shower with Pt. Pleasant and agreeable to OT therapy      Orientation     Objective    ADL  Feeding: Independent (left with breakfast tray at EOS, Pt able to open all containers)  Grooming: Stand by assistance (Pt completed oral hygiene and brushed hair in stance at sink)  UE Bathing: Stand by assistance (all UB bathing completed on TTB, IV sleeve donned on L hand)  LE Bathing: Stand by assistance (Washed/dried BLEs in fig 4 seated, Pt wash back arsen by lateral WSing, dried in stance at GBs with SBA. Pt washed/dried front arsen seated.)  UE Dressing: Supervision (Pt doffed/donned shirt seated on TTB)  LE Dressing: Stand by assistance (Pt completed pants mgmt down/up in stance with SBA. Pt able to unthread/thread pants/brief with fig 4.  Pt doffed socks in fig 4, donned R compression sock/R AFO, and shoes with fig 4 and SBA.)        Balance  Sitting Balance: Stand by assistance (seated on TTB)  Standing Balance: Contact guard assistance (CGA during ambulation, SBA during pants mgmt/oral hygiene)  Standing Balance  Time: ~15 mins total  Activity: functional mobility and transfers, oral hygiene and pants mgmt, stance at tabletop for dowel tatyana/FM activities  Comment: Pt completed multiple stands for pants mgmt, completed grooming task in stance at sink. Pt also completed standing dowel tatyana exercises and FM activitiy in standing w CGA  and min UE support from tabletop to increase standing balance tolerance. Functional Mobility  Functional - Mobility Device: Rolling Walker  Activity: To/from bathroom; To/From therapy gym  Assist Level: Contact guard assistance  Functional Mobility Comments: Pt ambulated to/from bathroom and therapy gym using RW and CGA. Pt also had R AFO during ambulation, maintained slow pace and gave herself personal VCs to increase R step height. Shower Transfers  Shower - Transfer From: Christelle Valdes - Transfer Type: To and From  Shower - Transfer To: Transfer tub bench  Shower - Technique: Ambulating  Shower Transfers: Contact Guard  Shower Transfers Comments: VCs for [employer] and sequencing 180 turn     Transfers  Sit to stand: Stand by assistance (recliner>RW, TTB>GBs, standard chair>tabletop stance)  Stand to sit: Stand by assistance (RW>standard chair, stance at tabletop>chair, RW>recliner)  Transfer Comments: VCs for hand placement        Coordination  Fine Motor: In order to increase fine motor coordination of R hand for ADLs, Pt given string and x15 tiny beads. While sitting, Pt instructed to thread all beads onto string with R hand pincer grasp. Pt then unthreaded all beads from string and placed back into their individual color slots. Pt completed in standing w CGA, and demo'd mild ataxia when placing beads back into correct containers. Pt misplaced x2 beads, VCs to correct bead placement. Pt demo'd pincer grasp when removing and placing beads in slots. Cognition  Overall Cognitive Status: WFL  Arousal/Alertness: Appropriate responses to stimuli  Following Commands:  Follows all commands without difficulty  Attention Span: Attends with cues to redirect  Memory: Appears intact  Safety Judgement: Decreased awareness of need for assistance;Decreased awareness of need for safety  Problem Solving: Able to problem solve independently  Insights: Decreased awareness of deficits  Initiation: Requires cues for some  Sequencing: Requires cues for some  Cognition Comment: During standing dowel tatyana exercises, Pt instructed to name animals/items of food during the second set. Pt required + time to think of animal/food, often repeating item multiple times. VCs to choose another item. Type of ROM/Therapeutic Exercise  Type of ROM/Therapeutic Exercise: Free weights  Comment: Pt completed the following BUE 2# dowel tatyana exercises in standing to increase BUE strength and endurance to increase independence in functional tasks.   Exercises  Shoulder Flexion: 2x10, noted uneven dowel tatyana d/t RUE weakness  Elbow Flexion: 2x10, VCs to correct technique                    Plan   Plan  Times per week: 60 mins per day/ 5 days per week  Times per day: Daily  Current Treatment Recommendations: Functional Mobility Training,Safety Education & Training,Self-Care / ADL,Equipment Evaluation, Education, & procurement,Patient/Caregiver Education & Training,Strengthening,Balance Training,Neuromuscular Re-education  G-Code     OutComes Score                                                  AM-PAC Score             Goals  Short term goals  Time Frame for Short term goals: 10 days  Short term goal 1: Pt will complete all bathing componets with spvn-ongoing  Short term goal 2: Pt will complete toilet and tub transfers with CGA-ongoing for toilet, goal met for tub transfer 3/10  Short term goal 3: Pt will complete UB and LB dressing with SBA-goal met 3/14  Short term goal 4: Pt will complete oral hygiene and grooming tasks standing with SBA-goal met 3/14  Short term goal 5: Pt will complete NHPT assessment-goal met 3/10  Long term goals  Time Frame for Long term goals : 3 weeks  Long term goal 1: Pt will complete bathing componets with Mod I-ongoing  Long term goal 2: Pt will complete toilet and tub transfers with Mod I-ongoing  Long term goal 3: Pt will complete all dressing componets with Mod I-ongoing  Long term goal 4: Pt will complete oral hygiene/grooming in stance with Mod I-ongoing  Long term goal 5: Pt will complete R hand NHPT and score within 50th percentile for age-ongoing  Patient Goals   Patient goals : \"to go home\"       Therapy Time   Individual Concurrent Group Co-treatment   Time In 0730         Time Out 0830         Minutes [address], OTS

## 2022-03-16 NOTE — PROGRESS NOTES
ACUTE REHAB UNIT  SPEECH/LANGUAGE PATHOLOGY      [x] Daily  [] Weekly Care Conference Note  [] Discharge    Patient:Esther Murray      :1945  [facility]:[id number]  Rehab Dx/Hx: Acute cerebrovascular accident (CVA) ([facility] [address] [I63.9]    Precautions: [] Aspiration  [x] Fall risk  [] Sternal  [] Seizure [] Hip  [] Weight Bearing [] Other  ST Dx: [] Aphasia  [] Dysarthria  [] Apraxia   [] Oropharyngeal dysphagia [x] Cognitive-linguistic Impairment  [x] Other: Dysphonia (baseline)  Date of Admit: 3/8/2022  Room #: 3113/3113-01  Date: 3/16/2022          Current Diet Order:ADULT DIET; Regular; Low Sodium (2 gm)     Subjective: Patient pleasant and cooperative. Appeared slightly anxious with assessment. Lives With: Daughter  Homemaking Responsibilities: Yes  Occupation: Retired  Type of occupation: Cosmotologist  Leisure & Hobbies: travel    Elysia Ambrocioraytoby Shane 62: Impaired (Reports occasional difficulty with her L eye)   Vision Exceptions: Wears glasses for reading  Hearing  Hearing: Within functional limits  Barriers toward progress: Cognitive-linguistic deficit, Anxiety and Limited insight into deficits      Date: 3/16/2022      Tx session 1 Tx session 2   Total Timed Code Min 30 30   Total Treatment Minutes 30 30   Individual Treatment Minutes 30 30   Group Treatment Minutes 0 0   Co-Treat Minutes 0 0   Brief Exception: N/A N/A   Pain None indicated via any means  None indicated. Pain Intervention: [] RN notified  [] Repositioned  [] Intervention offered and patient declined  [x] N/A  [] Other: [] RN notified  [] Repositioned  [] Intervention offered and patient declined  [x] N/A  [] Other:   Subjective:     Pt upright in chair and agreeable to therapy. Pts daughter present for session. Pt resting in chair upon arrival. Participates well in therapy tasks.     Patient will complete higher level/abstract naming tasks to improve mental flexibility at 90% acc given min cues <2 episodes of perseveration    Abstract reasoning (comparing and contrasting): Mod cues, pt require encouragement to continue task. Category Exclusion: Pt noted with increased instances of unintelligible speech- appeared to be related to fast rate of speech. Continues to demonstrate decreased mental flexibility stating, \"that's how I would do it\". Pt easily redirected. Patient will complete verbal/visual reasoning tasks at 90% acc given min tasks (meds,times,finance etc)   Deductive reasoning: <90% accuracy with mod/max cues. Pt had difficulty with reasoning written cues. Perseverations noted throughout task. Completion of deductive reasoning task: >90% accuracy with min cues; Pt noted with overall increased attention to task and accurate verbalizations for rationale. Sequencing (6 photographs): Mod cues required; Pt with mild tangential speech. Perseveration x2. Patient will complete graded memory tasks with learned strategies at 90% acc given min cues Goal not targeted. Goal not targeted. Other areas targeted:     Education:   Educated re: rationale for treatment tasks, skills targeted Ongoing re: skills targeted during session   Safety Devices: [x] Call light within reach  [x] Chair alarm activated and connected to nurse call light system  [] Bed alarm activated   [] Other:  [x] Call light within reach  [x] Chair alarm activated and connected to nurse call light system  [] Bed alarm activated   [] Other:    Assessment: Cognitive-linguistic impairment marked by deficits with verbal reasoning. Reduced flexibility of thought and  awareness of deficits continues to limit patient. Good attention maintained throughout tasks. Plan:  Continue per POC.      Interventions used this date:  [] Speech/Language Treatment  [] Instruction in HEP  [] Dysphagia Treatment [x] Cognitive Treatment   [] Other:    Discharge recommendations:  [] Home independently  [x] Home with assistance []  24 hour supervision  [] ECF [x] Other : Would recommend assist with finances/meds at DC given cognitive-linguistic and visual deficits. Educated pt on this recommendation on 3/11  Continued Tx Upon Discharge: ? [x] Yes    [] No    [] TBD based on progress while on ARU     [] Vital Stim indicated     [] Other:   Estimated discharge date: 3/19/22    Anthony Dawkins   Clinician     Santana Avendano M.A., [facility]  Speech-Language Pathologist    The speech-language pathologist was present, directed the patient's care, made skilled judgment and was responsible for assessment and treatment.

## 2022-03-16 NOTE — PROGRESS NOTES
Physical Therapy  Facility/Department: [facility] ACUTE REHAB UNIT  Daily Treatment Note  NAME: Anupama Murray  : 1945  MRN: [id number]    Date of Service: 3/16/2022    Discharge Recommendations:  Continue to assess pending progress,24 hour supervision or assist,Home with Home health PT   PT Equipment Recommendations  Equipment Needed: Yes  Mobility Devices: Marianne Mattock: Rolling    Assessment   Body structures, Functions, Activity limitations: Decreased functional mobility ; Decreased balance;Decreased endurance;Decreased strength;Decreased ADL status; Decreased coordination;Decreased cognition  Assessment: Pt demo's improved DF strength this date during supine/seated exercises and ambulation with decreased need for R AFO however contiues to demonstrate decreased R foot clearance with fatigue during extended ambulation. Pt meets short term goal to ambulate 150' with CGA. Demonstrates difficulty qith baalnce activity with narrow base of support and no UE support. Treatment Diagnosis: impaired mobility associated with CVA  Prognosis: Good  Decision Making: Medium Complexity  PT Education: General Safety;Gait Training;Goals;Plan of Care;Energy Conservation  Barriers to Learning: some decreased memory noted  REQUIRES PT FOLLOW UP: Yes  Activity Tolerance  Activity Tolerance: Patient limited by fatigue;Patient limited by endurance; Patient Tolerated treatment well     Patient Diagnosis(es): There were no encounter diagnoses. has a past medical history of Lupus (HCC) and MVP (mitral valve prolapse). has a past surgical history that includes Tonsillectomy; Colonoscopy; and Hysterectomy. Restrictions  Restrictions/Precautions  Restrictions/Precautions: Fall Risk,Seizure  Position Activity Restriction  Other position/activity restrictions: up as tolerated  Subjective   General  Chart Reviewed: Yes  Additional Pertinent Hx: Patient is a 78 y/o female admitted 3/5 with right leg weakness and unsteadiness.   CT head and chest x-ray (-) for acute findings. MRI brain (+) for Acute infarct involving the deep white matter of the posterior left frontal lobe. PMH significant for lupus. Admitted to ARU 3/8. Response To Previous Treatment: Patient with no complaints from previous session. Family / Caregiver Present: No  Referring Practitioner: Jasen Altamirano  General Comment  Comments: Pt found seated in recliner upon PT arrival.  Pt agreeable to therapy session. Pain Screening  Patient Currently in Pain: Denies  Vital Signs  Patient Currently in Pain: Denies       Orientation     Cognition      Objective      Transfers  Sit to Stand: Contact guard assistance (from recliner/EOM/wc up to RW)  Stand to sit: Contact guard assistance; Moderate Assistance (to recliner/EOM/wc with RW, 1 instance of mod d/t posterior LOB during balance exercise)  Ambulation 1  Surface: level tile;uneven;ramp  Device: Rolling Walker  Assistance: Contact guard assistance (CGA for ambulation on flat surfaces)  Quality of Gait: slightly decreased zac, narrow SHU, decreased foot clearance R with fatigue, decreased stance time R, decreased knee flexion in R LE swing phase  Distance: 115', 200' (includng ascent/descent of 10' ramp)  Comments: Cues for increased SHU, R foot clearance and R ankle DF. Improved R DF noted this date with increased foot clearance/ decreased foot drop     Balance  Posture: Fair  Sitting - Static: Good (indp seated EOM)  Sitting - Dynamic: Good;- (sueprvision for seated scooting EOM)  Standing - Static: Fair (CGA without device, SBA with RW)  Standing - Dynamic: Fair;- (CGA for ambulation with RW, CGA-mod for balance activity (see below))  Comments: Pt completes activity picking socks scattered around gym off of ground with UL UE support on RW and CGA-min. Increase assist required for balance in instance of decreased SHU and with fatigue.  Pt completes 3 x ~ 1 min bean bag toss balance activity with NBOS including reaching for beanbags outside SHU and crossbody with CGA-min throughout most of activity except for 1 posterior LOB requiring mod A. Exercises  Bridging:  (2 x 16, VC/TC to prevent R hip IR/ER)  Straight Leg Raise: x 10 R LE (to demonstrate technique she has been using when she completes then on her own in her room) VC for technique  Hip Abduction: 2x10 abduction R LE  Ankle Pumps: x30 supine (VC to prevent knee flexion compensation) , x20 seated                                  Goals  Short term goals  Time Frame for Short term goals: 10 days  Short term goal 1: Pt will perform bed mobility with modified independence -ongoing  Short term goal 2: Pt will perform transfers sit<>stand with SBA and LRAD -ongoing  Short term goal 3: Pt will ambulate 150' with CGA and LRAD - met 3/16  Short term goal 4: Pt will ascend/descend 5 stairs with B handrails and SBA -ongoing  Short term goal 5: Pt will stand without UE support while completing dynamic UE task with CGA -met 3/15  Long term goals  Time Frame for Long term goals : 3 weeks  Long term goal 1: Pt will perform sit<>stand transfers with mod I and LRAD -ongoing  Long term goal 2: Pt will ambulate 150' with mod I and LRAD -ongoing  Long term goal 3: Pt will ascend/descend 12 stairs with mod I and UL handrail -ongoing  Long term goal 4: Pt will stand without UE support while completing dynamic UE task with mod I -ongoing  Patient Goals   Patient goals : get better and get home, improve walking    Plan    Plan  Times per week: 5 days a week, 60 min  Times per day: Daily  Current Treatment Recommendations: Strengthening,Transfer Training,Endurance Training,Patient/Caregiver Education & Training,Balance Training,Gait Training,Functional Mobility Training,Safety Education & Training,Neuromuscular Re-education,Stair training,Equipment Evaluation, Education, & procurement  Safety Devices  Type of devices:  All fall risk precautions in place,Call light within reach,Chair alarm in place,Gait belt,Left in chair  Restraints  Initially in place: No     Therapy Time   Individual Concurrent Group Co-treatment   Time In 1245         Time Out 1345         Minutes 60         Timed Code Treatment Minutes: Mary Kay, [facility]

## 2022-03-16 NOTE — CARE COORDINATION
OT asked SW this afternoon to arrange Family Training with daughter for this Friday and to make sure dgt or grandson will be at home with Pt 24/7. SW called Pt's dgt, Paresh Vázquez, to discuss but went to voicemail so left above info and SW phone # and requested a call back. Will follow and advise.      Dagoberto Ortiz, [place]  Case Management  [phone]

## 2022-03-16 NOTE — PATIENT CARE CONFERENCE
Inpatient Rehabilitation  Weekly Team Conference Note  The [address]  [phone]  Patient Name: Harpreet Barcenas        MRN: [id number]    : 1945  (70 y.o.)  Gender: female   Referring Practitioner: Cristiane Altamirano  Diagnosis: TIA  The team conference for this patient was held on 3/16/2022 at 10:30am by:  Leilani Aggarwal, DO    Current/Goal QM SCORES  QM Current/Goal Score   Eating CARE Score: 6 / Discharge Goal: Independent   Oral Hygiene CARE Score: 4 / Discharge Goal: Independent   Shower/Bathing CARE Score: 4 / Discharge Goal: Independent   UB Dressing CARE Score: 4 / Discharge Goal: Independent   LB Dressing CARE Score: 4 / Discharge Goal: Independent   Putting on/off Footwear CARE Score: 4 / Discharge Goal: Independent   Toileting Hygiene CARE Score: 4 / Discharge Goal: Independent   Bladder Continence Bladder Continence: Always continent    Bowel Continence Bowel Continence: Frequently incontinent    Toilet Transfers CARE Score: 4 / Discharge Goal: Independent   Shower/Bathe Self  CARE Score: 4 / Discharge Goal: Independent   Rolling Left and Right CARE Score: 4 / Discharge Goal: Independent   Sit to Lying CARE Score: 4 / Discharge Goal: Independent   Lying to Sitting on Bedside CARE Score: 4 / Discharge Goal: Independent   Sit to Stand CARE Score: 4 / Discharge Goal: Independent   Chair/Bed to Chair Transfer CARE Score: 4 / Discharge Goal: Independent   Car Transfers CARE Score: 3 / Discharge Goal: Independent   Walk 10 Feet CARE Score: 4 / Discharge Goal: Independent   Walk 50 Feet with Two Turns CARE Score: 4 / Discharge Goal: Independent   Walk 150 Feet CARE Score: 4 / Discharge Goal: Independent   Walk 10 Feet on Uneven Surfaces CARE Score: 4 / Discharge Goal: Independent   1 Step (Curb) CARE Score: 4 / Discharge Goal: Independent   4 Steps CARE Score: 4 / Discharge Goal: Independent   12 Steps CARE Score: 4 / Discharge Goal: Independent Picking up Object from Floor CARE Score: 1 /       NURSING:  A&Ox: Level of Consciousness: Alert (0)  Orientation Level: Oriented X4  Navarro Fall Risk Score: Total Score: 70  Admission BIMS: 12  Wounds/Incisions/Ulcers: none mild bruising, burn scar  Medication Review: yes  Pain:  none  Consultations: none  Imaging:   No orders to display     Active Comorbid Conditions: CVA, hx lupus  Systems Review:   Renal: N/A, Dialysis: N/A Type: N/A, Frequency: N/A  Neurological: WDL  Musculoskeletal: R sided weakness, improving  Respiratory: WDL  Cardiac/Circulatory/Peripheral Vascular: WDL  Abnormal/Relevant Test Results: N/A  Abnormal/Relevant Lab Values:   CBC:   Recent Labs     03/14/22  0702 03/16/22 0621   WBC 6.0 5.0   HGB 14.3 13.6   HCT 40.6 39.1   MCV 92.0 92.0    283     BMP:   Recent Labs     03/14/22  0702 03/16/22 0621   * 137   K 3.9 4.0   CL 98* 98*   CO2 27 30   BUN 30* 39*   CREATININE 1.0 1.1     PT/INR: No results for input(s): PROTIME, INR in the last 72 hours. APTT: No results for input(s): APTT in the last 72 hours. Liver Profile:  Lab Results   Component Value Date    AST 15 03/05/2022    ALT 16 03/05/2022    BILITOT 0.4 03/05/2022    ALKPHOS 104 03/05/2022     Lab Results   Component Value Date    CHOL 209 03/06/2022    HDL 70 03/06/2022    TRIG 66 03/06/2022     Recent Labs     03/14/22  0702 03/16/22 0621   WBC 6.0 5.0   HGB 14.3 13.6   HCT 40.6 39.1    283   MCV 92.0 92.0     Recent Labs     03/14/22  0702 03/16/22 0621   * 137   K 3.9 4.0   CL 98* 98*   CO2 27 30   BUN 30* 39*   CREATININE 1.0 1.1     No results for input(s): AST, ALT, ALB, BILIDIR, BILITOT, ALKPHOS in the last 72 hours. No results for input(s): MG in the last 72 hours.   Recent Labs     03/14/22  0702 03/16/22  0621   WBC 6.0 5.0   HGB 14.3 13.6   HCT 40.6 39.1    283     Recent Labs     03/14/22  0702 03/16/22  0621   * 137   K 3.9 4.0   CL 98* 98*   CO2 27 30   BUN 30* 39* CREATININE 1.0 1.1   CALCIUM 10.9* 10.7*     No results for input(s): AST, ALT, BILIDIR, BILITOT, ALKPHOS in the last 72 hours. No results for input(s): INR in the last 72 hours. No results for input(s): Benny Dike in the last 72 hours. PHYSICAL THERAPY:  Bed Mobility: Scooting: Supervision (seated scooting)    Transfers:  Sit to Stand: Contact guard assistance (from recliner/EOM/wc up to RW)  Stand to sit: Contact guard assistance,Moderate Assistance (to recliner/EOM/wc with RW, 1 instance of mod d/t posterior LOB during balance exercise)  Bed to Chair: Contact guard assistance (SPT)  Comment: PT instructed pt to regina shoes. Only  set up req    WB Status: No WB restrictions noted  Ambulation 1  Surface: level tile,uneven,ramp  Device: Rolling Walker  Other Apparatus: AFO,Right (ACE DF assist RLE for all except last round of ambulation (completed with R AFO))  Assistance: Contact guard assistance (CGA for ambulation on flat surfaces)  Quality of Gait: slightly decreased katharina, narrow SHU, decreased foot clearance R with fatigue, decreased stance time R, decreased knee flexion in R LE swing phase  Gait Deviations: Slow Katharina,Decreased step length,Decreased step height  Distance: 115', 200' (includng ascent/descent of 10' ramp)  Comments: Cues for increased SHU, R foot clearance and R ankle DF. Improved R DF noted this date with increased foot clearance/ decreased foot drop    Stairs  # Steps : 12  Stairs Height: 6\"  Rails: Left ascending  Assistance: Contact guard assistance  Comment: Step to pattern. Min VC for sequencing. Mod VC for increased R LE clearance and foot placement.     OCCUPATIONAL THERAPY:  ADL  Feeding: Independent (left with breakfast tray at EOS, Pt able to open all containers)  Grooming: Stand by assistance (Pt completed oral hygiene and brushed hair in stance at sink)  UE Bathing: Stand by assistance (all UB bathing completed on TTB, IV sleeve donned on L hand)  LE Bathing: Stand by assistance (Washed/dried BLEs in fig 4 seated, Pt wash back arsen by lateral WSing, dried in stance at GBs with SBA. Pt washed/dried front arsen seated.)  UE Dressing: Supervision (Pt doffed/donned shirt seated on TTB)  LE Dressing: Stand by assistance (Pt completed pants mgmt down/up in stance with SBA. Pt able to unthread/thread pants/brief with fig 4. Pt doffed socks in fig 4, donned R compression sock/R AFO, and shoes with fig 4 and SBA.)  Toileting: Contact guard assistance (Pt completed briefs down in stance with CGA, Pt continent of small bm and completed back pericare seated with SBA.)  Additional Comments: Pt given tensoshape compression sock for RLE, Pt able to don compression sock with SBA seated on TTB    Toilet Transfers: Toilet Transfers  Toilet - Technique: Ambulating  Equipment Used: Standard toilet  Toilet Transfer: Moderate assistance  Toilet Transfers Comments: Pt does not have GBs at home toilet. Required VCs for RW mgmt during 180 turn at toilet. Min A-Mod A for balance and to sit due to poor ecentric control. Tub/ShowerTransfers:  Tub Transfers  Tub - Transfer From: Rolling walker  Tub - Transfer Type: To and From  Tub - Transfer To: Transfer tub bench  Tub - Technique: Ambulating  Tub Transfers: Contact guard  Tub Transfers Comments: Pt compelted dry tub transfer during session to simulate home environment. At home, Pt takes baths and has one GB on long side of tub. Pt completed today with CGA and no use of GBs. Increased time spent educating and recommending TTB for Pt at home. Shower Transfers  Shower - Transfer From: Usman Fuentes - Transfer Type: To and From  Shower - Transfer To:  Transfer tub bench  Shower - Technique: Ambulating  Shower Transfers: Contact Guard  Shower Transfers Comments: VCs for [employer] and sequencing 180 turn    SPEECH THERAPY:  Assessment: Speech Therapy Diagnosis  Cognitive Diagnosis: Mild cognitive-linguistic impairment    NUTRITION:  Please see nutrition note for details. Current Weight: 143 lb 11.2 oz (65.2 kg) BMI (Calculated): 23.2  Current diet order: Current diet and supplement order: ADULT DIET; Regular; Low Sodium (2 gm)     Feeding: Able to feed self  Room Service: Selective   NSG Recorded PO: PO Meals Eaten (%): 76 - 100%    Malnutrition Status Malnutrition Status: Insufficient data  Please see nutrition evaluation per nutrition standards of care for additional info. Nabila Sultana RD, LD  [place]: [phone]  Office:  [phone]    CASE MANAGEMENT:  Psychosocial/Behavioral Issues: none  Assessment:  Pt will be returning home with grandson and dgt. SW will arrange skilled Home Care and any needed DME. Patient/Family Education provided by team:  Role of PT/OT/ST, Transfer Training, ADL Adaptive Strategies    Patient Goals for Rehab stay:  1. To get stronger and improve independence   2. To go home     Rehab Team Goals for patient for the Upcoming Week:  1. Increase independence with transfers and ambulation   2. Pt will complete all dressing components with spvn    Barriers to Progress/Attainment of Goals & Efforts/Interventions to remove Barriers:  1. Decreased R LE strength   2.  Minor coordination deficits with R hand - ongoing therapy    Discharge Plan:  Estimated Length of Stay: 12 days  Destination: home health  Services at Discharge[facility] [address] [place], [place] [address] and [facility] week  Equipment at Discharge:   Rolling Walker, Tub Transfer [employer] Resources:   Factors facilitating achievement of predicted outcomes: Family support, Cooperative and Pleasant  Barriers to the achievement of predicted outcomes: Limited safety awareness, Decreased endurance, Decreased proprioception, Upper extremity weakness, Lower extremity weakness and Stairs at home Limited insight into impairments    Special Needs in the Upcoming Week:   [x] Family/Caregiver Education  [] Home visit  []Therapeutic Pass [] conference due to:   [] The estimated discharge destination has been changed from initial team conference due to:     Rehab Team Members in attendance for Team Conference:  ARU Supervisor/PPS Coordinator:  Jabier Cox, PT, DPT    Therapy Manager/ARU :  Lucio Lal, PT, DPT    Nursing Manager:  Valentina Wahl, TANISHA    Social Work:  Timo Ernandez, RN    Nursing:  Chayo Puga, TANISHA Sommers, RN    Therapy:  Anel Mendoza, PT  Arian Bob, PT, DPT  Chino Mcgee PT, DPT     Diony Sepulveda, OTR/L  Javi Chatterjee, OTR/L  Emilee, OTR/L    DONY Castrejon, SLP    Nutrition:  Noah Osborne, GLORY LD    I approve the established interdisciplinary plan of care as documented within the medical record of Alie Cardenas.     MD Signature Joel Merlin, D.O. M.P.H  PM&R  3/18/2022  7:42 AM

## 2022-03-16 NOTE — PROGRESS NOTES
Department of Physical Medicine & Rehabilitation  Progress Note    Patient Identification:  Yefri Ahmadi  [id number]  : 1945  Admit date: 3/8/2022    Chief Complaint: Acute cerebrovascular accident (CVA) ([address]    Subjective:   No new complaints overnight or this AM. Improving in therapy and strength improving. Patient has not had BM since Monday, typically has one every other day. Will monitor. Doing very well. Plan for discharge Saturday. ROS: No f/c, n/v, cp     Objective:  Patient Vitals for the past 24 hrs:   BP Temp Temp src Pulse Resp SpO2   22 0900 133/73 98.5 °F (36.9 °C) Oral 84 20 99 %   03/15/22 1941 114/65 98.5 °F (36.9 °C) Oral 88 18 98 %     Const: Alert. WDWN. No distress  Eyes: Conjunctiva noninjected, no icterus noted; pupils equal, round, and reactive to light. HENT: Atraumatic, normocephalic; Oral mucosa moist  Neck: Trachea midline, neck supple. No thyromegaly noted. CV: Regular rate and rhythm, no murmur rub or gallop noted  Resp: Lungs clear to auscultation bilaterally, no rales wheezes or ronchi, no retractions. Respirations unlabored. GI: Soft, nontender, nondistended. Normal bowel sounds. No palpable masses. Skin: Normal temperature and turgor. No rashes or breakdown noted. Ext: No significant edema appreciated. No varicosities. MSK: No joint tenderness, erythema, warmth noted.  AROM intact. Neuro: Alert, oriented, appropriate. No cranial nerve deficits appreciated. Sensation intact to light touch. Motor examination reveals 5/5 strength in RUE, LUE, LLE. Strength 5/5 in RLE upon flexion and extension of hip. No abnormalities with finger/nose or heel/shin noted. Reflexes normal and symmetric. Psych: Stable mood, normal judgement, normal affect     Laboratory data: Available via EMR.    Last 24 hour lab  Recent Results (from the past 24 hour(s))   Basic Metabolic Panel w/ Reflex to MG    Collection Time: 22  6:21 AM   Result Value Ref Range    Sodium 137 136 - 145 mmol/L    Potassium reflex Magnesium 4.0 3.5 - 5.1 mmol/L    Chloride 98 (L) 99 - 110 mmol/L    CO2 30 21 - 32 mmol/L    Anion Gap 9 3 - 16    Glucose 109 (H) 70 - 99 mg/dL    BUN 39 (H) 7 - 20 mg/dL    CREATININE 1.1 0.6 - 1.2 mg/dL    GFR Non- 48 (A) >60    GFR  59 (A) >60    Calcium 10.7 (H) 8.3 - 10.6 mg/dL   CBC auto differential    Collection Time: 03/16/22  6:21 AM   Result Value Ref Range    WBC 5.0 4.0 - 11.0 K/uL    RBC 4.25 4.00 - 5.20 M/uL    Hemoglobin 13.6 12.0 - 16.0 g/dL    Hematocrit 39.1 36.0 - 48.0 %    MCV 92.0 80.0 - 100.0 fL    MCH 32.0 26.0 - 34.0 pg    MCHC 34.8 31.0 - 36.0 g/dL    RDW 13.3 12.4 - 15.4 %    Platelets 681 665 - 491 K/uL    MPV 8.8 5.0 - 10.5 fL    Neutrophils % 48.2 %    Lymphocytes % 38.3 %    Monocytes % 8.7 %    Eosinophils % 4.2 %    Basophils % 0.6 %    Neutrophils Absolute 2.4 1.7 - 7.7 K/uL    Lymphocytes Absolute 1.9 1.0 - 5.1 K/uL    Monocytes Absolute 0.4 0.0 - 1.3 K/uL    Eosinophils Absolute 0.2 0.0 - 0.6 K/uL    Basophils Absolute 0.0 0.0 - 0.2 K/uL       Therapy progress:  PT  Position Activity Restriction  Other position/activity restrictions: up as tolerated  Objective     Sit to Stand: Contact guard assistance (from recliner/commode/bar height chair with RW)  Stand to sit: Contact guard assistance (to recliner/commode/bar height chair with RW)  Bed to Chair: Contact guard assistance (SPT)  Device: Rolling Walker  Other Apparatus: AFO,Right (ACE DF assist RLE for all except last round of ambulation (completed with R AFO))  Assistance: Contact guard assistance  Distance: 125' x 2 , small distances in gym, 30' (to trial AFO)  OT  PT Equipment Recommendations  Other: continue to assess pending progress- would likely benefit from R AFO and RW at discharge  Toilet - Technique: Ambulating  Equipment Used: Standard toilet  Toilet Transfers Comments: Pt does not have GBs at home toilet.  Required VCs for RW mgmt during 180 turn at toilet. Min A-Mod A for balance and to sit due to poor ecentric control. Assessment        SLP          Body mass index is 23.19 kg/m². Assessment and Plan:  1. Acute CVA   - No focal deficits on exam however patient has continued sense of being off balance with walking and was noted in ED to have some ataxia. - CT, CTA head no acute findings. 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 - Stroke team consulted, advised initiation of DAPT and admission for further work-up  - Neurology following  - Echocardiogram pending  - Risk factor modification  - Hypercoagulable panel   - PT/OT/SLP     2. HTN  - Careful blood pressure lowering allowing for permissive HTN  - Monitor       3. Congestion  - mucinex  -improved    PPx  DVT: lovenox  GI: pantoprazole    Rehab Progress: Improving  Anticipated Dispo: home  Services/DME:   ELOS: next week    Noa Pavon DO, PGY-1   PM&R  3/16/2022  10:56 AM     Patient has been staffed and discussed with attending physician, Dr. Enmanuel Miller DO. This patient has been seen, examined, and discussed with the resident. This note has been altered to reflect my own examination findings, impression, and recommendations.      Enmanuel Miller D.O. M.P.H  PM&R  3/16/2022  10:56 AM

## 2022-03-17 PROCEDURE — 99233 SBSQ HOSP IP/OBS HIGH 50: CPT | Performed by: PHYSICAL MEDICINE & REHABILITATION

## 2022-03-17 PROCEDURE — 6370000000 HC RX 637 (ALT 250 FOR IP): Performed by: STUDENT IN AN ORGANIZED HEALTH CARE EDUCATION/TRAINING PROGRAM

## 2022-03-17 PROCEDURE — 97129 THER IVNTJ 1ST 15 MIN: CPT

## 2022-03-17 PROCEDURE — 97116 GAIT TRAINING THERAPY: CPT

## 2022-03-17 PROCEDURE — 97110 THERAPEUTIC EXERCISES: CPT

## 2022-03-17 PROCEDURE — 6370000000 HC RX 637 (ALT 250 FOR IP): Performed by: PHYSICAL MEDICINE & REHABILITATION

## 2022-03-17 PROCEDURE — 6360000002 HC RX W HCPCS: Performed by: PHYSICAL MEDICINE & REHABILITATION

## 2022-03-17 PROCEDURE — 97530 THERAPEUTIC ACTIVITIES: CPT

## 2022-03-17 PROCEDURE — 1280000000 HC REHAB R&B

## 2022-03-17 PROCEDURE — 92507 TX SP LANG VOICE COMM INDIV: CPT

## 2022-03-17 RX ADMIN — CETIRIZINE HYDROCHLORIDE 10 MG: 10 TABLET, FILM COATED ORAL at 09:17

## 2022-03-17 RX ADMIN — ASPIRIN 81 MG: 81 TABLET, COATED ORAL at 09:17

## 2022-03-17 RX ADMIN — ENOXAPARIN SODIUM 40 MG: 100 INJECTION SUBCUTANEOUS at 09:17

## 2022-03-17 RX ADMIN — PANTOPRAZOLE SODIUM 40 MG: 40 TABLET, DELAYED RELEASE ORAL at 06:35

## 2022-03-17 RX ADMIN — LISINOPRIL 5 MG: 5 TABLET ORAL at 09:17

## 2022-03-17 RX ADMIN — ATORVASTATIN CALCIUM 80 MG: 80 TABLET, FILM COATED ORAL at 22:32

## 2022-03-17 RX ADMIN — GUAIFENESIN 600 MG: 600 TABLET, EXTENDED RELEASE ORAL at 09:17

## 2022-03-17 RX ADMIN — CLOPIDOGREL BISULFATE 75 MG: 75 TABLET ORAL at 09:17

## 2022-03-17 RX ADMIN — HYDROCHLOROTHIAZIDE 25 MG: 25 TABLET ORAL at 09:16

## 2022-03-17 RX ADMIN — GUAIFENESIN 600 MG: 600 TABLET, EXTENDED RELEASE ORAL at 22:32

## 2022-03-17 ASSESSMENT — PAIN SCALES - GENERAL
PAINLEVEL_OUTOF10: 0

## 2022-03-17 NOTE — PROGRESS NOTES
a past medical history of Lupus ([address] and MVP (mitral valve prolapse). has a past surgical history that includes Tonsillectomy; Colonoscopy; and Hysterectomy. Restrictions  Restrictions/Precautions  Restrictions/Precautions: Fall Risk,Seizure  Position Activity Restriction  Other position/activity restrictions: up as tolerated  Subjective   General  Chart Reviewed: Yes  Patient assessed for rehabilitation services?: Yes  Additional Pertinent Hx: Pt is 77 y.o female admitted to [facility] with with RLE weakness and balance deficits. Admitted for acute CVA. CT, CTA head negative. 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 Admitted to ARU 3/8. Response to previous treatment: Patient with no complaints from previous session  Family / Caregiver Present: No  Referring Practitioner: Dr. Nghia Marie  Diagnosis: CVA  Subjective  Subjective: Pt seated in recliner upon arrival, pleasant and agreeable to therapy. Dgt present upon arrival      Orientation     Objective    ADL  Toileting: Contact guard assistance (Pt completed pants mgm up/down in stance with CGA. Pt continent of bowel/bladder and completed nitza pericare with SBA)        Balance  Sitting Balance: Stand by assistance (seated in recliner and standard chair, SBA seated on toilet)  Standing Balance: Contact guard assistance  Standing Balance  Time: ~15 mins total  Activity: functional mobility and transfers, stance for pants mgmt, functional mobility item retrieveal  Comment: Pt completed two stands for pants mgmt with CGA  Functional Mobility  Functional - Mobility Device: Rolling Walker  Activity: To/from bathroom; To/From therapy gym  Assist Level: Contact guard assistance  Functional Mobility Comments: Pt completed item retrieval task in therapy gym to increase mobility and endurance for functional tasks. Pt completed item retrieveal of x16 bean bags placed throughout therapy gym in low places.  OT provided Pt with reacher to avoid bending down too far and creating a fall risk. OT also provided Pt with walker basket to avoid carrying items in hands during ambulation. Pt retrieved all x16 items from gym using combonation of reacher and hand. Pt used RUE to manipulate the reacher to increase RUE hand function. Pt required CGA and completed task in one standing. Toilet Transfers  Toilet - Technique: Ambulating  Equipment Used: Standard toilet  Toilet Transfer: Contact guard assistance  Toilet Transfers Comments: Pt required VCs for RW mgmt during 180 turn, VCs for hand placement     Transfers  Sit to stand: Stand by assistance (recliner>RW, standard chair>RW)  Stand to sit: Stand by assistance (RW>recliner and standard chair)  Transfer Comments: VCs for hand placement                       Cognition  Overall Cognitive Status: WFL  Arousal/Alertness: Appropriate responses to stimuli  Following Commands: Follows all commands without difficulty  Attention Span: Attends with cues to redirect  Memory: Appears intact  Safety Judgement: Decreased awareness of need for assistance;Decreased awareness of need for safety  Problem Solving: Able to problem solve independently  Insights: Decreased awareness of deficits  Initiation: Requires cues for some  Sequencing: Requires cues for some                    Type of ROM/Therapeutic Exercise  Type of ROM/Therapeutic Exercise: Resistive Bands  Comment: Pt reviewed HEP theraband exercises with OT seated in standard chair to increase independence with BUE strength and endurance for functional tasks. VCs for correct tech  Exercises  Shoulder Flexion: x10  Shoulder ABduction: x10  Horizontal ABduction: x10  Elbow Flexion: x10  Other: x10 diagnonal up/ x10 diagnonal down            First Session ([id number]): Pt seated in recliner upon arrival, pleasant and agreeable to therapy. Functional Transfers: Pt completed sit>stand from recliner>RW with SBA.  Pt completed multiple sit><stands from standard chair to

## 2022-03-17 NOTE — PROGRESS NOTES
ACUTE REHAB UNIT  SPEECH/LANGUAGE PATHOLOGY      [x] Daily  [] Weekly Care Conference Note  [] Discharge    Patient:Esther Murray      :1945  TVM:[id number]  Rehab Dx/Hx: Acute cerebrovascular accident (CVA) ([facility] [address] [I63.9]    Precautions: [] Aspiration  [x] Fall risk  [] Sternal  [] Seizure [] Hip  [] Weight Bearing [] Other  ST Dx: [] Aphasia  [] Dysarthria  [] Apraxia   [] Oropharyngeal dysphagia [x] Cognitive-linguistic Impairment  [x] Other: Dysphonia (baseline)  Date of Admit: 3/8/2022  Room #: 3113/3113-01  Date: 3/17/2022          Current Diet Order:ADULT DIET; Regular; Low Sodium (2 gm)     Subjective: Patient pleasant and cooperative. Appeared slightly anxious with assessment. Lives With: Daughter  Homemaking Responsibilities: Yes  Occupation: Retired  Type of occupation: Cosmotologist  Leisure & Hobbies: travel    Elysia Amb[address]ioraymakstephanie [address] 62: Impaired (Reports occasional difficulty with her L eye)   Vision Exceptions: Wears glasses for reading  Hearing  Hearing: Within functional limits  Barriers toward progress: Cognitive-linguistic deficit, Anxiety and Limited insight into deficits      Date: 3/17/2022       Tx session 1 Tx session 2 Weekly Summary    Total Timed Code Min 15 15    Total Treatment Minutes 30 30    Individual Treatment Minutes 30 30    Group Treatment Minutes 0 0    Co-Treat Minutes 0 0    Brief Exception: N/A N/A    Pain Denies  Denies     Pain Intervention: [] RN notified  [] Repositioned  [] Intervention offered and patient declined  [x] N/A  [] Other: [] RN notified  [] Repositioned  [] Intervention offered and patient declined  [x] N/A  [] Other:    Subjective:     Pt pleasant and cooperative, sitting upright in her chair. Pt pleasant and cooperative, sitting upright in her chair.     Patient will complete higher level/abstract naming tasks to improve mental flexibility at 90% acc given min cues <2 episodes of perseveration    No perseveration noted with abstract naming, h/w divergent naming not targeted. She demonstrated abstract naming with 80% acc (I) increasing to 90% given cues. Abstract divergent namin% with occasional perseveration  Matching definitions approx 75% with increased time and consistent cues  Making progress. Continues to perseverate during divergent thinking tasks. Continue goal.    Patient will complete verbal/visual reasoning tasks at 90% acc given min tasks (meds,times,finance etc)     Did not target    Indirectly targeted with matching definition task. Required increased time to scan columns and successfully complete the task. Making progress. Difficulty reasoning with written cues. Will benefit from continued medication review. Continue goal.    Patient will complete graded memory tasks with learned strategies at 90% acc given min cues Immed recall ST facts 2/3 (I). 3/3 after 10 min delay. Strategies reviewed. She did not recall this ST, although had previously seen the pt on 2 occasions. Recall for pattern on a grid utilizing [employer] regina.   5 items: 3/3   6 items: 2/3  Recalled 3/3 ST facts from earlier session. Making progress. Demonstrated good STM this date between sessions. She is receptive of strategies. Continue goal.    Other areas targeted: N/A N/A    Education:   Education re: rationale for ST tasks, internal memory strategies   Ongoing education re: rationale for ST tasks & strategies to reduced perseveration     Safety Devices: [x] Call light within reach  [x] Chair alarm activated and connected to nurse call light system  [] Bed alarm activated   [] Other:  [x] Call light within reach  [x] Chair alarm activated and connected to nurse call light system  [] Bed alarm activated   [] Other:    Assessment: Mild cognitive-linguistic deficits in the areas of verbal/visual reasoning, memory and abstract language. Continues to participate well in tx & is receptive to education & strategies.  She will benefit from ongoing ST services to maximize potential for returning to OF.    .    Plan:  Continue per POC Continue per POC. Interventions used this date:  [x] Speech/Language Treatment  [] Instruction in HEP  [] Dysphagia Treatment [x] Cognitive Treatment   [] Other:    Discharge recommendations:  [] Home independently  [x] Home with assistance []  24 hour supervision  [] ECF [x] Other : Would recommend assist with finances/meds at DC given cognitive-linguistic and visual deficits. Educated pt on this recommendation on 3/11  Continued Tx Upon Discharge: ? [x] Yes    [] No    [] TBD based on progress while on ARU     [] Vital Stim indicated     [] Other:   Estimated discharge date: 3/19/22    Thank you.    Irving Bhandari M.A, CCC-SLP/ CBIS

## 2022-03-17 NOTE — PROGRESS NOTES
Physical Therapy  Facility/Department: [facility] ACUTE REHAB UNIT  Daily Treatment Note  NAME: Pino Murray  : 1945  MRN: [id number]    Date of Service: 3/17/2022    Discharge Recommendations:  Continue to assess pending progress,24 hour supervision or assist,Home with Home health PT   PT Equipment Recommendations  Mobility Devices: [place]: Rolling    Assessment   Body structures, Functions, Activity limitations: Decreased functional mobility ; Decreased balance;Decreased endurance;Decreased strength;Decreased ADL status; Decreased coordination;Decreased cognition  Assessment: Pt demo's improved endurance through increasing ambulation distance- continues to demo decreased R foot clearance with fatigue however able to correct with VC and occasional standing rest breaks as needed. Pt demo's progression in stairs through completing ascent/descent of 12 stairs with one handrail only. Pt would benefit from continued skilled PT in order to progress towards independent PLOF . Treatment Diagnosis: impaired mobility associated with CVA  Prognosis: Good  Decision Making: Medium Complexity  PT Education: General Safety;Gait Training;Goals;Plan of Care;Energy Conservation  Patient Education: pt verbalizes understanding- continue to reinforce  Barriers to Learning: some decreased memory noted  REQUIRES PT FOLLOW UP: Yes  Activity Tolerance  Activity Tolerance: Patient limited by fatigue;Patient limited by endurance; Patient Tolerated treatment well     Patient Diagnosis(es): There were no encounter diagnoses. has a past medical history of Lupus (HCC) and MVP (mitral valve prolapse). has a past surgical history that includes Tonsillectomy; Colonoscopy; and Hysterectomy. Restrictions  Restrictions/Precautions  Restrictions/Precautions: Fall Risk,Seizure  Position Activity Restriction  Other position/activity restrictions: up as tolerated  Subjective   General  Chart Reviewed:  Yes  Additional Pertinent Hx: Patient is a 78 y/o female admitted 3/5 with right leg weakness and unsteadiness. CT head and chest x-ray (-) for acute findings. MRI brain (+) for Acute infarct involving the deep white matter of the posterior left frontal lobe. PMH significant for lupus. Admitted to ARU 3/8. Response To Previous Treatment: Patient with no complaints from previous session. Family / Caregiver Present: No  Referring Practitioner: Jasen Altamirano  General Comment  Comments: Pt found seated in recliner upon PT arrival.  Pt agreeable to therapy session. Orientation     Cognition      Objective      Transfers  Sit to Stand: Contact guard assistance (from recliner/bar height chair up to RW/no device)  Stand to sit: Contact guard assistance (to recliner/bar height chair with RW/ no device)  Ambulation  Ambulation?: Yes  WB Status: No WB restrictions noted  More Ambulation?: No  Ambulation 1  Surface: level tile;uneven;ramp  Device: Rolling Walker  Assistance: Contact guard assistance  Quality of Gait: slightly decreased zac, narrow SHU, decreased foot clearance R with fatigue, decreased stance time R, improved flexion during R LE swing phase  Gait Deviations: Slow Zac;Decreased step length;Decreased step height  Distance: 490', 125', 15' x 2  Comments: Cues for increased SHU, R foot clearance and R ankle DF. In instances where pt unable to clear R LE with VC standing rest breaks taken with pt demo'ing ability to clear R LE afterwards. Stairs/Curb  Stairs?: Yes  Stairs  # Steps : 15  Stairs Height: 6\"  Rails: Left ascending;Bilateral  Device: No Device  Assistance: Contact guard assistance;Minimal assistance (CGA first round, min A second round)  Comment: ascent/descent of 12, 6\" steps with UL handrail and step to pattern + ascent/descent of 3, 6\" with B handrails and reciprocal pattern. VC for sequencing first round with pt occassionally intiating reciprocal pattern before VC.  Assist required second round for minor R knee buckling on descent, R knee hyperextension on ascent- pt verbalizng afterwards how she can notice how step - to is easier afterwards. Exercises:   x15 standing hamstring curls + 20 R LE marches with emphasis on df with UE support     Balance  Posture: Fair  Sitting - Static: Good (indp seated on commode)  Sitting - Dynamic: Good;- (sueprvision for seated weight shifting on commode)  Standing - Static: Fair (CGA without UE support)  Standing - Dynamic: Fair;- (CGA for ambulation with RW, CGA-min for balance activity (see below), CGA for brief/pants management in standing without UE support)  Comments: Pt reports needing to use restroom at end of session. While in gym pt completes x 5 + x 7 B trunk rotation with physio ball (CGA-min for balance d/t ocassional L lean) and 2 x 10 physioball OH raise with mini sqaut at bottom/ scap retraction at top (CGA-min required for balance d/t occassional postrior lean) . Second Session:   Conversation with pt daughter regarding AFO use and therapy recommendations while pt participating with OT d/t pt/family question reported by OT. Previously pt had reported not wanting to use AFO with pt performing well in therapy without- explained to pt daughter reasoning for why AFO not ordered including wanting to give R ankle opportunity to strengthen and pt demo'ing ability to correct with VC and standing rest. Daughter continues to insist that pt/herself would like to have one available for longer walks. Therefore PT provides daughter with information on where to order AFO. Option of OP therapy as opposed to home therapy also discussed with daughter  Including pro's and cons of both options. Daughter and pt eventually choosing OP pt with daughter reporting she will be able to drive her mom to appointments. SW called to update.               Goals  Short term goals  Time Frame for Short term goals: 10 days  Short term goal 1: Pt will perform bed mobility with modified independence -ongoing  Short term goal 2: Pt will perform transfers sit<>stand with SBA and LRAD -ongoing  Short term goal 3: Pt will ambulate 150' with CGA and LRAD - met 3/16  Short term goal 4: Pt will ascend/descend 5 stairs with B handrails and SBA -ongoing  Short term goal 5: Pt will stand without UE support while completing dynamic UE task with CGA -met 3/15  Long term goals  Time Frame for Long term goals : 3 weeks  Long term goal 1: Pt will perform sit<>stand transfers with mod I and LRAD -ongoing  Long term goal 2: Pt will ambulate 150' with mod I and LRAD -ongoing  Long term goal 3: Pt will ascend/descend 12 stairs with mod I and UL handrail -ongoing  Long term goal 4: Pt will stand without UE support while completing dynamic UE task with mod I -ongoing  Patient Goals   Patient goals : get better and get home, improve walking    Plan    Plan  Times per week: 5 days a week, 60 min  Times per day: Daily  Current Treatment Recommendations: Strengthening,Transfer Training,Endurance Training,Patient/Caregiver Education & Training,Balance Training,Gait Training,Functional Mobility Training,Safety Education & Training,Neuromuscular Re-education,Stair training,Equipment Evaluation, Education, & procurement  Safety Devices  Type of devices:  All fall risk precautions in place,Call light within reach,Chair alarm in place,Gait belt,Left in chair  Restraints  Initially in place: No     Therapy Time   Individual Concurrent Group Co-treatment   Time In 1100         Time Out 1210         Minutes 70              Second Session Therapy Time:   Individual Concurrent Group Co-treatment   Time In 1510         Time Out 1523         Minutes 13           Timed Code Treatment Minutes:  60+13    Total Treatment Minutes:  [address] PT, [place] [id number]

## 2022-03-17 NOTE — PLAN OF CARE
Problem: Falls - Risk of:  Goal: Will remain free from falls  Description: Will remain free from falls  3/17/2022 1556 by Paolo Tan RN  Outcome: Ongoing  Note: Remains free from falls this shift. Fall precautions in place. Patient calls out for assistance, call light within reach. Bed/chair alarm utilized.      Problem: Mobility - Impaired:  Goal: Mobility will improve  Description: Mobility will improve  3/17/2022 1556 by Paolo Tan RN  Outcome: Ongoing     Problem: Skin Integrity:  Goal: Absence of new skin breakdown  Description: Absence of new skin breakdown  Outcome: Ongoing

## 2022-03-17 NOTE — PROGRESS NOTES
Shift assessment complete. VSS. A&Ox4. Denies pain at this time. Fall precautions in place. Patient up to chair for breakfast, chair alarm utilized, call light within reach. Patient reports no complaints at this time. Patient showered this morning with night shift staff, informed in morning report.      Vitals:    03/17/22 0915   BP: 111/68   Pulse: 81   Resp: 18   Temp:    SpO2:

## 2022-03-18 LAB
ANION GAP SERPL CALCULATED.3IONS-SCNC: 9 MMOL/L (ref 3–16)
BASOPHILS ABSOLUTE: 0 K/UL (ref 0–0.2)
BASOPHILS RELATIVE PERCENT: 0.6 %
BUN BLDV-MCNC: 33 MG/DL (ref 7–20)
CALCIUM SERPL-MCNC: 10.3 MG/DL (ref 8.3–10.6)
CHLORIDE BLD-SCNC: 99 MMOL/L (ref 99–110)
CO2: 27 MMOL/L (ref 21–32)
CREAT SERPL-MCNC: 1 MG/DL (ref 0.6–1.2)
EOSINOPHILS ABSOLUTE: 0.2 K/UL (ref 0–0.6)
EOSINOPHILS RELATIVE PERCENT: 4.2 %
GFR AFRICAN AMERICAN: >60
GFR NON-AFRICAN AMERICAN: 54
GLUCOSE BLD-MCNC: 102 MG/DL (ref 70–99)
HCT VFR BLD CALC: 36.3 % (ref 36–48)
HEMOGLOBIN: 12.9 G/DL (ref 12–16)
LYMPHOCYTES ABSOLUTE: 1.8 K/UL (ref 1–5.1)
LYMPHOCYTES RELATIVE PERCENT: 33 %
MCH RBC QN AUTO: 32.4 PG (ref 26–34)
MCHC RBC AUTO-ENTMCNC: 35.4 G/DL (ref 31–36)
MCV RBC AUTO: 91.5 FL (ref 80–100)
MONOCYTES ABSOLUTE: 0.5 K/UL (ref 0–1.3)
MONOCYTES RELATIVE PERCENT: 9.3 %
NEUTROPHILS ABSOLUTE: 3 K/UL (ref 1.7–7.7)
NEUTROPHILS RELATIVE PERCENT: 52.9 %
PDW BLD-RTO: 13.3 % (ref 12.4–15.4)
PLATELET # BLD: 247 K/UL (ref 135–450)
PMV BLD AUTO: 9 FL (ref 5–10.5)
POTASSIUM REFLEX MAGNESIUM: 4.4 MMOL/L (ref 3.5–5.1)
RBC # BLD: 3.96 M/UL (ref 4–5.2)
SODIUM BLD-SCNC: 135 MMOL/L (ref 136–145)
WBC # BLD: 5.6 K/UL (ref 4–11)

## 2022-03-18 PROCEDURE — 6370000000 HC RX 637 (ALT 250 FOR IP): Performed by: PHYSICAL MEDICINE & REHABILITATION

## 2022-03-18 PROCEDURE — 97129 THER IVNTJ 1ST 15 MIN: CPT

## 2022-03-18 PROCEDURE — 80048 BASIC METABOLIC PNL TOTAL CA: CPT

## 2022-03-18 PROCEDURE — 99222 1ST HOSP IP/OBS MODERATE 55: CPT | Performed by: PHYSICAL MEDICINE & REHABILITATION

## 2022-03-18 PROCEDURE — 97530 THERAPEUTIC ACTIVITIES: CPT

## 2022-03-18 PROCEDURE — 6370000000 HC RX 637 (ALT 250 FOR IP): Performed by: STUDENT IN AN ORGANIZED HEALTH CARE EDUCATION/TRAINING PROGRAM

## 2022-03-18 PROCEDURE — 1280000000 HC REHAB R&B

## 2022-03-18 PROCEDURE — 97535 SELF CARE MNGMENT TRAINING: CPT

## 2022-03-18 PROCEDURE — 85025 COMPLETE CBC W/AUTO DIFF WBC: CPT

## 2022-03-18 PROCEDURE — 97130 THER IVNTJ EA ADDL 15 MIN: CPT

## 2022-03-18 PROCEDURE — 97116 GAIT TRAINING THERAPY: CPT

## 2022-03-18 RX ORDER — LISINOPRIL 5 MG/1
5 TABLET ORAL DAILY
Qty: 30 TABLET | Refills: 3 | Status: SHIPPED | OUTPATIENT
Start: 2022-03-18

## 2022-03-18 RX ORDER — CLOPIDOGREL BISULFATE 75 MG/1
75 TABLET ORAL DAILY
Qty: 20 TABLET | Refills: 0 | Status: SHIPPED | OUTPATIENT
Start: 2022-03-18 | End: 2022-04-07

## 2022-03-18 RX ORDER — CLONIDINE HYDROCHLORIDE 0.1 MG/1
0.1 TABLET ORAL EVERY 8 HOURS PRN
Qty: 60 TABLET | Refills: 3 | Status: SHIPPED | OUTPATIENT
Start: 2022-03-18

## 2022-03-18 RX ORDER — PANTOPRAZOLE SODIUM 40 MG/1
40 TABLET, DELAYED RELEASE ORAL
Qty: 30 TABLET | Refills: 3 | Status: SHIPPED | OUTPATIENT
Start: 2022-03-19

## 2022-03-18 RX ORDER — POLYETHYLENE GLYCOL 3350 17 G/17G
17 POWDER, FOR SOLUTION ORAL DAILY
Qty: 527 G | Refills: 1 | Status: SHIPPED | OUTPATIENT
Start: 2022-03-18 | End: 2022-04-17

## 2022-03-18 RX ORDER — SENNA PLUS 8.6 MG/1
1 TABLET ORAL NIGHTLY
Qty: 30 TABLET | Refills: 0 | Status: SHIPPED | OUTPATIENT
Start: 2022-03-18 | End: 2022-04-17

## 2022-03-18 RX ORDER — HYDROCHLOROTHIAZIDE 25 MG/1
25 TABLET ORAL DAILY
Qty: 30 TABLET | Refills: 3 | Status: SHIPPED | OUTPATIENT
Start: 2022-03-18

## 2022-03-18 RX ORDER — ONDANSETRON 4 MG/1
4 TABLET, ORALLY DISINTEGRATING ORAL EVERY 8 HOURS PRN
Qty: 30 TABLET | Refills: 0 | Status: SHIPPED | OUTPATIENT
Start: 2022-03-18

## 2022-03-18 RX ORDER — CETIRIZINE HYDROCHLORIDE 10 MG/1
10 TABLET ORAL DAILY
Qty: 60 TABLET | Refills: 1 | Status: SHIPPED | OUTPATIENT
Start: 2022-03-18

## 2022-03-18 RX ORDER — ASPIRIN 81 MG/1
81 TABLET ORAL DAILY
Qty: 30 TABLET | Refills: 3 | Status: SHIPPED | OUTPATIENT
Start: 2022-03-18

## 2022-03-18 RX ORDER — LOPERAMIDE HYDROCHLORIDE 2 MG/1
2 CAPSULE ORAL 4 TIMES DAILY PRN
Status: DISCONTINUED | OUTPATIENT
Start: 2022-03-18 | End: 2022-03-19 | Stop reason: HOSPADM

## 2022-03-18 RX ADMIN — LISINOPRIL 5 MG: 5 TABLET ORAL at 09:19

## 2022-03-18 RX ADMIN — PANTOPRAZOLE SODIUM 40 MG: 40 TABLET, DELAYED RELEASE ORAL at 07:01

## 2022-03-18 RX ADMIN — HYDROCHLOROTHIAZIDE 25 MG: 25 TABLET ORAL at 09:19

## 2022-03-18 RX ADMIN — GUAIFENESIN 600 MG: 600 TABLET, EXTENDED RELEASE ORAL at 09:19

## 2022-03-18 RX ADMIN — CLOPIDOGREL BISULFATE 75 MG: 75 TABLET ORAL at 09:20

## 2022-03-18 RX ADMIN — CETIRIZINE HYDROCHLORIDE 10 MG: 10 TABLET, FILM COATED ORAL at 09:20

## 2022-03-18 RX ADMIN — ASPIRIN 81 MG: 81 TABLET, COATED ORAL at 09:20

## 2022-03-18 RX ADMIN — GUAIFENESIN 600 MG: 600 TABLET, EXTENDED RELEASE ORAL at 22:06

## 2022-03-18 RX ADMIN — ATORVASTATIN CALCIUM 80 MG: 80 TABLET, FILM COATED ORAL at 22:06

## 2022-03-18 ASSESSMENT — PAIN SCALES - GENERAL
PAINLEVEL_OUTOF10: 0
PAINLEVEL_OUTOF10: 0

## 2022-03-18 NOTE — PROGRESS NOTES
ACUTE REHAB UNIT  SPEECH/LANGUAGE PATHOLOGY      [] Daily  [] Weekly Care Conference Note  [x] Discharge    Patient:Esther Murray      :1945  MBT:[id number]  Rehab Dx/Hx: Acute cerebrovascular accident (CVA) ([facility] [address] [I63.9]    Precautions: [] Aspiration  [x] Fall risk  [] Sternal  [] Seizure [] Hip  [] Weight Bearing [] Other  ST Dx: [] Aphasia  [] Dysarthria  [] Apraxia   [] Oropharyngeal dysphagia [x] Cognitive-linguistic Impairment  [x] Other: Dysphonia (baseline)  Date of Admit: 3/8/2022  Room #: 3113/3113-01  Date: 3/18/2022          Current Diet Order:ADULT DIET; Regular; Low Sodium (2 gm)     Subjective: Patient pleasant and cooperative. Appeared slightly anxious with assessment. Lives With: Daughter  Homemaking Responsibilities: Yes  Occupation: Retired  Type of occupation: Cosmotologist  Leisure & Hobbies: travel    Elysia Preci[address]omakstephanie [address] 62: Impaired (Reports occasional difficulty with her L eye)   Vision Exceptions: Wears glasses for reading  Hearing  Hearing: Within functional limits  Barriers toward progress: Cognitive-linguistic deficit, Anxiety and Limited insight into deficits      Date: 3/18/2022       Tx session 1 Tx session 2 DIscharge Summary    Total Timed Code Min 30 30    Total Treatment Minutes 30 30    Individual Treatment Minutes 30 30    Group Treatment Minutes 0 0    Co-Treat Minutes 0 0    Brief Exception: N/A N/A    Pain Denies  Denies     Pain Intervention: [] RN notified  [] Repositioned  [] Intervention offered and patient declined  [x] N/A  [] Other: [] RN notified  [] Repositioned  [] Intervention offered and patient declined  [x] N/A  [] Other:    Subjective:     Pt pleasant and cooperative. Daughter present during the majority of the session. Pt pleasant and cooperative, sitting upright in her chair.      Patient will complete higher level/abstract naming tasks to improve mental flexibility at 90% acc given min cues <2 episodes of perseveration    Cognitive Reorganization- Defining Homographs: 18.5/20 given min cues with minimal perseveration     Cognitive Reorganization- Naming Categories: 5/7 (I)  GOAL MET  Naming words by letters: 90% acc given min cues   Perseveration x1 with self-correction with divergent naming  GOAL MET; self- correction with minimal perseveration this date    Patient will complete verbal/visual reasoning tasks at 90% acc given min tasks (meds,times,finance etc)   Did not directly target  Did not directly target   Making progress. Difficulty reasoning with written cues. Will benefit from continued medication review. Continue goal at next level of care. Patient will complete graded memory tasks with learned strategies at 90% acc given min cues Recalled 3/3 ST facts from yesterday's session given min cues on 1/3. SLUMS STM: 0/5 ; Passage recall 3/4   Strategies reviewed. Medications reviewed. Improved recall with strategy use (visual aid/ grouping)  SLUMS STM: 0/5 ; Passage recall 3/4   Continue goal at next level of care. Improved medication recall with use of visual aid      Other areas targeted: N/A N/A N/A   Education:   Education re: rationale for ST tasks, internal memory strategies   Ongoing education re: rationale for ST tasks & strategies to reduce perseveration     Safety Devices: [x] Call light within reach  [x] Chair alarm activated and connected to nurse call light system  [] Bed alarm activated   [] Other:  [x] Call light within reach  [x] Chair alarm activated and connected to nurse call light system  [] Bed alarm activated   [] Other:    Discharge Assessment: Patient has made steady progress since initial eval. Mild cognitive-linguistic deficits remains in the areas of verbal/visual reasoning, memory and abstract language. Perseveration has improved. Continues to participate well in tx & is receptive to education & strategies.  She scored a 19/30 on the SLUMS (27-30=WFL) this date, h/w daughter indicates baseline cognitive changes, especially in the area of memory. She will benefit from continued  ST services at the next level of care as indicated. Additionally, she will benefit from family supervision for medications/ finances/ cooking etc for utmost safety. .    Plan:  Continue per POC D/c home 3/19 per MD     Interventions used this date:  [] Speech/Language Treatment  [] Instruction in HEP  [] Dysphagia Treatment [x] Cognitive Treatment   [] Other:    Discharge recommendations:  [] Home independently  [x] Home with assistance []  24 hour supervision  [] ECF [x] Other : Would recommend assist with finances/meds at DC given cognitive-linguistic and visual deficits. Educated pt on this recommendation on 3/11  Continued Tx Upon Discharge: ? [x] Yes    [] No    [] TBD based on progress while on ARU     [] Vital Stim indicated     [] Other:   Estimated discharge date: 3/19/22 per MD     Thank you.    Ever Middleton M.A, CCC-SLP/ CBIS

## 2022-03-18 NOTE — PROGRESS NOTES
Department of Physical Medicine & Rehabilitation  Progress Note    Patient Identification:  Bhargav Larose  [id number]  : 1945  Admit date: 3/8/2022    Chief Complaint: Acute cerebrovascular accident (CVA) ([address]    Subjective:   No new complaints overnight. She is doing well in therapy. Plan for discharge tomorrow. Having some mild loose stool. ROS: No f/c, n/v, cp     Objective:  Patient Vitals for the past 24 hrs:   BP Temp Temp src Pulse Resp SpO2   22 2228 136/74 97.6 °F (36.4 °C) Oral -- 16 97 %   22 0915 111/68 -- -- 81 18 --   22 0753 137/81 97.7 °F (36.5 °C) Oral 74 18 98 %     Const: Alert. WDWN. No distress  Eyes: Conjunctiva noninjected, no icterus noted; pupils equal, round, and reactive to light. HENT: Atraumatic, normocephalic; Oral mucosa moist  Neck: Trachea midline, neck supple. No thyromegaly noted. CV: Regular rate and rhythm, no murmur rub or gallop noted  Resp: Lungs clear to auscultation bilaterally, no rales wheezes or ronchi, no retractions. Respirations unlabored. GI: Soft, nontender, nondistended. Normal bowel sounds. No palpable masses. Skin: Normal temperature and turgor. No rashes or breakdown noted. Ext: No significant edema appreciated. No varicosities. MSK: No joint tenderness, erythema, warmth noted.  AROM intact. Neuro: Alert, oriented, appropriate. No cranial nerve deficits appreciated. Sensation intact to light touch. Motor examination reveals 5/5 strength in RUE, LUE, LLE. Strength 5/5 in RLE upon flexion and extension of hip. No abnormalities with finger/nose or heel/shin noted. Reflexes normal and symmetric. Psych: Stable mood, normal judgement, normal affect     Laboratory data: Available via EMR.    Last 24 hour lab  Recent Results (from the past 24 hour(s))   Basic Metabolic Panel w/ Reflex to MG    Collection Time: 22  5:58 AM   Result Value Ref Range    Sodium 135 (L) 136 - 145 mmol/L    Potassium reflex Magnesium 4.4 3.5 - 5.1 mmol/L    Chloride 99 99 - 110 mmol/L    CO2 27 21 - 32 mmol/L    Anion Gap 9 3 - 16    Glucose 102 (H) 70 - 99 mg/dL    BUN 33 (H) 7 - 20 mg/dL    CREATININE 1.0 0.6 - 1.2 mg/dL    GFR Non-African American 54 (A) >60    GFR African American >60 >60    Calcium 10.3 8.3 - 10.6 mg/dL   CBC auto differential    Collection Time: 03/18/22  6:12 AM   Result Value Ref Range    WBC 5.6 4.0 - 11.0 K/uL    RBC 3.96 (L) 4.00 - 5.20 M/uL    Hemoglobin 12.9 12.0 - 16.0 g/dL    Hematocrit 36.3 36.0 - 48.0 %    MCV 91.5 80.0 - 100.0 fL    MCH 32.4 26.0 - 34.0 pg    MCHC 35.4 31.0 - 36.0 g/dL    RDW 13.3 12.4 - 15.4 %    Platelets 263 646 - 220 K/uL    MPV 9.0 5.0 - 10.5 fL    Neutrophils % 52.9 %    Lymphocytes % 33.0 %    Monocytes % 9.3 %    Eosinophils % 4.2 %    Basophils % 0.6 %    Neutrophils Absolute 3.0 1.7 - 7.7 K/uL    Lymphocytes Absolute 1.8 1.0 - 5.1 K/uL    Monocytes Absolute 0.5 0.0 - 1.3 K/uL    Eosinophils Absolute 0.2 0.0 - 0.6 K/uL    Basophils Absolute 0.0 0.0 - 0.2 K/uL       Therapy progress:  PT  Position Activity Restriction  Other position/activity restrictions: up as tolerated  Objective     Sit to Stand: Contact guard assistance (from recliner/bar height chair up to RW/no device)  Stand to sit: Contact guard assistance (to recliner/bar height chair with RW/ no device)  Bed to Chair: Contact guard assistance (SPT)  Device: Rolling Walker  Other Apparatus: AFO,Right (ACE DF assist RLE for all except last round of ambulation (completed with R AFO))  Assistance: Contact guard assistance  Distance: 490', 125', 15' x 2  OT  PT Equipment Recommendations  Equipment Needed: Yes  Mobility Devices: [place]: Rolling  Other: continue to assess pending progress- would likely benefit from R AFO and RW at discharge  Toilet - Technique: Ambulating  Equipment Used: Standard toilet  Toilet Transfers Comments: Pt required VCs for RW mgmt during 180 turn, VCs for hand placement  Assessment        SLP          Body mass index is 23.19 kg/m². Assessment and Plan:  1. Acute CVA   - No focal deficits on exam however patient has continued sense of being off balance with walking and was noted in ED to have some ataxia. - CT, CTA head no acute findings. 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 - Stroke team consulted, advised initiation of DAPT and admission for further work-up  - Neurology following  - Echocardiogram pending  - Risk factor modification  - Hypercoagulable panel   - PT/OT/SLP     2. HTN  - Careful blood pressure lowering allowing for permissive HTN  - Monitor       3. Congestion  - mucinex  -improved    4.  Soft stool- one time immodium    PPx  DVT: lovenox  GI: pantoprazole    Rehab Progress: Improving  Anticipated Dispo: home  Services/DME:   ELOS: 3/19      Kevin Street D.O. M.P.H  PM&R  3/18/2022  7:43 AM

## 2022-03-18 NOTE — PROGRESS NOTES
Physical Therapy  Facility/Department: [facility] ACUTE REHAB UNIT  Daily Treatment Note/ Discharge Summary  NAME: Kingsley Murray  : 1945  MRN: [id number]    Date of Service: 3/18/2022    Discharge Recommendations:  Continue to assess pending progress,24 hour supervision or assist,Outpatient PT   PT Equipment Recommendations  Equipment Needed: Yes  Mobility Devices: [place]: Rolling  Other: Pt will benefit from AFO use for xtended distances- family provided with information on how to order    Assessment   Body structures, Functions, Activity limitations: Decreased functional mobility ; Decreased balance;Decreased endurance;Decreased strength;Decreased ADL status; Decreased coordination;Decreased cognition  Assessment: Pt son and daughter present for family training including gait belt use and transfer/stair/ambulation training. Pt contiues to require CGA for transfers, ambulation, and stairs however meets goal to complete bed mobility indpendently. Pt requires increased time to meet ling term goals to complete functional mobility with mod I / indp due to requiring increased time to meet goals. Pt does have 24/7 supervision/assist available upon discharge. Pt would benefit from OP PT for continued progression of ambulation, balance, and independence. Treatment Diagnosis: impaired mobility associated with CVA  Prognosis: Good  Decision Making: Medium Complexity  PT Education: General Safety;Gait Training;Goals;Plan of Care;Energy Conservation  Patient Education: Daughter and son educated on use of gait belt and gaurding/cues for transfers/ambulation/stairs  Barriers to Learning: some decreased memory noted  REQUIRES PT FOLLOW UP: Yes  Activity Tolerance  Activity Tolerance: Patient limited by fatigue;Patient limited by endurance; Patient Tolerated treatment well     Patient Diagnosis(es): There were no encounter diagnoses. has a past medical history of Lupus (HCC) and MVP (mitral valve prolapse).    has a past surgical history that includes Tonsillectomy; Colonoscopy; and Hysterectomy. Restrictions  Restrictions/Precautions  Restrictions/Precautions: Fall Risk,Seizure  Position Activity Restriction  Other position/activity restrictions: up as tolerated  Subjective   General  Chart Reviewed: Yes  Additional Pertinent Hx: Patient is a 78 y/o female admitted 3/5 with right leg weakness and unsteadiness. CT head and chest x-ray (-) for acute findings. MRI brain (+) for Acute infarct involving the deep white matter of the posterior left frontal lobe. PMH significant for lupus. Admitted to ARU 3/8. Response To Previous Treatment: Patient with no complaints from previous session. Family / Caregiver Present: Yes (son and daughter present for family training)  Referring Practitioner: Jasen Altamirano  General Comment  Comments: Pt found seated in recliner upon PT arrival.  Pt agreeable to therapy session.   Pain Screening  Patient Currently in Pain: Denies  Vital Signs  Patient Currently in Pain: Denies       Orientation     Cognition      Objective   Bed mobility  Rolling to Left: Independent  Rolling to Right: Independent  Supine to Sit: Independent  Sit to Supine: Independent  Comment: completed with  HOB flat and without handrails  Transfers  Sit to Stand: Contact guard assistance (from recliner/wc/commode up to RW)  Stand to sit: Contact guard assistance  Bed to Chair: Contact guard assistance (with RW via stand pivot)  Car Transfer: Contact guard assistance (walks up to car and completes with RW, LEs in and out independently)  Comment: VC for hand and foot placement  Ambulation  Ambulation?: Yes  WB Status: No WB restrictions noted  More Ambulation?: No  Ambulation 1  Surface: level tile;uneven;ramp  Device: Rolling Walker  Assistance: Contact guard assistance  Quality of Gait: slightly decreased zac, narrow SHU, decreased foot clearance R with fatigue, decreased stance time R, improved flexion during R LE swing phase  Distance: 350', 500' (including ascent/decent of 10' ramp), 10' x 2  Comments: Cues for increased SHU, R foot clearance and R ankle DF. In instances where pt unable to clear R LE with VC standing rest breaks taken with pt demo'ing ability to clear R LE afterwards. Stairs  # Steps : 12  Stairs Height: 6\"  Rails: Left ascending;Bilateral  Device: No Device  Assistance: Contact guard assistance  Comment: step to pattern, min VC for sequencing/ foot placement/ increased step clearance     Balance  Comments: Urgently needs to use bathroom post car transfer- incontentent of stool. Pt picks object off floor with CGA an UL UE support on RW.                            G-Code     OutComes Score                                                     AM-PAC Score             Goals  Short term goals  Time Frame for Short term goals: 10 days  Short term goal 1: Pt will perform bed mobility with modified independence -met 3/18  Short term goal 2: Pt will perform transfers sit<>stand with SBA and LRAD -ongoing  Short term goal 3: Pt will ambulate 150' with CGA and LRAD - met 3/16  Short term goal 4: Pt will ascend/descend 5 stairs with B handrails and SBA -ongoing  Short term goal 5: Pt will stand without UE support while completing dynamic UE task with CGA -met 3/15  Long term goals  Time Frame for Long term goals : 3 weeks  Long term goal 1: Pt will perform sit<>stand transfers with mod I and LRAD -ongoing  Long term goal 2: Pt will ambulate 150' with mod I and LRAD -ongoing  Long term goal 3: Pt will ascend/descend 12 stairs with mod I and UL handrail -ongoing  Long term goal 4: Pt will stand without UE support while completing dynamic UE task with mod I -ongoing  Patient Goals   Patient goals : get better and get home, improve walking    Plan    Plan  Times per week: 5 days a week, 60 min  Times per day: Daily  Current Treatment Recommendations: Makayla Mcpherson Training,Patient/Caregiver Education & Training,Balance Training,Gait Training,Functional Mobility Training,Safety Education & Training,Neuromuscular Re-education,Stair training,Equipment Evaluation, Education, & procurement  Safety Devices  Type of devices:  All fall risk precautions in place,Call light within reach,Chair alarm in place,Gait belt,Left in chair (daughter and son present)  Restraints  Initially in place: No     Therapy Time   Individual Concurrent Group Co-treatment   Time In 0930         Time Out 1030         Minutes 60         Timed Code Treatment Minutes: Mary Kay, [address] [place] [address]

## 2022-03-18 NOTE — PROGRESS NOTES
Shift assessment complete. VSS. A&Ox4. Denies pain at this time. Fall precautions in place. Patient up in chair for breakfast, chair alarm utilized, call light within reach, family bedside for family training. Patient with no complaints at this time. Patient refused lovenox injection, patient provided education.      Vitals:    03/18/22 0915   BP: 120/74   Pulse: 93   Resp: 16   Temp: 97.8 °F (36.6 °C)   SpO2: 98%

## 2022-03-18 NOTE — DISCHARGE INSTR - COC
{CHP DME OPCT:606839889}  Toileting  {CHP DME CNPS:480043333}  Feeding  {CHP DME AMZK:533001966}  Med Admin  {CHP DME RFQO:617318794}  Med Delivery   { PATRIA MED Delivery:632961559}    Wound Care Documentation and Therapy:        Elimination:  Continence: Bowel: {YES / HA:14055}  Bladder: {YES / AS:12397}  Urinary Catheter: {Urinary Catheter:225348314}   Colostomy/Ileostomy/Ileal Conduit: {YES / WI:56886}       Date of Last BM: ***    Intake/Output Summary (Last 24 hours) at 3/18/2022 0752  Last data filed at 3/17/2022 1848  Gross per 24 hour   Intake 840 ml   Output --   Net 840 ml     I/O last 3 completed shifts:   In: 5 [P.O.:840]  Out: -     Safety Concerns:     [employer] Safety Concerns:305265854}    Impairments/Disabilities:      [employer] Impairments/Disabilities:578923117}    Nutrition Therapy:  Current Nutrition Therapy:   [employer] Diet List:799409985}    Routes of Feeding: {[facility] DME Other Feedings:483060718}  Liquids: {Slp liquid thickness:85456}  Daily Fluid Restriction: {CHP DME Yes amt example:895417289}  Last Modified Barium Swallow with Video (Video Swallowing Test): {Done Not Done ROSP:924126787}    Treatments at the Time of Hospital Discharge:   Respiratory Treatments: ***  Oxygen Therapy:  {Therapy; copd oxygen:18127}  Ventilator:    { CC Vent OXFI:867606469}    Rehab Therapies: {THERAPEUTIC INTERVENTION:9804344067}  Weight Bearing Status/Restrictions: [employer] Weight Bearin}  Other Medical Equipment (for information only, NOT a DME order):  {EQUIPMENT:160291935}  Other Treatments: ***    Patient's personal belongings (please select all that are sent with patient):  {[facility] DME Belongings:556167012}    RN SIGNATURE:  {Esignature:081955742}    CASE MANAGEMENT/SOCIAL WORK SECTION    Inpatient Status Date: ***    Readmission Risk Assessment Score:  Readmission Risk              Risk of Unplanned Readmission:  13           Discharging to Facility/ Agency   Name: Address:  Phone:  Fax:    Dialysis Facility (if applicable)   Name:  Address:  Dialysis Schedule:  Phone:  Fax:    / signature: {Esignature:951833972}    PHYSICIAN SECTION    Prognosis: Good    Condition at Discharge: Stable    Rehab Potential (if transferring to Rehab): Good    Recommended Labs or Other Treatments After Discharge: PT/OT    Physician Certification: I certify the above information and transfer of Cuong Castellano  is necessary for the continuing treatment of the diagnosis listed and that she requires Home Care for greater 30 days.      Update Admission H&P: No change in H&P    PHYSICIAN SIGNATURE:  Electronically signed by Kim Ramos DO on 3/18/22 at 7:53 AM EDT

## 2022-03-18 NOTE — PLAN OF CARE
Problem: Falls - Risk of:  Goal: Will remain free from falls  Bed/Chair alarm. Call light in reach. Instructed to use call light for assistance  Problem: Falls - Risk of:  Goal: Absence of physical injury  Description: Absence of physical injury  Outcome: Ongoing     Description: Will remain free from falls  3/18/2022 0244 by Charline Smith RN  Outcome: Ongoing  Call light in reach. Bed in low position. Bed alarm/chair alarm  Problem: ACTIVITY INTOLERANCE/IMPAIRED MOBILITY  Goal: Mobility/activity is maintained at optimum level for patient  Outcome: Ongoing     Problem: Skin Integrity:  Goal: Will show no infection signs and symptoms             Skin to remain intact  Problem: Skin Integrity:  Goal: Absence of new skin breakdown  Description: Absence of new skin breakdown  3/18/2022 0244 by Charline Smith RN  Outcome: Ongoing  3/17/2022 1556 by Joyce Barba RN  Outcome: Ongoing     Description: Will show no infection signs and symptoms                         Outcome: Ongoing     3/17/2022 1556 by Joyce Barba RN  Outcome: Ongoing  Note: Remains free from falls this shift. Fall precautions in place. Patient calls out for assistance, call light within reach. Bed/chair alarm utilized.

## 2022-03-18 NOTE — CARE COORDINATION
Case Management Assessment            Discharge Note                    Date / Time of Note: 3/18/2022 9:42 AM                  Discharge Note Completed by: Eldora Litten, LSW    Patient Name: Anupama Murray   YOB: 1945  Diagnosis: Acute cerebrovascular accident (CVA) [facility]) [I63.9]   Date / Time: 3/8/2022  6:02 PM    Current PCP: Rosa Trejo MD  Clinic patient: No    Hospitalization in the last 30 days: No    Advance Directives:  Code Status: Full Code  [place] DNR form completed and on chart: Not Indicated    Financial:  Payor: MEDICARE / Plan: MEDICARE PART A AND B / Product Type: *No Product type* /      Pharmacy:    [facility]/pharmacy #[id number]- [address] [phone] - F [phone]  [address] [place] [address]  Phone: [phone] Fax: [phone]      Assistance purchasing medications?: Potential Assistance Purchasing Medications: No  Assistance provided by Case Management: None at this time    Does patient want to participate in local refill/ meds to beds program?:      Meds To Beds General Rules:  1. Can ONLY be done Monday- Friday between 8:30am-5pm  2. Prescription(s) must be in pharmacy by 3pm to be filled same day  3. Copy of patient's insurance/ prescription drug card and patient face sheet must be sent along with the prescription(s)  4. Cost of Rx cannot be added to hospital bill. If financial assistance is needed, please contact unit  or ;  or  CANNOT provide pharmacy voucher for patients co-pays  5.  Patients can then  the prescription on their way out of the hospital at discharge, or pharmacy can deliver to the bedside if staff is available. (payment due at time of pick-up or delivery - cash, check, or card accepted)     Able to afford home medications/ co-pay costs: Yes    ADLS:  Current PT AM-PAC Score:   /24  Current OT AM-PAC Score:   /24      DISCHARGE Disposition: home with family + outpt therapy at [facility]    Notification completed in HENS/PAS?:  Not Applicable    IMM Completed:   Not Indicated    Transportation:  Transportation PLAN for discharge: family   Mode of Transport: Private Car  Reason for medical transport:   Name of [address] [place] [address],[address]:   Time of Transport:     Home Care:  [address] ordered at discharge: No  [address]: Not Applicable  Orders faxed: No    Durable Medical Equipment:  DME Provider: [address] obtained during hospitalization: rolling walker    Home Oxygen and Respiratory Equipment:  Oxygen needed at discharge?: No  [address]: Not Applicable  Portable tank available for discharge?: No    Dialysis:  Dialysis patient: No    Referrals [facility] at [facility] for outpatient continued care: Outpatient PT/OT    Additional CM Notes:   SW met with Pt, dgt, and torrey at bedside this AM prior to "[employer]. Dgt states they prefer outpt therapy at [facility]. SW made referral to [facility] outpt therapy and faxed a facesheet and RX to fax # [phone] and RX given to dgt. LEVI called Rubina Reid with [employer] to deliver rolling walker to Pt's room today for DC home tomorrow. SW explained to dgt that Medicare does not cover any DME needs \"in the bathroom. \" Dgt states she will purchase items on [address]. No other DC needs. Emotional support provided. The Plan for Transition of Care is related to the following treatment goals of Acute cerebrovascular accident (CVA) [facility]) [I63.9]    The Patient and/or patient representative Makayla Watkins and her family were provided with a choice of provider and agrees with the discharge plan Yes    Freedom of choice list was provided with basic dialogue that supports the patient's individualized plan of care/goals and shares the quality data associated with the providers.  Yes    Care Transitions patient: No    Mine Handley  Case Management Department  Ph: [phone]

## 2022-03-18 NOTE — PLAN OF CARE
Problem: Falls - Risk of:  Goal: Will remain free from falls  Description: Will remain free from falls  Outcome: Ongoing     Problem: Mobility - Impaired:  Goal: Mobility will improve  Description: Mobility will improve  Outcome: Ongoing     Problem: ACTIVITY INTOLERANCE/IMPAIRED MOBILITY  Goal: Mobility/activity is maintained at optimum level for patient  Outcome: Ongoing     Problem: Skin Integrity:  Goal: Absence of new skin breakdown  Description: Absence of new skin breakdown  Outcome: Ongoing

## 2022-03-18 NOTE — PROGRESS NOTES
Occupational Therapy  Facility/Department: [facility] ACUTE REHAB UNIT  Daily Treatment Note  / Discharge Summary  Qamar Murray  : 1945  MRN: [id number]    Date of Service: 3/18/2022    Discharge Recommendations:  24 hour supervision or assist,Outpatient OT  OT Equipment Recommendations  Equipment Needed: Yes  Mobility Devices: ADL Assistive Devices  ADL Assistive Devices: Transfer Tub Bench;Grab Bars - toilet; Reacher  Other: . Assessment   Performance deficits / Impairments: Decreased functional mobility ; Decreased ADL status; Decreased balance;Decreased strength;Decreased fine motor control;Decreased high-level IADLs;Decreased coordination;Decreased safe awareness    Assessment: Pt made good progress during her time on ARU, meeting 4/5 STGS. Pt completes LB dressing and bathing SBA, and UB dressing and bathing with spvn. Pt completes all sit><stand transfers with SBA, and toilet and tub transfers with CGA. Pt's daughter completed family training with OT today and famiy will be providing 24hr care at home. Pt will also be participating in Outpatient therapy upon discharge. Pt would benefit from continued skilled OT services to maximize independence and safety in functional tasks, cont OT per POC. Treatment Diagnosis: Impaired ADLs, mobility and R motor control s/p CVA  Prognosis: Good  OT Education: Transfer Training;Plan of Care;ADL Adaptive Strategies;OT Role;Family Education  Patient Education: Daughter present for OT family training. OT and Pt demonstrated dry tub and toilet transfers for the daughter. Increased time spent educating daughter on assist levels for ADLs, Pt requires SBA-spvn for bathing and dressing. OT and daughter discussed need for TTB, daughter plans to order off [address] today/tomorrow. Daughter also works in health care profession and feels compentent to perform transfers and assist Pt in ADL tasks.   REQUIRES OT FOLLOW UP: Yes  Activity Tolerance  Activity Tolerance: Patient Tolerated treatment well  Safety Devices  Safety Devices in place: Yes  Type of devices: Call light within reach; Left in chair;Chair alarm in place         Patient Diagnosis(es): There were no encounter diagnoses. has a past medical history of Lupus (HCC) and MVP (mitral valve prolapse). has a past surgical history that includes Tonsillectomy; Colonoscopy; and Hysterectomy. Restrictions  Restrictions/Precautions  Restrictions/Precautions: Fall Risk,Seizure  Position Activity Restriction  Other position/activity restrictions: up as tolerated  Subjective   General  Chart Reviewed: Yes  Patient assessed for rehabilitation services?: Yes  Additional Pertinent Hx: Pt is 77 y.o female admitted to [address] [place] [address] with with RLE weakness and balance deficits. Admitted for acute CVA. CT, CTA head negative. 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 Admitted to ARU 3/8. Response to previous treatment: Patient with no complaints from previous session  Family / Caregiver Present: No  Referring Practitioner: Dr. Kendra Mckeon  Diagnosis: CVA  Subjective  Subjective: Pt seated in recliner upon arrival, dgt and son present. Pt pleasant and agreeable to therapy session      Orientation     Objective    ADL  Grooming: Stand by assistance (Pt washed hands, completed oral hygiene, and brushed hair in stance at sink)  UE Bathing: Supervision (all UB bathing componets seated on TTB)  LE Bathing: Stand by assistance (Washed/dried BLEs in fig 4 seated, Pt wash back arsen by lateral WSing, dried in stance at GBs with SBA. Pt washed/dried front arsen seated.)  UE Dressing: Supervision (Pt doffed/donned shirt seated on TTB)  LE Dressing: Stand by assistance;Supervision (Pt completed pants mgmt down/up in stance with SBA. Pt able to unthread/thread pants/brief with fig 4.  Pt doffed/donned shoes on TTB with fig 4 and spvn)  Toileting: Contact guard assistance (Pt completed pants mgmt in stance with CGA, continent of urine and completed pericare in stance with CGA.)        Balance  Sitting Balance: Stand by assistance (seated on TTB)  Standing Balance: Contact guard assistance  Standing Balance  Time: ~15 mins total  Activity: functional mobility and transfers, stance for pants mgmt and oral hygiene, stance for standing balance activity  Comment: Pt completed the following standing balance activitiy to increase standing tolerance for ADL tasks. Pt completed two ~90 sec stands at RW, OT placed 4 colored cones in front of Pt. Pt instructed to tap cones with R foot in standing. Pt able to tap individual cones and 1-2 colored sequences with CGA for balance. Pt reported RLE fatigue during activity. Pt demo'd mild ataxia in RLE, occasionally knocking cones over and decreased accuracy with targeting cones. Functional Mobility  Functional - Mobility Device: Rolling Walker  Activity: To/from bathroom; To/From therapy gym  Assist Level: Contact guard assistance  Functional Mobility Comments: Pt ambulated to/from bathroom twice (FT/OT discharge shower) and to/from therapy gym twice (FT/standing balance activity) with CGA. Pt took intermitent standing rest breaks due to RLE fatigue. VCs for R foot step height during ambulation. Toilet Transfers  Toilet - Technique: Ambulating  Equipment Used: Standard toilet  Toilet Transfer: Contact guard assistance  Toilet Transfers Comments: Pt completed dry toliet transfer during FT with daughter present, VCs to avoid use of GBs to simulate home environment. Pt reported urge to urinate after shower and completed another sit><stand transfer from RW><toilet  Tub Transfers  Tub - Transfer From: Rolling walker  Tub - Transfer Type: To and From  Tub - Transfer To: Transfer tub bench  Tub - Technique: Ambulating  Tub Transfers: Contact guard  Tub Transfers Comments: Pt completed dry tub transfer during FT with dtg present to simulate home environment.  Pt and dgt report having 1 GB on long side of tub. Increased time spent educated dgt on need for TTB for Pt as she will not be safe climbing in/out of bathtub at home. Shower Transfers  Shower - Transfer From: [address] [place] [address] - Transfer Type: To and From  Shower - Transfer To: Transfer tub bench  Shower - Technique: Ambulating  Shower Transfers: Contact Guard  Shower Transfers Comments: VCs for [employer] and sequencing 180 turn     Transfers  Sit to stand: Stand by assistance (recliner>RWx2, TTB>GBs, standard chair>RW x2)  Stand to sit: Stand by assistance (RW>standard chair, RW> recliner)  Transfer Comments: VCs for hand placement                       Cognition  Overall Cognitive Status: WFL  Arousal/Alertness: Appropriate responses to stimuli  Following Commands:  Follows all commands without difficulty  Attention Span: Attends with cues to redirect  Memory: Appears intact  Safety Judgement: Decreased awareness of need for assistance;Decreased awareness of need for safety  Problem Solving: Able to problem solve independently  Insights: Decreased awareness of deficits  Initiation: Requires cues for some  Sequencing: Requires cues for some                                         Plan   Plan  Times per week: 60 mins per day/ 5 days per week  Times per day: Daily  Current Treatment Recommendations: Functional Mobility Training,Safety Education & Training,Self-Care / ADL,Equipment Evaluation, Education, & procurement,Patient/Caregiver Education & Training,Strengthening,Balance Training,Neuromuscular Re-education  G-Code     OutComes Score                                                  AM-PAC Score             Goals  Short term goals  Time Frame for Short term goals: 10 days  Short term goal 1: Pt will complete all bathing componets with spvn-goal not met 3/18  Short term goal 2: Pt will complete toilet and tub transfers with CGA-goal met toilet 3/17, goal met for tub transfer 3/10  Short term goal 3: Pt will complete UB and LB dressing with SBA-goal met 3/14  Short term goal 4: Pt will complete oral hygiene and grooming tasks standing with SBA-goal met 3/14  Short term goal 5: Pt will complete NHPT assessment-goal met 3/10  Long term goals  Time Frame for Long term goals : 3 weeks  Long term goal 1: Pt will complete bathing componets with Mod I-goal not met 3/18  Long term goal 2: Pt will complete toilet and tub transfers with Mod I-goal not met 3/18  Long term goal 3: Pt will complete all dressing componets with Mod I- goal not met 3/18  Long term goal 4: Pt will complete oral hygiene/grooming in stance with Mod I- goal not met 3/18  Long term goal 5: Pt will complete R hand NHPT and score within 50th percentile for age-goal not met 3/18  Patient Goals   Patient goals : \"to go home\"       Therapy Time   Individual Concurrent Group Co-treatment   Time In 1100         Time Out 1200         Minutes 60             Total Time: 60 mins    GRISEL Livingston

## 2022-03-19 VITALS
DIASTOLIC BLOOD PRESSURE: 73 MMHG | BODY MASS INDEX: 23.09 KG/M2 | HEIGHT: 66 IN | TEMPERATURE: 97.8 F | RESPIRATION RATE: 18 BRPM | WEIGHT: 143.7 LBS | SYSTOLIC BLOOD PRESSURE: 123 MMHG | HEART RATE: 75 BPM | OXYGEN SATURATION: 97 %

## 2022-03-19 PROCEDURE — 6370000000 HC RX 637 (ALT 250 FOR IP): Performed by: PHYSICAL MEDICINE & REHABILITATION

## 2022-03-19 PROCEDURE — 99239 HOSP IP/OBS DSCHRG MGMT >30: CPT | Performed by: PHYSICAL MEDICINE & REHABILITATION

## 2022-03-19 PROCEDURE — 6370000000 HC RX 637 (ALT 250 FOR IP): Performed by: STUDENT IN AN ORGANIZED HEALTH CARE EDUCATION/TRAINING PROGRAM

## 2022-03-19 RX ADMIN — GUAIFENESIN 600 MG: 600 TABLET, EXTENDED RELEASE ORAL at 08:06

## 2022-03-19 RX ADMIN — LISINOPRIL 5 MG: 5 TABLET ORAL at 08:06

## 2022-03-19 RX ADMIN — HYDROCHLOROTHIAZIDE 25 MG: 25 TABLET ORAL at 08:06

## 2022-03-19 RX ADMIN — CLOPIDOGREL BISULFATE 75 MG: 75 TABLET ORAL at 08:06

## 2022-03-19 RX ADMIN — PANTOPRAZOLE SODIUM 40 MG: 40 TABLET, DELAYED RELEASE ORAL at 06:38

## 2022-03-19 RX ADMIN — CETIRIZINE HYDROCHLORIDE 10 MG: 10 TABLET, FILM COATED ORAL at 08:06

## 2022-03-19 RX ADMIN — ASPIRIN 81 MG: 81 TABLET, COATED ORAL at 08:05

## 2022-03-19 ASSESSMENT — PAIN SCALES - GENERAL: PAINLEVEL_OUTOF10: 0

## 2022-03-19 NOTE — PLAN OF CARE
No falls noted thus far this shift, bed in lowest position, alarm on, non-skid socks on, call light within reach, hourly checks, safety maintained. Call light within reach. No c/o pain throughout shift. WCTM.     Problem: Falls - Risk of:  Goal: Will remain free from falls  Description: Will remain free from falls  3/19/2022 0210 by Juan Miller RN  Outcome: Ongoing     Problem: Mobility - Impaired:  Goal: Mobility will improve  Description: Mobility will improve  3/19/2022 0210 by Juan Miller RN  Outcome: Ongoing     Problem: Skin Integrity:  Goal: Will show no infection signs and symptoms  Description: Will show no infection signs and symptoms  Outcome: Ongoing

## 2022-03-19 NOTE — PROGRESS NOTES
Patient's discharge instructions reviewed with her and her daughter. She denies any questions or concerns about discharge. Patient's daughter here to discharge patient to patient's home. Patient denies any pain at this time. Patient has belongings in her [place]. Prescriptions sent to patient's preferred pharmacy. Patient and daughter verbalized understanding. Daughter has script for outpatient therapy in hand. Taken to car per wheelchair and assisted into car safely.

## 2022-03-19 NOTE — DISCHARGE SUMMARY
however patient has continued sense of being off balance with walking and was noted in ED to have some ataxia. - CT, CTA head no acute findings. 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 - Stroke team consulted, advised initiation of DAPT and admission for further work-up  - Neurology following  - Echocardiogram pending  - Risk factor modification  - Hypercoagulable panel   - PT/OT/SLP     2. HTN  - Careful blood pressure lowering allowing for permissive HTN  - Monitor        3. Congestion  - mucinex  -improved     4. Soft stool- one time immodium       Discharge Exam:  Constitutional: Alert, WDWN, Pleasant, no distress  Head: Normocephalic, atruamatic, MMM  Eyes: Conjunctiva noninjected, no icterus, no drainage  Pulm: CTA bilat. Respirations non-labored. CV: No murmurs noted. RRR. Abd: Soft, nontender. NABS+  Ext: No edema, no varicosities  Neuro: Alert, fully oriented, appropriate   MSK: No joint abnormalities noted       Discharge Functional Status:    Physical therapy:  Bed Mobility: Scooting: Supervision (seated scooting)  Transfers: Sit to Stand: Contact guard assistance (from recliner/wc/commode up to RW)  Stand to sit: Contact guard assistance  Bed to Chair: Contact guard assistance (with RW via stand pivot)  Comment: PT instructed pt to regina shoes.  Only  set up req, WB Status: No WB restrictions noted  Ambulation 1  Surface: level tile,uneven,ramp  Device: Rolling Walker  Other Apparatus: AFO,Right (ACE DF assist RLE for all except last round of ambulation (completed with R AFO))  Assistance: Contact guard assistance  Quality of Gait: slightly decreased zac, narrow SHU, decreased foot clearance R with fatigue, decreased stance time R, improved flexion during R LE swing phase  Gait Deviations: Slow Zac,Decreased step length,Decreased step height  Distance: 350', 500' (including ascent/decent of 10' ramp), 10' x 2  Comments: Cues for increased SHU, R foot clearance and R ankle DF. In instances where pt unable to clear R LE with VC standing rest breaks taken with pt demo'ing ability to clear R LE afterwards. , Stairs  # Steps : 12  Stairs Height: 6\"  Rails: Left ascending,Bilateral  Device: No Device  Assistance: Contact guard assistance  Comment: step to pattern, min VC for sequencing/ foot placement/ increased step clearance  Mobility:  , PT Equipment Recommendations  Equipment Needed: Yes  Mobility Devices: Ch Stearns: Rolling  Other: Pt will benefit from AFO use for xtended distances- family provided with information on how to order, Assessment: Pt son and daughter present for family training including gait belt use and transfer/stair/ambulation training. Pt contiues to require CGA for transfers, ambulation, and stairs however meets goal to complete bed mobility indpendently. Pt requires increased time to meet ling term goals to complete functional mobility with mod I / indp due to requiring increased time to meet goals. Pt does have 24/7 supervision/assist available upon discharge. Pt would benefit from OP PT for continued progression of ambulation, balance, and independence. Occupational therapy:  ,  , Assessment: Pt made good progress during her time on ARU, meeting 4/5 STGS. Pt completes LB dressing and bathing SBA, and UB dressing and bathing with spvn. Pt completes all sit><stand transfers with SBA, and toilet and tub transfers with CGA. Pt's daughter completed family training with OT today and famiy will be providing 24hr care at home. Pt will also be participating in Outpatient therapy upon discharge. Pt would benefit from continued skilled OT services to maximize independence and safety in functional tasks, cont OT per POC.     Speech therapy:         Significant Diagnostics:   Lab Results   Component Value Date    CREATININE 1.0 03/18/2022    BUN 33 (H) 03/18/2022     (L) 03/18/2022    K 4.4 03/18/2022    CL 99 03/18/2022    CO2 27 03/18/2022       Lab Results   Component Value Date    WBC 5.6 03/18/2022    HGB 12.9 03/18/2022    HCT 36.3 03/18/2022    MCV 91.5 03/18/2022     03/18/2022       Disposition:  home    Services:PT/OT  DME: walker    Discharge Condition: Stable    Follow-up:  See after visit summary from hospitalization    Discharge Medications:     Medication List      START taking these medications    cetirizine 10 MG tablet  Commonly known as: ZYRTEC  Take 1 tablet by mouth daily     lisinopril 5 MG tablet  Commonly known as: PRINIVIL;ZESTRIL  Take 1 tablet by mouth daily     ondansetron 4 MG disintegrating tablet  Commonly known as: ZOFRAN-ODT  Take 1 tablet by mouth every 8 hours as needed for Nausea or Vomiting     pantoprazole 40 MG tablet  Commonly known as: PROTONIX  Take 1 tablet by mouth every morning (before breakfast)     polyethylene glycol 17 g packet  Commonly known as: GLYCOLAX  Take 17 g by mouth daily     senna 8.6 MG tablet  Commonly known as: SENOKOT  Take 1 tablet by mouth nightly        CONTINUE taking these medications    aspirin 81 MG EC tablet  Take 1 tablet by mouth daily     atorvastatin 80 MG tablet  Commonly known as: LIPITOR  Take 1 tablet by mouth nightly     cloNIDine 0.1 MG tablet  Commonly known as: CATAPRES  Take 1 tablet by mouth every 8 hours as needed for High Blood Pressure (sbp> 180 or DBP> 100)     clopidogrel 75 MG tablet  Commonly known as: PLAVIX  Take 1 tablet by mouth daily for 20 doses     hydroCHLOROthiazide 25 MG tablet  Commonly known as: HYDRODIURIL  Take 1 tablet by mouth daily           Where to Get Your Medications      These medications were sent to [facility]/pharmacy #[id number]- [place], [place] - [address] - P [phone] - F [phone]  [address], [facility] [address]    Phone: [phone]   · aspirin 81 MG EC tablet  · cetirizine 10 MG tablet  · cloNIDine 0.1 MG tablet  · clopidogrel 75 MG tablet  · hydroCHLOROthiazide 25 MG tablet  · lisinopril 5 MG tablet  · ondansetron 4 MG disintegrating tablet  · pantoprazole 40 MG tablet  · polyethylene glycol 17 g packet  · senna 8.6 MG tablet           I spent over 35 minutes on this discharge encounter between counseling, coordination of care, and medication reconciliation. To comply with Ira bylaw R.II.4.1:   Discharge order placed in advance to facilitate patients discharge needs.       Dino Aggarwal, DO

## 2022-03-19 NOTE — PLAN OF CARE
Problem: Falls - Risk of:  Goal: Will remain free from falls  3/19/2022 0948 by Dottie Vázquez RN  Outcome: Completed  3/19/2022 0210 by Chase Kramer RN  Outcome: Ongoing  Goal: Absence of physical injury  Outcome: Completed     Problem: Mobility - Impaired:  Goal: Mobility will improve  3/19/2022 0948 by Dottie Vázquez RN  Outcome: Completed  3/19/2022 0210 by Chase Kramer RN  Outcome: Ongoing     Problem: ACTIVITY INTOLERANCE/IMPAIRED MOBILITY  Goal: Mobility/activity is maintained at optimum level for patient  Outcome: Completed     Problem: Skin Integrity:  Goal: Will show no infection signs and symptoms  3/19/2022 0948 by Dottie Vázquez RN  Outcome: Completed  3/19/2022 0210 by Chase Kramer RN  Outcome: Ongoing  Goal: Absence of new skin breakdown  Outcome: Completed

## 2022-03-24 ENCOUNTER — HOSPITAL ENCOUNTER (OUTPATIENT)
Dept: PHYSICAL THERAPY | Age: 77
Setting detail: THERAPIES SERIES
Discharge: HOME | End: 2022-03-24
Payer: MEDICARE

## 2022-03-24 ENCOUNTER — HOSPITAL ENCOUNTER (OUTPATIENT)
Dept: SPEECH THERAPY | Age: 77
Setting detail: THERAPIES SERIES
Discharge: HOME | End: 2022-03-24
Payer: MEDICARE

## 2022-03-24 ENCOUNTER — HOSPITAL ENCOUNTER (OUTPATIENT)
Dept: OCCUPATIONAL THERAPY | Age: 77
Setting detail: THERAPIES SERIES
Discharge: HOME | End: 2022-03-24
Payer: MEDICARE

## 2022-03-24 PROCEDURE — 97162 PT EVAL MOD COMPLEX 30 MIN: CPT

## 2022-03-24 PROCEDURE — 97112 NEUROMUSCULAR REEDUCATION: CPT

## 2022-03-24 PROCEDURE — 97530 THERAPEUTIC ACTIVITIES: CPT

## 2022-03-24 PROCEDURE — 92523 SPEECH SOUND LANG COMPREHEN: CPT

## 2022-03-24 PROCEDURE — 97166 OT EVAL MOD COMPLEX 45 MIN: CPT

## 2022-03-24 PROCEDURE — 97116 GAIT TRAINING THERAPY: CPT

## 2022-03-24 NOTE — PLAN OF CARE
[facility] Outpatient Therapy  [address] [place] [address], [address], [address]  Phone: [phone]   Fax: [phone]       Physical Therapy Certification    Dear Referring Practitioner: Dr. Cristian Escamilla,    We had the pleasure of evaluating the following patient for physical therapy services at [place] [address]. A summary of our findings can be found in the initial assessment below. This includes our plan of care. If you have any questions or concerns regarding these findings, please do not hesitate to contact me at the office phone number above.   Thank you for the referral.       Provider Signature:_______________________________Date:__________________  By signing above (or electronic signature), therapist's plan is approved by physician      Patient: Felipe Pickering   : 1945   MRN: [id number]    Referring Physician: Referring Practitioner: Dr. Cristian Escamilla        Evaluation Date: 3/24/2022        Medical Diagnosis Information:  Diagnosis: I63.9 (ICD-10-CM) - Cerebral infarction, unspecified                                           Insurance information: PT Insurance Information: Medicare   Treatment diagnosis: R 26 abnormality of gait and mobility; R53.1 weakness, I63.9 cerebral infarction     Precautions/ Contra-indications: receptive aphasia, Plavix and aspirin    Adhesive allergy: NO  Latex Allergy:  NO    Preferred Language for Healthcare:   [x]English       []other:    C-SSRS Triggered by Intake questionnaire (Past 2 wk assessment):   [x] No, Questionnaire did not trigger screening.   [] Yes, Patient intake triggered further evaluation      [] C-SSRS Screening completed  [] PCP notified via Plan of Care  [] Emergency services notified    Plan of care sent to provider:      []Faxed   []Co-signature   (attempts: 1[x] 3/24/22 2 []3[])              Co-morbidities/Complexities (which will affect course of rehabilitation):   []None []Hx of COVID   Arthritic conditions   []Rheumatoid arthritis (M05.9)  []Osteoarthritis (M19.91)  []Gout   Cardiovascular conditions   []Hypertension (I10)  []Hyperlipidemia (E78.5)  []Angina pectoris (I20)  []Atherosclerosis (I70)  []Pacemaker  []Hx of CABG/stent/  cardiac surgeries  Mitral valve prolapse  Lupus Musculoskeletal conditions   []Disc pathology   []Congenital spine pathologies   []Osteoporosis (M81.8)  []Osteopenia (M85.8)  []Scoliosis       Endocrine conditions   []Hypothyroid (E03.9)  []Hyperthyroid  hyperparathyroidism possibly HTN Gastrointestinal conditions   []Constipation (K59.00)  [] Diarrhea   Metabolic conditions   []Morbid obesity (E66.01)  []Diabetes type 1(E10.65) or 2 (E11.65)   []Neuropathy (G60.9)     Cardio/Pulmonary conditions   []Asthma (J45)  []Coughing   []COPD (J44.9)  []CHF  []A-fib   Psychological Disorders  []Anxiety (F41.9)  []Depression (F32.9)   []Other:   Developmental Disorders  []Autism (F84.0)  []CP (G80)  []Down Syndrome (Q90.9)  []Developmental delay     Neurological conditions  []Prior Stroke (I69.30)  []Parkinson's (G20)  []Encephalopathy (G93.40)  []MS (G35)  []Post-polio (G14)  []SCI  []TBI  []ALS Other conditions  []Fibromyalgia (M79.7)  []Vertigo  []Syncope  []Kidney Failure  []Cancer      []currently undergoing                treatment  []Pregnancy  []Incontinence   Prior surgeries  []involved limb  []previous spinal surgery  [] section birth  []hysterectomy  []bowel / bladder surgery  []other relevant surgeries        Occupation/School: retired beutician     Sport/recreational activities: rock climbing, hiking, skating, exploring nature    Patient goal for therapy:  \"achieve better balance and walk\"      SUBJECTIVE  History of Present Illness:  Pt is 77 y. o. female with past medical history lupus, hyperparathyroidism possibly HTN, presents with symptoms of loss of balance than began on 3/3/22.  She denies any vertigo symptoms, just felt very much off balance. She also found herself not being well able to move her right leg and almost seeming to drag it on the ground. The right leg weakness resolved. She then had a recurrence of this however, but is able to move her leg again now. No other areas of focal weakness. No headache. No vision changes. No palpitations. No cardiac history. She is not taking any prescription med, does not smoke, takes a keto weight loss supplement and a green tea supplement. She is not on medication for high blood pressure, report its is sometimes high but comes down with taking deep breaths.   Admitted for acute CVA. CT, CTA head negative. 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 Pt participated in inpatient rehab from 3/8/22-3/19/22. Currently unable to drive, mobility, balance. Daughter doing heavier cooking and cleaning, laundry, ADL's- supervision, has not tried tieing shoes yet. Pt now using RW and limited to 10-15 min. Social support/Environment:    Family/caregiver support:       YES- daughter living w/ her temporarily      Home environment:   two story home  Steps to enter first floor:    5 steps to enter and hand rail bilateral    Steps to second floor:  Full flight of 12-13  Bathroom:    Grab bars and Shower Chair  , raised toilet seat, no handles. Bathroom on 1st floor. Bedroom on 1st floor. Equipment owned:   [address] [place] [address] ()    Baseline function/PLOF:  History of falls     No  Pt able to drive     Yes  Pt independent with ADLs  Yes  Pt. Required assistance from family for: Independent PTA, living alone  Pt. independent for transfers and gait and walked with No Device. Was walking up to 5 miles, lifting weights. Pain       Patient describes pain to be none  Pain location:    Patient reports pain is       OBJECTIVE FINDINGS      Cognitive Status    A&O to   Person, Place and Situation;  Not to date  Able to follow:   1 step commands    Communication WFL  Comments: impaired comprehension, sometimes requires further explanation or repetition of questions to obtain accurate answer. Vision: WNL- per pt report, possible slight R neglect. Hearing:      WNL    Cardiopulmonary Status   [x]NT  Blood pressure:   Heart Rate: bpm  SpO2: %    Tone     hypertonic R LE- mild catch R gastroc    Trunk Control      Good    Sensation       Light touch:     WNL L LE and R LE  Proprioception:   WNL L LE and R LE- B ankles    Strength/ROM    []All ROM WNL except as marked below    []All strength measured on 5 point scale    [x]UE ROM/strength deferred to OT eval  Gross R UE movement observed, decreased use of it functionally and impaired coordination    Movement Left Right []AROM  []PROM   Hip Flexion 5 3-     Hip Extension  TBA     Hip Abduction  3-     Hip Adduction       Hip Internal Rotation 5 3     Hip External Rotation 4+ 3     Knee Flexion 5 3+ (sitting     Knee Extension 5 5 (ataxic)     Ankle Dorsiflexion 5 3-     Ankle Plantarflexion       Ankle Inversion 5 3-     Ankle Eversion 5 3-     Great Toe extension             Reflexes  Reflex Left Right   Biceps (C5,6)     Brachioradialis (C6)     Triceps (C7)     Quadriceps (L3,4)     Achilles (S1,2)     Ankle clonus Negative Positive   Gaviota's reflex      Babinski's reflex        Coordination Testing:       Finger to Nose:        Impaired- R  Alternating Toe Tapping:       Impaired- R    Flexibility     Hamstrings: decreased on R    Functional Mobility    Transitional Movement Assistance Level   Rolling to left side Modified Independent   Rolling to right side Modified Independent   Scooting in bed Modified Independent   Supine to sit Modified Independent   Sit to supine Modified Independent   Sit to stand Modified Independent B UE support   Stand to sit Modified Independent B UE support   Bed to chair  Modified Independent with stand-pivot RW     Gait Testing:     Gait   Level of Assistance needed: Supervision  Gait Deviations (firm surface/linoleum):     decreased zac, decreased step height on right, ataxia on right and spastic on right  Assistive Device Used:     no AD; some compensation of ataxia and step height w/ RW    Stairs  Level of Assistance needed:      Supervision- per pt report   Pt able to negotiate up/down 4 stairs:  Non-reciprocal pattern w/ B rails  Assistive Device Used:      no AD  Deficits noted:      Ataxic, increased time       Balance  Static Sitting:  Normal  Dynamic Sitting: Good   Static Standing:  Good -   Dynamic Standing: Fair -    Falls Risk Assessment (30 days):   [] Falls Risk assessed and no intervention required. [] Falls Risk assessed and Patient requires intervention due to being higher risk   []Tinetti Balance: Total Score:        Balance:            Gait:         Disability Index:       Risk of Falls:   []Low (> 24)   []Mod (19-23)   []High (< 18)    [x]10m walk speed    26.9 sec=0.37m/s     []Functional Gait Assessment           Indications: Non-specific older adults: <22/30 = risk of falls; Parkinson's patients <15-18/30 = risk of falls    [x]Rosa Balance Scale    Rosa Balance Score: 27/56     []41-56 = independent     [x]21-40 = walking with assistance     []0-20 = wc bound    [x]Timed Up and Go (TUG)    35.8 seconds w/ RW  (36.8 sec without AD, min A, ataxic, UEs in high guard, decreased R foot clearance, unsteady)      []<10 = freely mobile     []<20 = mostly independent    []20-29  = variable mobility    [x]>20 = impaired mobility  [x] Falls education provided, including: recommend continued use of RW at all times      Functional Scale/Score:  Measure Used:  ABC scale  Date Assessed:  3/24/2022  Score:    40% confidence    LEFs: 12/80= 85% impaired      [x] Patient history, allergies, meds reviewed. Medical chart reviewed. See intake form.      Review Of Systems (ROS):  [x]Performed Review of systems (Integumentary, CardioPulmonary, Neurological) by intake and observation. Intake form has been scanned into medical record. Patient has been instructed to contact their primary care physician regarding ROS issues if not already being addressed at this time. Barriers to/and or personal factors that will affect rehab potential:              Age  Co-Morbidities  Cognitive Function, education/learning barriers  Environmental, home barriers        ASSESSMENT: Pt is 79 yo Female presenting to OP PT clinic with medical diagnosis of Diagnosis: I63.9 (ICD-10-CM) - Cerebral infarction, unspecified. Presents today with impaired transfers, ambulation and balance. She is a high fall risk and is recommended to continue using RW at this time. She will potentially benefit from a R AFO to improve R foot clearance and knee control, will continue to assess . Would benefit from continued OP PT to address below impairments. Functional Impairments:    Assistance required with functional mobility/gait for safety below baseline  Decreased core/proximal hip strength and neuromuscular control   Decreased UE/LE functional strength   Abnormal reflexes/sensation/myotomal/dermatomal deficits   Hypertonic UE/LE   Impaired balance/coordination/proprioceptive control     Functional Activity Limitations   Reduced ability or difficulty with changes of positions or transfers between positions  Reduced ability or tolerance with driving and/or computer work  Reduced ability to perform lifting, carrying tasks  Reduced ability to forward bend  Reduced ability to ambulate prolonged functional periods/distances/surfaces  Reduced ability to ascend/descend stairs  Reduced ability to self-correct for losses of balance     Participation Restrictions  Reduced participation in self care activities  Reduced participation in home management activities  Reduced participation in social activities. Reduced participation in sport activities.     Classification :   Signs/symptoms consistent with primary prevention/risk reduction for loss of balance and falls   Signs/symptoms consistent with Impaired motor function and sensory integrity associated w/non-progressive disorders of CNS - Congenital/aquired in infancy/childhood or acquired in adolescence/adulthood     Prognosis/Rehab Potential:    Good    Tolerance of evaluation/treatment:   Good     Physical Therapy Evaluation Complexity Justification   [x] A history of present problem with:  [] no personal factors and/or comorbidities that impact the plan of care;  []1-2 personal factors and/or comorbidities that impact the plan of care  [x]3 personal factors and/or comorbidities that impact the plan of care  [x] An examination of body systems using standardized tests and measures addressing any of the following: body structures and functions (impairments), activity limitations, and/or participation restrictions;:  [] a total of 1-2 or more elements   [] a total of 3 or more elements   [x] a total of 4 or more elements   [x] A clinical presentation with:  [] stable and/or uncomplicated characteristics   [x] evolving clinical presentation with changing characteristics  [] unstable and unpredictable characteristics;   [x] Clinical decision making of moderate complexity using standardized patient assessment instrument and/or measurable assessment of functional outcome. EVAL (MOD) 07964 (typically 30 minutes face-to-face)    PLAN: Begin PT focusing on:     LE strengthening, coordination, balance    Access Code: [email or web address]  URL: [email or web address]  Date: 03/24/2022  Prepared by: Claude Lee    Exercises  Supine Knee to Chest with Leg Straight - 1 x daily - 7 x weekly - 1-3 sets - 5-10 reps  Supine Hip Abduction - 1 x daily - 7 x weekly - 1-3 sets - 5-10 reps  Seated Ankle Pumps - 1 x daily - 7 x weekly - 3 sets - 10 reps  Seated Toe Taps - 2 x daily - 7 x weekly - 3 sets - 10 reps       Frequency/Duration: 3x/week for 4 weeks then 2x/week for 8 weeks Progressing: [] Met: [] Not Met: [] Adjusted  4. Pt will improve Rosa to 42/56 to demonstrate reduced all risk. [] Progressing: [] Met: [] Not Met: [] Adjusted  5. Pt independent with HEP. [] Progressing: [] Met: [] Not Met: [] Adjusted  6. Pt to report improved confidence with ambulating in community   [] Progressing: [] Met: [] Not Met: [] Adjusted  7. Pt will improve TUG time to 18 sec w/ least restrictive AD to improve functional ambulation. [] Progressing: [] Met: [] Not Met: [] Adjusted  8. Pt will demonstrate 10m walk speed to 0.80m/s.    [] Progressing: [] Met: [] Not Met: [] Adjusted   -       Electronically signed by:  Vadim White, PT, DPT

## 2022-03-24 NOTE — FLOWSHEET NOTE
[facility] Outpatient Therapy  [address] [place] [address], [address], [address]  Phone: [phone]   Fax: [phone]    Physical Therapy Treatment Note/ Progress Report:     Date:  3/24/2022    Patient Name:  Juan Corrales    :  1945  MRN: [id number]    Medical/Treatment Diagnosis Information:  · Diagnosis: I63.9 (ICD-10-CM) - Cerebral infarction, unspecified  ·    R 26 abnormality of gait and mobility; R53.1 weakness, I63.9 cerebral infarction  Insurance/Certification information:  PT Insurance Information: Medicare  Physician Information:  Referring Practitioner: Dr. Stuart Wallace of care signed:    [] Yes  [x] No    Date of Patient follow up with Physician:      Progress Report: []  Yes  [x]  No     Date Range for reporting period:  Beginning:  3/24/22  Ending:      Progress report due (10 Rx/or 30 days whichever is less): 3/51/55    Recertification due (POC duration/ or 90 days whichever is less):      Visit # Insurance Allowable Auth Needed    BMN []Yes   [x]No     RESTRICTIONS/PRECAUTIONS: receptive aphasia, Plavix and aspirin  Latex Allergy:  [x]NO      []YES  Preferred Language for Healthcare:   [x]English       []other:  Functional Scale: 10m walk= 0.37m/s; TUG 35.8 sec w/ RW; Rajesh Mc   Date assessed:3/24/22    Pain level:  0/10     SUBJECTIVE:  See eval    OBJECTIVE: See eval   Observation: Pt presents amb w/ RW, decreased zac, decreased step height on right, ataxia on right and spastic on right   Test measurements:      Exercises/Interventions: Exercises in bold performed in department today. Items not bolded are carried forward from prior visits for continuity of the record.     Exercise/Equipment Resistance/Repetitions HEP Other comments   Nustep  []      []    R gastroc stretch  []    R soleus stretch  []    R knee to chest 5 x 2 [x] Supine, VC for neutral hip rotation and slow control of lowering   R hip abd 5 x 1 [x] Hooklying, VC for neutral hip rotation   R bridging  []    R ankle DF 5 x 1 [x] seated   B heel raises  []      []      []    NMR  []    Alt toe taps 10 x 1 [x] seated     []      []      []      []      []      Home Exercise Program:  Access Code: Ken Dee  URL: [email or web address]  Date: 03/24/2022  Prepared by: Yajaira Alcala     Exercises  Supine Knee to Chest with Leg Straight - 1 x daily - 7 x weekly - 1-3 sets - 5-10 reps  Supine Hip Abduction - 1 x daily - 7 x weekly - 1-3 sets - 5-10 reps  Seated Ankle Pumps - 1 x daily - 7 x weekly - 3 sets - 10 reps  Seated Toe Taps - 2 x daily - 7 x weekly - 3 sets - 10 reps       Therapeutic Exercise:   [] (56411) Provided verbal/tactile cueing for activities related to strengthening, flexibility, endurance, ROM for improvements in LE, proximal hip, and core control with self-care, mobility, lifting, ambulation. NMR:  [x] (70748) Provided verbal/tactile cueing for activities related to improving balance, coordination, kinesthetic sense, posture, motor skill, proprioception to assist with LE, proximal hip, and core control in self-care, mobility, lifting, ambulation and eccentric single leg control. Therapeutic Activities:    [x] (60968) Provided verbal/tactile cueing for activities related to improving balance, coordination, kinesthetic sense, posture, motor skill, proprioception and motor activation to allow for proper function of core, proximal hip and LE with self-care and ADLs and functional mobility.      Gait Training:    [x] (37298) Provided training and instruction to the patient for proper LE, core and proximal hip recruitment and positioning and eccentric body weight control with ambulation re-education including up and down stairs     Manual Treatments:  PROM / STM / Oscillations-Mobs:  G-I, II, III, IV (PA's, Inf., Post.)  [] (53512) Provided manual therapy to mobilize LE, proximal hip and/or LS spine soft tissue/joints for the purpose of modulating pain, promoting relaxation,  increasing ROM, reducing/eliminating soft tissue swelling/inflammation/restriction, improving soft tissue extensibility and allowing for proper ROM for normal function with self-care, mobility, lifting and ambulation. Home Exercise Program:    [x] (16596) Reviewed/Progressed HEP activities related to strengthening, flexibility, endurance, ROM of core, proximal hip and LE for functional self-care, mobility, lifting and ambulation/stair navigation   [x] (10821) Reviewed/Progressed HEP activities related to improving balance, coordination, kinesthetic sense, posture, motor skill, proprioception of core, proximal hip and LE for self-care, mobility, lifting, and ambulation/stair navigation      Modalities:    [] Electric Stimulation:   [] Ultrasound:   [] Other:       Charges:  Timed Code Treatment Minutes: GT 8; NM 17; TA 15    Total Treatment Minutes: 40      [] EVAL (LOW) 15462 (typically 20 minutes face-to-face)  [x] EVAL (MOD) 89764 (typically 30 minutes face-to-face)  [] EVAL (HIGH) 94337 (typically 45 minutes face-to-face)  [] RE-EVAL     [] GJ(92744) x       [x] NMR (03063) x   1    [] Manual (16989) x       [x] TA (32108) x   1    [x] Gait Training (A8879759) x 1      [] ES(attended) (73462)  [] ES (un) (06001)   [] DRY NEEDLE 1 OR 2 MUSCLES  [] DRY NEEDLE 3+ MUSCLES  [] Mech Traction (86409)  [] Ultrasound (59474)  [] Other:    GOALS: Goals established 3/24/22   Patient stated goal: achieve better balance and walk  [] Progressing: [] Met: [] Not Met: [] Adjusted    Therapist goals for Patient:   Short Term Goals: To be achieved in: 6 weeks   1. Independent in HEP and progression per patient tolerance. [] Progressing: [] Met: [] Not Met: [] Adjusted   2. Pt will improve TUG time to 25 sec to improve functional ambulation. [] Progressing: [] Met: [] Not Met: [] Adjusted  3.  Pt to perform transfers via stand-pivot technique with use of  small base quad [facility]) on left Modified Independent  [] Progressing: [] Met: [] Not Met: [] Adjusted  4. Pt will improve Rosa to 32/56 to demonstrate reduced all risk. [] Progressing: [] Met: [] Not Met: [] Adjusted    Long Term Goals: To be achieved in: 12 weeks  1. Pt will improve ABC scale to 55% confidence for reduced fall risk. [] Progressing: [] Met: [] Not Met: [] Adjusted  2. Pt to negotiate up/down 7 stairs with step to pattern w/ single rail mod I.   [] Progressing: [] Met: [] Not Met: [] Adjusted   3. Pt to demonstrate improved gait pattern including improved R foot clearance and knee control without cues with use of AFO on right and an appropriate based cane. [] Progressing: [] Met: [] Not Met: [] Adjusted  4. Pt will improve Rosa to 42/56 to demonstrate reduced all risk. [] Progressing: [] Met: [] Not Met: [] Adjusted  5. Pt independent with HEP. [] Progressing: [] Met: [] Not Met: [] Adjusted  6. Pt to report improved confidence with ambulating in community   [] Progressing: [] Met: [] Not Met: [] Adjusted  7. Pt will improve TUG time to 18 sec w/ least restrictive AD to improve functional ambulation. [] Progressing: [] Met: [] Not Met: [] Adjusted  8. Pt will demonstrate 10m walk speed to 0.80m/s. [] Progressing: [] Met: [] Not Met: [] Adjusted    ASSESSMENT:  See eval      Treatment/Activity Tolerance:  [x] Patient tolerated treatment well [] Patient limited by fatique  [] Patient limited by pain  [] Patient limited by other medical complications  [] Other:     Overall Progression Towards Functional goals/ Treatment Progress Update:  [] Patient is progressing as expected towards functional goals listed. [] Progression is slowed due to complexities/Impairments listed. [] Progression has been slowed due to co-morbidities.   [x] Plan just implemented, too soon to assess goals progression <30days   [] Goals require adjustment due to lack of progress  [] Patient is not progressing as expected and requires additional follow up with physician  [] Other    Prognosis for POC: [x] Good [] Fair  [] Poor    Patient requires continued skilled intervention: [x] Yes  [] No        PLAN: 3x/week for 4 weeks then 2x/week for 8 weeks   [] Continue per plan of care [] Alter current plan (see comments)  [x] Plan of care initiated [] Hold pending MD visit [] Discharge    Electronically signed by: Vadim White, PT, DPT    Note: If patient does not return for scheduled/recommended follow up visits, this note will serve as a discharge from care along with the most recent update on progress.

## 2022-03-24 NOTE — PLAN OF CARE
[facility] Outpatient Therapy  [address], [address], [address]  Phone: [phone]   Fax: [phone]                                               Occupational Therapy Certification    Dear Dr. Patti Sánchez,    We had the pleasure of evaluating the following patient for occupational therapy services at [facility] [place]. A summary of our findings can be found in the initial assessment below. This includes our plan of care. If you have any questions or concerns regarding these findings, please do not hesitate to contact me at the office phone number checked above.   Thank you for the referral.         Physician Signature:_______________________________Date:__________________  By signing above (or electronic signature), therapist's plan is approved by physician      Patient: Sofia Dove   : 1945   MRN: [id number]  Referring Physician: Dr. Mikael Aggarwal       Evaluation Date: 3/24/2022      Medical Diagnosis Information: I63.9 (ICD-10-CM) - Cerebral infarction, unspecified  Treatment Diagnosis Information: J17.439 (ICD-10-CM) Stiffness of right shoulder, not elsewhere classified, R27.9 (ICD-10-CM) Unspecified lack of coordination, R53.1 (ICD-10-CM) Weakness                                        Insurance information: Medicare A & B    Onset Date: 2022 symptom onset, presented to ED on 2022      Follow-Up Appointments: none known at this time     Precautions/ Contra-indications: Plavix and aspirin   Latex Allergy:  [x]NO      []YES   Preferred Language for Healthcare:   [x]English       []other:  C-SSRS Triggered by Intake questionnaire (Past 2 wk assessment):   [x] No, Questionnaire did not trigger screening.   [] Yes, Patient intake triggered further evaluation      [] C-SSRS Screening completed  [] PCP notified via Plan of Care  [] Emergency services notified    SUBJECTIVE: Pt reported should like like to \"get back to normal\"    History of Present Illness: Pt presented to ED on March 5th, 2022 with symptoms of loss of balance that began on March 3rd, 2022. She denied vertigo symptoms, but stated she felt that her right leg was dragging on the ground. Although her right leg weakness resolved, she had a recurring episode. CTA head negative. 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 Pt was stabilized and transferred to inpatient rehab on March 8th and discharged home on March 19th with recommendations to continue with comprehensive skilled therapy services in OP setting. Relevant Medical History:   Past Medical History:   Diagnosis Date    Lupus ([facility] [address]     MVP (mitral valve prolapse)      Co-morbidities/Complexities (which will affect course of rehabilitation):   []None           Arthritic conditions   []Rheumatoid arthritis (M05.9)  []Osteoarthritis (M19.91)   Cardiovascular conditions   []Hypertension (I10)  []Hyperlipidemia (E78.5)  []Angina pectoris (I20)  []Atherosclerosis (I70)   Musculoskeletal conditions   []Disc pathology   []Congenital spine pathologies   []Prior surgical intervention  []Osteoporosis (M81.8)  []Osteopenia (M85.8)   Endocrine conditions   []Hypothyroid (E03.9)  []Hyperthyroid Gastrointestinal conditions   []Constipation (F01.05)   Metabolic conditions   []Morbid obesity (E66.01)  []Obesity (E66)  []Diabetes type 1(E10.65)  []Diabetes type 2 (E11.65)  []Neuropathy (G60.9)       Pulmonary conditions   []Asthma (J45)  []Coughing   []COPD (J44.9)   Psychological Disorders  []Anxiety (F41.9)  []Depression (F32.9)   []Bipolar (F31.9)   Neurological conditions  [x]CVA (I69)  []Parkinsons (G20)  []Other:         Pain No    Current Functional Limitations:   Functional Complaints: decreased quality of handwriting, requires increase time to complete activities       PLOF:  No functional limitations  Pt's sleep is affected?  Yes    Social support/Environment:    Lives with: with daughter (daughter lives with patient)    Family/caregiver support needed prior to current illness: No      Home Environment:  2-story, Laundry in basement and Able to perform ADLs on 1st floor   Number of JOLLY: 5 w/ Bilateral handrail ascending    Bathroom Accessibility: Accessible via walker    Bathroom Environment: Tub/shower combo, Grab bars in the tub, Tub transfer bench and Raised toilet seat    Other Equipment:  Rolling walker    Occupation/School: Retired - previously a beautician    Active : No; would like to return to driving    Leisure/Hobbies: outdoor activities, motorcycle,     Other Comments: wants to be able to go to the store and \"do things for myself\"    OBJECTIVE:     Hand Dominance: right    Observations:  Posture: Fair-good seated edge of mat. Compensatory posturing during RUE assessment  Bandages/Dressings/Incisions: no  Edema: no    Functional Outcome Measure:    Date Assessed: 3/24/2022  Measure Used: UEFS  Score: 8.75/100    ADL and IADL Status:  Current ADL Status: Independent - daughter provides distant supervision for bathing in tub  Current IADL Status: Requires assist       Cognitive Status: Command Following: able to follow one-step commands    Cardiopulmonary Status   [x]NT  Blood pressure:   Heart Rate:   SpO2:     Tone     Normal  Location: Bilateral    Trunk Control     Good    Vision:   Impaired  Wears glasses: For reading    Hearing:    WNL    Sensation    Light touch:   WFL    Upper Extremity ROM and Strength Assessment: Items not marked are items that are NT         RUE    RUE     RUE     LUE       LUE        LUE    PROM AROM MMT PROM AROM MMT   Shoulder         Flexion    (0-180)  135 (149)  115 (144)           Abduction (0-180)  114 (95) 85 (95)           Extension (0-45)  [facility]           ER    (0-90) 34 (84) 34 (34)           IR   (0-70) WFL   WFL                          Elbow               Flexion (0-145)   WFL 4   WFL 4   Extension (145-0)   WFL 3+   WFL 4 Supination (0-90)  WFL 4  WFL 4   Pronation (0-90)  WFL 4  WFL 4            Wrist              Flexion   (0-80)    WFL 4   WFL 4   Extension (0-70)   WFL 4   WFL 4   Radial Deviation (0-20)   WFL     WFL     Ulnar Deviation (0-45)   WFL     WFL                    Values in () indicates ROM for supine position    Hand ROM Assessment: WFL    Hand Strength Assessment: WFL     Strength (pounds)  --R hand: 64. 33# which is above the 90th percentile compared to norms for patient's age range  --L hand: 61. 67# which is above the 90th percentile compared to norms for patient's age range     Coordination Testing:       Box and Blocks Test (blocks/minute): Standing  --R hand: 27 blocks in one minute which is less than the mean compared to patient's age range  --L hand: NT    Nine Hole Peg Test (seconds): Standing  --R hand: 1 minute 2 seconds which is less than 10th percentile for patient's age range  --L hand: NT     Management of Every Day Containers: Seated  Pt instructed to retrieve 5/5 containers with left hand and manipulated lids off/on using right hand. No cues provided to patient following instruction. --Time to complete activity: 1 minute 2 seconds   --Number of compensatory movement patterns: 5/5    Handwriting Assessment: percent of legibility  1.) Writing the full alphabet: 100%, (2 minutes 15 seconds)  2.) Writing a sentence from dictation: 75%, (1 minute 29 seconds)    Timed ADLs  35 seconds to unfasten 7/7 small buttons (seated)   57 seconds to fasten 7/7 small buttons (seated)    Coins 8 (1 error with retrieving and 1 error with placing. L lateral lean during task. RUE abducted awkwardly, slight R shoulder hike      Functional Mobility    Device used: RW  Level of assist: Supervision    Balance     Static Sitting: Normal        Dynamic Sitting: Normal    Static Stance: Good        Dynamic Stance: Fair (uses RW)                     [x] Patient history, allergies, meds reviewed. Medical chart reviewed.  See intake form. Review Of Systems (ROS):  [x]Performed Review of systems (Integumentary, CardioPulmonary, Neurological) by intake and observation. Intake form has been scanned into medical record. Patient has been instructed to contact their primary care physician regarding ROS issues if not already being addressed at this time. Barriers to/and or personal factors that will affect rehab potential: Cognitive function                  ASSESSMENT    Pt is a 77year old female s/p stroke presenting with decreased functional use of RUE. OT assessment revealed decreased muscle lengthening and strength of the R shoulder and impaired fine/gross motor coordination of R hand resulting in impaired functional reach and decreased timing, speed and accuracy with every day activities. Pt to benefit from skilled OT in OP setting to address functional impairments and functional activity limitations to facilitate increased functional use of RUE for return to [facility] which is independent.      Functional Impairments: Decreased ROM, Decreased strength, Decreased ADL status, Decrease functional activity tolerance, Decreased high level IADLs, Decreased posture, Decreased coordination, Decreased fine motor control, Decreased balance, Decreased cognition and Decreased functional mobility     Functional Activity Limitations (from functional questionnaire and intake): Reduced ability to perform self-care, Reduced ability or difficulty with changes of positions or transfers between positions, Reduced ability to maintain good posture and demonstrate good body mechanics with sitting, bending, and lifting, Reduced ability to sleep, Reduced ability or tolerance with driving  and Reduced ability to perform lifting, reaching, and/or carrying tasks     Participation Restrictions: Reduced participation in self-care activities, Reduced participation in home management activities, Reduced participation in work related activities and Reduced participation in social activities    Tolerance of evaluation/treatment: Good      Occupational Therapy Evaluation Complexity Justification    [x] A history of present problem with:  [] brief history including review of medical records relating to the problem; no personal factors and/or comorbidities that impact the plan of care  [x] expanded review of medical records and additional review of physical, cognitive, or psychosocial history related to current functional performance  [] extensive review of medical records and additional review of physical, cognitive, or psychosocial history related to current functional performance    [x] An examination of body systems using standardized tests and measures addressing any of the following: body structures and functions (impairments), activity limitations, and/or participation restrictions:  [] a total of 1-3 elements   [x] a total of 3-5 elements   [] a total of 5 or more elements     [x] A clinical presentation with:  [] stable and/or uncomplicated characteristics; no personal factors and/or comorbidities that impact the plan of care  [x] evolving clinical presentation w/ changing characteristics; min/mod assistance or modifications required to perform tasks. May have comorbidities that affect occupational performance  [] unstable and unpredictable characteristics; significant assistance or modifications required to perform tasks. Have comorbidities that affect occupational performance. [x] Clinical decision making of [] low, [x] moderate, [] high complexity using standardized patient assessment instrument and/or measurable assessment of functional outcome.      [] EVAL (LOW) 26300 (typically 20 minutes face-to-face)  [x] EVAL (MOD) 42803 (typically 30 minutes face-to-face)  [] EVAL (HIGH) 97669 (typically 45 minutes face-to-face)  [] RE-EVAL 18531      PLAN     Frequency/Duration:  3 days per week for 4 weeks followed by 2x/week x8 weeks:    Interventions/Procedural Codes:  [x]  Therapeutic exercise (95855) including strength training, ROM, endurance training and flexibility  [x]  Neuromuscular re-education (55434) including facilitation of normal movement patterns, promoting postural alignment and stability during static and dynamic movement (sitting or standing), visual perceptual training, proprioception training, balance retraining during functional activities  [x]  Manual therapy ([address] [facility] including tissue mobilization, joint mobilization, massage, passive ROM   []  Modalities as needed that may include thermal agents, attended E-stim (25555), Biofeedback, US (67500), iontophoresis as indicated  [x]  Therapeutic activity (53050) that may include patient/caregiver education/training, activity modification, increasing participation and independence with functional activities seated/standing, engagement in fine and gross motor activities, functional transfers and mobility  [] Sensory integration techniques (97783) that include enhancing sensory processing and promoting responses that are adaptive to environmental demands  [x] Self-care/home management training (94872) that includes restorative and compensatory training in activities of daily living, meal preparation, laundry and other various house maintenance activities. May include education and training in use of adaptive equipment/devices and assistive technology to promote participation and independence. [x] Cognitive function (35776 initial 15 minutes, 77465 each additional 15 minutes w/ maximum of 3 units billable) activities that address attention, memory, reasoning, executive functioning, problem solving, and/or pragmatic functioning in addition to compensatory strategies to manage the performance of an activity (for example, managing time or schedules, initiating, organizing, and sequencing tasks).     GOALS:  Patient stated goal: \"get back to normal\"   [] Progressing: [] Met: [] Not Met: [] Adjusted    Therapist goals for Patient:     Short Term Goals: To be achieved in: 6 weeks  1. Pt will improve score on UEFS to 40/100 for a subjective report of decreased difficulty with completing every day activities with RUE and R hand  [] Progressing: [] Met: [] Not Met: [] Adjusted  2. Pt will improve time on NHPT to more than 45 seconds to demonstrate improved fine motor coordination of R hand  [] Progressing: [] Met: [] Not Met: [] Adjusted  3. Pt will improve score on BBT to 40 blocks/minute to demonstrate improved timing, speed and accuracy with functional grasp of right hand. [] Progressing: [] Met: [] Not Met: [] Adjusted  4. Pt will be Min A with HEP/Home activities program to facilitate independence with carryover from sessions and to progress towards returning to PLOF  [] Progressing: [] Met: [] Not Met: [] Adjusted   5. Pt will write a pangram sentence in no more than 1 minute demo'ing 85% legibility to demo improved fine motor control and speed of R hand. [] Progressing: [] Met: [] Not Met: [] Adjusted     Long Term Goals: To be achieved in: 12 weeks  1. Pt will improve score on UEFS to 65/100 for a subjective report of decreased difficulty with completing every day activities with RUE and R hand  [] Progressing: [] Met: [] Not Met: [] Adjusted  2. Pt will improve time on NHPT to more than 30 seconds to demonstrate improved fine motor coordination of R hand  [] Progressing: [] Met: [] Not Met: [] Adjusted  3. Pt will improve score on BBT to 50 blocks/minute to demonstrate improved timing, speed and accuracy with functional grasp of right hand. [] Progressing: [] Met: [] Not Met: [] Adjusted  4. Pt will be Independent with HEP/Home activities program to facilitate independence with carryover from sessions and to progress towards returning to PLOF  [] Progressing: [] Met: [] Not Met: [] Adjusted   5.  Pt will write a pangram sentence in no more than 45 seconds minute demo'ing 95% legibility to demo improved fine motor control and speed of R hand.    [] Progressing: [] Met: [] Not Met: [] Adjusted       Timed Code Treatment Minutes: 30 minutes    Total Treatment Minutes:  60 minutes  30 (mod complex eval), 30 (TA)        Electronically signed by:  Stephanie Jenkins, OT

## 2022-03-24 NOTE — FLOWSHEET NOTE
[facility]  [address], [facility] [place] [address]  Phone: [phone]   Fax: [phone]    Occupational Therapy Treatment Note/ Progress Report:     Date:  3/24/2022    Patient Name:  Lona Sims    :  1945  MRN: [id number]      Medical/Treatment Diagnosis Information:  I63.9 (ICD-10-CM) - Cerebral infarction, unspecified  M25.611 (ICD-10-CM) Stiffness of right shoulder, not elsewhere classified, R27.9 (ICD-10-CM) Unspecified lack of coordination, R53.1 (ICD-10-CM) Weakness        Insurance/Certification information: Medicare A & B  Physician Information:  Dr. Jeff Langston of care signed:    No    Date of Patient follow up with Physician: none known at this time     Progress Report: []  Yes  [x]  No     Date Range for reporting period:  Beginning: 3/24/2022   Ending:      Progress report due:      Recertification due (POC duration/ or 90 days whichever is less): 2022     Visit # Insurance Allowable Auth Needed   1 BMN No     Latex Allergy:  [x]NO      []YES  Preferred Language for Healthcare:   [x]English       []other:  Functional Scale: UEFS (3/24/2022)  -- 8.75/100    Pain level:  0/10     SUBJECTIVE:  See eval    OBJECTIVE: See eval   Observation:    Test measurements:      RESTRICTIONS/PRECAUTIONS: Plavix and aspirin     Therapeutic Activities:    Activity #1: Worked on R hand finger<->palm translation using coins of various types. Pt instructed to gather coins one at a time to gather in palm of hand. Pt then instructed to translate coins (one at a time) from palm->finger, more specifically tip to tip pincer grasp, to place coins through slot of container. Pt completed 1 set x8 coins demo'ing 1 error with retrieving and 1 error with placing. L lateral lean during task and RUE abducted, Slight R shoulder hike.      Patient Education:  Role of OT in OP setting -  verb understanding  POC and goals - verb understanding  Purpose of assessment and various areas to be assessed - verb understanding    Therapeutic Exercises: Exercises in bold performed in department today. Items not bolded are carried forward from prior visits for continuity of the record. Exercise/Equipment Resistance/Repetitions HEP Other comments       []        []        []        []        []        []        []        []        []          []         []        []        []        []        []        []        []        []      Home Exercise Program:      Therapeutic Exercise:   [] (76710) Provided verbal/tactile cueing for activities related to strengthening, flexibility, endurance, ROM. Includes review/progression of HEP activities related to strengthening, flexibility, endurance, AROM, proximal shoulder and UE for functional transfers/mobility, self-care, IADLs, and community re-entry    Therapeutic Activities:    [x] (31336) Provided verbal/tactile cueing for activities related to improving balance, coordination, kinesthetic sense, posture, motor skill, proprioception and motor activation to allow for proper function of core, proximal shoulder and UE with self-care, and ADLs, IADLs, functional mobility, work-related and leisure based activities. Includes review/progression of HEP activities to improve balance, coordination, kinesthetic sense, posture, motor skill, proprioception, proximal shoulder and UE for functional transfers/mobility, self-care, IADLs, and community re-entry    Sensory Integration Techniques:  [] (21059)Provided verbal/tactile feedback to enhance sensory processing and promoting responses that are adaptive to environmental demands    Self-Care/Home Management Training:  [] (16893) Provided training and education in restorative and compensatory strategies/activity modifications in activities of daily living, meal preparation, laundry and other various house maintenance activities.  Provided training and education in use of adaptive equipment/devices and assistive technology, as needed, to promote participation and independence. Cognitive Function:  [] (33718 x15 minutes, 11120 +15 minutes) Integrated activities that address attention, memory, reasoning, executive functioning, problem solving, and/or pragmatic functioning in addition to compensatory strategies to manage the performance of an activity (for example, managing time or schedules, initiating, organizing, and sequencing tasks). NMR:  [] (70864) During functional activities/therapeutic exercises, provided facilitation of normal movement patterns and provided handling/verbal cues to promote postural alignment and stability during static and dynamic movement (sitting or standing). Activities may also include visual perceptual training, proprioception training, and balance retraining during functional activities    Manual Treatments:    [] (49800) Provided manual therapy to mobilize UB for the purpose of modulating pain, promoting relaxation,  increasing ROM, reducing/eliminating soft tissue swelling/inflammation/restriction, improving soft tissue extensibility and allowing for proper ROM for normal function with self-care, functional mobility, transfers, reaching and lifting    Modalities:     [] Electrical Stimulation (98266):     [] Ultrasound (13233):    Charges:  Timed Code Treatment Minutes: 30   Total Treatment Minutes: 60      [] EVAL (LOW) 00181 (typically 20 minutes face-to-face)  [x] EVAL (MOD) 35160 (typically 30 minutes face-to-face)  [] EVAL (HIGH) 36620 (typically 45 minutes face-to-face)  [] RE-EVAL     [] KY(20581) x      [] NMR (21690) x       [] Manual ([id number]) x       [x] TA (49414) x30 (2)       [] ES(attended) (97696)       [] ES (un) (83516)  [] Other:    GOALS: Patient stated goal: \"get back to normal\"       []? Progressing: []? Met: []? Not Met: []? Adjusted     Therapist goals for Patient:      Short Term Goals: To be achieved in: 6 weeks  1.  Pt will improve score on UEFS to 40/100 for a subjective report of decreased difficulty with completing every day activities with RUE and R hand  []? Progressing: []? Met: []? Not Met: []? Adjusted  2. Pt will improve time on NHPT to more than 45 seconds to demonstrate improved fine motor coordination of R hand  []? Progressing: []? Met: []? Not Met: []? Adjusted  3. Pt will improve score on BBT to 40 blocks/minute to demonstrate improved timing, speed and accuracy with functional grasp of right hand. []? Progressing: []? Met: []? Not Met: []? Adjusted  4. Pt will be Min A with HEP/Home activities program to facilitate independence with carryover from sessions and to progress towards returning to PLOF  []? Progressing: []? Met: []? Not Met: []? Adjusted   5. Pt will write a pangram sentence in no more than 1 minute demo'ing 85% legibility to demo improved fine motor control and speed of R hand. []? Progressing: []? Met: []? Not Met: []? Adjusted      Long Term Goals: To be achieved in: 12 weeks  1. Pt will improve score on UEFS to 65/100 for a subjective report of decreased difficulty with completing every day activities with RUE and R hand  []? Progressing: []? Met: []? Not Met: []? Adjusted  2. Pt will improve time on NHPT to more than 30 seconds to demonstrate improved fine motor coordination of R hand  []? Progressing: []? Met: []? Not Met: []? Adjusted  3. Pt will improve score on BBT to 50 blocks/minute to demonstrate improved timing, speed and accuracy with functional grasp of right hand. []? Progressing: []? Met: []? Not Met: []? Adjusted  4. Pt will be Independent with HEP/Home activities program to facilitate independence with carryover from sessions and to progress towards returning to PLOF  []? Progressing: []? Met: []? Not Met: []? Adjusted   5. Pt will write a pangram sentence in no more than 45 seconds minute demo'ing 95% legibility to demo improved fine motor control and speed of R hand.    []? Progressing: []? Met: []? Not Met: []? Adjusted      ASSESSMENT:  See eval    Treatment/Activity Tolerance:  [x] Patient tolerated treatment well [] Patient limited by fatique  [] Patient limited by pain  [] Patient limited by other medical complications  [] Other:     Overall Progression Towards Functional goals/ Treatment Progress Update:  [] Patient is progressing as expected towards functional goals listed. [] Progression is slowed due to complexities/Impairments listed. [] Progression has been slowed due to co-morbidities. [x] Plan just implemented, too soon to assess goals progression <30days   [] Goals require adjustment due to lack of progress  [] Patient is not progressing as expected and requires additional follow up with physician  [] Other    Prognosis for POC: [x] Good [] Fair  [] Poor    Patient requires continued skilled intervention: [x] Yes  [] No        PLAN: See eval  [] Continue per plan of care [] Alter current plan (see comments)  [x] Plan of care initiated [] Hold pending MD visit [] Discharge    Electronically signed by: Bala Aviles OT    Note: If patient does not return for scheduled/recommended follow up visits, this note will serve as a discharge from care along with the most recent update on progress.

## 2022-03-28 ENCOUNTER — HOSPITAL ENCOUNTER (OUTPATIENT)
Dept: SPEECH THERAPY | Age: 77
Setting detail: THERAPIES SERIES
Discharge: HOME | End: 2022-03-28
Payer: MEDICARE

## 2022-03-28 ENCOUNTER — HOSPITAL ENCOUNTER (OUTPATIENT)
Dept: PHYSICAL THERAPY | Age: 77
Setting detail: THERAPIES SERIES
Discharge: HOME | End: 2022-03-28
Payer: MEDICARE

## 2022-03-28 ENCOUNTER — HOSPITAL ENCOUNTER (OUTPATIENT)
Dept: OCCUPATIONAL THERAPY | Age: 77
Setting detail: THERAPIES SERIES
Discharge: HOME | End: 2022-03-28
Payer: MEDICARE

## 2022-03-28 PROCEDURE — 97130 THER IVNTJ EA ADDL 15 MIN: CPT

## 2022-03-28 PROCEDURE — 97530 THERAPEUTIC ACTIVITIES: CPT

## 2022-03-28 PROCEDURE — 97110 THERAPEUTIC EXERCISES: CPT

## 2022-03-28 PROCEDURE — 97112 NEUROMUSCULAR REEDUCATION: CPT

## 2022-03-28 PROCEDURE — 97140 MANUAL THERAPY 1/> REGIONS: CPT

## 2022-03-28 PROCEDURE — 97129 THER IVNTJ 1ST 15 MIN: CPT

## 2022-03-28 NOTE — FLOWSHEET NOTE
[facility]  [address], [address], [address]  Phone: [phone]   Fax: [phone]    Occupational Therapy Treatment Note/ Progress Report:     Date:  3/28/2022    Patient Name:  Bhargav Larose    :  1945  MRN: [id number]      Medical/Treatment Diagnosis Information:  I63.9 (ICD-10-CM) - Cerebral infarction, unspecified  M25.611 (ICD-10-CM) Stiffness of right shoulder, not elsewhere classified, R27.9 (ICD-10-CM) Unspecified lack of coordination, R53.1 (ICD-10-CM) Weakness        Insurance/Certification information: Medicare A & B  Physician Information:  Dr. Anthony Schultz of [facility] signed:    Yes    Date of Patient follow up with Physician: none known at this time     Progress Report: []  Yes  [x]  No     Date Range for reporting period:  Beginning: 3/24/2022   Ending:      Progress report due:      Recertification due (POC duration/ or 90 days whichever is less): 2022     Visit # Insurance Allowable Auth Needed   2 BMN No     Latex Allergy:  [x]NO      []YES  Preferred Language for Healthcare:   [x]English       []other:  Functional Scale: UEFS (3/28/2022)  -- 8.75/100    Pain level:  3/10 RUE during movement     SUBJECTIVE: pt stated increased pain in R shoulder, onset yesterday evening. Pt is unsure what she did to cause pain. Pain with movement, no pain at rest.     OBJECTIVE:    Observation: ineffective posturing during seated TAs   Test measurements:      RESTRICTIONS/PRECAUTIONS: Plavix and aspirin     Therapeutic Activities:    Activity #1: Pt worked on finger<->palm translation of R hand using small sized blocks of various shapes. Pt retrieved blocks one at a time to store in palm of hand. Pt then instructed to translate palm->finger to tip to tip pincer grasp to place each block on top of each other into a vertical tower to work on fine motor coordination and steadiness of the R hand.  Pt completed 3 sets x 4 blocks and 2 sets x 5 blocks. Pt required min VCs for technique, pt occasionally used L hand to assist. Pt demo'd occasional errors demo'ing greater difficulty with handling 5 blocks versus 4 blocks. Observed R shoulder hike and L lateral lean during task participation. Activity #2: Pt used a standard sized pen with felt tip to trace 20 medium/small icons to work on R hand fine motor control and strengthening. Pt demo'd significant difficulty with task demo'ing less than 25% accuracy. Pt aware of mistakes, however, unable to correct. Patient Education:  --purpose of TAs - verb understanding    Manual Therapy: RUE, supine  Shoulder abduction stretch with STM: tolerated progressive stretch to ~90 degrees. Increase pain at 90 degrees. Total stretch time ~3 minutes  Shoulder flexion stretch with STM: tolerated progressive stretch to ~100 degrees, 3 minutes     Unsupported sit, RUE  Scap mobs: ab/adduction 10x and elevation/depression 10x. Limited mobility initially improving with repetition. Therapeutic Exercises: Exercises in bold performed in department today. Items not bolded are carried forward from prior visits for continuity of the record. Exercise/Equipment Resistance/Repetitions HEP Other comments   BUE Shoulder flexion w/ dowel tatyana (supine)   1# dowel tatyana, 10 reps [] Assisted pt with RUE and maintaining good alignment with BUEs (versus veering to the R). Instructed pt to work in pain free limits. ROM limited to ~160 degrees     RUE shoulder abduction w/ dowel tatyana (supine)     1# dowel tatyana, 10 reps [] Mod A to support RUE and to maximize ROM to ~100 degrees.      []        []        []        []        []        []        []          []         []        []        []        []        []        []        []        []      Home Exercise Program:      Therapeutic Exercise:   [x] (28152) Provided verbal/tactile cueing for activities related to strengthening, flexibility, endurance, ROM.  Includes review/progression of HEP activities related to strengthening, flexibility, endurance, AROM, proximal shoulder and UE for functional transfers/mobility, self-care, IADLs, and community re-entry    Therapeutic Activities:    [x] (61142) Provided verbal/tactile cueing for activities related to improving balance, coordination, kinesthetic sense, posture, motor skill, proprioception and motor activation to allow for proper function of core, proximal shoulder and UE with self-care, and ADLs, IADLs, functional mobility, work-related and leisure based activities. Includes review/progression of HEP activities to improve balance, coordination, kinesthetic sense, posture, motor skill, proprioception, proximal shoulder and UE for functional transfers/mobility, self-care, IADLs, and community re-entry    Sensory Integration Techniques:  [] (35881)Provided verbal/tactile feedback to enhance sensory processing and promoting responses that are adaptive to environmental demands    Self-Care/Home Management Training:  [] (41217) Provided training and education in restorative and compensatory strategies/activity modifications in activities of daily living, meal preparation, laundry and other various house maintenance activities. Provided training and education in use of adaptive equipment/devices and assistive technology, as needed, to promote participation and independence. Cognitive Function:  [] (51829 x15 minutes, 57143 +15 minutes) Integrated activities that address attention, memory, reasoning, executive functioning, problem solving, and/or pragmatic functioning in addition to compensatory strategies to manage the performance of an activity (for example, managing time or schedules, initiating, organizing, and sequencing tasks).     NMR:  [] (48194) During functional activities/therapeutic exercises, provided facilitation of normal movement patterns and provided handling/verbal cues to promote postural alignment and stability during static and dynamic movement (sitting or standing). Activities may also include visual perceptual training, proprioception training, and balance retraining during functional activities    Manual Treatments:    [x] (67911) Provided manual therapy to mobilize UB for the purpose of modulating pain, promoting relaxation,  increasing ROM, reducing/eliminating soft tissue swelling/inflammation/restriction, improving soft tissue extensibility and allowing for proper ROM for normal function with self-care, functional mobility, transfers, reaching and lifting    Modalities:     [] Electrical Stimulation (71033):     [] Ultrasound (09987):    Charges:  Timed Code Treatment Minutes: 55   Total Treatment Minutes: 55      [] EVAL (LOW) 05820 (typically 20 minutes face-to-face)  [] EVAL (MOD) 53877 (typically 30 minutes face-to-face)  [] EVAL (HIGH) 16938 (typically 45 minutes face-to-face)  [] RE-EVAL     [x] CU(76022) x15 (1)      [] NMR (88943) x       [x] Manual (86771) x25 (2)       [x] TA (10333) x15 (1)       [] ES(attended) (58837)       [] ES (un) (21852)  [] Other:    GOALS: Patient stated goal: \"get back to normal\"       []? Progressing: []? Met: []? Not Met: []? Adjusted     Therapist goals for Patient:      Short Term Goals: To be achieved in: 6 weeks  1. Pt will improve score on UEFS to 40/100 for a subjective report of decreased difficulty with completing every day activities with RUE and R hand  []? Progressing: []? Met: []? Not Met: []? Adjusted  2. Pt will improve time on NHPT to more than 45 seconds to demonstrate improved fine motor coordination of R hand  []? Progressing: []? Met: []? Not Met: []? Adjusted  3. Pt will improve score on BBT to 40 blocks/minute to demonstrate improved timing, speed and accuracy with functional grasp of right hand. []? Progressing: []? Met: []? Not Met: []? Adjusted  4.  Pt will be Min A with HEP/Home activities program to facilitate independence with carryover from sessions and to progress towards returning to PLOF  []? Progressing: []? Met: []? Not Met: []? Adjusted   5. Pt will write a pangram sentence in no more than 1 minute demo'ing 85% legibility to demo improved fine motor control and speed of R hand. []? Progressing: []? Met: []? Not Met: []? Adjusted      Long Term Goals: To be achieved in: 12 weeks  1. Pt will improve score on UEFS to 65/100 for a subjective report of decreased difficulty with completing every day activities with RUE and R hand  []? Progressing: []? Met: []? Not Met: []? Adjusted  2. Pt will improve time on NHPT to more than 30 seconds to demonstrate improved fine motor coordination of R hand  []? Progressing: []? Met: []? Not Met: []? Adjusted  3. Pt will improve score on BBT to 50 blocks/minute to demonstrate improved timing, speed and accuracy with functional grasp of right hand. []? Progressing: []? Met: []? Not Met: []? Adjusted  4. Pt will be Independent with HEP/Home activities program to facilitate independence with carryover from sessions and to progress towards returning to PLOF  []? Progressing: []? Met: []? Not Met: []? Adjusted   5. Pt will write a pangram sentence in no more than 45 seconds minute demo'ing 95% legibility to demo improved fine motor control and speed of R hand. []? Progressing: []? Met: []? Not Met: []? Adjusted      ASSESSMENT:    Pt presents this date with increase pain in R shoulder - suspects to have been from overuse on Sunday, 3/27/2022. Pt required mod-max VCs to relax during manual stretch which improved tolerance. Pt demo'd good tolerance with self-assisted/OT-assisted exercises which decreased muscle tightness and pain. Pt required increase time with fine motor coordination activities d/t impaired timing, speed and accuracy. Pt cont to demo decreased fine motor control which impacts performance with handwriting. Pt cont to benefit from skilled OT, cont per POC.        Treatment/Activity Tolerance:  [x] Patient tolerated treatment well [] Patient limited by fatique  [] Patient limited by pain  [] Patient limited by other medical complications  [] Other:     Overall Progression Towards Functional goals/ Treatment Progress Update:  [] Patient is progressing as expected towards functional goals listed. [] Progression is slowed due to complexities/Impairments listed. [] Progression has been slowed due to co-morbidities. [x] Plan just implemented, too soon to assess goals progression <30days   [] Goals require adjustment due to lack of progress  [] Patient is not progressing as expected and requires additional follow up with physician  [] Other    Prognosis for POC: [x] Good [] Fair  [] Poor    Patient requires continued skilled intervention: [x] Yes  [] No        PLAN:   [x] Continue per plan of care [] Alter current plan (see comments)  [] Plan of care initiated [] Hold pending MD visit [] Discharge    Electronically signed by: Markell Aceves OT    Note: If patient does not return for scheduled/recommended follow up visits, this note will serve as a discharge from care along with the most recent update on progress.

## 2022-03-28 NOTE — FLOWSHEET NOTE
[facility]  [address] [place] [address], [address], [address]  Phone: [phone]   Fax: [phone]    Physical Therapy Treatment Note/ Progress Report:     Date:  3/28/2022    Patient Name:  Bhavin Wick    :  1945  MRN: [id number]    Medical/Treatment Diagnosis Information:  · Diagnosis: I63.9 (ICD-10-CM) - Cerebral infarction, unspecified  ·    R 26 abnormality of gait and mobility; R53.1 weakness, I63.9 cerebral infarction  Insurance/Certification information:  PT Insurance Information: Medicare  Physician Information:  Referring Practitioner: Dr. Lamont Cristobal of care signed:    [x] Yes  [] No    Date of Patient follow up with Physician:      Progress Report: []  Yes  [x]  No     Date Range for reporting period:  Beginning:  3/24/22  Ending:      Progress report due (10 Rx/or 30 days whichever is less):     Recertification due (POC duration/ or 90 days whichever is less):      Visit # Insurance Allowable Auth Needed    BMN []Yes   [x]No     RESTRICTIONS/PRECAUTIONS: receptive aphasia, Plavix and aspirin  Latex Allergy:  [x]NO      []YES  Preferred Language for Healthcare:   [x]English       []other:  Functional Scale: 10m walk= 0.37m/s; TUG 35.8 sec w/ RW; Chris Heading   Date assessed:3/24/22    Pain level:  0/10     SUBJECTIVE:  Pt reports she does all of her exercises, but couldn't recall the ones given to her last session and she did not bring her folder with her today. OBJECTIVE:    Observation: Pt presents amb w/ RW, decreased zac, decreased step height on right, ataxia on right and spastic on right   Test measurements:      Exercises/Interventions: Exercises in bold performed in department today. Items not bolded are carried forward from prior visits for continuity of the record.     Exercise/Equipment Resistance/Repetitions HEP Other comments   Nustep 5 min., L 2 [] Seat 8, arms 11  Freq VC for R hip control due to excessive adduction      []    R gastroc stretch 30 sec x 3 [x]    R soleus stretch 30 sec x 3 []    R knee to chest, heel on SB 5 x 2 [x] Supine, VC for neutral hip rotation and slow control of lowering   R hip abd 5 x 2 [x] Hooklying, VC for neutral hip rotation   R bridging 10 x 1, 3 sec hold [] Blocking at R lateral knee and ankle to avoid hip adduction   R ankle DF/PF 10 x 1 [x] Long sitting, propped on extended UEs. Active assist to reduce inversion and knee flexion   B heel raises 5x [] Attempted, but minimal L activation demonstrated. Will hold      []      []    NMR  []    Alt toe taps 10 x 1 to 4\" cones    10x 1 to 4\" cones w/ 2lb ankle R weight  [x] Standing w/ RW    Reduced ataxic movement, especially returning R foot to starting position     []      []      []      []      []      Home Exercise Program:  Access Code: Aleatha Brass  URL: [employer]/  Date: 03/24/2022  Prepared by: Paolo Hannah  Exercises  Supine Knee to Chest with Leg Straight - 1 x daily - 7 x weekly - 1-3 sets - 5-10 reps  Supine Hip Abduction - 1 x daily - 7 x weekly - 1-3 sets - 5-10 reps  Seated Ankle Pumps - 1 x daily - 7 x weekly - 3 sets - 10 reps  Seated Toe Taps - 2 x daily - 7 x weekly - 3 sets - 10 reps     Access Code: [email or web address]  URL: [email or web address]  Date: 03/28/2022  Prepared by: Paolo Hannah  Exercises  Standing Gastroc Stretch at Counter - 1 x daily - 7 x weekly - 3 sets - 10 reps      Therapeutic Exercise:   [x] (48268) Provided verbal/tactile cueing for activities related to strengthening, flexibility, endurance, ROM for improvements in LE, proximal hip, and core control with self-care, mobility, lifting, ambulation.     NMR:  [x] (91595) Provided verbal/tactile cueing for activities related to improving balance, coordination, kinesthetic sense, posture, motor skill, proprioception to assist with LE, proximal hip, and core control in self-care, mobility, lifting, ambulation and eccentric single leg control. Therapeutic Activities:    [] (51327) Provided verbal/tactile cueing for activities related to improving balance, coordination, kinesthetic sense, posture, motor skill, proprioception and motor activation to allow for proper function of core, proximal hip and LE with self-care and ADLs and functional mobility. Gait Training:    [] (30521) Provided training and instruction to the patient for proper LE, core and proximal hip recruitment and positioning and eccentric body weight control with ambulation re-education including up and down stairs     Manual Treatments:  PROM / STM / Oscillations-Mobs:  G-I, II, III, IV (PA's, Inf., Post.)  [] (98001) Provided manual therapy to mobilize LE, proximal hip and/or LS spine soft tissue/joints for the purpose of modulating pain, promoting relaxation,  increasing ROM, reducing/eliminating soft tissue swelling/inflammation/restriction, improving soft tissue extensibility and allowing for proper ROM for normal function with self-care, mobility, lifting and ambulation.      Home Exercise Program:    [x] (42377) Reviewed/Progressed HEP activities related to strengthening, flexibility, endurance, ROM of core, proximal hip and LE for functional self-care, mobility, lifting and ambulation/stair navigation   [x] (06419) Reviewed/Progressed HEP activities related to improving balance, coordination, kinesthetic sense, posture, motor skill, proprioception of core, proximal hip and LE for self-care, mobility, lifting, and ambulation/stair navigation      Modalities:    [] Electric Stimulation:   [] Ultrasound:   [] Other:       Charges:  Timed Code Treatment Minutes: TE 43; NM 15   Total Treatment Minutes: 58      [] EVAL (LOW) 07235 (typically 20 minutes face-to-face)  [] EVAL (MOD) 21320 (typically 30 minutes face-to-face)  [] EVAL (HIGH) 54578 (typically 45 minutes face-to-face)  [] RE-EVAL     [x] AX(34042) x   3    [x] NMR (31521) x   1    [] Manual (87405) x       [] TA demonstrate 10m walk speed to 0.80m/s. [] Progressing: [] Met: [] Not Met: [] Adjusted    ASSESSMENT:  Pt demonstrates good tolerance of initial HEP, cardio and R LE focused there dawn. Pt limited by hip rotation, hamstring, gastroc weakness and typical tonal pattern of R ankle inversion. Pt challenged to maintain consistent R foot clearance during amb even w/ RW. Resistance added to distal R LE during coordination training. Pt very motivated and will continue to benefit from skilled PT to improve independence and return to previous level of functional mobility. Treatment/Activity Tolerance:  [x] Patient tolerated treatment well [] Patient limited by fatique  [] Patient limited by pain  [] Patient limited by other medical complications  [] Other:     Overall Progression Towards Functional goals/ Treatment Progress Update:  [] Patient is progressing as expected towards functional goals listed. [] Progression is slowed due to complexities/Impairments listed. [] Progression has been slowed due to co-morbidities. [x] Plan just implemented, too soon to assess goals progression <30days   [] Goals require adjustment due to lack of progress  [] Patient is not progressing as expected and requires additional follow up with physician  [] Other    Prognosis for POC: [x] Good [] Fair  [] Poor    Patient requires continued skilled intervention: [x] Yes  [] No        PLAN: 3x/week for 4 weeks then 2x/week for 8 weeks   [x] Continue per plan of care [] Alter current plan (see comments)  [] Plan of care initiated [] Hold pending MD visit [] Discharge    Electronically signed by: Rajeev Martinez, PT, DPT    Note: If patient does not return for scheduled/recommended follow up visits, this note will serve as a discharge from care along with the most recent update on progress.

## 2022-03-29 ENCOUNTER — HOSPITAL ENCOUNTER (OUTPATIENT)
Dept: SPEECH THERAPY | Age: 77
Setting detail: THERAPIES SERIES
Discharge: HOME | End: 2022-03-29
Payer: MEDICARE

## 2022-03-29 ENCOUNTER — HOSPITAL ENCOUNTER (OUTPATIENT)
Dept: OCCUPATIONAL THERAPY | Age: 77
Setting detail: THERAPIES SERIES
Discharge: HOME | End: 2022-03-29
Payer: MEDICARE

## 2022-03-29 ENCOUNTER — HOSPITAL ENCOUNTER (OUTPATIENT)
Dept: PHYSICAL THERAPY | Age: 77
Setting detail: THERAPIES SERIES
Discharge: HOME | End: 2022-03-29
Payer: MEDICARE

## 2022-03-29 PROCEDURE — 97110 THERAPEUTIC EXERCISES: CPT

## 2022-03-29 PROCEDURE — 97116 GAIT TRAINING THERAPY: CPT

## 2022-03-29 PROCEDURE — 97140 MANUAL THERAPY 1/> REGIONS: CPT

## 2022-03-29 PROCEDURE — 97130 THER IVNTJ EA ADDL 15 MIN: CPT

## 2022-03-29 PROCEDURE — 97112 NEUROMUSCULAR REEDUCATION: CPT

## 2022-03-29 PROCEDURE — 97129 THER IVNTJ 1ST 15 MIN: CPT

## 2022-03-29 PROCEDURE — 97530 THERAPEUTIC ACTIVITIES: CPT

## 2022-03-29 NOTE — FLOWSHEET NOTE
[facility]  [address] [place] [address], [address], [address]  Phone: [phone]   Fax: [phone]    Physical Therapy Treatment Note/ Progress Report:     Date:  3/29/2022    Patient Name:  Ira Kapadia    :  1945  MRN: [id number]    Medical/Treatment Diagnosis Information:  · Diagnosis: I63.9 (ICD-10-CM) - Cerebral infarction, unspecified  ·    R 26 abnormality of gait and mobility; R53.1 weakness, I63.9 cerebral infarction  Insurance/Certification information:  PT Insurance Information: Medicare  Physician Information:  Referring Practitioner: Dr. Moon Goods of care signed:    [x] Yes  [] No    Date of Patient follow up with Physician:      Progress Report: []  Yes  [x]  No     Date Range for reporting period:  Beginning:  3/24/22  Ending:      Progress report due (10 Rx/or 30 days whichever is less):     Recertification due (POC duration/ or 90 days whichever is less):      Visit # Insurance Allowable Auth Needed   3/24 BMN []Yes   [x]No     RESTRICTIONS/PRECAUTIONS: receptive aphasia, Plavix and aspirin  Latex Allergy:  [x]NO      []YES  Preferred Language for Healthcare:   [x]English       []other:  Functional Scale: 10m walk= 0.37m/s; TUG 35.8 sec w/ RW; Jose Guadalupe Aaron   Date assessed:3/24/22    Pain level:  1/10 R shoulder     SUBJECTIVE:  Pt reports she did some of her exercises last night, is not sore or tired from yesterday at all. OBJECTIVE:    Observation: Pt presents amb w/ RW, decreased zac, decreased step height on right, ataxia on right and spastic on right   Test measurements:      Exercises/Interventions: Exercises in bold performed in department today. Items not bolded are carried forward from prior visits for continuity of the record.     Exercise/Equipment Resistance/Repetitions HEP Other comments   Nustep 8 min., L 2 [] Seat 8, arms 11  Freq VC for R hip control due to excessive adduction  Avg.  spm    R hamstring stretch 30 sec x 3 x Seated w/ belt   R gastroc stretch 30 sec x 2 [x]    R soleus stretch 30 sec x 2 []    R knee to chest, heel on SB 5 x 2 [x] Supine, VC for neutral hip rotation and slow control of lowering   R hip abd 5 x 2 [x] Hooklying, VC for neutral hip rotation   R bridging 10 x 1, 3 sec hold [] Blocking at R lateral knee and ankle to avoid hip adduction   R ankle DF/PF 10 x 1 [x] Long sitting, propped on extended UEs. Active assist to reduce inversion and knee flexion           []      []    NMR  []    Alt toe taps 10 x 1 to 4\" cones    10x 1 to 4\" cones w/ 2lb ankle R weight  [x] Standing w/ RW    Reduced ataxic movement, especially returning R foot to starting position     []    NMS to R ant tib  5 min. , seated w/ active ankle DF [] VMS, phase duration 200, frequency 50pps, cycle time 5/5. Good contraction, nearly full ROM achieved. Narrow SHU 30 sec  Trunk/UE rotations- 3x   Cervical/UE flex/ext- 3x    []   Post LOB    Post LOB    Semi tandem 15 sec  Cervical rotation- 3x  []   LOB R w/ either foot in back     []    Gait      amb  10' x 4 L UE support only   Increased R LE adduction at stance   [employer] ladder 10' x 10, L UE support  VC to widen SHU, R toe catch on >50% of trials                  Home Exercise Program:  Access Code: [email or web address]  URL: [employer]/  Date: 03/24/2022  Prepared by: Lisa Aponte  Exercises  Supine Knee to Chest with Leg Straight - 1 x daily - 7 x weekly - 1-3 sets - 5-10 reps  Supine Hip Abduction - 1 x daily - 7 x weekly - 1-3 sets - 5-10 reps  Seated Ankle Pumps - 1 x daily - 7 x weekly - 3 sets - 10 reps  Seated Toe Taps - 2 x daily - 7 x weekly - 3 sets - 10 reps     Access Code: [email or web address]  URL: [email or web address]  Date: 03/28/2022  Prepared by: Lisa Aponte  Exercises  Standing Gastroc Stretch at Counter - 1 x daily - 7 x weekly - 3 sets - 10 reps      Therapeutic Exercise:   [x] (23173) Provided verbal/tactile cueing for activities related to strengthening, flexibility, endurance, ROM for improvements in LE, proximal hip, and core control with self-care, mobility, lifting, ambulation. NMR:  [x] (50799) Provided verbal/tactile cueing for activities related to improving balance, coordination, kinesthetic sense, posture, motor skill, proprioception to assist with LE, proximal hip, and core control in self-care, mobility, lifting, ambulation and eccentric single leg control. Therapeutic Activities:    [] (55271) Provided verbal/tactile cueing for activities related to improving balance, coordination, kinesthetic sense, posture, motor skill, proprioception and motor activation to allow for proper function of core, proximal hip and LE with self-care and ADLs and functional mobility. Gait Training:    [x] (30367) Provided training and instruction to the patient for proper LE, core and proximal hip recruitment and positioning and eccentric body weight control with ambulation re-education including up and down stairs     Manual Treatments:  PROM / STM / Oscillations-Mobs:  G-I, II, III, IV (PA's, Inf., Post.)  [] (06248) Provided manual therapy to mobilize LE, proximal hip and/or LS spine soft tissue/joints for the purpose of modulating pain, promoting relaxation,  increasing ROM, reducing/eliminating soft tissue swelling/inflammation/restriction, improving soft tissue extensibility and allowing for proper ROM for normal function with self-care, mobility, lifting and ambulation.      Home Exercise Program:    [x] (97619) Reviewed/Progressed HEP activities related to strengthening, flexibility, endurance, ROM of core, proximal hip and LE for functional self-care, mobility, lifting and ambulation/stair navigation   [x] (11997) Reviewed/Progressed HEP activities related to improving balance, coordination, kinesthetic sense, posture, motor skill, proprioception of core, proximal hip and LE for self-care, mobility, lifting, and ambulation/stair navigation      Modalities:    [] Electric Stimulation:   [] Ultrasound:   [] Other:       Charges:  Timed Code Treatment Minutes: Vern Jenkins; GT 15, TE 14   Total Treatment Minutes: 59      [] EVAL (LOW) 02680 (typically 20 minutes face-to-face)  [] EVAL (MOD) 92393 (typically 30 minutes face-to-face)  [] EVAL (HIGH) 74279 (typically 45 minutes face-to-face)  [] RE-EVAL     [x] HV(10027) x   1    [x] NMR (02816) x  2    [] Manual (85855) x       [] TA (68631) x      [x] Gait Training ([id number]) x 1      [] ES(attended) (03946)  [] ES (un) (47606)   [] DRY NEEDLE 1 OR 2 MUSCLES  [] DRY NEEDLE 3+ MUSCLES  [] Mech Traction (76226)  [] Ultrasound (25272)  [] Other:    GOALS: Goals established 3/24/22   Patient stated goal: achieve better balance and walk  [] Progressing: [] Met: [] Not Met: [] Adjusted    Therapist goals for Patient:   Short Term Goals: To be achieved in: 6 weeks   1. Independent in HEP and progression per patient tolerance. [] Progressing: [] Met: [] Not Met: [] Adjusted   2. Pt will improve TUG time to 25 sec to improve functional ambulation. [] Progressing: [] Met: [] Not Met: [] Adjusted  3. Pt to perform transfers via stand-pivot technique with use of  small base quad cane (SBQC) on left Modified Independent  [] Progressing: [] Met: [] Not Met: [] Adjusted  4. Pt will improve Rosa to 32/56 to demonstrate reduced all risk. [] Progressing: [] Met: [] Not Met: [] Adjusted    Long Term Goals: To be achieved in: 12 weeks  1. Pt will improve ABC scale to 55% confidence for reduced fall risk. [] Progressing: [] Met: [] Not Met: [] Adjusted  2. Pt to negotiate up/down 7 stairs with step to pattern w/ single rail mod I.   [] Progressing: [] Met: [] Not Met: [] Adjusted   3. Pt to demonstrate improved gait pattern including improved R foot clearance and knee control without cues with use of AFO on right and an appropriate based cane. [] Progressing: [] Met: [] Not Met: [] Adjusted  4.  Pt will improve Rosa to 42/56 to demonstrate reduced all risk. [] Progressing: [] Met: [] Not Met: [] Adjusted  5. Pt independent with HEP. [] Progressing: [] Met: [] Not Met: [] Adjusted  6. Pt to report improved confidence with ambulating in community   [] Progressing: [] Met: [] Not Met: [] Adjusted  7. Pt will improve TUG time to 18 sec w/ least restrictive AD to improve functional ambulation. [] Progressing: [] Met: [] Not Met: [] Adjusted  8. Pt will demonstrate 10m walk speed to 0.80m/s. [] Progressing: [] Met: [] Not Met: [] Adjusted    ASSESSMENT:  Pt demonstrates good tolerance and contraction achieved w/ NMES to R anterior tib. Will continue to trial during functional stepping for further neuromuscular re-ed. Pt w/ good performance on cardio exercise, will benefit from progression to interval training for further cardio challenge. Pt challenged w/ consistent R step length and foot clearance w/ use of yvonne ladder. Pt will continue to benefit from skilled PT to improve functional mobility, reduce fall risk and maximize independence. Treatment/Activity Tolerance:  [x] Patient tolerated treatment well [] Patient limited by fatique  [] Patient limited by pain  [] Patient limited by other medical complications  [] Other:     Overall Progression Towards Functional goals/ Treatment Progress Update:  [] Patient is progressing as expected towards functional goals listed. [] Progression is slowed due to complexities/Impairments listed. [] Progression has been slowed due to co-morbidities.   [x] Plan just implemented, too soon to assess goals progression <30days   [] Goals require adjustment due to lack of progress  [] Patient is not progressing as expected and requires additional follow up with physician  [] Other    Prognosis for POC: [x] Good [] Fair  [] Poor    Patient requires continued skilled intervention: [x] Yes  [] No        PLAN: 3x/week for 4 weeks then 2x/week for 8 weeks   [x] Continue per plan of care [] Alter current plan (see comments)  [] Plan of care initiated [] Hold pending MD visit [] Discharge    Electronically signed by: Domo Amos, PT, DPT    Note: If patient does not return for scheduled/recommended follow up visits, this note will serve as a discharge from care along with the most recent update on progress.

## 2022-03-29 NOTE — FLOWSHEET NOTE
[facility] Outpatient Therapy  [address] [place] [address], [address], [address]  Phone: [phone]   Fax: [phone]    Occupational Therapy Treatment Note/ Progress Report:     Date:  3/29/2022    Patient Name:  Bhargav Larose    :  1945  MRN: [id number]      Medical/Treatment Diagnosis Information:  I63.9 (ICD-10-CM) - Cerebral infarction, unspecified  M25.611 (ICD-10-CM) Stiffness of right shoulder, not elsewhere classified, R27.9 (ICD-10-CM) Unspecified lack of coordination, R53.1 (ICD-10-CM) Weakness        Insurance/Certification information: Medicare A & B  Physician Information:  Dr. Anthony Schultz of care signed:    Yes    Date of Patient follow up with Physician: none known at this time     Progress Report: []  Yes  [x]  No     Date Range for reporting period:  Beginning: 3/24/2022   Ending:      Progress report due:      Recertification due (POC duration/ or 90 days whichever is less): 2022     Visit # Insurance Allowable Auth Needed   3/28 BMN No     Latex Allergy:  [x]NO      []YES  Preferred Language for Healthcare:   [x]English       []other:  Functional Scale: UEFS (3/29/2022)  -- 8.75/100    Pain level:  2/10 RUE during movement     SUBJECTIVE: pt reported RUE feeling better this date    OBJECTIVE:    Observation: improved tolerance for manual therapy - stretches this date compared to last session   Test measurements:      RESTRICTIONS/PRECAUTIONS: Plavix and aspirin     Therapeutic Activities:    Activity #1: Pt completed various activities using game pieces from \"[employer]\" to work on R hand manipulation to improve digit dexterity and fine motor coordination. Pt retrieved game pieces one a time and manipulated in hand to match orientation to game board x22 pieces. Completed with increase time. Pt then worked on R hand finger<->palm translation using game pieces.  Pt instructed to gather game pieces one at a time to gather in palm of hand. Pt then instructed to translate pieces (one at a time) from palm->finger, more specifically tip to tip pincer grasp, to place coins through slot of container. Pt completed 7 sets x 3 pieces. Activity #2:  Pt used felt tip standard pen to trace letters A-Z and numbers 1-13 (55 sized font, Kenneth) to work on R hand fine motor control. Pt demo'd improved accuracy with straight lines demo'ing increased difficulty with curved lines. Pt demo'd fair accuracy overall. Patient Education:  --continue to stretch and move RUE today and tomorrow, however, cautioned patient to not \"over do it\". Verb understanding    Manual Therapy: RUE, supine  Shoulder abduction stretch with STM: progressive stretch 80-90 degrees with STM to coracobrachialis, 2 sets x ~2-3 minutes  Shoulder flexion stretch with STM: progressive stretch to 90 degrees with STM, 2 sets x ~2-3 minutes    Unsupported sit, RUE  Scap mobs: ab/adduction 10x and elevation/depression 10x. Limited mobility initially improving with repetition. Therapeutic Exercises: Exercises in bold performed in department today. Items not bolded are carried forward from prior visits for continuity of the record. Exercise/Equipment Resistance/Repetitions HEP Other comments   BUE Shoulder flexion w/ dowel tatyana (supine)   1# dowel tatyana, 10 reps [] Requires hand over hand assist to guide movement. Pt instructed no to push into pain (< or equal to 4/10). Elbows flexed ~20-25 degrees. Tolerated well. RUE shoulder abduction w/ dowel tatyana (supine)     1# dowel tatyana, 10 reps [] Requires hand over hand assist to guide movement. Occasional STM for desensitization. Pt instructed to not push into pain (< or equal to 4/10). Elbow slightly flexed. Tolerated well improving ROM with reps.          []        []        []        []        []        []        []          []         []        []        []        []        []        []        []        []      Home Exercise Program:      Therapeutic Exercise:   [x] (65462) Provided verbal/tactile cueing for activities related to strengthening, flexibility, endurance, ROM. Includes review/progression of HEP activities related to strengthening, flexibility, endurance, AROM, proximal shoulder and UE for functional transfers/mobility, self-care, IADLs, and community re-entry    Therapeutic Activities:    [x] (28714) Provided verbal/tactile cueing for activities related to improving balance, coordination, kinesthetic sense, posture, motor skill, proprioception and motor activation to allow for proper function of core, proximal shoulder and UE with self-care, and ADLs, IADLs, functional mobility, work-related and leisure based activities. Includes review/progression of HEP activities to improve balance, coordination, kinesthetic sense, posture, motor skill, proprioception, proximal shoulder and UE for functional transfers/mobility, self-care, IADLs, and community re-entry    Sensory Integration Techniques:  [] (76676)Provided verbal/tactile feedback to enhance sensory processing and promoting responses that are adaptive to environmental demands    Self-Care/Home Management Training:  [] (54199) Provided training and education in restorative and compensatory strategies/activity modifications in activities of daily living, meal preparation, laundry and other various house maintenance activities. Provided training and education in use of adaptive equipment/devices and assistive technology, as needed, to promote participation and independence. Cognitive Function:  [] (06379 x15 minutes, 50889 +15 minutes) Integrated activities that address attention, memory, reasoning, executive functioning, problem solving, and/or pragmatic functioning in addition to compensatory strategies to manage the performance of an activity (for example, managing time or schedules, initiating, organizing, and sequencing tasks).     NMR:  [] (81111) During functional activities/therapeutic exercises, provided facilitation of normal movement patterns and provided handling/verbal cues to promote postural alignment and stability during static and dynamic movement (sitting or standing). Activities may also include visual perceptual training, proprioception training, and balance retraining during functional activities    Manual Treatments:    [x] (06211) Provided manual therapy to mobilize UB for the purpose of modulating pain, promoting relaxation,  increasing ROM, reducing/eliminating soft tissue swelling/inflammation/restriction, improving soft tissue extensibility and allowing for proper ROM for normal function with self-care, functional mobility, transfers, reaching and lifting    Modalities:     [] Electrical Stimulation (18664):     [] Ultrasound (70154):    Charges:  Timed Code Treatment Minutes: 55   Total Treatment Minutes: 55      [] EVAL (LOW) 87281 (typically 20 minutes face-to-face)  [] EVAL (MOD) 18890 (typically 30 minutes face-to-face)  [] EVAL (HIGH) 16100 (typically 45 minutes face-to-face)  [] RE-EVAL     [x] GH(93980) x10 (1)      [] NMR (79862) x       [x] Manual (61720) x30 (2)       [x] TA (89203) x15 (1)       [] ES(attended) (13676)       [] ES (un) (82636)  [] Other:    GOALS: Patient stated goal: \"get back to normal\"       []? Progressing: []? Met: []? Not Met: []? Adjusted     Therapist goals for Patient:      Short Term Goals: To be achieved in: 6 weeks  1. Pt will improve score on UEFS to 40/100 for a subjective report of decreased difficulty with completing every day activities with RUE and R hand  []? Progressing: []? Met: []? Not Met: []? Adjusted  2. Pt will improve time on NHPT to more than 45 seconds to demonstrate improved fine motor coordination of R hand  []? Progressing: []? Met: []? Not Met: []? Adjusted  3.  Pt will improve score on BBT to 40 blocks/minute to demonstrate improved timing, speed and accuracy with functional grasp of right hand.  []? Progressing: []? Met: []? Not Met: []? Adjusted  4. Pt will be Min A with HEP/Home activities program to facilitate independence with carryover from sessions and to progress towards returning to PLOF  []? Progressing: []? Met: []? Not Met: []? Adjusted   5. Pt will write a pangram sentence in no more than 1 minute demo'ing 85% legibility to demo improved fine motor control and speed of R hand. []? Progressing: []? Met: []? Not Met: []? Adjusted      Long Term Goals: To be achieved in: 12 weeks  1. Pt will improve score on UEFS to 65/100 for a subjective report of decreased difficulty with completing every day activities with RUE and R hand  []? Progressing: []? Met: []? Not Met: []? Adjusted  2. Pt will improve time on NHPT to more than 30 seconds to demonstrate improved fine motor coordination of R hand  []? Progressing: []? Met: []? Not Met: []? Adjusted  3. Pt will improve score on BBT to 50 blocks/minute to demonstrate improved timing, speed and accuracy with functional grasp of right hand. []? Progressing: []? Met: []? Not Met: []? Adjusted  4. Pt will be Independent with HEP/Home activities program to facilitate independence with carryover from sessions and to progress towards returning to PLOF  []? Progressing: []? Met: []? Not Met: []? Adjusted   5. Pt will write a pangram sentence in no more than 45 seconds minute demo'ing 95% legibility to demo improved fine motor control and speed of R hand. []? Progressing: []? Met: []? Not Met: []? Adjusted      ASSESSMENT:    Pt with improved tolerance with manual stretch this date demo'ing improved ROM with progressive exercises. Pt cont to demo increased pain in RUE, however, improved from previous session. Pt benefits from hands on assistance to guide and support RUE to optimize normal movement patterns. Pt requires increase time with fine motor coordination/control activities d/t impaired digit dexterity and control.   Pt cont to benefit from skilled OT, cont per POC. Treatment/Activity Tolerance:  [x] Patient tolerated treatment well [] Patient limited by fatique  [] Patient limited by pain  [] Patient limited by other medical complications  [] Other:     Overall Progression Towards Functional goals/ Treatment Progress Update:  [] Patient is progressing as expected towards functional goals listed. [] Progression is slowed due to complexities/Impairments listed. [] Progression has been slowed due to co-morbidities. [x] Plan just implemented, too soon to assess goals progression <30days   [] Goals require adjustment due to lack of progress  [] Patient is not progressing as expected and requires additional follow up with physician  [] Other    Prognosis for POC: [x] Good [] Fair  [] Poor    Patient requires continued skilled intervention: [x] Yes  [] No        PLAN:   [x] Continue per plan of care [] Alter current plan (see comments)  [] Plan of care initiated [] Hold pending MD visit [] Discharge    Electronically signed by: Misti Tran OT    Note: If patient does not return for scheduled/recommended follow up visits, this note will serve as a discharge from care along with the most recent update on progress.

## 2022-03-31 ENCOUNTER — HOSPITAL ENCOUNTER (OUTPATIENT)
Dept: OCCUPATIONAL THERAPY | Age: 77
Setting detail: THERAPIES SERIES
Discharge: HOME | End: 2022-03-31
Payer: MEDICARE

## 2022-03-31 PROCEDURE — 97110 THERAPEUTIC EXERCISES: CPT

## 2022-03-31 PROCEDURE — 97530 THERAPEUTIC ACTIVITIES: CPT

## 2022-03-31 PROCEDURE — 97140 MANUAL THERAPY 1/> REGIONS: CPT

## 2022-03-31 NOTE — FLOWSHEET NOTE
[facility] Outpatient [facility]  [address] [place] [address], [address], [address]  Phone: [phone]   Fax: [phone]    Occupational Therapy Treatment Note/ Progress Report:     Date:  3/31/2022    Patient Name:  Chito Bai    :  1945  MRN: [id number]      Medical/Treatment Diagnosis Information:  I63.9 (ICD-10-CM) - Cerebral infarction, unspecified  M25.611 (ICD-10-CM) Stiffness of right shoulder, not elsewhere classified, R27.9 (ICD-10-CM) Unspecified lack of coordination, R53.1 (ICD-10-CM) Weakness        Insurance/Certification information: Medicare A & B  Physician Information:  Dr. Mirlande Huitron of care signed:    Yes    Date of Patient follow up with Physician: none known at this time     Progress Report: []  Yes  [x]  No     Date Range for reporting period:  Beginning: 3/24/2022   Ending:      Progress report due: 9588     Recertification due (POC duration/ or 90 days whichever is less): 2022     Visit # Insurance Allowable Auth Needed    BMN No     Latex Allergy:  [x]NO      []YES  Preferred Language for Healthcare:   [x]English       []other:  Functional Scale: UEFS (3/31/2022)  -- 8.75/100    Pain level: 0/10     SUBJECTIVE: denied new issues/complaints    OBJECTIVE:    Observation: Improved AROM in RUE with self-assisted/assisted exercises. L lateral lean and R shoulder hike during TAs   Test measurements:      RESTRICTIONS/PRECAUTIONS: Plavix and aspirin     Therapeutic Activities:  Activity #1: Worked on R hand finger<->palm translation using coins of various types. Pt instructed to gather coins one at a time to gather in palm of hand. Pt then instructed to translate coins (one at a time) from palm->finger, more specifically tip to tip pincer grasp, to place coins through slot of container. Pt completed 5 sets: 8 coins with no errors, 12 coins with 2 errors and 12 coins with no errors.  Pt then completed 2 additional sets in which OT called out type of coin to be translated for increase challenge: 8 coins with 1 error and 12 coins with 4 errors    Activity #2: Pt used groove peg board to work on in-hand manipulation of the R hand. Pt completed 1 set x 23 pegs. Patient Education:  --increasing awareness of L lateral lean and R shoulder hike during TAs - verb understanding    Manual Therapy: RUE, supine    Shoulder abduction stretch with STM: progressive stretch 90-95 degrees (~100 degrees on 2nd set) with STM to coracobrachialis, 2 sets x ~2-3 minutes  Shoulder flexion stretch with STM: progressive stretch to 90 degrees (~100 degrees on 2nd set), 2 sets x ~2-3 minutes     Therapeutic Exercises: Exercises in bold performed in department today. Items not bolded are carried forward from prior visits for continuity of the record. Exercise/Equipment Resistance/Repetitions HEP Other comments   BUE Shoulder flexion w/ dowel tatyana (supine)   1# dowel tatyana, 2 sets x10 reps [] BUEs slightly abducted, elbows slightly flexed. On 2nd set readjusted hand placement and encouraged pt to \"reach up big\" with improved mechanics. Achieved ~120-135 degrees ROM     RUE shoulder abduction w/ dowel tatyana (supine)     1# dowel tatyana, 2 sets x10 reps [] Mod A to support RUE - able to achieve ~135 degrees of abduction     []        []        []        []        []        []        []          []         []        []        []        []        []        []        []        []      Home Exercise Program:      Therapeutic Exercise:   [x] (96695) Provided verbal/tactile cueing for activities related to strengthening, flexibility, endurance, ROM.  Includes review/progression of HEP activities related to strengthening, flexibility, endurance, AROM, proximal shoulder and UE for functional transfers/mobility, self-care, IADLs, and community re-entry    Therapeutic Activities:    [x] (10259) Provided verbal/tactile cueing for activities related to improving balance, coordination, increasing ROM, reducing/eliminating soft tissue swelling/inflammation/restriction, improving soft tissue extensibility and allowing for proper ROM for normal function with self-care, functional mobility, transfers, reaching and lifting    Modalities:     [] Electrical Stimulation (63033):     [] Ultrasound (56107):    Charges:  Timed Code Treatment Minutes: 55   Total Treatment Minutes: 55      [] EVAL (LOW) 85202 (typically 20 minutes face-to-face)  [] EVAL (MOD) 58878 (typically 30 minutes face-to-face)  [] EVAL (HIGH) 92312 (typically 45 minutes face-to-face)  [] RE-EVAL     [x] JN(27393) x 25 (2)    [] NMR (45949) x       [x] Manual (77417) x12 (1)    [x] TA (13109) x 18 (1)    [] ES(attended) (41995)       [] ES (un) (28538)  [] Other:    GOALS: Patient stated goal: \"get back to normal\"       []? Progressing: []? Met: []? Not Met: []? Adjusted     Therapist goals for Patient:      Short Term Goals: To be achieved in: 6 weeks  1. Pt will improve score on UEFS to 40/100 for a subjective report of decreased difficulty with completing every day activities with RUE and R hand  []? Progressing: []? Met: []? Not Met: []? Adjusted  2. Pt will improve time on NHPT to more than 45 seconds to demonstrate improved fine motor coordination of R hand  []? Progressing: []? Met: []? Not Met: []? Adjusted  3. Pt will improve score on BBT to 40 blocks/minute to demonstrate improved timing, speed and accuracy with functional grasp of right hand. []? Progressing: []? Met: []? Not Met: []? Adjusted  4. Pt will be Min A with HEP/Home activities program to facilitate independence with carryover from sessions and to progress towards returning to PLOF  []? Progressing: []? Met: []? Not Met: []? Adjusted   5. Pt will write a pangram sentence in no more than 1 minute demo'ing 85% legibility to demo improved fine motor control and speed of R hand. []? Progressing: []? Met: []? Not Met: []? Adjusted      Long Term Goals:  To be achieved in: 12 weeks  1. Pt will improve score on UEFS to 65/100 for a subjective report of decreased difficulty with completing every day activities with RUE and R hand  []? Progressing: []? Met: []? Not Met: []? Adjusted  2. Pt will improve time on NHPT to more than 30 seconds to demonstrate improved fine motor coordination of R hand  []? Progressing: []? Met: []? Not Met: []? Adjusted  3. Pt will improve score on BBT to 50 blocks/minute to demonstrate improved timing, speed and accuracy with functional grasp of right hand. []? Progressing: []? Met: []? Not Met: []? Adjusted  4. Pt will be Independent with HEP/Home activities program to facilitate independence with carryover from sessions and to progress towards returning to PLOF  []? Progressing: []? Met: []? Not Met: []? Adjusted   5. Pt will write a pangram sentence in no more than 45 seconds minute demo'ing 95% legibility to demo improved fine motor control and speed of R hand. []? Progressing: []? Met: []? Not Met: []? Adjusted      ASSESSMENT:    Pt progressing well with AROM/PROM of RUE benefiting from self-assisted and manual therapy this date. Pt progressing with R hand fine motor coordination tasks demo'ing improved digit dexterity, however, timing, speed and accuracy continue to be deficient. Pt cont to benefit from skilled OT, cont per POC. Treatment/Activity Tolerance:  [x] Patient tolerated treatment well [] Patient limited by fatique  [] Patient limited by pain  [] Patient limited by other medical complications  [] Other:     Overall Progression Towards Functional goals/ Treatment Progress Update:  [] Patient is progressing as expected towards functional goals listed. [] Progression is slowed due to complexities/Impairments listed. [] Progression has been slowed due to co-morbidities.   [x] Plan just implemented, too soon to assess goals progression <30days   [] Goals require adjustment due to lack of progress  [] Patient is not progressing as expected and requires additional follow up with physician  [] Other    Prognosis for POC: [x] Good [] Fair  [] Poor    Patient requires continued skilled intervention: [x] Yes  [] No        PLAN:   [x] Continue per plan of care [] Alter current plan (see comments)  [] Plan of care initiated [] Hold pending MD visit [] Discharge    Electronically signed by: Lele Aldrich OT    Note: If patient does not return for scheduled/recommended follow up visits, this note will serve as a discharge from care along with the most recent update on progress.

## 2022-04-04 ENCOUNTER — HOSPITAL ENCOUNTER (OUTPATIENT)
Dept: OCCUPATIONAL THERAPY | Age: 77
Setting detail: THERAPIES SERIES
Discharge: HOME | End: 2022-04-04
Payer: MEDICARE

## 2022-04-04 ENCOUNTER — HOSPITAL ENCOUNTER (OUTPATIENT)
Dept: PHYSICAL THERAPY | Age: 77
Setting detail: THERAPIES SERIES
Discharge: HOME | End: 2022-04-04
Payer: MEDICARE

## 2022-04-04 PROCEDURE — 97116 GAIT TRAINING THERAPY: CPT

## 2022-04-04 PROCEDURE — 97112 NEUROMUSCULAR REEDUCATION: CPT

## 2022-04-04 PROCEDURE — 97530 THERAPEUTIC ACTIVITIES: CPT

## 2022-04-04 PROCEDURE — 97110 THERAPEUTIC EXERCISES: CPT

## 2022-04-04 PROCEDURE — 97140 MANUAL THERAPY 1/> REGIONS: CPT

## 2022-04-04 NOTE — FLOWSHEET NOTE
[facility] Outpatient Therapy  [address] [employer], [address]  Phone: [phone]   Fax: [phone]    Occupational Therapy Treatment Note/ Progress Report:     Date:  2022    Patient Name:  Clif Martinez    :  1945  MRN: [id number]      Medical/Treatment Diagnosis Information:  I63.9 (ICD-10-CM) - Cerebral infarction, unspecified  M25.611 (ICD-10-CM) Stiffness of right shoulder, not elsewhere classified, R27.9 (ICD-10-CM) Unspecified lack of coordination, R53.1 (ICD-10-CM) Weakness        Insurance/Certification information: Medicare A & B  Physician Information:  Dr. Les Siegel of [facility] signed:    Yes    Date of Patient follow up with Physician: none known at this time     Progress Report: []  Yes  [x]  No     Date Range for reporting period:  Beginning: 3/24/2022   Ending:      Progress report due:      Recertification due (POC duration/ or 90 days whichever is less): 2022     Visit # Insurance Allowable Auth Needed    BMN No     Latex Allergy:  [x]NO      []YES  Preferred Language for Healthcare:   [x]English       []other:  Functional Scale: UEFS (2022)  -- 8.75/100    Pain level: 0/10     SUBJECTIVE: pt reported tightness in R shoulder, denied pain. OBJECTIVE:    Observation: improved RUE ROM with self-assisted exercises   Test measurements:      RESTRICTIONS/PRECAUTIONS: Plavix and aspirin     Therapeutic Activities:  Activity #1: Pt instructed to tightly braid 3 pipe  for bimanual task practice and to improve fine motor control of R hand. Pt completed with 100% accuracy requiring increase time. Slight cramping in L hand - no issues reported with R hand fatigue    Activity #2: Pt used a felt tip standard pen to trace small (x5), medium (x5) and large (x5) icons to work on fine motor control of R hand. Pt demo'd fair-poor accuracy with tracing requiring cues for quality versus speed.  Overall, pt does well inhibiting overuse of R shoulder. Patient Education:  --purpose of TAs - verb understanding    Manual Therapy: RUE, supine unless otherwise indicated    Shoulder abduction stretch with STM: progressive stretch 90-95 degrees (~100 degrees on 2nd set) with STM to coracobrachialis, 2 sets x ~1-2 minutes  Shoulder flexion stretch with STM: progressive stretch limited to 90 degrees 1-2 minute hold  Shoulder flexion stretch (sidelying) with STM: 30 second hold followed by 1-2 minutes of PROM moving RUE  degrees with STM to R shoulder for pain relief. Completed 2 sets. Therapeutic Exercises: Exercises in bold performed in department today. Items not bolded are carried forward from prior visits for continuity of the record. Exercise/Equipment Resistance/Repetitions HEP Other comments   BUE Shoulder flexion w/ dowel tatyana (supine)   1# dowel tatyana, 2 sets x10 reps [] Min VCs to facilitate effective movement patterns. VCs to caution moving into pain  ROM, although limited, was improved with 2nd set     RUE shoulder abduction w/ dowel tatyana (supine)     1# dowel tatyana, 2 sets x10 reps [] Min-mod A to support and facilitate RUE. ROM improving with repetition     []        []        []        []        []        []        []          []         []        []        []        []        []        []        []        []      Home Exercise Program:      Therapeutic Exercise:   [x] (63725) Provided verbal/tactile cueing for activities related to strengthening, flexibility, endurance, ROM.  Includes review/progression of HEP activities related to strengthening, flexibility, endurance, AROM, proximal shoulder and UE for functional transfers/mobility, self-care, IADLs, and community re-entry    Therapeutic Activities:    [x] (57689) Provided verbal/tactile cueing for activities related to improving balance, coordination, kinesthetic sense, posture, motor skill, proprioception and motor activation to allow for proper function of core, proximal shoulder and UE with self-care, and ADLs, IADLs, functional mobility, work-related and leisure based activities. Includes review/progression of HEP activities to improve balance, coordination, kinesthetic sense, posture, motor skill, proprioception, proximal shoulder and UE for functional transfers/mobility, self-care, IADLs, and community re-entry    Sensory Integration Techniques:  [] (77711)Provided verbal/tactile feedback to enhance sensory processing and promoting responses that are adaptive to environmental demands    Self-Care/Home Management Training:  [] (38275) Provided training and education in restorative and compensatory strategies/activity modifications in activities of daily living, meal preparation, laundry and other various house maintenance activities. Provided training and education in use of adaptive equipment/devices and assistive technology, as needed, to promote participation and independence. Cognitive Function:  [] (80658 x15 minutes, 64920 +15 minutes) Integrated activities that address attention, memory, reasoning, executive functioning, problem solving, and/or pragmatic functioning in addition to compensatory strategies to manage the performance of an activity (for example, managing time or schedules, initiating, organizing, and sequencing tasks). NMR:  [] (07209) During functional activities/therapeutic exercises, provided facilitation of normal movement patterns and provided handling/verbal cues to promote postural alignment and stability during static and dynamic movement (sitting or standing).  Activities may also include visual perceptual training, proprioception training, and balance retraining during functional activities    Manual Treatments:    [x] (30684) Provided manual therapy to mobilize UB for the purpose of modulating pain, promoting relaxation,  increasing ROM, reducing/eliminating soft tissue swelling/inflammation/restriction, improving soft tissue extensibility and allowing for proper ROM for normal function with self-care, functional mobility, transfers, reaching and lifting    Modalities:     [] Electrical Stimulation (13756):     [] Ultrasound (92504):    Charges:  Timed Code Treatment Minutes: 55   Total Treatment Minutes: 55      [] EVAL (LOW) 17276 (typically 20 minutes face-to-face)  [] EVAL (MOD) 60320 (typically 30 minutes face-to-face)  [] EVAL (HIGH) 25053 (typically 45 minutes face-to-face)  [] RE-EVAL     [x] LJ(56007) x25 (2)   [] NMR (98743) x       [x] Manual (97870) x15 (1)   [x] TA (79682) x15 (1)     [] ES(attended) (73564)       [] ES (un) (54939)  [] Other:    GOALS: Patient stated goal: \"get back to normal\"       []? Progressing: []? Met: []? Not Met: []? Adjusted     Therapist goals for Patient:      Short Term Goals: To be achieved in: 6 weeks  1. Pt will improve score on UEFS to 40/100 for a subjective report of decreased difficulty with completing every day activities with RUE and R hand  []? Progressing: []? Met: []? Not Met: []? Adjusted  2. Pt will improve time on NHPT to more than 45 seconds to demonstrate improved fine motor coordination of R hand  []? Progressing: []? Met: []? Not Met: []? Adjusted  3. Pt will improve score on BBT to 40 blocks/minute to demonstrate improved timing, speed and accuracy with functional grasp of right hand. []? Progressing: []? Met: []? Not Met: []? Adjusted  4. Pt will be Min A with HEP/Home activities program to facilitate independence with carryover from sessions and to progress towards returning to PLOF  []? Progressing: []? Met: []? Not Met: []? Adjusted   5. Pt will write a pangram sentence in no more than 1 minute demo'ing 85% legibility to demo improved fine motor control and speed of R hand. []? Progressing: []? Met: []? Not Met: []? Adjusted      Long Term Goals: To be achieved in: 12 weeks  1.  Pt will improve score on UEFS to 65/100 for a subjective report of decreased difficulty with completing every day activities with RUE and R hand  []? Progressing: []? Met: []? Not Met: []? Adjusted  2. Pt will improve time on NHPT to more than 30 seconds to demonstrate improved fine motor coordination of R hand  []? Progressing: []? Met: []? Not Met: []? Adjusted  3. Pt will improve score on BBT to 50 blocks/minute to demonstrate improved timing, speed and accuracy with functional grasp of right hand. []? Progressing: []? Met: []? Not Met: []? Adjusted  4. Pt will be Independent with HEP/Home activities program to facilitate independence with carryover from sessions and to progress towards returning to PLOF  []? Progressing: []? Met: []? Not Met: []? Adjusted   5. Pt will write a pangram sentence in no more than 45 seconds minute demo'ing 95% legibility to demo improved fine motor control and speed of R hand. []? Progressing: []? Met: []? Not Met: []? Adjusted      ASSESSMENT:    Pt is making good progress with increasing P/AAROM in RUE demo'ing good tolerance for manual stretch and self-assisted exercises. Pt demo'ing good fine motor control of R hand during bimanual tasks, however, demo increased difficulty with motor control during high level unimanual tasks such as writing. Pt cont to be limited by decreased muscle lengthening in R shoudler and impaired fine motor control of R hand which impacts functional use during every day activities. Pt cont to benefit from skilled OT, cont per POC. Treatment/Activity Tolerance:  [x] Patient tolerated treatment well [] Patient limited by fatique  [] Patient limited by pain  [] Patient limited by other medical complications  [] Other:     Overall Progression Towards Functional goals/ Treatment Progress Update:  [] Patient is progressing as expected towards functional goals listed. [] Progression is slowed due to complexities/Impairments listed. [] Progression has been slowed due to co-morbidities.   [x] Plan just implemented, too soon to assess goals progression <30days   [] Goals require adjustment due to lack of progress  [] Patient is not progressing as expected and requires additional follow up with physician  [] Other    Prognosis for POC: [x] Good [] Fair  [] Poor    Patient requires continued skilled intervention: [x] Yes  [] No        PLAN:   [x] Continue per plan of care [] Alter current plan (see comments)  [] Plan of care initiated [] Hold pending MD visit [] Discharge    Electronically signed by: Roney Okeefe OT    Note: If patient does not return for scheduled/recommended follow up visits, this note will serve as a discharge from care along with the most recent update on progress.

## 2022-04-04 NOTE — FLOWSHEET NOTE
[facility] Outpatient Therapy  [address] [place] [address], [address], [address] [place] [address] Av  Phone: [phone]   Fax: [phone]    Physical Therapy Treatment Note/ Progress Report:     Date:  2022    Patient Name:  Lauryn De Paz    :  1945  MRN: [id number]    Medical/Treatment Diagnosis Information:  · Diagnosis: I63.9 (ICD-10-CM) - Cerebral infarction, unspecified  ·    R 26 abnormality of gait and mobility; R53.1 weakness, I63.9 cerebral infarction  Insurance/Certification information:  PT Insurance Information: Medicare  Physician Information:  Referring Practitioner: Dr. Madelaine Santana of care signed:    [x] Yes  [] No    Date of Patient follow up with Physician:      Progress Report: []  Yes  [x]  No     Date Range for reporting period:  Beginning:  3/24/22  Ending:      Progress report due (10 Rx/or 30 days whichever is less): 3/94/63    Recertification due (POC duration/ or 90 days whichever is less):      Visit # Insurance Allowable Auth Needed    BMN []Yes   [x]No     RESTRICTIONS/PRECAUTIONS: receptive aphasia, Plavix and aspirin  Latex Allergy:  [x]NO      []YES  Preferred Language for Healthcare:   [x]English       []other:  Functional Scale: 10m walk= 0.37m/s; TUG 35.8 sec w/ RW; Vernon Minor   Date assessed:3/24/22    Pain level:  0/10 R shoulder     SUBJECTIVE:  Pt reports she has been compliant w/ HEP. OBJECTIVE:    Observation: Pt presents amb w/ RW, decreased zac, decreased step height on right, ataxia on right and spastic on right   Test measurements:      Exercises/Interventions: Exercises in bold performed in department today. Items not bolded are carried forward from prior visits for continuity of the record. Exercise/Equipment Resistance/Repetitions HEP Other comments   Nustep 8 min., L 3  Total: 8  Warm up: 2 min. Intervals: 1 min fast (120's spm x 1 min comfortable 80's spm)  Cool down: 1 min.       [] Seat 8, arms 11  Freq VC for R hip control due to excessive adduction  Cues for full ROM, limited by challenged to maintain hip control/foot placement. Had R medial knee pain in final 2 min,went straight to cool down. R hamstring stretch 30 sec x 3 x Seated w/ belt   R gastroc stretch 30 sec x 2 [x] Standing lunge stretch   R soleus stretch 30 sec x 2 [] Standing lunge, B UE support    R knee to chest, heel on SB 5 x 2 [x] Supine, VC for neutral hip rotation and slow control of lowering   R hip abd 5 x 2 [x] Hooklying, VC for neutral hip rotation   R bridging 10 x 1, 3 sec hold [] Blocking at R lateral knee and ankle to avoid hip adduction   R ankle DF/PF 10 x 1 [x] Long sitting, propped on extended UEs. Active assist to reduce inversion and knee flexion           []      []    NMR  []    Alt toe taps 10 x 1 to 4\" cones    10x 1 to 4\" cones w/ 2lb ankle R weight  [x] Standing w/ RW    Reduced ataxic movement, especially returning R foot to starting position     []    NMES to R ant tib  6 min. , standing w/ forward stepping in place for active ankle DF during swing [] VMS, phase duration 200, frequency 50pps, cycle time 5/5. Good contraction, nearly full ROM achieved. Narrow SHU 30 sec    Trunk/UE rotations- 5x   Cervical/UE flex/ext- 5x    []   Post weight shift; poor eye hand coordination, cues to maintain eye contact on hands     Semi tandem 15 sec  Cervical rotation- 3x  []   LOB R w/ either foot in back     []    Gait      amb  100', 20' x 2 w/  SBQC L hand  3 point pattern challenged to maintain 2 consistently, R side mildly ataxic w/ delay in R toe off. Decreased R arm swing, maintains in flexor tone position. [employer] ladder 10' x 10, L UE support  VC to widen SHU, R toe catch on >50% of trials                  Home Exercise Program:  Access Code: [email or web address]  URL: [email or web address]  Date: 03/24/2022  Prepared by: Vanda Jade  Exercises  Supine Knee to Chest with Leg Straight - 1 x daily - 7 x weekly - 1-3 sets - 5-10 reps  Supine Hip Abduction - 1 x daily - 7 x weekly - 1-3 sets - 5-10 reps  Seated Ankle Pumps - 1 x daily - 7 x weekly - 3 sets - 10 reps  Seated Toe Taps - 2 x daily - 7 x weekly - 3 sets - 10 reps     Access Code: [email or web address]  URL: [email or web address]  Date: 03/28/2022  Prepared by: Gomez Brace  Exercises  Standing Gastroc Stretch at Counter - 1 x daily - 7 x weekly - 3 sets - 10 reps      Therapeutic Exercise:   [x] (87941) Provided verbal/tactile cueing for activities related to strengthening, flexibility, endurance, ROM for improvements in LE, proximal hip, and core control with self-care, mobility, lifting, ambulation. NMR:  [x] (18311) Provided verbal/tactile cueing for activities related to improving balance, coordination, kinesthetic sense, posture, motor skill, proprioception to assist with LE, proximal hip, and core control in self-care, mobility, lifting, ambulation and eccentric single leg control. Therapeutic Activities:    [] (27602) Provided verbal/tactile cueing for activities related to improving balance, coordination, kinesthetic sense, posture, motor skill, proprioception and motor activation to allow for proper function of core, proximal hip and LE with self-care and ADLs and functional mobility.      Gait Training:    [x] (12333) Provided training and instruction to the patient for proper LE, core and proximal hip recruitment and positioning and eccentric body weight control with ambulation re-education including up and down stairs     Manual Treatments:  PROM / STM / Oscillations-Mobs:  G-I, II, III, IV (PA's, Inf., Post.)  [] (72839) Provided manual therapy to mobilize LE, proximal hip and/or LS spine soft tissue/joints for the purpose of modulating pain, promoting relaxation,  increasing ROM, reducing/eliminating soft tissue swelling/inflammation/restriction, improving soft tissue extensibility and allowing for proper ROM for normal function with self-care, mobility, lifting and ambulation. Home Exercise Program:    [x] (83858) Reviewed/Progressed HEP activities related to strengthening, flexibility, endurance, ROM of core, proximal hip and LE for functional self-care, mobility, lifting and ambulation/stair navigation   [x] (60436) Reviewed/Progressed HEP activities related to improving balance, coordination, kinesthetic sense, posture, motor skill, proprioception of core, proximal hip and LE for self-care, mobility, lifting, and ambulation/stair navigation      Modalities:    [] Electric Stimulation:   [] Ultrasound:   [] Other:       Charges:  Timed Code Treatment Minutes: NM 17; GT 12, TE28   Total Treatment Minutes: 57      [] EVAL (LOW) 46794 (typically 20 minutes face-to-face)  [] EVAL (MOD) 45928 (typically 30 minutes face-to-face)  [] EVAL (HIGH) 67801 (typically 45 minutes face-to-face)  [] RE-EVAL     [x] GG(51905) x   2    [x] NMR (72502) x 1    [] Manual (63338) x       [] TA (28073) x      [x] Gait Training [phone]) x 1      [] ES(attended) (92178)  [] ES (un) (16625)   [] DRY NEEDLE 1 OR 2 MUSCLES  [] DRY NEEDLE 3+ MUSCLES  [] Mech Traction (56517)  [] Ultrasound (60581)  [] Other:    GOALS: Goals established 3/24/22   Patient stated goal: achieve better balance and walk  [] Progressing: [] Met: [] Not Met: [] Adjusted    Therapist goals for Patient:   Short Term Goals: To be achieved in: 6 weeks   1. Independent in HEP and progression per patient tolerance. [] Progressing: [] Met: [] Not Met: [] Adjusted   2. Pt will improve TUG time to 25 sec to improve functional ambulation. [] Progressing: [] Met: [] Not Met: [] Adjusted  3. Pt to perform transfers via stand-pivot technique with use of  small base quad cane (SBQC) on left Modified Independent  [] Progressing: [] Met: [] Not Met: [] Adjusted  4. Pt will improve Rosa to 32/56 to demonstrate reduced all risk. [] Progressing: [] Met: [] Not Met: [] Adjusted    Long Term Goals: To be achieved in: 12 weeks  1.  Pt will improve ABC scale to 55% confidence for reduced fall risk. [] Progressing: [] Met: [] Not Met: [] Adjusted  2. Pt to negotiate up/down 7 stairs with step to pattern w/ single rail mod I.   [] Progressing: [] Met: [] Not Met: [] Adjusted   3. Pt to demonstrate improved gait pattern including improved R foot clearance and knee control without cues with use of AFO on right and an appropriate based cane. [] Progressing: [] Met: [] Not Met: [] Adjusted  4. Pt will improve Rosa to 42/56 to demonstrate reduced all risk. [] Progressing: [] Met: [] Not Met: [] Adjusted  5. Pt independent with HEP. [] Progressing: [] Met: [] Not Met: [] Adjusted  6. Pt to report improved confidence with ambulating in community   [] Progressing: [] Met: [] Not Met: [] Adjusted  7. Pt will improve TUG time to 18 sec w/ least restrictive AD to improve functional ambulation. [] Progressing: [] Met: [] Not Met: [] Adjusted  8. Pt will demonstrate 10m walk speed to 0.80m/s. [] Progressing: [] Met: [] Not Met: [] Adjusted    ASSESSMENT:  Pt demonstrates good tolerance and contraction achieved w/ NMES to R anterior tib, added to functional stepping task w/ good performance. Initiated gait training SBQC w/ min A. Pt demos 3 point pattern, unable to fully transition to 2 point pattern due to delayed R toe off and ataxia. Pt w/ good performance on cardio exercise,able to tolerate interval training. Pt will continue to benefit from skilled PT to improve functional mobility, reduce fall risk and maximize independence. Treatment/Activity Tolerance:  [x] Patient tolerated treatment well [] Patient limited by fatique  [] Patient limited by pain  [] Patient limited by other medical complications  [] Other:     Overall Progression Towards Functional goals/ Treatment Progress Update:  [] Patient is progressing as expected towards functional goals listed. [] Progression is slowed due to complexities/Impairments listed.   [] Progression has been slowed due to co-morbidities. [x] Plan just implemented, too soon to assess goals progression <30days   [] Goals require adjustment due to lack of progress  [] Patient is not progressing as expected and requires additional follow up with physician  [] Other    Prognosis for POC: [x] Good [] Fair  [] Poor    Patient requires continued skilled intervention: [x] Yes  [] No        PLAN: 3x/week for 4 weeks then 2x/week for 8 weeks   [x] Continue per plan of care [] Alter current plan (see comments)  [] Plan of care initiated [] Hold pending MD visit [] Discharge    Electronically signed by: Greg Dawn, PT, DPT    Note: If patient does not return for scheduled/recommended follow up visits, this note will serve as a discharge from care along with the most recent update on progress.

## 2022-04-06 ENCOUNTER — HOSPITAL ENCOUNTER (OUTPATIENT)
Dept: PHYSICAL THERAPY | Age: 77
Setting detail: THERAPIES SERIES
Discharge: HOME | End: 2022-04-06
Payer: MEDICARE

## 2022-04-06 ENCOUNTER — HOSPITAL ENCOUNTER (OUTPATIENT)
Dept: OCCUPATIONAL THERAPY | Age: 77
Setting detail: THERAPIES SERIES
Discharge: HOME | End: 2022-04-06
Payer: MEDICARE

## 2022-04-06 ENCOUNTER — HOSPITAL ENCOUNTER (OUTPATIENT)
Dept: SPEECH THERAPY | Age: 77
Setting detail: THERAPIES SERIES
Discharge: HOME | End: 2022-04-06
Payer: MEDICARE

## 2022-04-06 PROCEDURE — 97130 THER IVNTJ EA ADDL 15 MIN: CPT

## 2022-04-06 PROCEDURE — 97140 MANUAL THERAPY 1/> REGIONS: CPT

## 2022-04-06 PROCEDURE — 97110 THERAPEUTIC EXERCISES: CPT

## 2022-04-06 PROCEDURE — 97530 THERAPEUTIC ACTIVITIES: CPT

## 2022-04-06 PROCEDURE — 97129 THER IVNTJ 1ST 15 MIN: CPT

## 2022-04-06 PROCEDURE — 97116 GAIT TRAINING THERAPY: CPT

## 2022-04-06 NOTE — FLOWSHEET NOTE
[facility]  [address], [facility] [address]  Phone: [phone]   Fax: [phone]    Occupational Therapy Treatment Note/ Progress Report:     Date:  2022    Patient Name:  Natalie Martinez    :  1945  MRN: [id number]      Medical/Treatment Diagnosis Information:  I63.9 (ICD-10-CM) - Cerebral infarction, unspecified  M25.611 (ICD-10-CM) Stiffness of right shoulder, not elsewhere classified, R27.9 (ICD-10-CM) Unspecified lack of coordination, R53.1 (ICD-10-CM) Weakness        Insurance/Certification information: Medicare A & B  Physician Information:  Dr. Jose Jenkins of care signed:    Yes    Date of Patient follow up with Physician: none known at this time     Progress Report: []  Yes  [x]  No     Date Range for reporting period:  Beginning: 3/24/2022   Ending:      Progress report due:      Recertification due (POC duration/ or 90 days whichever is less): 2022     Visit # Insurance Allowable Auth Needed    BMN No     Latex Allergy:  [x]NO      []YES  Preferred Language for Healthcare:   [x]English       []other:  Functional Scale: UEFS (2022)  -- 8.75/100    Pain level: 0/10     SUBJECTIVE: pt reported increased tightness in R shoulder, denied pain. OBJECTIVE:    Observation: improved RUE ROM with self-assisted exercises   Test measurements:      RESTRICTIONS/PRECAUTIONS: Plavix and aspirin     Therapeutic Activities:  Activity #1: Pt used groove peg board to work on in-hand manipulation of the R hand. Pt completed 2 sets x 23 pegs. Timing, speed and accuracy made of focus by timing patient for each set. Pt completed each set with the following times: 3 minutes 31 seconds and 3 minutes 51 seconds. Required cues to inhibit RUE shoulder abduction and L lateral lean demo'ing improved posturing during 2nd set which resulted in increased time needed to complete.      Activity #2: Pt instructed to color in between vertical and horizontal lines (~.25\" wide) to work on fine motor control of R hand. Pt demo'd decreased speed but good accuracy participating in task x10 minutes. Patient Education:  --importance of inhibiting ineffective posturing and movement patterns during fine motor tasks - verb/demo understanding    Manual Therapy: RUE, supine unless otherwise indicated    Shoulder abduction stretch with STM: progressive stretch 90-95 degrees (~100 degrees on 2nd set) with STM to coracobrachialis, 2 sets x ~2-3 minutes with last minute PROM 95<->80  Shoulder flexion stretch (sidelying) with STM: 90 second hold followed by 1-2 minutes of PROM moving RUE  degrees with STM to R shoulder for pain relief. Completed 2 sets. Therapeutic Exercises: Exercises in bold performed in department today. Items not bolded are carried forward from prior visits for continuity of the record. Exercise/Equipment Resistance/Repetitions HEP Other comments   BUE Shoulder flexion w/ dowel tatyana (supine)   1# dowel tatyana, 2 sets x15 reps [] With elbows slightly flexed - ROM limited to ~130 degrees   RUE shoulder abduction w/ dowel tatyana (supine)     1# dowel tatyana, 2 sets x15 reps [] Mod A to support RUE, ROM limited to ~100 degrees initially progressing to 135 degrees       []        []        []        []        []        []        []          []         []        []        []        []        []        []        []        []      Home Exercise Program:  Self-assisted stretches for RUE using broom/stick at home   Force use RUE/R hand    Therapeutic Exercise:   [x] (33931) Provided verbal/tactile cueing for activities related to strengthening, flexibility, endurance, ROM.  Includes review/progression of HEP activities related to strengthening, flexibility, endurance, AROM, proximal shoulder and UE for functional transfers/mobility, self-care, IADLs, and community re-entry    Therapeutic Activities:    [x] (46457) Provided verbal/tactile cueing for activities related to improving balance, coordination, kinesthetic sense, posture, motor skill, proprioception and motor activation to allow for proper function of core, proximal shoulder and UE with self-care, and ADLs, IADLs, functional mobility, work-related and leisure based activities. Includes review/progression of HEP activities to improve balance, coordination, kinesthetic sense, posture, motor skill, proprioception, proximal shoulder and UE for functional transfers/mobility, self-care, IADLs, and community re-entry    Sensory Integration Techniques:  [] (55988)Provided verbal/tactile feedback to enhance sensory processing and promoting responses that are adaptive to environmental demands    Self-Care/Home Management Training:  [] (45358) Provided training and education in restorative and compensatory strategies/activity modifications in activities of daily living, meal preparation, laundry and other various house maintenance activities. Provided training and education in use of adaptive equipment/devices and assistive technology, as needed, to promote participation and independence. Cognitive Function:  [] (97186 x15 minutes, 82300 +15 minutes) Integrated activities that address attention, memory, reasoning, executive functioning, problem solving, and/or pragmatic functioning in addition to compensatory strategies to manage the performance of an activity (for example, managing time or schedules, initiating, organizing, and sequencing tasks). NMR:  [] (94170) During functional activities/therapeutic exercises, provided facilitation of normal movement patterns and provided handling/verbal cues to promote postural alignment and stability during static and dynamic movement (sitting or standing).  Activities may also include visual perceptual training, proprioception training, and balance retraining during functional activities    Manual Treatments:    [x] (24011) Provided manual therapy to mobilize UB for the purpose of modulating pain, promoting relaxation,  increasing ROM, reducing/eliminating soft tissue swelling/inflammation/restriction, improving soft tissue extensibility and allowing for proper ROM for normal function with self-care, functional mobility, transfers, reaching and lifting    Modalities:     [] Electrical Stimulation (62707):     [] Ultrasound (37880):    Charges:  Timed Code Treatment Minutes: 55   Total Treatment Minutes: 55      [] EVAL (LOW) 14931 (typically 20 minutes face-to-face)  [] EVAL (MOD) 28593 (typically 30 minutes face-to-face)  [] EVAL (HIGH) 71992 (typically 45 minutes face-to-face)  [] RE-EVAL     [x] JN(97369) x23 (2)   [] NMR (32963) x       [x] Manual (20998) x14 (1)   [x] TA (42013) x18 (1)    [] ES(attended) (54792)       [] ES (un) (11813)  [] Other:    GOALS: Patient stated goal: \"get back to normal\"       []? Progressing: []? Met: []? Not Met: []? Adjusted     Therapist goals for Patient:      Short Term Goals: To be achieved in: 6 weeks  1. Pt will improve score on UEFS to 40/100 for a subjective report of decreased difficulty with completing every day activities with RUE and R hand  []? Progressing: []? Met: []? Not Met: []? Adjusted  2. Pt will improve time on NHPT to more than 45 seconds to demonstrate improved fine motor coordination of R hand  []? Progressing: []? Met: []? Not Met: []? Adjusted  3. Pt will improve score on BBT to 40 blocks/minute to demonstrate improved timing, speed and accuracy with functional grasp of right hand. []? Progressing: []? Met: []? Not Met: []? Adjusted  4. Pt will be Min A with HEP/Home activities program to facilitate independence with carryover from sessions and to progress towards returning to PLOF  []? Progressing: []? Met: []? Not Met: []? Adjusted   5. Pt will write a pangram sentence in no more than 1 minute demo'ing 85% legibility to demo improved fine motor control and speed of R hand.    []? Progressing: []? Met: []? Not Met: []? Adjusted      Long Term Goals: To be achieved in: 12 weeks  1. Pt will improve score on UEFS to 65/100 for a subjective report of decreased difficulty with completing every day activities with RUE and R hand  []? Progressing: []? Met: []? Not Met: []? Adjusted  2. Pt will improve time on NHPT to more than 30 seconds to demonstrate improved fine motor coordination of R hand  []? Progressing: []? Met: []? Not Met: []? Adjusted  3. Pt will improve score on BBT to 50 blocks/minute to demonstrate improved timing, speed and accuracy with functional grasp of right hand. []? Progressing: []? Met: []? Not Met: []? Adjusted  4. Pt will be Independent with HEP/Home activities program to facilitate independence with carryover from sessions and to progress towards returning to PLOF  []? Progressing: []? Met: []? Not Met: []? Adjusted   5. Pt will write a pangram sentence in no more than 45 seconds minute demo'ing 95% legibility to demo improved fine motor control and speed of R hand. []? Progressing: []? Met: []? Not Met: []? Adjusted      ASSESSMENT:    Pt demo'd fair tolerance with manual stretch this date - limited PROM d/t possible guarding and muscle tightness. Improved ROM with active-assisted exercises progressing from manual stretch. Pt cont to respond well to verbal and tactile cues to facilitate effective movement patterns and inhibit ineffective posturing. Pt cont to demo impaired timing, speed and accuracy with fine motor tasks d/t impaired digit dexterity. Pt cont to benefit from skilled OT, cont per POC. Treatment/Activity Tolerance:  [x] Patient tolerated treatment well [] Patient limited by fatique  [] Patient limited by pain  [] Patient limited by other medical complications  [] Other:     Overall Progression Towards Functional goals/ Treatment Progress Update:  [] Patient is progressing as expected towards functional goals listed.     [] Progression is slowed due to complexities/Impairments listed. [] Progression has been slowed due to co-morbidities. [x] Plan just implemented, too soon to assess goals progression <30days   [] Goals require adjustment due to lack of progress  [] Patient is not progressing as expected and requires additional follow up with physician  [] Other    Prognosis for POC: [x] Good [] Fair  [] Poor    Patient requires continued skilled intervention: [x] Yes  [] No      PLAN:   [x] Continue per plan of care [] Alter current plan (see comments)  [] Plan of care initiated [] Hold pending MD visit [] Discharge    Electronically signed by: Lele Aldrich OT    Note: If patient does not return for scheduled/recommended follow up visits, this note will serve as a discharge from care along with the most recent update on progress.

## 2022-04-06 NOTE — FLOWSHEET NOTE
[facility]  [address], [address], [address]  Phone: [phone]   Fax: [phone]    Physical Therapy Treatment Note/ Progress Report:     Date:  2022    Patient Name:  Liberty Church    :  1945  MRN: [id number]    Medical/Treatment Diagnosis Information:  · Diagnosis: I63.9 (ICD-10-CM) - Cerebral infarction, unspecified  ·    R 26 abnormality of gait and mobility; R53.1 weakness, I63.9 cerebral infarction  Insurance/Certification information:  PT Insurance Information: Medicare  Physician Information:  Referring Practitioner: Dr. Rachele Dumont of care signed:    [x] Yes  [] No    Date of Patient follow up with Physician:      Progress Report: []  Yes  [x]  No     Date Range for reporting period:  Beginning:  3/24/22  Ending:      Progress report due (10 Rx/or 30 days whichever is less):     Recertification due (POC duration/ or 90 days whichever is less):      Visit # Insurance Allowable Auth Needed    BMN []Yes   [x]No     RESTRICTIONS/PRECAUTIONS: receptive aphasia, Plavix and aspirin  Latex Allergy:  [x]NO      []YES  Preferred Language for Healthcare:   [x]English       []other:  Functional Scale: 10m walk= 0.37m/s; TUG 35.8 sec w/ RW; [employer]   Date assessed:3/24/22    Pain level:  /10 R shoulder     SUBJECTIVE:  Pt reports doing well, had some R shoulder discomfort. OBJECTIVE:    Observation: Pt presents amb w/ RW, decreased zac, decreased step height on right, ataxia on right and spastic on right   Test measurements:      Exercises/Interventions: Exercises in bold performed in department today. Items not bolded are carried forward from prior visits for continuity of the record. Exercise/Equipment Resistance/Repetitions HEP Other comments   Nustep 8 min., L 3  Total: 8  Warm up: 2 min. Intervals: 1 min fast (120's spm x 1 min comfortable 80's spm)  Cool down: 1 min.       [] Seat 8, arms 11  Freq VC for R hip control due to excessive adduction  Cues for full ROM, limited by challenged to maintain hip control/foot placement. Had R medial knee pain in final 2 min,went straight to cool down. R hamstring stretch 30 sec x 3 x Seated w/ belt   R gastroc stretch 30 sec x 3 [x] Standing lunge stretch   R soleus stretch 30 sec x 2 [] Standing lunge, B UE support    R knee to chest, heel on SB 5 x 2 [x] Supine, VC for neutral hip rotation and slow control of lowering   R hip abd 5 x 2 [x] Hooklying, VC for neutral hip rotation   R bridging 10 x 1, 3 sec hold [] Blocking at R lateral knee and ankle to avoid hip adduction   R ankle DF/PF 10 x 1 [x] Long sitting, propped on extended UEs. Active assist to reduce inversion and knee flexion           []      []    NMR  []    Alt toe taps 10 x 1 to 4\" cones    10x 1 to 4\" cones w/ 2lb ankle R weight  [x] Standing w/ RW    Reduced ataxic movement, especially returning R foot to starting position     []    NMES to R ant tib  6 min. , standing w/ forward stepping in place for active ankle DF during swing [] VMS, phase duration 200, frequency 50pps, cycle time 5/5. Good contraction, nearly full ROM achieved. Narrow SHU 30 sec    Trunk/UE rotations- 5x   Cervical/UE flex/ext- 5x    []   Post weight shift; poor eye hand coordination, cues to maintain eye contact on hands     Semi tandem 15 sec  Cervical rotation- 3x  []   LOB R w/ either foot in back     []    Gait      amb  100', 20' x 4 w/  LBQC L hand  3 point pattern challenged to maintain 2 consistently, R side mildly ataxic w/ delay in R toe off. Decreased R arm swing, maintains in flexor tone position. Poor placement of cane compared to [employer] 10' x 10, L UE support  VC to widen SHU, R toe catch on >50% of trials    Biodex TM  3-5 min.  X 3, 0.8-0.9mph,   B UE support  Metronome attempted at 35-40bpm to improve symmetry  Max HR 107bpm  Pt very limited by incoordination, absent heel strike, then excessive heel strike, ataxic R LE throughout w/ R knee hyperextension in stance. Pt unable to match metronome due to overstepping. Freq VC to reduce upper body tension and breath holding           Home Exercise Program:  Access Code: [email or web address]  URL: [email or web address]  Date: 03/24/2022  Prepared by: Darrian Dumont  Exercises  Supine Knee to Chest with Leg Straight - 1 x daily - 7 x weekly - 1-3 sets - 5-10 reps  Supine Hip Abduction - 1 x daily - 7 x weekly - 1-3 sets - 5-10 reps  Seated Ankle Pumps - 1 x daily - 7 x weekly - 3 sets - 10 reps  Seated Toe Taps - 2 x daily - 7 x weekly - 3 sets - 10 reps     Access Code: [email or web address]  URL: [email or web address]  Date: 03/28/2022  Prepared by: Darrian Dumont  Exercises  Standing Gastroc Stretch at Counter - 1 x daily - 7 x weekly - 3 sets - 10 reps      Therapeutic Exercise:   [x] (89641) Provided verbal/tactile cueing for activities related to strengthening, flexibility, endurance, ROM for improvements in LE, proximal hip, and core control with self-care, mobility, lifting, ambulation. NMR:  [] (03150) Provided verbal/tactile cueing for activities related to improving balance, coordination, kinesthetic sense, posture, motor skill, proprioception to assist with LE, proximal hip, and core control in self-care, mobility, lifting, ambulation and eccentric single leg control. Therapeutic Activities:    [] (65196) Provided verbal/tactile cueing for activities related to improving balance, coordination, kinesthetic sense, posture, motor skill, proprioception and motor activation to allow for proper function of core, proximal hip and LE with self-care and ADLs and functional mobility.      Gait Training:    [x] (00499) Provided training and instruction to the patient for proper LE, core and proximal hip recruitment and positioning and eccentric body weight control with ambulation re-education including up and down stairs     Manual Treatments:  PROM / STM / Oscillations-Mobs:  G-I, II, III, IV (PA's, Inf., Post.)  [] (73033) Provided manual therapy to mobilize LE, proximal hip and/or LS spine soft tissue/joints for the purpose of modulating pain, promoting relaxation,  increasing ROM, reducing/eliminating soft tissue swelling/inflammation/restriction, improving soft tissue extensibility and allowing for proper ROM for normal function with self-care, mobility, lifting and ambulation. Home Exercise Program:    [x] (61156) Reviewed/Progressed HEP activities related to strengthening, flexibility, endurance, ROM of core, proximal hip and LE for functional self-care, mobility, lifting and ambulation/stair navigation   [x] (54380) Reviewed/Progressed HEP activities related to improving balance, coordination, kinesthetic sense, posture, motor skill, proprioception of core, proximal hip and LE for self-care, mobility, lifting, and ambulation/stair navigation      Modalities:    [] Electric Stimulation:   [] Ultrasound:   [] Other:       Charges:  Timed Code Treatment Minutes:  GT 25, TE28   Total Treatment Minutes: 56      [] EVAL (LOW) 87463 (typically 20 minutes face-to-face)  [] EVAL (MOD) 88423 (typically 30 minutes face-to-face)  [] EVAL (HIGH) 51677 (typically 45 minutes face-to-face)  [] RE-EVAL     [x] LX(32060) x   2    [] NMR (06058) x     [] Manual (93820) x       [] TA (31554) x      [x] Gait Training ([id number]) x 2      [] ES(attended) (45245)  [] ES (un) (18428)   [] DRY NEEDLE 1 OR 2 MUSCLES  [] DRY NEEDLE 3+ MUSCLES  [] Mech Traction (24637)  [] Ultrasound (16541)  [] Other:    GOALS: Goals established 3/24/22   Patient stated goal: achieve better balance and walk  [] Progressing: [] Met: [] Not Met: [] Adjusted    Therapist goals for Patient:   Short Term Goals: To be achieved in: 6 weeks   1. Independent in HEP and progression per patient tolerance. [] Progressing: [] Met: [] Not Met: [] Adjusted   2.  Pt will improve TUG time to 25 sec to improve functional ambulation. [] Progressing: [] Met: [] Not Met: [] Adjusted  3. Pt to perform transfers via stand-pivot technique with use of  small base quad cane (SBQC) on left Modified Independent  [] Progressing: [] Met: [] Not Met: [] Adjusted  4. Pt will improve Rosa to 32/56 to demonstrate reduced all risk. [] Progressing: [] Met: [] Not Met: [] Adjusted    Long Term Goals: To be achieved in: 12 weeks  1. Pt will improve ABC scale to 55% confidence for reduced fall risk. [] Progressing: [] Met: [] Not Met: [] Adjusted  2. Pt to negotiate up/down 7 stairs with step to pattern w/ single rail mod I.   [] Progressing: [] Met: [] Not Met: [] Adjusted   3. Pt to demonstrate improved gait pattern including improved R foot clearance and knee control without cues with use of AFO on right and an appropriate based cane. [] Progressing: [] Met: [] Not Met: [] Adjusted  4. Pt will improve Rosa to 42/56 to demonstrate reduced all risk. [] Progressing: [] Met: [] Not Met: [] Adjusted  5. Pt independent with HEP. [] Progressing: [] Met: [] Not Met: [] Adjusted  6. Pt to report improved confidence with ambulating in community   [] Progressing: [] Met: [] Not Met: [] Adjusted  7. Pt will improve TUG time to 18 sec w/ least restrictive AD to improve functional ambulation. [] Progressing: [] Met: [] Not Met: [] Adjusted  8. Pt will demonstrate 10m walk speed to 0.80m/s. [] Progressing: [] Met: [] Not Met: [] Adjusted    ASSESSMENT:  Pt significantly challenged by initial gait training on [employer] TM. She demonstrated mult gait deviations and could improve some w/ cueing, but overall was ataxic, guarded and inconsistent in gait speed despite consistent TM speed. Use of metronome helped symmetry for very brief periods, but pt mostly overstepping TM. Pt trialed LBQC vs SBQC today w/ poor cane placement initially and unable to transition to 2 point pattern from 3.  Pt w/ good performance on cardio exercise,able to tolerate interval training. Pt will continue to benefit from skilled PT to improve functional mobility, reduce fall risk and maximize independence. Treatment/Activity Tolerance:  [x] Patient tolerated treatment well [] Patient limited by fatique  [] Patient limited by pain  [] Patient limited by other medical complications  [] Other:     Overall Progression Towards Functional goals/ Treatment Progress Update:  [] Patient is progressing as expected towards functional goals listed. [] Progression is slowed due to complexities/Impairments listed. [] Progression has been slowed due to co-morbidities. [x] Plan just implemented, too soon to assess goals progression <30days   [] Goals require adjustment due to lack of progress  [] Patient is not progressing as expected and requires additional follow up with physician  [] Other    Prognosis for POC: [x] Good [] Fair  [] Poor    Patient requires continued skilled intervention: [x] Yes  [] No        PLAN: 3x/week for 4 weeks then 2x/week for 8 weeks   [x] Continue per plan of care [] Alter current plan (see comments)  [] Plan of care initiated [] Hold pending MD visit [] Discharge    Electronically signed by: Cherie Diana, PT, DPT    Note: If patient does not return for scheduled/recommended follow up visits, this note will serve as a discharge from care along with the most recent update on progress.

## 2022-04-07 ENCOUNTER — HOSPITAL ENCOUNTER (OUTPATIENT)
Dept: SPEECH THERAPY | Age: 77
Setting detail: THERAPIES SERIES
Discharge: HOME | End: 2022-04-07
Payer: MEDICARE

## 2022-04-07 ENCOUNTER — HOSPITAL ENCOUNTER (OUTPATIENT)
Dept: OCCUPATIONAL THERAPY | Age: 77
Setting detail: THERAPIES SERIES
Discharge: HOME | End: 2022-04-07
Payer: MEDICARE

## 2022-04-07 ENCOUNTER — HOSPITAL ENCOUNTER (OUTPATIENT)
Dept: PHYSICAL THERAPY | Age: 77
Setting detail: THERAPIES SERIES
Discharge: HOME | End: 2022-04-07
Payer: MEDICARE

## 2022-04-07 PROCEDURE — 97140 MANUAL THERAPY 1/> REGIONS: CPT

## 2022-04-07 PROCEDURE — 97110 THERAPEUTIC EXERCISES: CPT

## 2022-04-07 PROCEDURE — 97116 GAIT TRAINING THERAPY: CPT

## 2022-04-07 PROCEDURE — 97129 THER IVNTJ 1ST 15 MIN: CPT

## 2022-04-07 PROCEDURE — 97530 THERAPEUTIC ACTIVITIES: CPT

## 2022-04-07 PROCEDURE — 97130 THER IVNTJ EA ADDL 15 MIN: CPT

## 2022-04-07 NOTE — FLOWSHEET NOTE
[facility] Outpatient Therapy  [address] [place] [address], [address], [address]  Phone: [phone]   Fax: [phone]    Physical Therapy Treatment Note/ Progress Report:     Date:  2022    Patient Name:  Miki Dawn    :  1945  MRN: [id number]    Medical/Treatment Diagnosis Information:  · Diagnosis: I63.9 (ICD-10-CM) - Cerebral infarction, unspecified  ·    R 26 abnormality of gait and mobility; R53.1 weakness, I63.9 cerebral infarction  Insurance/Certification information:  PT Insurance Information: Medicare  Physician Information:  Referring Practitioner: Dr. Carisa Rubio of care signed:    [x] Yes  [] No    Date of Patient follow up with Physician:      Progress Report: []  Yes  [x]  No     Date Range for reporting period:  Beginning:  3/24/22  Ending:      Progress report due (10 Rx/or 30 days whichever is less):     Recertification due (POC duration/ or 90 days whichever is less):      Visit # Insurance Allowable Auth Needed    BMN []Yes   [x]No     RESTRICTIONS/PRECAUTIONS: receptive aphasia, Plavix and aspirin  Latex Allergy:  [x]NO      []YES  Preferred Language for Healthcare:   [x]English       []other:  Functional Scale: 10m walk= 0.37m/s; TUG 35.8 sec w/ RW; Tello Distel   Date assessed:3/24/22    Pain level:  1/10 R shoulder     SUBJECTIVE:  Pt reports doing well, no soreness or fatigue from yesterdays session. OBJECTIVE:    Observation: Pt presents amb w/ RW, decreased zac, decreased step height on right, ataxia on right and spastic on right   Test measurements:      Exercises/Interventions: Exercises in bold performed in department today. Items not bolded are carried forward from prior visits for continuity of the record. Exercise/Equipment Resistance/Repetitions HEP Other comments   Nustep 8 min., L 3  Total: 8  Warm up: 2 min. Intervals: 1 min fast (120's spm x 1 min comfortable 80's spm)  Cool down: 1 min.       [] Seat 8, Improved cane manegement and step continuity after TM training compared to before. 10m walk= 0.15m/s   Simmery ladder 10' x 10, L UE support  VC to widen SHU, R toe catch on >50% of trials    Biodex TM  5 min. + 6 min. 0.8-1.0mph,   B UE support  Metronome attempted at 35-40bpm to improve symmetry  Max HR 107bpm  Pt very limited by incoordination, absent heel strike, then excessive heel strike, ataxic R LE throughout w/ R knee hyperextension in stance. Pt w/ reduced ataxia and improved step length slightly when cued for less focus on R LE and overall focus on bigger steps. Freq VC to reduce upper body tension and breath holding         TA: encouraged pt to wear more supportive gym shoes w/ solid based sole to reduce ankle inversion and risk of sprain. Pt verbalized understanding. Home Exercise Program:  Access Code: Rossana Cabrerau: [email or web address]  Date: 03/24/2022  Prepared by: Vanda Jade  Exercises  Supine Knee to Chest with Leg Straight - 1 x daily - 7 x weekly - 1-3 sets - 5-10 reps  Supine Hip Abduction - 1 x daily - 7 x weekly - 1-3 sets - 5-10 reps  Seated Ankle Pumps - 1 x daily - 7 x weekly - 3 sets - 10 reps  Seated Toe Taps - 2 x daily - 7 x weekly - 3 sets - 10 reps     Access Code: [email or web address]  URL: [email or web address]/  Date: 03/28/2022  Prepared by: Vanda Jade  Exercises  Standing Gastroc Stretch at Counter - 1 x daily - 7 x weekly - 3 sets - 10 reps      Therapeutic Exercise:   [x] (23477) Provided verbal/tactile cueing for activities related to strengthening, flexibility, endurance, ROM for improvements in LE, proximal hip, and core control with self-care, mobility, lifting, ambulation.     NMR:  [x] (83322) Provided verbal/tactile cueing for activities related to improving balance, coordination, kinesthetic sense, posture, motor skill, proprioception to assist with LE, proximal hip, and core control in self-care, mobility, lifting, ambulation and eccentric single leg control. Therapeutic Activities:    [] (96551) Provided verbal/tactile cueing for activities related to improving balance, coordination, kinesthetic sense, posture, motor skill, proprioception and motor activation to allow for proper function of core, proximal hip and LE with self-care and ADLs and functional mobility. Gait Training:    [x] (87494) Provided training and instruction to the patient for proper LE, core and proximal hip recruitment and positioning and eccentric body weight control with ambulation re-education including up and down stairs     Manual Treatments:  PROM / STM / Oscillations-Mobs:  G-I, II, III, IV (PA's, Inf., Post.)  [] (73065) Provided manual therapy to mobilize LE, proximal hip and/or LS spine soft tissue/joints for the purpose of modulating pain, promoting relaxation,  increasing ROM, reducing/eliminating soft tissue swelling/inflammation/restriction, improving soft tissue extensibility and allowing for proper ROM for normal function with self-care, mobility, lifting and ambulation.      Home Exercise Program:    [x] (96806) Reviewed/Progressed HEP activities related to strengthening, flexibility, endurance, ROM of core, proximal hip and LE for functional self-care, mobility, lifting and ambulation/stair navigation   [x] (33153) Reviewed/Progressed HEP activities related to improving balance, coordination, kinesthetic sense, posture, motor skill, proprioception of core, proximal hip and LE for self-care, mobility, lifting, and ambulation/stair navigation      Modalities:    [] Electric Stimulation:   [] Ultrasound:   [] Other:       Charges:  Timed Code Treatment Minutes:  GT 27, TE28, NM 4   Total Treatment Minutes: 59      [] EVAL (LOW) 07288 (typically 20 minutes face-to-face)  [] EVAL (MOD) 13277 (typically 30 minutes face-to-face)  [] EVAL (HIGH) 18485 (typically 45 minutes face-to-face)  [] RE-EVAL     [x] AX(85683) x   2    [] NMR (30986) x     [] Manual ([id number]) x       [] TA (50625) x      [x] Gait Training (22300) x 2      [] ES(attended) (95379)  [] ES (un) (01842)   [] DRY NEEDLE 1 OR 2 MUSCLES  [] DRY NEEDLE 3+ MUSCLES  [] Mech Traction (12793)  [] Ultrasound (39315)  [] Other:    GOALS: Goals established 3/24/22   Patient stated goal: achieve better balance and walk  [] Progressing: [] Met: [] Not Met: [] Adjusted    Therapist goals for Patient:   Short Term Goals: To be achieved in: 6 weeks   1. Independent in HEP and progression per patient tolerance. [] Progressing: [] Met: [] Not Met: [] Adjusted   2. Pt will improve TUG time to 25 sec to improve functional ambulation. [] Progressing: [] Met: [] Not Met: [] Adjusted  3. Pt to perform transfers via stand-pivot technique with use of  small base quad cane (SBQC) on left Modified Independent  [] Progressing: [] Met: [] Not Met: [] Adjusted  4. Pt will improve Rosa to 32/56 to demonstrate reduced all risk. [] Progressing: [] Met: [] Not Met: [] Adjusted    Long Term Goals: To be achieved in: 12 weeks  1. Pt will improve ABC scale to 55% confidence for reduced fall risk. [] Progressing: [] Met: [] Not Met: [] Adjusted  2. Pt to negotiate up/down 7 stairs with step to pattern w/ single rail mod I.   [] Progressing: [] Met: [] Not Met: [] Adjusted   3. Pt to demonstrate improved gait pattern including improved R foot clearance and knee control without cues with use of AFO on right and an appropriate based cane. [] Progressing: [] Met: [] Not Met: [] Adjusted  4. Pt will improve Rosa to 42/56 to demonstrate reduced all risk. [] Progressing: [] Met: [] Not Met: [] Adjusted  5. Pt independent with HEP. [] Progressing: [] Met: [] Not Met: [] Adjusted  6. Pt to report improved confidence with ambulating in community   [] Progressing: [] Met: [] Not Met: [] Adjusted  7. Pt will improve TUG time to 18 sec w/ least restrictive AD to improve functional ambulation. [] Progressing: [] Met: [] Not Met: [] Adjusted  8. Pt will demonstrate 10m walk speed to 0.80m/s. [] Progressing: [] Met: [] Not Met: [] Adjusted    ASSESSMENT:  Pt w/ gradual slight improvement in gait training on Biodex TM. She demonstrated mult gait deviations and could improve some w/ cueing, but overall was ataxic, guarded and inconsistent in gait speed despite consistent TM speed. Use of metronome helped symmetry for very brief periods, but pt mostly overstepping TM. Pt progressing towards 2 point gait pattern w/ SBQC. Pt w/ good performance on cardio exercise,able to tolerate interval training. Pt will continue to benefit from skilled PT to improve functional mobility, reduce fall risk and maximize independence. Treatment/Activity Tolerance:  [x] Patient tolerated treatment well [] Patient limited by fatique  [] Patient limited by pain  [] Patient limited by other medical complications  [] Other:     Overall Progression Towards Functional goals/ Treatment Progress Update:  [] Patient is progressing as expected towards functional goals listed. [] Progression is slowed due to complexities/Impairments listed. [] Progression has been slowed due to co-morbidities. [x] Plan just implemented, too soon to assess goals progression <30days   [] Goals require adjustment due to lack of progress  [] Patient is not progressing as expected and requires additional follow up with physician  [] Other    Prognosis for POC: [x] Good [] Fair  [] Poor    Patient requires continued skilled intervention: [x] Yes  [] No        PLAN: 3x/week for 4 weeks then 2x/week for 8 weeks   [x] Continue per plan of care [] Alter current plan (see comments)  [] Plan of care initiated [] Hold pending MD visit [] Discharge    Electronically signed by: Eric Madden, PT, DPT    Note: If patient does not return for scheduled/recommended follow up visits, this note will serve as a discharge from care along with the most recent update on progress.

## 2022-04-07 NOTE — FLOWSHEET NOTE
[facility] Outpatient Therapy  [address] [place] [address] [address], [address]  Phone: [phone]   Fax: [phone]    Occupational Therapy Treatment Note/ Progress Report:     Date:  2022    Patient Name:  Migdalia Kaufman    :  1945  MRN: [id number]      Medical/Treatment Diagnosis Information:  I63.9 (ICD-10-CM) - Cerebral infarction, unspecified  M25.611 (ICD-10-CM) Stiffness of right shoulder, not elsewhere classified, R27.9 (ICD-10-CM) Unspecified lack of coordination, R53.1 (ICD-10-CM) Weakness        Insurance/Certification information: Medicare A & B  Physician Information:  Dr. Deion Zarate of [facility] signed:    Yes    Date of Patient follow up with Physician: none known at this time     Progress Report: []  Yes  [x]  No     Date Range for reporting period:  Beginning: 3/24/2022   Ending:      Progress report due:      Recertification due (POC duration/ or 90 days whichever is less): 2022     Visit # Insurance Allowable Auth Needed    BMN No     Latex Allergy:  [x]NO      []YES  Preferred Language for Healthcare:   [x]English       []other:  Functional Scale: UEFS (2022)  -- 8.75/100    Pain level: 0/10     SUBJECTIVE: pt reported increased tightness in R shoulder, denied pain. Pt reported that it \"tightens up every morning\". Pt stated that her hot shower this morning was not effective in decreasing tightness. OBJECTIVE:    Observation: protracting of RUE during supine exercises, specifically with shoulder flexion - corrects with verbal and tactile cues   Test measurements:      RESTRICTIONS/PRECAUTIONS: Plavix and aspirin     Therapeutic Activities:  Activity #1:     Patient Education:  --self-assisted shoulder flexion stretch (bilaterally) while seated with BUEs resting on RW.  Instructed pt to hinge at hips to passively move BUEs into flexion - demo/verb understanding  --self assisted RUE shoulder flexion stretch using the wall - verb/demo understanding  --self assisted RUE shoulder abduction stretch using the wall - verb/demo understanding  --to use a heating pad prior to self-assisted exercises - verb understanding  --to sleep a few nights without using pillow for RUE support to determine benefits versus not - verb understanding    Manual Therapy: RUE, supine unless otherwise indicated    Shoulder abduction stretch with STM: progressive stretch  degrees with STM to coracobrachialis, 2 sets x ~2-3 minutes with last minute PROM 100<->115  Shoulder flexion stretch with STM: 2 minute hold followed by 1 minute of PROM moving RUE  degrees with STM to R shoulder for pain relief. Completed 2 sets. Therapeutic Exercises: Exercises in bold performed in department today. Items not bolded are carried forward from prior visits for continuity of the record. Exercise/Equipment Resistance/Repetitions HEP Other comments   BUE Shoulder flexion w/ dowel tatyana (supine)   1# dowel tatyana, 2 sets x15 reps [] Able to achieve ~150 degrees by 2nd set. Verbal and tactile cues to correct elbow flexion and abduction of R shoulder during flexion. Verbal and tactile cues to inhibit protraction with fair success. RUE shoulder abduction w/ dowel tatyana (supine)     1# dowel tatyana, 2 sets x15 reps [] Able to achieve ~150-160 degrees by 2nd set. Min-mod A to support RUE. Verbal and tactile cues to facilitate effective movement patterns       []        []        []        []        []        []        []          []         []        []        []        []        []        []        []        []      Home Exercise Program:  Self-assisted stretches for RUE using RW and wall as described above  Force use RUE/R hand    Therapeutic Exercise:   [x] (02572) Provided verbal/tactile cueing for activities related to strengthening, flexibility, endurance, ROM.  Includes review/progression of HEP activities related to strengthening, flexibility, endurance, AROM, proximal shoulder and UE for functional transfers/mobility, self-care, IADLs, and community re-entry    Therapeutic Activities:    [x] (20141) Provided verbal/tactile cueing for activities related to improving balance, coordination, kinesthetic sense, posture, motor skill, proprioception and motor activation to allow for proper function of core, proximal shoulder and UE with self-care, and ADLs, IADLs, functional mobility, work-related and leisure based activities. Includes review/progression of HEP activities to improve balance, coordination, kinesthetic sense, posture, motor skill, proprioception, proximal shoulder and UE for functional transfers/mobility, self-care, IADLs, and community re-entry    Sensory Integration Techniques:  [] (68520)Provided verbal/tactile feedback to enhance sensory processing and promoting responses that are adaptive to environmental demands    Self-Care/Home Management Training:  [] (72712) Provided training and education in restorative and compensatory strategies/activity modifications in activities of daily living, meal preparation, laundry and other various house maintenance activities. Provided training and education in use of adaptive equipment/devices and assistive technology, as needed, to promote participation and independence. Cognitive Function:  [] (54668 x15 minutes, 54922 +15 minutes) Integrated activities that address attention, memory, reasoning, executive functioning, problem solving, and/or pragmatic functioning in addition to compensatory strategies to manage the performance of an activity (for example, managing time or schedules, initiating, organizing, and sequencing tasks). NMR:  [] (61481) During functional activities/therapeutic exercises, provided facilitation of normal movement patterns and provided handling/verbal cues to promote postural alignment and stability during static and dynamic movement (sitting or standing).  Activities may also include visual perceptual training, proprioception training, and balance retraining during functional activities    Manual Treatments:    [x] (33709) Provided manual therapy to mobilize UB for the purpose of modulating pain, promoting relaxation,  increasing ROM, reducing/eliminating soft tissue swelling/inflammation/restriction, improving soft tissue extensibility and allowing for proper ROM for normal function with self-care, functional mobility, transfers, reaching and lifting    Modalities:     [] Electrical Stimulation (95861):     [] Ultrasound (99554):    Charges:  Timed Code Treatment Minutes: 50   Total Treatment Minutes: 50      [] EVAL (LOW) 05442 (typically 20 minutes face-to-face)  [] EVAL (MOD) 65706 (typically 30 minutes face-to-face)  [] EVAL (HIGH) 66283 (typically 45 minutes face-to-face)  [] RE-EVAL     [x] HI(47371) x23 (2)   [] NMR (62833) x       [x] Manual (02344) x20 (1)   [x] TA (55548) x7 (1)   [] ES(attended) (91722)       [] ES (un) (83788)  [] Other:    GOALS: Patient stated goal: \"get back to normal\"       []? Progressing: []? Met: []? Not Met: []? Adjusted     Therapist goals for Patient:      Short Term Goals: To be achieved in: 6 weeks  1. Pt will improve score on UEFS to 40/100 for a subjective report of decreased difficulty with completing every day activities with RUE and R hand  []? Progressing: []? Met: []? Not Met: []? Adjusted  2. Pt will improve time on NHPT to more than 45 seconds to demonstrate improved fine motor coordination of R hand  []? Progressing: []? Met: []? Not Met: []? Adjusted  3. Pt will improve score on BBT to 40 blocks/minute to demonstrate improved timing, speed and accuracy with functional grasp of right hand. []? Progressing: []? Met: []? Not Met: []? Adjusted  4. Pt will be Min A with HEP/Home activities program to facilitate independence with carryover from sessions and to progress towards returning to PLOF  []? Progressing: []? Met: []? Not Met: []? Adjusted   5.  Pt will write a pangram sentence in no more than 1 minute demo'ing 85% legibility to demo improved fine motor control and speed of R hand. []? Progressing: []? Met: []? Not Met: []? Adjusted      Long Term Goals: To be achieved in: 12 weeks  1. Pt will improve score on UEFS to 65/100 for a subjective report of decreased difficulty with completing every day activities with RUE and R hand  []? Progressing: []? Met: []? Not Met: []? Adjusted  2. Pt will improve time on NHPT to more than 30 seconds to demonstrate improved fine motor coordination of R hand  []? Progressing: []? Met: []? Not Met: []? Adjusted  3. Pt will improve score on BBT to 50 blocks/minute to demonstrate improved timing, speed and accuracy with functional grasp of right hand. []? Progressing: []? Met: []? Not Met: []? Adjusted  4. Pt will be Independent with HEP/Home activities program to facilitate independence with carryover from sessions and to progress towards returning to PLOF  []? Progressing: []? Met: []? Not Met: []? Adjusted   5. Pt will write a pangram sentence in no more than 45 seconds minute demo'ing 95% legibility to demo improved fine motor control and speed of R hand. []? Progressing: []? Met: []? Not Met: []? Adjusted      ASSESSMENT:    Pt cont to demo great benefit from self-assisted and OT-assisted manual therapy to improve PROM/AROM of RUE and to decrease muscle tightness. Pt with good lasting effects for the remainder of the day but reported increased tightness upon waking in the morning. Encouraged pt to use a heat pad and complete self-assisted exercises described above in patient education session. Pt in agreement. Plan to f/u at next session to determine effectiveness. Pt cont to benefit from skilled OT, cont per POC.      Treatment/Activity Tolerance:  [x] Patient tolerated treatment well [] Patient limited by fatique  [] Patient limited by pain  [] Patient limited by other medical complications  [] Other:     Overall Progression Towards Functional goals/ Treatment Progress Update:  [] Patient is progressing as expected towards functional goals listed. [] Progression is slowed due to complexities/Impairments listed. [] Progression has been slowed due to co-morbidities. [x] Plan just implemented, too soon to assess goals progression <30days   [] Goals require adjustment due to lack of progress  [] Patient is not progressing as expected and requires additional follow up with physician  [] Other    Prognosis for POC: [x] Good [] Fair  [] Poor    Patient requires continued skilled intervention: [x] Yes  [] No      PLAN:   [x] Continue per plan of care [] Alter current plan (see comments)  [] Plan of care initiated [] Hold pending MD visit [] Discharge    Electronically signed by: Taylor Howard OT    Note: If patient does not return for scheduled/recommended follow up visits, this note will serve as a discharge from care along with the most recent update on progress.

## 2022-04-11 ENCOUNTER — HOSPITAL ENCOUNTER (OUTPATIENT)
Dept: PHYSICAL THERAPY | Age: 77
Setting detail: THERAPIES SERIES
Discharge: HOME | End: 2022-04-11
Payer: MEDICARE

## 2022-04-11 ENCOUNTER — HOSPITAL ENCOUNTER (OUTPATIENT)
Dept: OCCUPATIONAL THERAPY | Age: 77
Setting detail: THERAPIES SERIES
Discharge: HOME | End: 2022-04-11
Payer: MEDICARE

## 2022-04-11 PROCEDURE — 97110 THERAPEUTIC EXERCISES: CPT

## 2022-04-11 PROCEDURE — 97140 MANUAL THERAPY 1/> REGIONS: CPT

## 2022-04-11 PROCEDURE — 97530 THERAPEUTIC ACTIVITIES: CPT

## 2022-04-11 PROCEDURE — 97116 GAIT TRAINING THERAPY: CPT

## 2022-04-11 NOTE — FLOWSHEET NOTE
[facility] Outpatient Therapy  [address] [place] [address], [address], [address]  Phone: [phone]   Fax: [phone]    Physical Therapy Treatment Note/ Progress Report:     Date:  2022    Patient Name:  Ralph Chase    :  1945  MRN: [id number]    Medical/Treatment Diagnosis Information:  · Diagnosis: I63.9 (ICD-10-CM) - Cerebral infarction, unspecified  ·    R 26 abnormality of gait and mobility; R53.1 weakness, I63.9 cerebral infarction  Insurance/Certification information:  PT Insurance Information: Medicare  Physician Information:  Referring Practitioner: Dr. Keane Factor of care signed:    [x] Yes  [] No    Date of Patient follow up with Physician:      Progress Report: []  Yes  [x]  No     Date Range for reporting period:  Beginning:  3/24/22  Ending:      Progress report due (10 Rx/or 30 days whichever is less):     Recertification due (POC duration/ or 90 days whichever is less):      Visit # Insurance Allowable Auth Needed    BMN []Yes   [x]No     RESTRICTIONS/PRECAUTIONS: receptive aphasia, Plavix and aspirin  Latex Allergy:  [x]NO      []YES  Preferred Language for Healthcare:   [x]English       []other:  Functional Scale: 10m walk= 0.37m/s; TUG 35.8 sec w/ RW; Juan Pablo Cote   Date assessed:3/24/22    Pain level:  0/10 R shoulder     SUBJECTIVE:  Pt reports she did her HEP, using heat on R shoulder for stiffness. She did walk to the grocery store from car, but then used motorized cart. She c/o not sleeping well since CVA and didn't sleep at all last night. OBJECTIVE:    Observation: Pt presents amb w/ RW, decreased zac, decreased step height on right, ataxia on right and spastic on right   Test measurements:      Exercises/Interventions: Exercises in bold performed in department today. Items not bolded are carried forward from prior visits for continuity of the record.     Exercise/Equipment Resistance/Repetitions HEP Other comments Nustep 10 min., L 3  Total: 8  Warm up: 2 min. Intervals: 1 min fast (120's spm x 1 min comfortable 80's spm)  Cool down: 1 min. [] Seat 8, arms 11  Less VC for R hip control due to excessive adduction  Cues for full ROM, limited by challenged to maintain hip control/foot placement. R hamstring stretch 30 sec x 3 x Seated w/ belt   R gastroc stretch 30 sec x 3 [x] Standing lunge stretch   R soleus stretch 30 sec x 3 [] Standing lunge, B UE support, verbal cues for correct form    R knee to chest, heel on SB 5 x 2 [x] Supine, VC for neutral hip rotation and slow control of lowering   R hip abd 5 x 2 [x] Hooklying, VC for neutral hip rotation   R bridging 10 x 1, 3 sec hold [] Blocking at R lateral knee and ankle to avoid hip adduction   R ankle DF/PF 10 x 1 [x] Long sitting, propped on extended UEs. Active assist to reduce inversion and knee flexion           []      []    NMR  []    Alt toe taps 10 x 1 to 4\" cones    10x 1 to 4\" cones w/ 2lb ankle R weight  [x] Standing w/ RW    Reduced ataxic movement, especially returning R foot to starting position     []    NMES to R ant tib  6 min. , standing w/ forward stepping in place for active ankle DF during swing [] VMS, phase duration 200, frequency 50pps, cycle time 5/5. Good contraction, nearly full ROM achieved. Narrow SHU 30 sec    Trunk/UE rotations- 5x   Cervical/UE flex/ext- 5x    []   Post weight shift; poor eye hand coordination, cues to maintain eye contact on hands     Semi tandem 15 sec  Cervical rotation- 3x  []   LOB R w/ either foot in back   R knee to chest 10 x 1, yellow tband [] //bars w/ B UE support, assist to positive tband in between reps, pt somewhat impulsive, cueing to reposition for hip flexor stretch in between each rep. Good ROM w/ overflow to ant tib. Gait      amb  150' x 2, 20' x 4 w/ SBQC L hand  Discontinuous stepping,3 point pattern challenged to maintain 2 consistently.  Pt easily distracted visually and verbally causing more discontinuous and unsteady steps. Decreased R arm swing, maintains in flexor tone position. Improved cane manegement and step continuity after TM training compared to before. Stephen Eric ladder 10' x 10, L UE support  VC to widen SHU, R toe catch on >50% of trials    Biodex TM  5 min. + 3 min. + 3 min. 1.0-1.1mph,   B UE support  Metronome attempted at 40-44bpm to improve symmetry  Max HR 107bpm  Pt w/ reduced ataxia overall, but still inconsistent gait pattern on the TM,R LE throughout w/ R knee hyperextension in stance. Freq VC to reduce upper body tension and breath holding         TA:    Home Exercise Program:  Access Code: [email or web address]  URL: [email or web address]  Date: 03/24/2022  Prepared by: Mimi West  Exercises  Supine Knee to Chest with Leg Straight - 1 x daily - 7 x weekly - 1-3 sets - 5-10 reps  Supine Hip Abduction - 1 x daily - 7 x weekly - 1-3 sets - 5-10 reps  Seated Ankle Pumps - 1 x daily - 7 x weekly - 3 sets - 10 reps  Seated Toe Taps - 2 x daily - 7 x weekly - 3 sets - 10 reps     Access Code: [email or web address]  URL: [email or web address]  Date: 03/28/2022  Prepared by: Mimi West  Exercises  Standing Gastroc Stretch at Counter - 1 x daily - 7 x weekly - 3 sets - 10 reps      Therapeutic Exercise:   [x] (12553) Provided verbal/tactile cueing for activities related to strengthening, flexibility, endurance, ROM for improvements in LE, proximal hip, and core control with self-care, mobility, lifting, ambulation. NMR:  [] (47272) Provided verbal/tactile cueing for activities related to improving balance, coordination, kinesthetic sense, posture, motor skill, proprioception to assist with LE, proximal hip, and core control in self-care, mobility, lifting, ambulation and eccentric single leg control.      Therapeutic Activities:    [] (38097) Provided verbal/tactile cueing for activities related to improving balance, coordination, kinesthetic sense, posture, motor skill, proprioception and motor activation to allow for proper function of core, proximal hip and LE with self-care and ADLs and functional mobility. Gait Training:    [x] (51077) Provided training and instruction to the patient for proper LE, core and proximal hip recruitment and positioning and eccentric body weight control with ambulation re-education including up and down stairs     Manual Treatments:  PROM / STM / Oscillations-Mobs:  G-I, II, III, IV (PA's, Inf., Post.)  [] (26386) Provided manual therapy to mobilize LE, proximal hip and/or LS spine soft tissue/joints for the purpose of modulating pain, promoting relaxation,  increasing ROM, reducing/eliminating soft tissue swelling/inflammation/restriction, improving soft tissue extensibility and allowing for proper ROM for normal function with self-care, mobility, lifting and ambulation.      Home Exercise Program:    [] (57937) Reviewed/Progressed HEP activities related to strengthening, flexibility, endurance, ROM of core, proximal hip and LE for functional self-care, mobility, lifting and ambulation/stair navigation   [] (20246) Reviewed/Progressed HEP activities related to improving balance, coordination, kinesthetic sense, posture, motor skill, proprioception of core, proximal hip and LE for self-care, mobility, lifting, and ambulation/stair navigation      Modalities:    [] Electric Stimulation:   [] Ultrasound:   [] Other:       Charges:  Timed Code Treatment Minutes:  GT43, TE15   Total Treatment Minutes: 58      [] EVAL (LOW) 05536 (typically 20 minutes face-to-face)  [] EVAL (MOD) 29961 (typically 30 minutes face-to-face)  [] EVAL (HIGH) 53066 (typically 45 minutes face-to-face)  [] RE-EVAL     [x] UH(88690) x   1    [] NMR (68075) x     [] Manual (27898) x       [] TA (49469) x      [x] Gait Training (Q3933920) x 3      [] ES(attended) (87931)  [] ES (un) (57302)   [] DRY NEEDLE 1 OR 2 MUSCLES  [] DRY NEEDLE 3+ MUSCLES  [] [facility] Traction (15675)  [] Ultrasound (92564)  [] Other:    GOALS: Goals established 3/24/22   Patient stated goal: achieve better balance and walk  [] Progressing: [] Met: [] Not Met: [] Adjusted    Therapist goals for Patient:   Short Term Goals: To be achieved in: 6 weeks   1. Independent in HEP and progression per patient tolerance. [] Progressing: [] Met: [] Not Met: [] Adjusted   2. Pt will improve TUG time to 25 sec to improve functional ambulation. [] Progressing: [] Met: [] Not Met: [] Adjusted  3. Pt to perform transfers via stand-pivot technique with use of  small base quad cane (SBQC) on left Modified Independent  [] Progressing: [] Met: [] Not Met: [] Adjusted  4. Pt will improve Rosa to 32/56 to demonstrate reduced all risk. [] Progressing: [] Met: [] Not Met: [] Adjusted    Long Term Goals: To be achieved in: 12 weeks  1. Pt will improve ABC scale to 55% confidence for reduced fall risk. [] Progressing: [] Met: [] Not Met: [] Adjusted  2. Pt to negotiate up/down 7 stairs with step to pattern w/ single rail mod I.   [] Progressing: [] Met: [] Not Met: [] Adjusted   3. Pt to demonstrate improved gait pattern including improved R foot clearance and knee control without cues with use of AFO on right and an appropriate based cane. [] Progressing: [] Met: [] Not Met: [] Adjusted  4. Pt will improve Rosa to 42/56 to demonstrate reduced all risk. [] Progressing: [] Met: [] Not Met: [] Adjusted  5. Pt independent with HEP. [] Progressing: [] Met: [] Not Met: [] Adjusted  6. Pt to report improved confidence with ambulating in community   [] Progressing: [] Met: [] Not Met: [] Adjusted  7. Pt will improve TUG time to 18 sec w/ least restrictive AD to improve functional ambulation. [] Progressing: [] Met: [] Not Met: [] Adjusted  8. Pt will demonstrate 10m walk speed to 0.80m/s.    [] Progressing: [] Met: [] Not Met: [] Adjusted    ASSESSMENT:  Pt more limited by fatigue than usual, she did not sleep well and has not been since the CVA. She tolerated less time on TM, but was able to improve gait deviations (mostly gait speed and step length) slightly. Pt able to focus on metronome for brief period which also improved step length. When transitioned to [facility] training w/ SBQC she demonstrated 2 point pattern but quickly reverted to 3 point pattern w/ distraction and fatigue. Pt took more rest breaks in between activities overall. Pt will continue to benefit from skilled PT to improve functional mobility, reduce fall risk and maximize independence. Treatment/Activity Tolerance:  [] Patient tolerated treatment well [x] Patient limited by fatigue  [] Patient limited by pain  [] Patient limited by other medical complications  [] Other:     Overall Progression Towards Functional goals/ Treatment Progress Update:  [] Patient is progressing as expected towards functional goals listed. [] Progression is slowed due to complexities/Impairments listed. [] Progression has been slowed due to co-morbidities. [x] Plan just implemented, too soon to assess goals progression <30days   [] Goals require adjustment due to lack of progress  [] Patient is not progressing as expected and requires additional follow up with physician  [] Other    Prognosis for POC: [x] Good [] Fair  [] Poor    Patient requires continued skilled intervention: [x] Yes  [] No        PLAN: 3x/week for 4 weeks then 2x/week for 8 weeks   [x] Continue per plan of care [] Alter current plan (see comments)  [] Plan of care initiated [] Hold pending MD visit [] Discharge    Electronically signed by: Darrian Dumont, PT, DPT    Note: If patient does not return for scheduled/recommended follow up visits, this note will serve as a discharge from care along with the most recent update on progress.

## 2022-04-11 NOTE — FLOWSHEET NOTE
[facility] Outpatient Therapy  [address] [place] [address], [address], [address]  Phone: [phone]   Fax: [phone]    Occupational Therapy Treatment Note/ Progress Report:     Date:  2022    Patient Name:  Luisito Lopez    :  1945  MRN: [id number]      Medical/Treatment Diagnosis Information:  I63.9 (ICD-10-CM) - Cerebral infarction, unspecified  M25.611 (ICD-10-CM) Stiffness of right shoulder, not elsewhere classified, R27.9 (ICD-10-CM) Unspecified lack of coordination, R53.1 (ICD-10-CM) Weakness        Insurance/Certification information: Medicare A & B  Physician Information:  Dr. Jyothi Hyde of care signed:    Yes    Date of Patient follow up with Physician: none known at this time     Progress Report: []  Yes  [x]  No     Date Range for reporting period:  Beginning: 3/24/2022   Ending:      Progress report due: 2102     Recertification due (POC duration/ or 90 days whichever is less): 2022     Visit # Insurance Allowable Auth Needed    BMN No     Latex Allergy:  [x]NO      []YES  Preferred Language for Healthcare:   [x]English       []other:  Functional Scale: UEFS (2022)  -- 8.75/100    Pain level: 0/10     SUBJECTIVE: Pt stated poor sleep this past weekend, particularly last night. Pt very fatigued this date. Pt denied pain in RUE, complained of \"tightness\". Pt stated she was compliant with HEP this past weekend. OBJECTIVE:    Observation: noticeably fatigued this date - decreased insight to deficits specifically with quality of handwriting.  Test measurements:      RESTRICTIONS/PRECAUTIONS: Plavix and aspirin     Therapeutic Activities:    Activity #1: Handwriting activities completed to work on R hand strengthening and fine motor control. Pt used a standard pen to write name, address, and phone number on white, unlined paper. Pt stated signature is to baseline although OT observed to be illegible.  Pt with increased difficulty with writing numbers versus letters, overall. Pt instructed to write a pangram - only 1 word out of 9 legible. Activity #2: Secondary to observations from activity #1, pt instructed to copy various \"doodles\" to work on continuity of writing to improve legibility and quality of handwriting - cursive style. Pt instructed to copy 3 designs, 3x each focusing on inhibiting overuse of shoulder. Pt demo'd poor accuracy with copying design. Pt instructed to copy 2 doodled lines (~8\" length) demo'ing very poor accuracy and unable to complete 2/6 reps (only karo to about 4\"). Pt with increase awareness with decreased accuracy to OT's example - issued additional handout for continued practice at home. Patient Education:  --contact PCP and notify of sleep issues - verb understanding    Manual Therapy: RUE, supine unless otherwise indicated    Shoulder abduction stretch with STM: first set x30 second hold followed by 1 minute PROM moving RUE 95-85 degrees. Tenderness localized to coracobrachialis tendon. Second set 30 second hold ~95 degrees followed by 1 minute PROM moving RUE  degrees. Shoulder flexion stretch: 30 second hold followed by 1 minute PROM moving RUE  degrees progressively; 2 sets completed. Therapeutic Exercises: Exercises in bold performed in department today. Items not bolded are carried forward from prior visits for continuity of the record. Exercise/Equipment Resistance/Repetitions HEP Other comments   BUE Shoulder flexion w/ dowel tatyana (supine)   1# dowel tatyana, x15 reps [] Elbows slightly flexed - ROM limited to ~135 degrees   RUE shoulder abduction w/ dowel tatyana (supine)     1# dowel tatyana, x10 reps [] Limited to 10 reps this date, ROM limited to 90 degrees with 2-3 bouts of 135/140 degrees     BUE shoulder flexion with walker ~10 reps to ~100 degrees; ~5 second holds [x] Worked on alignment and inhibiting upper trap d/t over activation.  Pt requires handling to improve posture prior to start of each rep. Pt benefits from inhibitory handling to decrease over-activation of upper trap while performing exercise. Completed to work on PROM of R shoulder to decrease muscle tightness.          []        []        []        []        []        []          []         []        []        []        []        []        []        []        []      Home Exercise Program:  Self-assisted stretches for RUE using RW and wall as described above  Force use RUE/R hand    Therapeutic Exercise:   [x] (81642) Provided verbal/tactile cueing for activities related to strengthening, flexibility, endurance, ROM. Includes review/progression of HEP activities related to strengthening, flexibility, endurance, AROM, proximal shoulder and UE for functional transfers/mobility, self-care, IADLs, and community re-entry    Therapeutic Activities:    [x] (22098) Provided verbal/tactile cueing for activities related to improving balance, coordination, kinesthetic sense, posture, motor skill, proprioception and motor activation to allow for proper function of core, proximal shoulder and UE with self-care, and ADLs, IADLs, functional mobility, work-related and leisure based activities. Includes review/progression of HEP activities to improve balance, coordination, kinesthetic sense, posture, motor skill, proprioception, proximal shoulder and UE for functional transfers/mobility, self-care, IADLs, and community re-entry    Sensory Integration Techniques:  [] (32655)Provided verbal/tactile feedback to enhance sensory processing and promoting responses that are adaptive to environmental demands    Self-Care/Home Management Training:  [] (30646) Provided training and education in restorative and compensatory strategies/activity modifications in activities of daily living, meal preparation, laundry and other various house maintenance activities.  Provided training and education in use of adaptive equipment/devices and assistive technology, as needed, to promote participation and independence. Cognitive Function:  [] (95538 x15 minutes, 68781 +15 minutes) Integrated activities that address attention, memory, reasoning, executive functioning, problem solving, and/or pragmatic functioning in addition to compensatory strategies to manage the performance of an activity (for example, managing time or schedules, initiating, organizing, and sequencing tasks). NMR:  [] (93431) During functional activities/therapeutic exercises, provided facilitation of normal movement patterns and provided handling/verbal cues to promote postural alignment and stability during static and dynamic movement (sitting or standing). Activities may also include visual perceptual training, proprioception training, and balance retraining during functional activities    Manual Treatments:    [x] (91718) Provided manual therapy to mobilize UB for the purpose of modulating pain, promoting relaxation,  increasing ROM, reducing/eliminating soft tissue swelling/inflammation/restriction, improving soft tissue extensibility and allowing for proper ROM for normal function with self-care, functional mobility, transfers, reaching and lifting    Modalities:     [] Electrical Stimulation (06497):     [] Ultrasound (92344):    Charges:  Timed Code Treatment Minutes: 55   Total Treatment Minutes: 55      [] EVAL (LOW) 49341 (typically 20 minutes face-to-face)  [] EVAL (MOD) 96700 (typically 30 minutes face-to-face)  [] EVAL (HIGH) 74171 (typically 45 minutes face-to-face)  [] RE-EVAL     [x] TX(10533) x25 (2)  [] NMR (34878) x       [x] Manual ([id number]) x15 (1)  [x] TA (70605) x15 (1)  [] ES(attended) (28735)       [] ES (un) (11920)  [] Other:    GOALS: Patient stated goal: \"get back to normal\"       []? Progressing: []? Met: []? Not Met: []? Adjusted     Therapist goals for Patient:      Short Term Goals: To be achieved in: 6 weeks  1.  Pt will improve score on UEFS to 40/100 for a subjective report of decreased difficulty with completing every day activities with RUE and R hand  []? Progressing: []? Met: []? Not Met: []? Adjusted  2. Pt will improve time on NHPT to more than 45 seconds to demonstrate improved fine motor coordination of R hand  []? Progressing: []? Met: []? Not Met: []? Adjusted  3. Pt will improve score on BBT to 40 blocks/minute to demonstrate improved timing, speed and accuracy with functional grasp of right hand. []? Progressing: []? Met: []? Not Met: []? Adjusted  4. Pt will be Min A with HEP/Home activities program to facilitate independence with carryover from sessions and to progress towards returning to PLOF  []? Progressing: []? Met: []? Not Met: []? Adjusted   5. Pt will write a pangram sentence in no more than 1 minute demo'ing 85% legibility to demo improved fine motor control and speed of R hand. []? Progressing: []? Met: []? Not Met: []? Adjusted      Long Term Goals: To be achieved in: 12 weeks  1. Pt will improve score on UEFS to 65/100 for a subjective report of decreased difficulty with completing every day activities with RUE and R hand  []? Progressing: []? Met: []? Not Met: []? Adjusted  2. Pt will improve time on NHPT to more than 30 seconds to demonstrate improved fine motor coordination of R hand  []? Progressing: []? Met: []? Not Met: []? Adjusted  3. Pt will improve score on BBT to 50 blocks/minute to demonstrate improved timing, speed and accuracy with functional grasp of right hand. []? Progressing: []? Met: []? Not Met: []? Adjusted  4. Pt will be Independent with HEP/Home activities program to facilitate independence with carryover from sessions and to progress towards returning to PLOF  []? Progressing: []? Met: []? Not Met: []? Adjusted   5. Pt will write a pangram sentence in no more than 45 seconds minute demo'ing 95% legibility to demo improved fine motor control and speed of R hand. []? Progressing: []? Met: []? Not Met: []?  Adjusted ASSESSMENT:    Pt benefits from manual therapy and self-assisted exercises to decrease muscle tightness for improved ROM. Pt limited by muscle shortening and decrease strength in RUE. Pt demo'd ineffective posturing and movement patterns with seated assisted PROM/AROM of R shoulder, specifically in flexion. Pt with over-activation of upper trap which can ultimately limited shoulder flexion and abduction during supine and unsupported sit/stand reaching activities. Pt cont to be limited by fine motor control and decreased strength in L hand which impacts quality and legibility of handwriting. Pt cont to demo decreased functional use of RUE. Pt cont to benefit from skilled OT, cont per POC. Treatment/Activity Tolerance:  [x] Patient tolerated treatment well [] Patient limited by fatique  [] Patient limited by pain  [] Patient limited by other medical complications  [] Other:     Overall Progression Towards Functional goals/ Treatment Progress Update:  [] Patient is progressing as expected towards functional goals listed. [] Progression is slowed due to complexities/Impairments listed. [] Progression has been slowed due to co-morbidities. [x] Plan just implemented, too soon to assess goals progression <30days   [] Goals require adjustment due to lack of progress  [] Patient is not progressing as expected and requires additional follow up with physician  [] Other    Prognosis for POC: [x] Good [] Fair  [] Poor    Patient requires continued skilled intervention: [x] Yes  [] No      PLAN:   [x] Continue per plan of care [] Alter current plan (see comments)  [] Plan of care initiated [] Hold pending MD visit [] Discharge    Electronically signed by: Damien Peters OT    Note: If patient does not return for scheduled/recommended follow up visits, this note will serve as a discharge from care along with the most recent update on progress.

## 2022-04-12 ENCOUNTER — HOSPITAL ENCOUNTER (OUTPATIENT)
Dept: PHYSICAL THERAPY | Age: 77
Setting detail: THERAPIES SERIES
Discharge: HOME | End: 2022-04-12
Payer: MEDICARE

## 2022-04-12 ENCOUNTER — HOSPITAL ENCOUNTER (OUTPATIENT)
Dept: OCCUPATIONAL THERAPY | Age: 77
Setting detail: THERAPIES SERIES
Discharge: HOME | End: 2022-04-12
Payer: MEDICARE

## 2022-04-12 ENCOUNTER — HOSPITAL ENCOUNTER (OUTPATIENT)
Dept: SPEECH THERAPY | Age: 77
Setting detail: THERAPIES SERIES
Discharge: HOME | End: 2022-04-12
Payer: MEDICARE

## 2022-04-12 PROCEDURE — 97116 GAIT TRAINING THERAPY: CPT

## 2022-04-12 PROCEDURE — 97110 THERAPEUTIC EXERCISES: CPT

## 2022-04-12 PROCEDURE — 92507 TX SP LANG VOICE COMM INDIV: CPT

## 2022-04-12 PROCEDURE — 97140 MANUAL THERAPY 1/> REGIONS: CPT

## 2022-04-12 PROCEDURE — 97530 THERAPEUTIC ACTIVITIES: CPT

## 2022-04-12 NOTE — FLOWSHEET NOTE
[facility] Outpatient Therapy  [address] [place] [address], MYA Silvest[address], [address]  Phone: [phone]   Fax: [phone]    Physical Therapy Treatment Note/ Progress Report:     Date:  2022    Patient Name:  Jessica Beasley    :  1945  MRN: [id number]    Medical/Treatment Diagnosis Information:  · Diagnosis: I63.9 (ICD-10-CM) - Cerebral infarction, unspecified  ·    R 26 abnormality of gait and mobility; R53.1 weakness, I63.9 cerebral infarction  Insurance/Certification information:  PT Insurance Information: Medicare  Physician Information:  Referring Practitioner: Dr. Zackery Sepulveda of care signed:    [x] Yes  [] No    Date of Patient follow up with Physician:      Progress Report: []  Yes  [x]  No     Date Range for reporting period:  Beginning:  3/24/22  Ending:       Progress report due (10 Rx/or 30 days whichever is less):     Recertification due (POC duration/ or 90 days whichever is less):      Visit # Insurance Allowable Auth Needed    BMN []Yes   [x]No     RESTRICTIONS/PRECAUTIONS: receptive aphasia, Plavix and aspirin  Latex Allergy:  [x]NO      []YES  Preferred Language for Healthcare:   [x]English       []other:  Functional Scale: 10m walk= 0.37m/s; TUG 35.8 sec w/ RW; Berneta Belt   Date assessed:3/24/22    Pain level:  0/10 R shoulder     SUBJECTIVE:  Pt reports she was able to get some sleep last night so she feels better today than she did yesterday. OBJECTIVE:    Observation: Pt presents amb w/ RW, decreased zac, decreased step height on right, ataxia on right and spastic on right   Test measurements:      Exercises/Interventions: Exercises in bold performed in department today. Items not bolded are carried forward from prior visits for continuity of the record. Exercise/Equipment Resistance/Repetitions HEP Other comments   Nustep 8 min., L 3  Total: 8  Warm up: 2 min.    Intervals: 1 min fast (120's spm x 1 min comfortable 80's spm)  Cool down: 1 min. [] Seat 8, arms 11  Less VC for R hip control due to excessive adduction  Cues for full ROM, limited by challenged to maintain hip control/foot placement. R hamstring stretch 30 sec x 3 x Seated w/ belt   R gastroc stretch 30 sec x 3 [x] Standing lunge stretch   R soleus stretch 30 sec x 3 [] Standing lunge, B UE support, verbal cues for correct form    R knee to chest, heel on SB 5 x 2 [x] Supine, VC for neutral hip rotation and slow control of lowering   R hip abd 5 x 2 [x] Hooklying, VC for neutral hip rotation   R bridging 10 x 1, 3 sec hold [] Blocking at R lateral knee and ankle to avoid hip adduction   R ankle DF/PF 10 x 1 [x] Long sitting, propped on extended UEs. Active assist to reduce inversion and knee flexion   side stepping next       []      []    NMR  []    Alt toe taps 10 x 1 to 4\" cones    10x 1 to 4\" cones w/ 2lb ankle R weight  [x] Standing w/ RW    Reduced ataxic movement, especially returning R foot to starting position     []    NMES to R ant tib  6 min. , standing w/ forward stepping in place for active ankle DF during swing [] VMS, phase duration 200, frequency 50pps, cycle time 5/5. Good contraction, nearly full ROM achieved. Narrow SHU 30 sec    Trunk/UE rotations- 5x   Cervical/UE flex/ext- 5x    []   Post weight shift; poor eye hand coordination, cues to maintain eye contact on hands     Semi tandem 15 sec  Cervical rotation- 3x  []   LOB R w/ either foot in back   R knee to chest 10 x 1, yellow tband [] //bars w/ B UE support, assist to positive tband in between reps, pt somewhat impulsive, cueing to reposition for hip flexor stretch in between each rep. Good ROM w/ overflow to ant tib.     Gait      amb  In //bars:   Squat walk 10' x 4    Backwards walk 10' x 4      150' , 20' x 2 w/ SBQC L hand    amb w/ ', CGA- good zac and maintains 2 point pattern more consistently, slight instability        Repeated cueing for pt to perform as instructed w/ B UE support        Somewhat discontinuous stepping, improved to 2 point pattern, but inconsistent. Pt easily distracted visually and verbally causing more discontinuous and unsteady steps. Decreased R arm swing, maintains in flexor tone position, able to correct w/ cues. Improved cane manegement and step continuity after TM training compared to before. [employer] 10' x 10, L UE support  VC to widen SHU, R toe catch on >50% of trials    [employer] TM  6 min. + 5 min. 1.0-1.2mph,   B UE support  Metronome attempted at 40-47bpm to improve symmetry. Freq VC to attend to visual feedback for step length/symmetry  Max HR 107bpm  Pt w/ reduced ataxia overall, but still inconsistent gait pattern on the TM,R LE throughout w/ R knee hyperextension in stance. Freq VC to reduce upper body tension and breath holding. Pt insisted on removing harness due to feeling it is making her walk slower. Used safety switch only, no significant differences in walking. Looking at step length on 1st and 2nd bouts, 1st was significantly less than yesterday's session, 2nd bout was better than 1st today, not as good as final bout yesterday          TA:    Home Exercise Program:  Access Code: [email or web address]  URL: [employer]/  Date: 03/24/2022  Prepared by: Ora Fabry  Exercises  Supine Knee to Chest with Leg Straight - 1 x daily - 7 x weekly - 1-3 sets - 5-10 reps  Supine Hip Abduction - 1 x daily - 7 x weekly - 1-3 sets - 5-10 reps  Seated Ankle Pumps - 1 x daily - 7 x weekly - 3 sets - 10 reps  Seated Toe Taps - 2 x daily - 7 x weekly - 3 sets - 10 reps     Access Code: [email or web address]  URL: [employer]/  Date: 03/28/2022  Prepared by: Ora Fabry  Exercises  Standing Gastroc Stretch at Counter - 1 x daily - 7 x weekly - 3 sets - 10 reps      Therapeutic Exercise:   [x] (87841) Provided verbal/tactile cueing for activities related to strengthening, flexibility, endurance, ROM for improvements in LE, proximal hip, and core control with self-care, mobility, lifting, ambulation. NMR:  [] (21575) Provided verbal/tactile cueing for activities related to improving balance, coordination, kinesthetic sense, posture, motor skill, proprioception to assist with LE, proximal hip, and core control in self-care, mobility, lifting, ambulation and eccentric single leg control. Therapeutic Activities:    [] (64632) Provided verbal/tactile cueing for activities related to improving balance, coordination, kinesthetic sense, posture, motor skill, proprioception and motor activation to allow for proper function of core, proximal hip and LE with self-care and ADLs and functional mobility. Gait Training:    [x] (99576) Provided training and instruction to the patient for proper LE, core and proximal hip recruitment and positioning and eccentric body weight control with ambulation re-education including up and down stairs     Manual Treatments:  PROM / STM / Oscillations-Mobs:  G-I, II, III, IV (PA's, Inf., Post.)  [] (66622) Provided manual therapy to mobilize LE, proximal hip and/or LS spine soft tissue/joints for the purpose of modulating pain, promoting relaxation,  increasing ROM, reducing/eliminating soft tissue swelling/inflammation/restriction, improving soft tissue extensibility and allowing for proper ROM for normal function with self-care, mobility, lifting and ambulation.      Home Exercise Program:    [] (83086) Reviewed/Progressed HEP activities related to strengthening, flexibility, endurance, ROM of core, proximal hip and LE for functional self-care, mobility, lifting and ambulation/stair navigation   [] (45560) Reviewed/Progressed HEP activities related to improving balance, coordination, kinesthetic sense, posture, motor skill, proprioception of core, proximal hip and LE for self-care, mobility, lifting, and ambulation/stair navigation      Modalities:    [] Electric Stimulation:   [] Ultrasound: [] Other:       Charges:  Timed Code Treatment Minutes:  [place]   Total Treatment Minutes: 55      [] EVAL (LOW) 30910 (typically 20 minutes face-to-face)  [] EVAL (MOD) 73530 (typically 30 minutes face-to-face)  [] EVAL (HIGH) 54614 (typically 45 minutes face-to-face)  [] RE-EVAL     [x] VY(03905) x   1    [] NMR (20200) x     [] Manual (75743) x       [] TA (55001) x      [x] Gait Training [phone] x 3      [] ES(attended) (78577)  [] ES (un) (87992)   [] DRY NEEDLE 1 OR 2 MUSCLES  [] DRY NEEDLE 3+ MUSCLES  [] Mech Traction (67447)  [] Ultrasound (83521)  [] Other:    GOALS: Goals established 3/24/22   Patient stated goal: achieve better balance and walk  [] Progressing: [] Met: [] Not Met: [] Adjusted    Therapist goals for Patient:   Short Term Goals: To be achieved in: 6 weeks   1. Independent in HEP and progression per patient tolerance. [] Progressing: [] Met: [] Not Met: [] Adjusted   2. Pt will improve TUG time to 25 sec to improve functional ambulation. [] Progressing: [] Met: [] Not Met: [] Adjusted  3. Pt to perform transfers via stand-pivot technique with use of  small base quad cane (SBQC) on left Modified Independent  [] Progressing: [] Met: [] Not Met: [] Adjusted  4. Pt will improve Rosa to 32/56 to demonstrate reduced all risk. [] Progressing: [] Met: [] Not Met: [] Adjusted    Long Term Goals: To be achieved in: 12 weeks  1. Pt will improve ABC scale to 55% confidence for reduced fall risk. [] Progressing: [] Met: [] Not Met: [] Adjusted  2. Pt to negotiate up/down 7 stairs with step to pattern w/ single rail mod I.   [] Progressing: [] Met: [] Not Met: [] Adjusted   3. Pt to demonstrate improved gait pattern including improved R foot clearance and knee control without cues with use of AFO on right and an appropriate based cane. [] Progressing: [] Met: [] Not Met: [] Adjusted  4. Pt will improve Rosa to 42/56 to demonstrate reduced all risk.    [] Progressing: [] Met: [] Not Met: [] Adjusted  5. Pt independent with HEP. [] Progressing: [] Met: [] Not Met: [] Adjusted  6. Pt to report improved confidence with ambulating in community   [] Progressing: [] Met: [] Not Met: [] Adjusted  7. Pt will improve TUG time to 18 sec w/ least restrictive AD to improve functional ambulation. [] Progressing: [] Met: [] Not Met: [] Adjusted  8. Pt will demonstrate 10m walk speed to 0.80m/s. [] Progressing: [] Met: [] Not Met: [] Adjusted    ASSESSMENT:  Pt more alert today than yesterday and less limited by fatigue, but some difficulty following commands consistently. Pt insisted on gait training on TM without harness, trialed w/ safety strap only. Pt demos same deviations as w/ harness, but significantly less step length than yesterday, but did improve on 2nd bout w/ increased TM speed. Pt limited by LE fatigue and ataxia/incoordination. Good carry over to [facility] training and trialed SPC. Pt will continue to benefit from skilled PT to improve functional mobility, reduce fall risk and maximize independence. Treatment/Activity Tolerance:  [] Patient tolerated treatment well [x] Patient limited by fatigue  [] Patient limited by pain  [] Patient limited by other medical complications  [] Other:     Overall Progression Towards Functional goals/ Treatment Progress Update:  [] Patient is progressing as expected towards functional goals listed. [] Progression is slowed due to complexities/Impairments listed. [] Progression has been slowed due to co-morbidities.   [x] Plan just implemented, too soon to assess goals progression <30days   [] Goals require adjustment due to lack of progress  [] Patient is not progressing as expected and requires additional follow up with physician  [] Other    Prognosis for POC: [x] Good [] Fair  [] Poor    Patient requires continued skilled intervention: [x] Yes  [] No        PLAN: 3x/week for 4 weeks then 2x/week for 8 weeks   [x] Continue per plan of care [] Alter current plan (see comments)  [] Plan of care initiated [] Hold pending MD visit [] Discharge    Electronically signed by: Anson Hi, PT, DPT    Note: If patient does not return for scheduled/recommended follow up visits, this note will serve as a discharge from care along with the most recent update on progress.

## 2022-04-12 NOTE — FLOWSHEET NOTE
[facility] Outpatient Therapy  [address] [place] [address], [address], [address]  Phone: [phone]   Fax: [phone]    Occupational Therapy Treatment Note/ Progress Report:     Date:  2022    Patient Name:  Luisito Lopez    :  1945  MRN: [id number]      Medical/Treatment Diagnosis Information:  I63.9 (ICD-10-CM) - Cerebral infarction, unspecified  M25.611 (ICD-10-CM) Stiffness of right shoulder, not elsewhere classified, R27.9 (ICD-10-CM) Unspecified lack of coordination, R53.1 (ICD-10-CM) Weakness        Insurance/Certification information: Medicare A & B  Physician Information:  Dr. Jyothi Hyde of care signed:    Yes    Date of Patient follow up with Physician: none known at this time     Progress Report: []  Yes  [x]  No     Date Range for reporting period:  Beginning: 3/24/2022   Ending:      Progress report due:      Recertification due (POC duration/ or 90 days whichever is less): 2022     Visit # Insurance Allowable Auth Needed    BMN No     Latex Allergy:  [x]NO      []YES  Preferred Language for Healthcare:   [x]English       []other:  Functional Scale: UEFS (2022)  -- 8.75/100    Pain level: 0/10     SUBJECTIVE:  Pt stated she had better sleep last night. No new issues/complaints. OBJECTIVE:    Observation: left lateral lean during functional activities in seated position, R shoulder hike observed during therapeutic exercises   Test measurements:      RESTRICTIONS/PRECAUTIONS: Plavix and aspirin     Therapeutic Activities:    Activity #1: Pt participated in modified game play \"Perfection\" to work on timing, speed and accuracy with R hand to improve fine motor control. Pt instructed to place 19 game pieces as quickly as possible manipulating objects in hand and placing in game board. Pt completed 1st set in 3 minutes 25 seconds with min VCs to inhibit L lateral lean.  Pt completed 2nd set 2 minutes 39 seconds with improved correction of L lateral lean except for last piece placed. Pt stated fatigue upon completion of 2nd set. Activity #2: Pt worked on finger<->palm translation of R hand using blocks of various shapes and sizes. Pt retrieved blocks one at a time to store in palm of hand. Pt then instructed to translate palm->finger to tip to tip pincer grasp to place each block on top of each other into a vertical tower to work on fine motor coordination and steadiness of the R hand. Pt completed 1 set x4 block and 1 set x6 blocks each set comprised of small and medium sized blocks. On first set, pt completed with minor difficulty. On second set, pt with multiple errors requiring multiple attempts for task completion. Pt instructed to copy tower constructed by OT for increase challenge. Activity #3: Pt instructed to draw small circles on lined paper so that the top and bottom of the [place] touched the lines. Pt karo ~20 circles with poor accuracy initially improving with repetition. Carryover activity - pt instructed to copy snowman drawn by OT. Pt with poor accuracy demo'ing consistent errors on right side of snowman versus L side (started drawing on R side and finished on L side). Patient Education:  --perform supine AROM exercises at home - pt left without handout (will be provided at next session)    Manual Therapy: RUE, supine unless otherwise indicated    Shoulder abduction stretch with STM to coracobrachialis muscle/tendon. 4-5 minute progressive stretch from 50 degrees to ~95 degrees. Shoulder flexion stretch: 3 sets x1 minute holds tolerated to ~125-145 degrees. Therapeutic Exercises: Exercises in bold performed in department today. Items not bolded are carried forward from prior visits for continuity of the record.     Exercise/Equipment Resistance/Repetitions HEP Other comments   BUE Shoulder flexion w/ dowel tatyana (supine)   1# dowel tatyana, x15 reps []    RUE shoulder abduction w/ dowel tatyana (supine) 1# dowel tatyana, x10 reps []    BUE shoulder flexion with walker   ~10 reps to ~100 degrees; ~5 second holds [x]    RUE shoulder flexion w/ UE [place]   12 reps [] AROM limited - requires hand on to inhibit ineffective posturing     RUE shoulder abduction/horizontal abduction w/ UE [place]   12 reps [] Mod-max VCs to fully extend elbow. Hands on to inhibit ineffective posturing   RUE AROM \"L\" with UE [place]   8 reps [] Hands on to inhibit ineffective posturing, fatigues after 8 reps demo'ing decreased motor control after rep 5. BUE AROM Shoulder Flexion (supine)     10 reps [x] Tolerated well -min VCs for slow, controlled movements. AROM limited     BUE AROM Shoulder Abduction (supine)     10 reps [x] Requires min-mod A to support RUE, min VCs to minimize elbow flexion. AROM limited     BUE horizontal ab/adduction (supine)     10 reps [x] Min VCs to minimize elbow flexion - tolerated well           []         []        []        []        []        []        []        []        []      Home Exercise Program:  Self-assisted stretches for RUE using RW and wall as described above  Force use RUE/R hand    Access Code: [email or web address]  URL: [email or web address]  Date: 04/12/2022  Prepared by: Louise Durant    Exercises  Supine Shoulder Flexion Extension Full Range AROM - 2 x daily - 7 x weekly - 1 sets - 10 reps  Supine Shoulder Abduction AROM - 2 x daily - 7 x weekly - 1 sets - 10 reps  Supine Shoulder Horizontal Abduction - 2 x daily - 7 x weekly - 1 sets - 10 reps    Therapeutic Exercise:   [x] (83075) Provided verbal/tactile cueing for activities related to strengthening, flexibility, endurance, ROM.  Includes review/progression of HEP activities related to strengthening, flexibility, endurance, AROM, proximal shoulder and UE for functional transfers/mobility, self-care, IADLs, and community re-entry    Therapeutic Activities:    [x] (88614) Provided verbal/tactile cueing for activities related to improving balance, coordination, kinesthetic sense, posture, motor skill, proprioception and motor activation to allow for proper function of core, proximal shoulder and UE with self-care, and ADLs, IADLs, functional mobility, work-related and leisure based activities. Includes review/progression of HEP activities to improve balance, coordination, kinesthetic sense, posture, motor skill, proprioception, proximal shoulder and UE for functional transfers/mobility, self-care, IADLs, and community re-entry    Sensory Integration Techniques:  [] (35645)Provided verbal/tactile feedback to enhance sensory processing and promoting responses that are adaptive to environmental demands    Self-Care/Home Management Training:  [] (93164) Provided training and education in restorative and compensatory strategies/activity modifications in activities of daily living, meal preparation, laundry and other various house maintenance activities. Provided training and education in use of adaptive equipment/devices and assistive technology, as needed, to promote participation and independence. Cognitive Function:  [] (13758 x15 minutes, 90053 +15 minutes) Integrated activities that address attention, memory, reasoning, executive functioning, problem solving, and/or pragmatic functioning in addition to compensatory strategies to manage the performance of an activity (for example, managing time or schedules, initiating, organizing, and sequencing tasks). NMR:  [] (75376) During functional activities/therapeutic exercises, provided facilitation of normal movement patterns and provided handling/verbal cues to promote postural alignment and stability during static and dynamic movement (sitting or standing).  Activities may also include visual perceptual training, proprioception training, and balance retraining during functional activities    Manual Treatments:    [x] (66645) Provided manual therapy to mobilize UB for the purpose of modulating pain, promoting relaxation,  increasing ROM, reducing/eliminating soft tissue swelling/inflammation/restriction, improving soft tissue extensibility and allowing for proper ROM for normal function with self-care, functional mobility, transfers, reaching and lifting    Modalities:     [] Electrical Stimulation (30305):     [] Ultrasound (19061):    Charges:  Timed Code Treatment Minutes: 59   Total Treatment Minutes: 59      [] EVAL (LOW) 95487 (typically 20 minutes face-to-face)  [] EVAL (MOD) 61860 (typically 30 minutes face-to-face)  [] EVAL (HIGH) 13257 (typically 45 minutes face-to-face)  [] RE-EVAL     [x] UT(32286) x31 (2)  [] NMR (68315) x       [x] Manual (08640) x8 (1)  [x] TA (80416) x20 (1)  [] ES(attended) (93130)       [] ES (un) (93368)  [] Other:    GOALS: Patient stated goal: \"get back to normal\"       []? Progressing: []? Met: []? Not Met: []? Adjusted     Therapist goals for Patient:      Short Term Goals: To be achieved in: 6 weeks  1. Pt will improve score on UEFS to 40/100 for a subjective report of decreased difficulty with completing every day activities with RUE and R hand  []? Progressing: []? Met: []? Not Met: []? Adjusted  2. Pt will improve time on NHPT to more than 45 seconds to demonstrate improved fine motor coordination of R hand  []? Progressing: []? Met: []? Not Met: []? Adjusted  3. Pt will improve score on BBT to 40 blocks/minute to demonstrate improved timing, speed and accuracy with functional grasp of right hand. []? Progressing: []? Met: []? Not Met: []? Adjusted  4. Pt will be Min A with HEP/Home activities program to facilitate independence with carryover from sessions and to progress towards returning to PLOF  []? Progressing: []? Met: []? Not Met: []? Adjusted   5. Pt will write a pangram sentence in no more than 1 minute demo'ing 85% legibility to demo improved fine motor control and speed of R hand. []? Progressing: []? Met: []? Not Met: []?  Adjusted      Long Term Goals: To be achieved in: 12 weeks  1. Pt will improve score on UEFS to 65/100 for a subjective report of decreased difficulty with completing every day activities with RUE and R hand  []? Progressing: []? Met: []? Not Met: []? Adjusted  2. Pt will improve time on NHPT to more than 30 seconds to demonstrate improved fine motor coordination of R hand  []? Progressing: []? Met: []? Not Met: []? Adjusted  3. Pt will improve score on BBT to 50 blocks/minute to demonstrate improved timing, speed and accuracy with functional grasp of right hand. []? Progressing: []? Met: []? Not Met: []? Adjusted  4. Pt will be Independent with HEP/Home activities program to facilitate independence with carryover from sessions and to progress towards returning to PLOF  []? Progressing: []? Met: []? Not Met: []? Adjusted   5. Pt will write a pangram sentence in no more than 45 seconds minute demo'ing 95% legibility to demo improved fine motor control and speed of R hand. []? Progressing: []? Met: []? Not Met: []? Adjusted      ASSESSMENT:    Pt demo'd good tolerance for standing RUE exercises with UE ranger - plan to continue to integrate into therapy sessions to increase ROM and to strengthen RUE while inhibiting ineffective posturing. Pt cont to demo decrease AROM of RUE d/t muscle shortening, pain and weakness - benefits from manual stretch and supine open chain therpeutic exercises. Pt cont to demo decreased timing, speed and accuracy with fine motor tasks which impacts efficiency with functional activities when using R hand. Pt cont to benefit from skilled OT, cont per POC. Treatment/Activity Tolerance:  [x] Patient tolerated treatment well [] Patient limited by fatique  [] Patient limited by pain  [] Patient limited by other medical complications  [] Other:     Overall Progression Towards Functional goals/ Treatment Progress Update:  [] Patient is progressing as expected towards functional goals listed.     [] Progression is slowed due to complexities/Impairments listed. [] Progression has been slowed due to co-morbidities. [x] Plan just implemented, too soon to assess goals progression <30days   [] Goals require adjustment due to lack of progress  [] Patient is not progressing as expected and requires additional follow up with physician  [] Other    Prognosis for POC: [x] Good [] Fair  [] Poor    Patient requires continued skilled intervention: [x] Yes  [] No      PLAN:   [x] Continue per plan of care [] Alter current plan (see comments)  [] Plan of care initiated [] Hold pending MD visit [] Discharge    Electronically signed by: Joann Zepeda OT    Note: If patient does not return for scheduled/recommended follow up visits, this note will serve as a discharge from care along with the most recent update on progress.

## 2022-04-14 ENCOUNTER — HOSPITAL ENCOUNTER (OUTPATIENT)
Dept: SPEECH THERAPY | Age: 77
Setting detail: THERAPIES SERIES
Discharge: HOME | End: 2022-04-14
Payer: MEDICARE

## 2022-04-14 ENCOUNTER — HOSPITAL ENCOUNTER (OUTPATIENT)
Dept: OCCUPATIONAL THERAPY | Age: 77
Setting detail: THERAPIES SERIES
Discharge: HOME | End: 2022-04-14
Payer: MEDICARE

## 2022-04-14 ENCOUNTER — HOSPITAL ENCOUNTER (OUTPATIENT)
Dept: PHYSICAL THERAPY | Age: 77
Setting detail: THERAPIES SERIES
Discharge: HOME | End: 2022-04-14
Payer: MEDICARE

## 2022-04-14 PROCEDURE — 97110 THERAPEUTIC EXERCISES: CPT

## 2022-04-14 PROCEDURE — 97130 THER IVNTJ EA ADDL 15 MIN: CPT

## 2022-04-14 PROCEDURE — 97530 THERAPEUTIC ACTIVITIES: CPT

## 2022-04-14 PROCEDURE — 97129 THER IVNTJ 1ST 15 MIN: CPT

## 2022-04-14 PROCEDURE — 97116 GAIT TRAINING THERAPY: CPT

## 2022-04-14 NOTE — FLOWSHEET NOTE
[facility] Outpatient Therapy  [address] [place] [address], [address]  Phone: [phone]   Fax: [phone]    Physical Therapy Treatment Note/ Progress Report:     Date:  2022    Patient Name:  Shefali Vicente    :  1945  MRN: [id number]    Medical/Treatment Diagnosis Information:  · Diagnosis: I63.9 (ICD-10-CM) - Cerebral infarction, unspecified  ·    R 26 abnormality of gait and mobility; R53.1 weakness, I63.9 cerebral infarction  Insurance/Certification information:  PT Insurance Information: Medicare  Physician Information:  Referring Practitioner: Dr. Dyanne Habermann of care signed:    [x] Yes  [] No    Date of Patient follow up with Physician:      Progress Report: []  Yes  [x]  No     Date Range for reporting period:  Beginning:  3/24/22  Ending:       Progress report due (10 Rx/or 30 days whichever is less):     Recertification due (POC duration/ or 90 days whichever is less):      Visit # Insurance Allowable Auth Needed    BMN []Yes   [x]No     RESTRICTIONS/PRECAUTIONS: receptive aphasia, Plavix and aspirin  Latex Allergy:  [x]NO      []YES  Preferred Language for Healthcare:   [x]English       []other:  Functional Scale: 10m walk= 0.37m/s; TUG 35.8 sec w/ RW; [place] [employer]   Date assessed:3/24/22    Pain level:  0/10 R shoulder     SUBJECTIVE:  Pt reports she feels good today. Was running late because she had to deal with getting her prescriptions refilled. She did walk in the grocery with a cart instead of riding electric cart. OBJECTIVE:    Observation: Pt presents amb w/ RW, decreased zac, decreased step height on right, ataxia on right and spastic on right   Test measurements:      Exercises/Interventions: Exercises in bold performed in department today. Items not bolded are carried forward from prior visits for continuity of the record.     Exercise/Equipment Resistance/Repetitions HEP Other comments   Nustep 8 min., L 4  Total: 8  Warm up: 2 min. Intervals: 1 min fast (120's spm x 1 min comfortable 80's spm)  Cool down: 1 min. [] Seat 8, arms 11  Less VC for R hip control due to excessive adduction  Cues for full ROM, limited by challenged to maintain hip control/foot placement. R hamstring stretch 30 sec x 3 x Seated w/ belt   R gastroc stretch 30 sec x 3 [x] Standing lunge stretch   R soleus stretch 30 sec x 3 [] Standing lunge, B UE support, verbal cues for correct form    R knee to chest, heel on SB 5 x 2 [x] Supine, VC for neutral hip rotation and slow control of lowering   R hip abd 5 x 2 [x] Hooklying, VC for neutral hip rotation   R bridging 10 x 1, 3 sec hold [] Blocking at R lateral knee and ankle to avoid hip adduction   R ankle DF/PF 10 x 1 [x] Long sitting, propped on extended UEs. Active assist to reduce inversion and knee flexion   side stepping next       []      []    NMR  []    Alt toe taps 10 x 1 to 4\" cones    10x 1 to 4\" cones w/ 2lb ankle R weight  [x] Standing w/ RW    Reduced ataxic movement, especially returning R foot to starting position     []    NMES to R ant tib  6 min. , standing w/ forward stepping in place for active ankle DF during swing [] VMS, phase duration 200, frequency 50pps, cycle time 5/5. Good contraction, nearly full ROM achieved. Narrow SHU 30 sec    Trunk/UE rotations- 5x   Cervical/UE flex/ext- 5x    []   Post weight shift; poor eye hand coordination, cues to maintain eye contact on hands     Semi tandem 15 sec  Cervical rotation- 3x  []   LOB R w/ either foot in back   R knee to chest 8 x 2, (5 sec pause in lunge position)  yellow tband [] //bars w/ B UE support, assist to positive tband in between reps, pt somewhat impulsive, cueing to reposition for hip flexor stretch in between each rep. Good ROM w/ overflow to ant tib.     Gait      amb    Side stepping w/ SPC, 20' x1 each    Figure 8 amb through cones w/ SPC        amb 20' x 2, 200' immediately after TM training, w/ SPC, CGA- good zac and maintains 2 point pattern more consistently, slight instability w/ mult minor LOB to R          CGA due to posterior LOB, going L worse than R    Close SBA, somewhat impulsive w/ poor cane placement initially      Pt easily distracted visually and verbally causing more discontinuous and unsteady steps. Decreased R arm swing, maintains in flexor tone position, able to correct w/ cues. Improved cane manegement and step continuity after TM training compared to before. Amadou Sepedy ladder 10' x 10, L UE support  VC to widen SHU, R toe catch on >50% of trials    Biodex TM  6 min. + 5 min. 1.2-1.6mph,   B UE support  Metronome attempted at 60bpm to improve symmetry. Freq VC to attend to visual feedback for step length/symmetry. Better step length than last bout last session. Max HR 117bpm  Pt w/ reduced ataxia overall, but still inconsistent gait pattern on the TM, less R knee hyperextension w/ increased zac. Freq VC to reduce upper body tension and breath holding. Used safety switch only, no harness          TA:    Home Exercise Program:  Access Code: Loree Ann: [facility]. com/  Date: 03/24/2022  Prepared by: Bushwood Daubs  Exercises  Supine Knee to Chest with Leg Straight - 1 x daily - 7 x weekly - 1-3 sets - 5-10 reps  Supine Hip Abduction - 1 x daily - 7 x weekly - 1-3 sets - 5-10 reps  Seated Ankle Pumps - 1 x daily - 7 x weekly - 3 sets - 10 reps  Seated Toe Taps - 2 x daily - 7 x weekly - 3 sets - 10 reps     Access Code: [email or web address]  URL: [email or web address]  Date: 03/28/2022  Prepared by: Meryl Daubs  Exercises  Standing Gastroc Stretch at Counter - 1 x daily - 7 x weekly - 3 sets - 10 reps      Therapeutic Exercise:   [x] (26985) Provided verbal/tactile cueing for activities related to strengthening, flexibility, endurance, ROM for improvements in LE, proximal hip, and core control with self-care, mobility, lifting, ambulation.     NMR:  [x] (31631) Provided verbal/tactile cueing for activities related to improving balance, coordination, kinesthetic sense, posture, motor skill, proprioception to assist with LE, proximal hip, and core control in self-care, mobility, lifting, ambulation and eccentric single leg control. Therapeutic Activities:    [] (12879) Provided verbal/tactile cueing for activities related to improving balance, coordination, kinesthetic sense, posture, motor skill, proprioception and motor activation to allow for proper function of core, proximal hip and LE with self-care and ADLs and functional mobility. Gait Training:    [x] (22291) Provided training and instruction to the patient for proper LE, core and proximal hip recruitment and positioning and eccentric body weight control with ambulation re-education including up and down stairs     Manual Treatments:  PROM / STM / Oscillations-Mobs:  G-I, II, III, IV (PA's, Inf., Post.)  [] (44303) Provided manual therapy to mobilize LE, proximal hip and/or LS spine soft tissue/joints for the purpose of modulating pain, promoting relaxation,  increasing ROM, reducing/eliminating soft tissue swelling/inflammation/restriction, improving soft tissue extensibility and allowing for proper ROM for normal function with self-care, mobility, lifting and ambulation.      Home Exercise Program:    [] (21363) Reviewed/Progressed HEP activities related to strengthening, flexibility, endurance, ROM of core, proximal hip and LE for functional self-care, mobility, lifting and ambulation/stair navigation   [] (75637) Reviewed/Progressed HEP activities related to improving balance, coordination, kinesthetic sense, posture, motor skill, proprioception of core, proximal hip and LE for self-care, mobility, lifting, and ambulation/stair navigation      Modalities:    [] Electric Stimulation:   [] Ultrasound:   [] Other:       Charges:  Timed Code Treatment Minutes:  GT35, NM 5, TE10   Total Treatment Minutes: 50      [] improved confidence with ambulating in community   [] Progressing: [] Met: [] Not Met: [] Adjusted  7. Pt will improve TUG time to 18 sec w/ least restrictive AD to improve functional ambulation. [] Progressing: [] Met: [] Not Met: [] Adjusted  8. Pt will demonstrate 10m walk speed to 0.80m/s. [] Progressing: [] Met: [] Not Met: [] Adjusted    ASSESSMENT: Pt 10 min. Late for session today. She demonstrates good gradual progress on TM w/ longer B step length and increased TM speed. Pt continues w/ inconsistent gait due to ataxia and spasticity. She is progressing towards gait w/ SPC, but still requires CGA-min A during ambulation and is easily distracted. She is limited of tolerance of TM training to 5-6 min. At a time due to fatigue. Pt will continue to benefit from skilled PT to improve functional mobility, reduce fall risk and maximize independence. Treatment/Activity Tolerance:  [x] Patient tolerated treatment well [] Patient limited by fatigue  [] Patient limited by pain  [] Patient limited by other medical complications  [] Other:     Overall Progression Towards Functional goals/ Treatment Progress Update:  [] Patient is progressing as expected towards functional goals listed. [] Progression is slowed due to complexities/Impairments listed. [] Progression has been slowed due to co-morbidities.   [x] Plan just implemented, too soon to assess goals progression <30days   [] Goals require adjustment due to lack of progress  [] Patient is not progressing as expected and requires additional follow up with physician  [] Other    Prognosis for POC: [x] Good [] Fair  [] Poor    Patient requires continued skilled intervention: [x] Yes  [] No        PLAN: 3x/week for 4 weeks then 2x/week for 8 weeks   [x] Continue per plan of care [] Alter current plan (see comments)  [] Plan of care initiated [] Hold pending MD visit [] Discharge    Electronically signed by: Eric Madden, PT, DPT    Note: If patient does not return for scheduled/recommended follow up visits, this note will serve as a discharge from care along with the most recent update on progress.

## 2022-04-14 NOTE — PROGRESS NOTES
[facility] Outpatient Therapy  [address] [place] [address], MY[address], [address]  Phone: [phone]   Fax: [phone]    Occupational Therapy Treatment Note/ Progress Report:     Date:  2022    Patient Name:  Chapis Carreon    :  1945  MRN: [id number]      Medical/Treatment Diagnosis Information:  I63.9 (ICD-10-CM) - Cerebral infarction, unspecified  M25.611 (ICD-10-CM) Stiffness of right shoulder, not elsewhere classified, R27.9 (ICD-10-CM) Unspecified lack of coordination, R53.1 (ICD-10-CM) Weakness        Insurance/Certification information: Medicare A & B  Physician Information:  Dr. Arcadio Verde of [facility] signed:    Yes    Date of Patient follow up with Physician: none known at this time     Progress Report: [x]  Yes  []  No     Date Range for reporting period:  Beginning: 3/24/2022   Endin2022     Progress report due: 8152     Recertification due (POC duration/ or 90 days whichever is less): 2022     Visit # Insurance Allowable Auth Needed   10/28 BMN No     Latex Allergy:  [x]NO      []YES  Preferred Language for Healthcare:   [x]English       []other:  Functional Scale:  (2022)  -- 95/100    Pain level: 0/10     SUBJECTIVE:  Pt reported \"tightness\" in RUE but that sleep quality has improved since last visit. OBJECTIVE:    Observation: Pt demo'd increased RUE endurance and strength. Pt also demo'd inc increased trunk rotation during activities completed in stance during session.  Test measurements:      Initial Assessment (3/24/2022) Progress Note (2022)     UEFS    8.75/100  95/100   NHPT  1 minute 2 seconds  43.03 seconds   BBT  27 blocks in one minute  38 blocks in one minute   HEP NA  Pt given formal HEP during session - plan to follow up.     Writing a pangram (time and legibility)     100% legibility  1 minute 29 seconds ~62.3% legibility   50.470 seconds                     RESTRICTIONS/PRECAUTIONS: Plavix and aspirin     Therapeutic Activities:    Activity #1: Pt sat in armchair and engaged in RUE strengthening activity needed for independence in IADLs such as writing. Pt used R digits to walk 2.2# medicine ball up and down wedge placed on therapy mat 10x to about 90 degrees of shoulder flexion. Pt performed with min v/c to keep RUE adducted and to inhibit L lateral lean. Pt tolerated well with no rest breaks and demo'd increased fine motor control and endurance. Activity #2: Pt sat in armchair and engaged in writing activity using standard pen and 3-lined paper. Pt copied approximately 3 sentences using print with 100% legibility during first two sentences. During third sentence, pt wrote in cursive due to preference and produced illegible letters for ~50% of sentence. Pt wrote the alphabet (A-Z) 2x in print and the word \"zoo\" 12x in cursive on 2-lined paper with intermittent sizing inconsistencies and illegible letters demo'ing particular difficulty with curved letters. Patient Education:   Pt received education about writing slowly and in large print to increase legibility. - verb understanding      Manual Therapy: RUE, supine unless otherwise indicated          Therapeutic Exercises: Exercises in bold performed in department today. Items not bolded are carried forward from prior visits for continuity of the record.     Exercise/Equipment Resistance/Repetitions HEP Other comments   BUE Shoulder flexion w/ dowel tatyana (supine)   1# dowel tatyana, x15 reps []    RUE shoulder abduction w/ dowel tatyana (supine)     1# dowel tatyana, x10 reps []    BUE shoulder flexion with walker   ~10 reps to ~100 degrees; ~5 second holds [x]    RUE shoulder flexion w/ UE [place]   13 reps [] Hands on to inhibit trunk rotation and scapular elevation      RUE shoulder abduction/horizontal abduction w/ UE [place]    13 reps [] Mod v/c to extend R elbow to full ROM    RUE AROM \"L\" with UE [place]   8 reps [] Hands on to inhibit trunk rotation - pt demo'd increased endurance during exercise   BUE AROM Shoulder Flexion (supine)     10 reps [x]      BUE AROM Shoulder Abduction (supine)     10 reps [x]      BUE horizontal ab/adduction (supine)     10 reps [x]    BUE shoulder flexion w/ theraball 10 reps w/ 15 second holds [] Hands on to inhibit trunk rotation - tolerated well        []        []        []        []        []        []        []        []      Home Exercise Program:  Self-assisted stretches for RUE using RW and wall as described above  Force use RUE/R hand    Access Code: [email or web address]  URL: [email or web address]  Date: 04/12/2022  Prepared by: Suyapa Moreland    Exercises  Supine Shoulder Flexion Extension Full Range AROM - 2 x daily - 7 x weekly - 1 sets - 10 reps  Supine Shoulder Abduction AROM - 2 x daily - 7 x weekly - 1 sets - 10 reps  Supine Shoulder Horizontal Abduction - 2 x daily - 7 x weekly - 1 sets - 10 reps    Therapeutic Exercise:   [x] (56317) Provided verbal/tactile cueing for activities related to strengthening, flexibility, endurance, ROM. Includes review/progression of HEP activities related to strengthening, flexibility, endurance, AROM, proximal shoulder and UE for functional transfers/mobility, self-care, IADLs, and community re-entry    Therapeutic Activities:    [x] (55660) Provided verbal/tactile cueing for activities related to improving balance, coordination, kinesthetic sense, posture, motor skill, proprioception and motor activation to allow for proper function of core, proximal shoulder and UE with self-care, and ADLs, IADLs, functional mobility, work-related and leisure based activities.  Includes review/progression of HEP activities to improve balance, coordination, kinesthetic sense, posture, motor skill, proprioception, proximal shoulder and UE for functional transfers/mobility, self-care, IADLs, and community re-entry    Sensory Integration Techniques:  [] (79516)Provided verbal/tactile feedback to enhance sensory processing and promoting responses that are adaptive to environmental demands    Self-Care/Home Management Training:  [] (63192) Provided training and education in restorative and compensatory strategies/activity modifications in activities of daily living, meal preparation, laundry and other various house maintenance activities. Provided training and education in use of adaptive equipment/devices and assistive technology, as needed, to promote participation and independence. Cognitive Function:  [] (21240 x15 minutes, 99302 +15 minutes) Integrated activities that address attention, memory, reasoning, executive functioning, problem solving, and/or pragmatic functioning in addition to compensatory strategies to manage the performance of an activity (for example, managing time or schedules, initiating, organizing, and sequencing tasks). NMR:  [] (47694) During functional activities/therapeutic exercises, provided facilitation of normal movement patterns and provided handling/verbal cues to promote postural alignment and stability during static and dynamic movement (sitting or standing).  Activities may also include visual perceptual training, proprioception training, and balance retraining during functional activities    Manual Treatments:    [] (43652) Provided manual therapy to mobilize UB for the purpose of modulating pain, promoting relaxation,  increasing ROM, reducing/eliminating soft tissue swelling/inflammation/restriction, improving soft tissue extensibility and allowing for proper ROM for normal function with self-care, functional mobility, transfers, reaching and lifting    Modalities:     [] Electrical Stimulation (54607):     [] Ultrasound (66196):    Charges:  Timed Code Treatment Minutes: 58   Total Treatment Minutes: 58      [] EVAL (LOW) 17711 (typically 20 minutes face-to-face)  [] EVAL (MOD) 54635 (typically 30 minutes face-to-face)  [] EVAL (HIGH) 70765 (typically 45 minutes face-to-face)  [] RE-EVAL     [x] AR(61455)  x25 (2)  [] NMR (62147)        [] Manual (20193)   [x] TA (51242)  x33 (2)  [] ES(attended) (53529)       [] ES (un) (76649)  [] Other:    GOALS: Patient stated goal: \"get back to normal\"       []? Progressing: []? Met: []? Not Met: []? Adjusted     Therapist goals for Patient:      Short Term Goals: To be achieved in: 6 weeks  1. Pt will improve score on UEFS to 40/100 for a subjective report of decreased difficulty with completing every day activities with RUE and R hand  []? Progressing: [x]? Met: []? Not Met: []? Adjusted  2. Pt will improve time on NHPT to more than 45 seconds to demonstrate improved fine motor coordination of R hand  []? Progressing: [x]? Met: []? Not Met: []? Adjusted  3. Pt will improve score on BBT to 40 blocks/minute to demonstrate improved timing, speed and accuracy with functional grasp of right hand. [x]? Progressing: []? Met: []? Not Met: []? Adjusted  4. Pt will be Min A with HEP/Home activities program to facilitate independence with carryover from sessions and to progress towards returning to PLOF  [x]? Progressing: []? Met: []? Not Met: []? Adjusted   5. Pt will write a pangram sentence in no more than 1 minute demo'ing 85% legibility to demo improved fine motor control and speed of R hand. [x]? Progressing: []? Met: []? Not Met: []? Adjusted      Long Term Goals: To be achieved in: 12 weeks  1. Pt will improve score on UEFS to 65/100 for a subjective report of decreased difficulty with completing every day activities with RUE and R hand  []? Progressing: [x]? Met: []? Not Met: [x]? Adjusted    1 Adjusted: Pt will improve score on UEFS to 100/100 for a subjective report of decreased difficulty with completing every day activities with RUE and R hand  []? Progressing: []? Met: []? Not Met: []? Adjusted     2. Pt will improve time on NHPT to more than 30 seconds to demonstrate improved fine motor coordination of R hand  []?  Progressing: []? Met: []? Not Met: []? Adjusted  3. Pt will improve score on BBT to 50 blocks/minute to demonstrate improved timing, speed and accuracy with functional grasp of right hand. []? Progressing: []? Met: []? Not Met: []? Adjusted  4. Pt will be Independent with HEP/Home activities program to facilitate independence with carryover from sessions and to progress towards returning to PLOF  []? Progressing: []? Met: []? Not Met: []? Adjusted   5. Pt will write a pangram sentence in no more than 45 seconds minute demo'ing 95% legibility to demo improved fine motor control and speed of R hand. []? Progressing: []? Met: []? Not Met: []? Adjusted      ASSESSMENT:      On this date, pt met 2/5 short-term goals and 1/5 long-term goals. Pt demo'd increased motor planning and fine motor control during Box and Blocks and Nine Hole Peg Test assessments. Pt demo'd good activity tolerance during session with tactile input to inhibit ineffective posturing. At end of session, HEP was reviewed with patient to increase carryover and compliance. Pt continues to progress towards treatment goals and benefit from skilled OT, continue per POC. Treatment/Activity Tolerance:  [x] Patient tolerated treatment well [] Patient limited by fatique  [] Patient limited by pain  [] Patient limited by other medical complications  [] Other:     Overall Progression Towards Functional goals/ Treatment Progress Update:  [x] Patient is progressing as expected towards functional goals listed. [] Progression is slowed due to complexities/Impairments listed. [] Progression has been slowed due to co-morbidities.   [] Plan just implemented, too soon to assess goals progression <30days   [] Goals require adjustment due to lack of progress  [] Patient is not progressing as expected and requires additional follow up with physician  [] Other    Prognosis for POC: [x] Good [] Fair  [] Poor    Patient requires continued skilled intervention: [x] Yes  [] No PLAN:   [x] Continue per plan of care [] Alter current plan (see comments)  [] Plan of care initiated [] Hold pending MD visit [] Discharge    Electronically signed by: Scott RECINOS    Note: If patient does not return for scheduled/recommended follow up visits, this note will serve as a discharge from care along with the most recent update on progress.

## 2022-04-18 ENCOUNTER — HOSPITAL ENCOUNTER (OUTPATIENT)
Dept: OCCUPATIONAL THERAPY | Age: 77
Setting detail: THERAPIES SERIES
Discharge: HOME | End: 2022-04-18
Payer: MEDICARE

## 2022-04-18 ENCOUNTER — HOSPITAL ENCOUNTER (OUTPATIENT)
Dept: PHYSICAL THERAPY | Age: 77
Setting detail: THERAPIES SERIES
Discharge: HOME | End: 2022-04-18
Payer: MEDICARE

## 2022-04-18 ENCOUNTER — HOSPITAL ENCOUNTER (OUTPATIENT)
Dept: SPEECH THERAPY | Age: 77
Setting detail: THERAPIES SERIES
Discharge: HOME | End: 2022-04-18
Payer: MEDICARE

## 2022-04-18 PROCEDURE — 97110 THERAPEUTIC EXERCISES: CPT

## 2022-04-18 PROCEDURE — 97130 THER IVNTJ EA ADDL 15 MIN: CPT

## 2022-04-18 PROCEDURE — 97116 GAIT TRAINING THERAPY: CPT

## 2022-04-18 PROCEDURE — 97530 THERAPEUTIC ACTIVITIES: CPT

## 2022-04-18 PROCEDURE — 97112 NEUROMUSCULAR REEDUCATION: CPT

## 2022-04-18 PROCEDURE — 97129 THER IVNTJ 1ST 15 MIN: CPT

## 2022-04-18 PROCEDURE — 97140 MANUAL THERAPY 1/> REGIONS: CPT

## 2022-04-18 NOTE — PROGRESS NOTES
[facility] Outpatient Therapy  [address] [place] [address], [address], [address]  Phone: [phone]   Fax: [phone]    Physical Therapy Treatment Note/ Progress Report:     Date:  2022    Patient Name:  Eliud Wick    :  1945  MRN: [id number]    Medical/Treatment Diagnosis Information:  · Diagnosis: I63.9 (ICD-10-CM) - Cerebral infarction, unspecified  ·    R 26 abnormality of gait and mobility; R53.1 weakness, I63.9 cerebral infarction  Insurance/Certification information:  PT Insurance Information: Medicare  Physician Information:  Referring Practitioner: Dr. Nel Schneider of care signed:    [x] Yes  [] No    Date of Patient follow up with Physician:      Progress Report: [x]  Yes  []  No     Date Range for reporting period:  Beginning:  3/24/22  Endin22    Progress report due (10 Rx/or 30 days whichever is less):     Recertification due (POC duration/ or 90 days whichever is less):      Visit # Insurance Allowable Auth Needed   10/24 BMN []Yes   [x]No     RESTRICTIONS/PRECAUTIONS: receptive aphasia, Plavix and aspirin  Latex Allergy:  [x]NO      []YES  Preferred Language for Healthcare:   [x]English       []other:  Functional Scale: 10m walk= 0.37m/s; TUG 35.8 sec w/ RW; Margi Eastman   Date assessed:3/24/22    Functional Scale: 10m walk= 0.56 m/s w/ SPC (after TM training 0.63m/s w/ SPC) ; TUG  20.6 sec (average) w/ SPC and close SBA; Rosa   Date assessed:22      Pain level:  0/10 R shoulder     SUBJECTIVE:  Pt reports she is doing well. She feels her walking is better because she is not dragging her R foot less. She is walking the grocery store w/ a cart now. \"I am more adventurous\" which attributes to being less afraid.        OBJECTIVE:    Observation: Pt presents amb w/ RW, decreased zac, decreased step height on right, less ataxia on right and spastic on right   Test measurements:        Exercises/Interventions: Exercises in bold performed in department today. Items not bolded are carried forward from prior visits for continuity of the record. Exercise/Equipment Resistance/Repetitions HEP Other comments   Nustep 8 min., L 4  Total: 8  Warm up: 2 min. Intervals: 1 min fast (120's spm x 1 min comfortable 80's spm)  Cool down: 1 min. [] Seat 8, arms 11  Less VC for R hip control due to excessive adduction  Cues for full ROM, limited by challenged to maintain hip control/foot placement. R hamstring stretch 30 sec x 3 x Seated w/ belt   R gastroc stretch 30 sec x 3 [x] Standing lunge stretch   R soleus stretch 30 sec x 3 [] Standing lunge, B UE support, verbal cues for correct form    R knee to chest, heel on SB 5 x 2 [x] Supine, VC for neutral hip rotation and slow control of lowering   R hip abd 5 x 2 [x] Hooklying, VC for neutral hip rotation   R bridging 10 x 1, 3 sec hold [] Blocking at R lateral knee and ankle to avoid hip adduction   R ankle DF/PF 10 x 1 [x] Long sitting, propped on extended UEs. Active assist to reduce inversion and knee flexion   side stepping next       []      []    NMR  []    Alt toe taps 10 x 1 to 4\" cones    10x 1 to 4\" cones w/ 2lb ankle R weight  [x] Standing w/ RW    Reduced ataxic movement, especially returning R foot to starting position     []    NMES to R ant tib  6 min. , standing w/ forward stepping in place for active ankle DF during swing [] VMS, phase duration 200, frequency 50pps, cycle time 5/5. Good contraction, nearly full ROM achieved.     Narrow SHU 30 sec    Trunk/UE rotations- 5x   Cervical/UE flex/ext- 5x    []   Post weight shift; poor eye hand coordination, cues to maintain eye contact on hands     Semi tandem 15 sec  Cervical rotation- 3x  []   LOB R w/ either foot in back   R knee to chest 8 x 2, (5 sec pause in lunge position)  yellow tband [] //bars w/ B UE support, assist to positive tband in between reps, pt somewhat impulsive, cueing to reposition for hip flexor 1 x daily - 7 x weekly - 3 sets - 10 reps  Seated Toe Taps - 2 x daily - 7 x weekly - 3 sets - 10 reps     Access Code: [email or web address]  URL: [email or web address]  Date: 03/28/2022  Prepared by: Elizabeth Casas  Exercises  Standing Gastroc Stretch at Counter - 1 x daily - 7 x weekly - 3 sets - 10 reps      Therapeutic Exercise:   [x] (75628) Provided verbal/tactile cueing for activities related to strengthening, flexibility, endurance, ROM for improvements in LE, proximal hip, and core control with self-care, mobility, lifting, ambulation. NMR:  [x] (73590) Provided verbal/tactile cueing for activities related to improving balance, coordination, kinesthetic sense, posture, motor skill, proprioception to assist with LE, proximal hip, and core control in self-care, mobility, lifting, ambulation and eccentric single leg control. Therapeutic Activities:    [] (60147) Provided verbal/tactile cueing for activities related to improving balance, coordination, kinesthetic sense, posture, motor skill, proprioception and motor activation to allow for proper function of core, proximal hip and LE with self-care and ADLs and functional mobility. Gait Training:    [x] (45565) Provided training and instruction to the patient for proper LE, core and proximal hip recruitment and positioning and eccentric body weight control with ambulation re-education including up and down stairs     Manual Treatments:  PROM / STM / Oscillations-Mobs:  G-I, II, III, IV (PA's, Inf., Post.)  [] (55920) Provided manual therapy to mobilize LE, proximal hip and/or LS spine soft tissue/joints for the purpose of modulating pain, promoting relaxation,  increasing ROM, reducing/eliminating soft tissue swelling/inflammation/restriction, improving soft tissue extensibility and allowing for proper ROM for normal function with self-care, mobility, lifting and ambulation.      Home Exercise Program:    [] (24619) Reviewed/Progressed HEP activities related to strengthening, flexibility, endurance, ROM of core, proximal hip and LE for functional self-care, mobility, lifting and ambulation/stair navigation   [] (34351) Reviewed/Progressed HEP activities related to improving balance, coordination, kinesthetic sense, posture, motor skill, proprioception of core, proximal hip and LE for self-care, mobility, lifting, and ambulation/stair navigation      Modalities:    [] Electric Stimulation:   [] Ultrasound:   [] Other:       Charges:  Timed Code Treatment Minutes:  GT25, NM 20  TE10   Total Treatment Minutes: 55      [] EVAL (LOW) 35392 (typically 20 minutes face-to-face)  [] EVAL (MOD) 91640 (typically 30 minutes face-to-face)  [] EVAL (HIGH) 21784 (typically 45 minutes face-to-face)  [] RE-EVAL     [x] AQ(52999) x   1    [x] NMR (34839) x 1    [] Manual (96932) x       [] TA (55516) x      [x] Gait Training (31811) x2      [] ES(attended) (78088)  [] ES (un) (37504)   [] DRY NEEDLE 1 OR 2 MUSCLES  [] DRY NEEDLE 3+ MUSCLES  [] Mech Traction (00405)  [] Ultrasound (27066)  [] Other:    GOALS: Goals established 3/24/22   Patient stated goal: achieve better balance and walk. Pt reports walking better, now walking in the grocery store (previously using motorized cart). [x] Progressing: [] Met: [] Not Met: [] Adjusted    Therapist goals for Patient:   Short Term Goals: To be achieved in: 6 weeks   1. Independent in HEP and progression per patient tolerance. [x] Progressing: [] Met: [] Not Met: [] Adjusted   2. Pt will improve TUG time to 25 sec to improve functional ambulation. 21 sec w/ SPC and close SBA  [] Progressing: [x] Met: [] Not Met: [] Adjusted  3. Pt to perform transfers via stand-pivot technique with use of  small base quad cane [facility]) on left Modified Independent. SBA due to inconsistent management of AD (RW, quad cane or SPC)   [x] Progressing: [] Met: [] Not Met: [] Adjusted  4. Pt will improve Rosa to 32/56 to demonstrate reduced all risk. 38/61  [] Progressing: [x] Met: [] Not Met: [] Adjusted    Long Term Goals: To be achieved in: 12 weeks  1. Pt will improve ABC scale to 55% confidence for reduced fall risk. [] Progressing: [] Met: [] Not Met: [] Adjusted  2. Pt to negotiate up/down 7 stairs with step to pattern w/ single rail mod I.   [] Progressing: [] Met: [] Not Met: [] Adjusted   3. Pt to demonstrate improved gait pattern including improved R foot clearance and knee control without cues with use of AFO on right and an appropriate based cane. [] Progressing: [] Met: [] Not Met: [] Adjusted  4. Pt will improve Rosa to 42/56 to demonstrate reduced all risk. [x] Progressing: [] Met: [] Not Met: [] Adjusted  5. Pt independent with HEP. [] Progressing: [] Met: [] Not Met: [] Adjusted  6. Pt to report improved confidence with ambulating in community   [x] Progressing: [] Met: [] Not Met: [] Adjusted  7. Pt will improve TUG time to 18 sec w/ least restrictive AD to improve functional ambulation. [x] Progressing: [] Met: [] Not Met: [] Adjusted  8. Pt will demonstrate 10m walk speed to 0.80m/s. [x] Progressing: [] Met: [] Not Met: [] Adjusted    ASSESSMENT: Pt met 2/4 ST goals. Pt demonstrates improved TUG time and 10m walk speed, now using SPC in clinic. Due to distractibility and impaired balance she continues to require at least SBA during gait training. She significantly improved her Rosa score although continues to be a moderate fall risk. She has been educated on the need to continue using an AD at all times. Pt is averse to TM training, however is demonstrating significant improvements in gait speed and step length due to TM training (w/ music). She requires frequent cueing to improve coordination and step length on TM. Pt is limited by fatigue during TM training. Pt will continue to benefit from skilled PT to improve functional mobility, reduce fall risk and maximize independence.        Treatment/Activity Tolerance:  [x] Patient tolerated treatment well [] Patient limited by fatigue  [] Patient limited by pain  [] Patient limited by other medical complications  [] Other:     Overall Progression Towards Functional goals/ Treatment Progress Update:  [x] Patient is progressing as expected towards functional goals listed. [] Progression is slowed due to complexities/Impairments listed. [] Progression has been slowed due to co-morbidities. [] Plan just implemented, too soon to assess goals progression <30days   [] Goals require adjustment due to lack of progress  [] Patient is not progressing as expected and requires additional follow up with physician  [] Other    Prognosis for POC: [x] Good [] Fair  [] Poor    Patient requires continued skilled intervention: [x] Yes  [] No        PLAN: 3x/week for 4 weeks then 2x/week for 8 weeks   [x] Continue per plan of care [] Alter current plan (see comments)  [] Plan of care initiated [] Hold pending MD visit [] Discharge    Electronically signed by: Mason Green PT, DPT    Note: If patient does not return for scheduled/recommended follow up visits, this note will serve as a discharge from care along with the most recent update on progress.

## 2022-04-18 NOTE — PROGRESS NOTES
[facility]  [address] [place] [address], [address] [place] [address]  Phone: [phone]   Fax: [phone]    Occupational Therapy Treatment Note/ Progress Report:     Date:  2022    Patient Name:  Radha Travis    :  1945  MRN: [id number]      Medical/Treatment Diagnosis Information:  I63.9 (ICD-10-CM) - Cerebral infarction, unspecified  M25.611 (ICD-10-CM) Stiffness of right shoulder, not elsewhere classified, R27.9 (ICD-10-CM) Unspecified lack of coordination, R53.1 (ICD-10-CM) Weakness        Insurance/Certification information: Medicare A & B  Physician Information:  Dr. Precious Brunner of care signed:    Yes    Date of Patient follow up with Physician: none known at this time     Progress Report: []  Yes  [x]  No     Date Range for reporting period:  Beginnin2022   Ending:       Progress report due: 3/16/7211     Recertification due (POC duration/ or 90 days whichever is less): 2022     Visit # Insurance Allowable Auth Needed    BMN No     Latex Allergy:  [x]NO      []YES  Preferred Language for Healthcare:   [x]English       []other:  Functional Scale:  (2022)  -- 95/100    Pain level: 0/10     SUBJECTIVE: Pt reports experiencing continued \"tightness\" in RUE. No new complaints or issues. OBJECTIVE:    Observation: Pt demo'd increased tone in RUE at start of session. Pt demo'd increased fine motor control in R hand during writing activities in session.  Test measurements:            RESTRICTIONS/PRECAUTIONS: Plavix and aspirin     Therapeutic Activities:    Activity #1: Pt sat edge of mat and participated in writing activity to increase fine motor control in R hand. Pt used a standard pen and single-lined paper to write 7 self-identified items for a grocery list in cursive and demo'd legible writing for 98% of letters.  Pt wrote fictitious appointment date and time, four grocery items, and four chores provided by OTS using cursive writing with 94% legibility and v/c to recall 1/4 grocery items. Pt wrote signature in cursive and two-step navigation directions provided by OTS 2x in print writing with 100% legibility. With v/c to slow writing speed, pt demonstrated increased consistency with letter spacing and sizing on second trial.        Patient Education:    Pt received continued education on HEP exercise technique and use of heat to facilitate AROM prior to completing exercises at home. - verbalized understanding    Manual Therapy: RUE, supine unless otherwise indicated   Shoulder abduction stretch with STM to coracobrachialis. 3:00 min, 1:30 min, and 2:30 min holds with breaks in between. ROM achieved ~  degrees. Shoulder flexion stretch with pulsing progression for 2 sets of 2:00 min with one rest break. ROM achieved ~ 120-140 degrees. Therapeutic Exercises: Exercises in bold performed in department today. Items not bolded are carried forward from prior visits for continuity of the record.     Exercise/Equipment Resistance/Repetitions HEP Other comments   BUE Shoulder flexion w/ dowel tatyana (supine)   1# dowel tatyana, x15 reps []    RUE shoulder abduction w/ dowel tatyana (supine)     1# dowel tatyana, x10 reps []    BUE shoulder flexion with walker   ~10 reps to ~100 degrees; ~5 second holds [x]    RUE shoulder flexion w/ UE [place]   13 reps []      RUE shoulder abduction/horizontal abduction w/ UE [place]    13 reps []    RUE AROM \"L\" with UE [place]   8 reps []    BUE AROM Shoulder Flexion (supine)     10 reps [x]  v/c to inhibit shoulder abduction during exercise - ROM achieved ~ 90 degrees     BUE AROM Shoulder Abduction (supine)     10 reps [x] Hands-on to support RUE - AROM is limited, ROM achieved ~ 100 degrees     BUE horizontal ab/adduction (supine)     10 reps [x] Hands-on to support distal RUE during horizontal abduction at end range   BUE shoulder flexion w/ theraball 10 reps w/ 5-15 second holds [] Occasional V/C to maintain hold at end range - tolerated well, ROM achieved ~90 degrees        []        []        []        []        []        []        []        []      Home Exercise Program:  Self-assisted stretches for RUE using RW and wall as described above  Force use RUE/R hand    Access Code: [email or web address]  URL: [email or web address]  Date: 04/12/2022  Prepared by: Nadia Torres    Exercises  Supine Shoulder Flexion Extension Full Range AROM - 2 x daily - 7 x weekly - 1 sets - 10 reps  Supine Shoulder Abduction AROM - 2 x daily - 7 x weekly - 1 sets - 10 reps  Supine Shoulder Horizontal Abduction - 2 x daily - 7 x weekly - 1 sets - 10 reps    Therapeutic Exercise:   [x] (45840) Provided verbal/tactile cueing for activities related to strengthening, flexibility, endurance, ROM. Includes review/progression of HEP activities related to strengthening, flexibility, endurance, AROM, proximal shoulder and UE for functional transfers/mobility, self-care, IADLs, and community re-entry    Therapeutic Activities:    [x] (22724) Provided verbal/tactile cueing for activities related to improving balance, coordination, kinesthetic sense, posture, motor skill, proprioception and motor activation to allow for proper function of core, proximal shoulder and UE with self-care, and ADLs, IADLs, functional mobility, work-related and leisure based activities.  Includes review/progression of HEP activities to improve balance, coordination, kinesthetic sense, posture, motor skill, proprioception, proximal shoulder and UE for functional transfers/mobility, self-care, IADLs, and community re-entry    Sensory Integration Techniques:  [] (07630)Provided verbal/tactile feedback to enhance sensory processing and promoting responses that are adaptive to environmental demands    Self-Care/Home Management Training:  [] (47967) Provided training and education in restorative and compensatory strategies/activity modifications in activities of daily living, meal preparation, laundry and other various house maintenance activities. Provided training and education in use of adaptive equipment/devices and assistive technology, as needed, to promote participation and independence. Cognitive Function:  [] (58896 x15 minutes, 33115 +15 minutes) Integrated activities that address attention, memory, reasoning, executive functioning, problem solving, and/or pragmatic functioning in addition to compensatory strategies to manage the performance of an activity (for example, managing time or schedules, initiating, organizing, and sequencing tasks). NMR:  [] (92034) During functional activities/therapeutic exercises, provided facilitation of normal movement patterns and provided handling/verbal cues to promote postural alignment and stability during static and dynamic movement (sitting or standing). Activities may also include visual perceptual training, proprioception training, and balance retraining during functional activities    Manual Treatments:    [x] (81534) Provided manual therapy to mobilize UB for the purpose of modulating pain, promoting relaxation,  increasing ROM, reducing/eliminating soft tissue swelling/inflammation/restriction, improving soft tissue extensibility and allowing for proper ROM for normal function with self-care, functional mobility, transfers, reaching and lifting    Modalities:     [] Electrical Stimulation (27906):     [] Ultrasound (00802):    Charges:  Timed Code Treatment Minutes: 55   Total Treatment Minutes: 55      [] EVAL (LOW) 88895 (typically 20 minutes face-to-face)  [] EVAL (MOD) 26690 (typically 30 minutes face-to-face)  [] EVAL (HIGH) 28658 (typically 45 minutes face-to-face)  [] RE-EVAL     [x] MY(31841)   x24 (2)  [] NMR (14834)        [x] Manual (00220) x11(1)  [x] TA (92677)  x20 (1)  [] ES(attended) (52992)       [] ES (un) (32295)  [] Other:    GOALS: Patient stated goal: \"get back to normal\"       []?  Progressing: []? Met: []? Not Met: []? Adjusted     Therapist goals for Patient:      Short Term Goals: To be achieved in: 6 weeks  1. Pt will improve score on UEFS to 40/100 for a subjective report of decreased difficulty with completing every day activities with RUE and R hand  []? Progressing: [x]? Met: []? Not Met: []? Adjusted  2. Pt will improve time on NHPT to more than 45 seconds to demonstrate improved fine motor coordination of R hand  []? Progressing: [x]? Met: []? Not Met: []? Adjusted  3. Pt will improve score on BBT to 40 blocks/minute to demonstrate improved timing, speed and accuracy with functional grasp of right hand. [x]? Progressing: []? Met: []? Not Met: []? Adjusted  4. Pt will be Min A with HEP/Home activities program to facilitate independence with carryover from sessions and to progress towards returning to PLOF  [x]? Progressing: []? Met: []? Not Met: []? Adjusted   5. Pt will write a pangram sentence in no more than 1 minute demo'ing 85% legibility to demo improved fine motor control and speed of R hand. [x]? Progressing: []? Met: []? Not Met: []? Adjusted      Long Term Goals: To be achieved in: 12 weeks  1. Pt will improve score on UEFS to 65/100 for a subjective report of decreased difficulty with completing every day activities with RUE and R hand  []? Progressing: [x]? Met: []? Not Met: [x]? Adjusted    1 Adjusted: Pt will improve score on UEFS to 100/100 for a subjective report of decreased difficulty with completing every day activities with RUE and R hand  []? Progressing: []? Met: []? Not Met: []? Adjusted     2. Pt will improve time on NHPT to more than 30 seconds to demonstrate improved fine motor coordination of R hand  []? Progressing: []? Met: []? Not Met: []? Adjusted  3. Pt will improve score on BBT to 50 blocks/minute to demonstrate improved timing, speed and accuracy with functional grasp of right hand. []? Progressing: []? Met: []? Not Met: []? Adjusted  4.  Pt will be Independent with HEP/Home activities program to facilitate independence with carryover from sessions and to progress towards returning to PLOF  []? Progressing: []? Met: []? Not Met: []? Adjusted   5. Pt will write a pangram sentence in no more than 45 seconds minute demo'ing 95% legibility to demo improved fine motor control and speed of R hand. []? Progressing: []? Met: []? Not Met: []? Adjusted      ASSESSMENT:      During session, pt demo'd increased fine motor control in R hand during writing activity with increased letter legibility. Pt continues to demo decreased AROM of RUE due to muscle shortening, tone, and weakness. However, pt demo'd good tolerance to shoulder flexion exercise using theraball. Pt continues to benefit from skilled OT intervention inclduing manual therapy and therapeutic exercises, cont per POC. Treatment/Activity Tolerance:  [x] Patient tolerated treatment well [] Patient limited by fatique  [] Patient limited by pain  [] Patient limited by other medical complications  [] Other:     Overall Progression Towards Functional goals/ Treatment Progress Update:  [x] Patient is progressing as expected towards functional goals listed. [] Progression is slowed due to complexities/Impairments listed. [] Progression has been slowed due to co-morbidities.   [] Plan just implemented, too soon to assess goals progression <30days   [] Goals require adjustment due to lack of progress  [] Patient is not progressing as expected and requires additional follow up with physician  [] Other    Prognosis for POC: [x] Good [] Fair  [] Poor    Patient requires continued skilled intervention: [x] Yes  [] No      PLAN:   [x] Continue per plan of care [] Alter current plan (see comments)  [] Plan of care initiated [] Hold pending MD visit [] Discharge    Electronically signed by: Janee RECINOS    Note: If patient does not return for scheduled/recommended follow up visits, this note will serve as a discharge from care along with the most recent update on progress.

## 2022-04-19 ENCOUNTER — HOSPITAL ENCOUNTER (OUTPATIENT)
Dept: PHYSICAL THERAPY | Age: 77
Setting detail: THERAPIES SERIES
Discharge: HOME | End: 2022-04-19
Payer: MEDICARE

## 2022-04-19 ENCOUNTER — HOSPITAL ENCOUNTER (OUTPATIENT)
Dept: OCCUPATIONAL THERAPY | Age: 77
Setting detail: THERAPIES SERIES
Discharge: HOME | End: 2022-04-19
Payer: MEDICARE

## 2022-04-19 PROCEDURE — 97530 THERAPEUTIC ACTIVITIES: CPT

## 2022-04-19 PROCEDURE — 97110 THERAPEUTIC EXERCISES: CPT

## 2022-04-19 PROCEDURE — 97116 GAIT TRAINING THERAPY: CPT

## 2022-04-19 PROCEDURE — 97112 NEUROMUSCULAR REEDUCATION: CPT

## 2022-04-19 NOTE — FLOWSHEET NOTE
[facility]  [address], [address], [address] [place] [address] Ave  Phone: [phone]   Fax: [phone]    Physical Therapy Treatment Note/ Progress Report:     Date:  2022    Patient Name:  Stephanie Vann    :  1945  MRN: [id number]    Medical/Treatment Diagnosis Information:  · Diagnosis: I63.9 (ICD-10-CM) - Cerebral infarction, unspecified  ·    R 26 abnormality of gait and mobility; R53.1 weakness, I63.9 cerebral infarction  Insurance/Certification information:  PT Insurance Information: Medicare  Physician Information:  Referring Practitioner: Dr. Javi Bella of [facility] signed:    [x] Yes  [] No    Date of Patient follow up with Physician:      Progress Report: [x]  Yes  []  No     Date Range for reporting period:  Beginning:  3/24/22  Endin22    Progress report due (10 Rx/or 30 days whichever is less): 85    Recertification due (POC duration/ or 90 days whichever is less):      Visit # Insurance Allowable Auth Needed    BMN []Yes   [x]No     RESTRICTIONS/PRECAUTIONS: receptive aphasia, Plavix and aspirin  Latex Allergy:  [x]NO      []YES  Preferred Language for Healthcare:   [x]English       []other:  Functional Scale: 10m walk= 0.37m/s; TUG 35.8 sec w/ RW; [facility]   Date assessed:3/24/22    Functional Scale: 10m walk= 0.56 m/s w/ SPC (after TM training 0.63m/s w/ SPC) ; TUG  20.6 sec (average) w/ SPC and close SBA; [facility]   Date assessed:22      Pain level:  0/10 R shoulder     SUBJECTIVE:  Pt reports she is doing well due to getting good sleep last night. OBJECTIVE:    Observation: Pt presents amb without AD. She reports she forgot it in someone's car and her son brought her today, he helped her get into the clinic.  Test measurements:        Exercises/Interventions: Exercises in bold performed in department today.   Items not bolded are carried forward from prior visits for continuity of the record. Exercise/Equipment Resistance/Repetitions HEP Other comments   Nustep 8 min., L 4  Total: 8  Warm up: 2 min. Intervals: 1 min fast (120's spm x 1 min comfortable 80's spm)  Cool down: 1 min. [] Seat 8, arms 11  Less VC for R hip control due to excessive adduction  Cues for full ROM, limited by challenged to maintain hip control/foot placement. R hamstring stretch 60 sec x 3 x Seated w/ belt   R gastroc stretch 30 sec x 3 [x] Standing lunge stretch   R soleus stretch 30 sec x 3 [] Standing lunge, B UE support, verbal cues for correct form    R knee to chest, heel on SB 5 x 2 [x] Supine, VC for neutral hip rotation and slow control of lowering   R hip abd 5 x 2 [x] Hooklying, VC for neutral hip rotation   R bridging 10 x 1, 3 sec hold [] Blocking at R lateral knee and ankle to avoid hip adduction   R ankle DF/PF 10 x 1 [x] Long sitting, propped on extended UEs. Active assist to reduce inversion and knee flexion           []      []    NMR  []    Alt toe taps 10 x 1 to 4\" cones    10x 1 to 4\" cones w/ 2lb ankle R weight  [x] Standing w/ RW    Reduced ataxic movement, especially returning R foot to starting position     []    NMES to R ant tib  6 min. , standing w/ forward stepping in place for active ankle DF during swing [] VMS, phase duration 200, frequency 50pps, cycle time 5/5. Good contraction, nearly full ROM achieved. Narrow SHU 30 sec    Trunk/UE rotations- 5x   Cervical/UE flex/ext- 5x    []   Post weight shift; poor eye hand coordination, cues to maintain eye contact on hands     Semi tandem 15 sec  Cervical rotation- 3x  []   LOB R w/ either foot in back   R knee to chest 8 x 2, (5 sec pause in lunge position)  yellow tband [] //bars w/ B UE support, assist to positive tband in between reps, pt somewhat impulsive, cueing to reposition for hip flexor stretch in between each rep. Good ROM w/ overflow to ant tib.     R toe taps Lunge to R foot on step- 5x    Cues for slow controlled today, encouraged to at least be amb w/ a SPC to reduce fall risk. Highlighted risks of falling, benefits of minimum AD use. Encouraged pt that her gait will continue to improve and she will not require a RW long term. Pt verbalized understanding. Home Exercise Program:  Access Code: [email or web address] Pastel: [email or web address]  Date: 03/24/2022  Prepared by: Savanna Honeycutt  Exercises  Supine Knee to Chest with Leg Straight - 1 x daily - 7 x weekly - 1-3 sets - 5-10 reps  Supine Hip Abduction - 1 x daily - 7 x weekly - 1-3 sets - 5-10 reps  Seated Ankle Pumps - 1 x daily - 7 x weekly - 3 sets - 10 reps  Seated Toe Taps - 2 x daily - 7 x weekly - 3 sets - 10 reps     Access Code: [email or web address]  URL: [email or web address]/  Date: 03/28/2022  Prepared by: Savanna Honeycutt  Exercises  Standing Gastroc Stretch at Counter - 1 x daily - 7 x weekly - 3 sets - 10 reps    Access Code: [email or web address]  URL: [email or web address]  Date: 04/19/2022  Prepared by: Savanna Honeycutt  Exercises  [facility] AND [facility] Back and DAMARIS - 1 x daily - 5 x weekly - 3 sets - 10 reps      Therapeutic Exercise:   [x] (45523) Provided verbal/tactile cueing for activities related to strengthening, flexibility, endurance, ROM for improvements in LE, proximal hip, and core control with self-care, mobility, lifting, ambulation. NMR:  [x] (97342) Provided verbal/tactile cueing for activities related to improving balance, coordination, kinesthetic sense, posture, motor skill, proprioception to assist with LE, proximal hip, and core control in self-care, mobility, lifting, ambulation and eccentric single leg control. Therapeutic Activities:    [] (91906) Provided verbal/tactile cueing for activities related to improving balance, coordination, kinesthetic sense, posture, motor skill, proprioception and motor activation to allow for proper function of core, proximal hip and LE with self-care and ADLs and functional mobility.      Gait Training:    [x] (08081) Provided training and instruction to the patient for proper LE, core and proximal hip recruitment and positioning and eccentric body weight control with ambulation re-education including up and down stairs     Manual Treatments:  PROM / STM / Oscillations-Mobs:  G-I, II, III, IV (PA's, Inf., Post.)  [] (60940) Provided manual therapy to mobilize LE, proximal hip and/or LS spine soft tissue/joints for the purpose of modulating pain, promoting relaxation,  increasing ROM, reducing/eliminating soft tissue swelling/inflammation/restriction, improving soft tissue extensibility and allowing for proper ROM for normal function with self-care, mobility, lifting and ambulation. Home Exercise Program:    [] (53358) Reviewed/Progressed HEP activities related to strengthening, flexibility, endurance, ROM of core, proximal hip and LE for functional self-care, mobility, lifting and ambulation/stair navigation   [] (04476) Reviewed/Progressed HEP activities related to improving balance, coordination, kinesthetic sense, posture, motor skill, proprioception of core, proximal hip and LE for self-care, mobility, lifting, and ambulation/stair navigation      Modalities:    [] Electric Stimulation:   [] Ultrasound:   [] Other:       Charges:  Timed Code Treatment Minutes:  GT33, NM 15  TE10   Total Treatment Minutes: 58      [] EVAL (LOW) 27312 (typically 20 minutes face-to-face)  [] EVAL (MOD) 18367 (typically 30 minutes face-to-face)  [] EVAL (HIGH) 68558 (typically 45 minutes face-to-face)  [] RE-EVAL     [x] DARRYL(60012) x   1    [x] NMR (09344) x 1    [] Manual (76865) x       [] TA (75998) x      [x] Gait Training (23539) x2      [] ES(attended) (61541)  [] ES (un) (02214)   [] DRY NEEDLE 1 OR 2 MUSCLES  [] DRY NEEDLE 3+ MUSCLES  [] [facility] (51020)  [] Ultrasound (35536)  [] Other:    GOALS: Goals established 3/24/22   Patient stated goal: achieve better balance and walk.  Pt reports walking better, now walking in the grocery store (previously using motorized cart). [x] Progressing: [] Met: [] Not Met: [] Adjusted    Therapist goals for Patient:   Short Term Goals: To be achieved in: 6 weeks   1. Independent in HEP and progression per patient tolerance. [x] Progressing: [] Met: [] Not Met: [] Adjusted   2. Pt will improve TUG time to 25 sec to improve functional ambulation. 21 sec w/ SPC and close SBA  [] Progressing: [x] Met: [] Not Met: [] Adjusted  3. Pt to perform transfers via stand-pivot technique with use of  small base quad cane [facility]) on left Modified Independent. SBA due to inconsistent management of AD (RW, quad cane or SPC)   [x] Progressing: [] Met: [] Not Met: [] Adjusted  4. Pt will improve Rosa to 32/56 to demonstrate reduced all risk. 38/61  [] Progressing: [x] Met: [] Not Met: [] Adjusted    Long Term Goals: To be achieved in: 12 weeks  1. Pt will improve ABC scale to 55% confidence for reduced fall risk. [] Progressing: [] Met: [] Not Met: [] Adjusted  2. Pt to negotiate up/down 7 stairs with step to pattern w/ single rail mod I.   [] Progressing: [] Met: [] Not Met: [] Adjusted   3. Pt to demonstrate improved gait pattern including improved R foot clearance and knee control without cues with use of AFO on right and an appropriate based cane. [] Progressing: [] Met: [] Not Met: [] Adjusted  4. Pt will improve Rosa to 42/56 to demonstrate reduced all risk. [x] Progressing: [] Met: [] Not Met: [] Adjusted  5. Pt independent with HEP. [] Progressing: [] Met: [] Not Met: [] Adjusted  6. Pt to report improved confidence with ambulating in community   [x] Progressing: [] Met: [] Not Met: [] Adjusted  7. Pt will improve TUG time to 18 sec w/ least restrictive AD to improve functional ambulation. [x] Progressing: [] Met: [] Not Met: [] Adjusted  8. Pt will demonstrate 10m walk speed to 0.80m/s.    [x] Progressing: [] Met: [] Not Met: [] Adjusted    ASSESSMENT: Reinforced need to use AD at all times due to fall risk and need to improve gait deviations instead of increased reps of compensatory strategies. Pt w/ improved endurance during TM training today and able to initiate backwards walking briefly. Focused on mult directional walking in/out of parallel bars as well as gait training w/ and without SPC. Pt demonstrates significantly improved zac and R foot clearance w/ SPC compared to without. Pt is somewhat forgetful and requires cueing to carryover techniques/recommendations. Pt will continue to benefit from skilled PT to improve functional mobility, reduce fall risk and maximize independence. Treatment/Activity Tolerance:  [x] Patient tolerated treatment well [] Patient limited by fatigue  [] Patient limited by pain  [] Patient limited by other medical complications  [] Other:     Overall Progression Towards Functional goals/ Treatment Progress Update:  [x] Patient is progressing as expected towards functional goals listed. [] Progression is slowed due to complexities/Impairments listed. [] Progression has been slowed due to co-morbidities. [] Plan just implemented, too soon to assess goals progression <30days   [] Goals require adjustment due to lack of progress  [] Patient is not progressing as expected and requires additional follow up with physician  [] Other    Prognosis for POC: [x] Good [] Fair  [] Poor    Patient requires continued skilled intervention: [x] Yes  [] No        PLAN: 3x/week for 4 weeks then 2x/week for 8 weeks   [x] Continue per plan of care [] Alter current plan (see comments)  [] Plan of care initiated [] Hold pending MD visit [] Discharge    Electronically signed by: Cherie Diana, PT, DPT    Note: If patient does not return for scheduled/recommended follow up visits, this note will serve as a discharge from care along with the most recent update on progress.

## 2022-04-19 NOTE — FLOWSHEET NOTE
[facility]  [address] [place] [address], MYA Silvestre [address], [address]  Phone: [phone]   Fax: [phone]    Occupational Therapy Treatment Note/ Progress Report:     Date:  2022    Patient Name:  Chapis Carreon    :  1945  MRN: [id number]      Medical/Treatment Diagnosis Information:  I63.9 (ICD-10-CM) - Cerebral infarction, unspecified  M25.611 (ICD-10-CM) Stiffness of right shoulder, not elsewhere classified, R27.9 (ICD-10-CM) Unspecified lack of coordination, R53.1 (ICD-10-CM) Weakness        Insurance/Certification information: Medicare A & B  Physician Information:  Dr. Arcadio Verde of [facility] signed:    Yes    Date of Patient follow up with Physician: none known at this time     Progress Report: []  Yes  [x]  No     Date Range for reporting period:  Beginnin2022   Ending:       Progress report due: 6666     Recertification due (POC duration/ or 90 days whichever is less): 2022     Visit # Insurance Allowable Auth Needed    BMN No     Latex Allergy:  [x]NO      []YES  Preferred Language for Healthcare:   [x]English       []other:  Functional Scale:  (2022)  -- 95/100    Pain level: 0/10     SUBJECTIVE: Pt reports that she forgot rolling walker on this date and that she used a loaner cane during PT session prior to OT session. Pt reports no new complaints or concerns. OBJECTIVE:    Observation: Pt demo'd increased RUE endurance and strength during closed-chain quadruped activities. Pt also demo'd decreased RUE speed and accuracy during coordination activity.  Test measurements:            RESTRICTIONS/PRECAUTIONS: Plavix and aspirin     Therapeutic Activities:    Activity #1: Pt sat edge of mat and participated in coordination activity to work on speed, timing, and accuracy of R hand. Pt rolled a tennis ball across table back and forth between hands for 20 seconds 2x at pace of ~3 seconds between roll.  Pt then bounced ball back and forth between hands and maintained control for ~1-3 bounces. Pt tried timed bounces of ~3 seconds between bounces 3x but demo'd difficulty controlling ball. Pt received v/c to slow down rate of bounces and was able to control ball for bouts of ~ 5-6 bounces 2x. Activity #2: Pt sat edge of mat and participated in writing activity to increase fine motor control. Pt used standard pen and single-lined paper to list steps in morning routine. Pt wrote two steps in print then discontinued and started a new list in cursive. Pt wrote with 89% legibility. Pt reported that handwriting has improved and is usually WFL. Pt expressed recent difficulty with letter \"O\"s and double \"O\"s. Pt wrote 9 \"O\"s and three words with double \"O\"s legibly and stated that she wasn't having trouble with them on this date. Patient Education:    Pt received education on use of weight bearing exercises to strengthen RUE. - verb understanding  Pt received education on slowing down movement with RUE to increase accuracy and efficiency. - verb understanding    Pt received education on continuing to monitor self during handwriting tasks to identify lingering handwriting concerns - verb understanding    Manual Therapy: RUE, supine unless otherwise indicated    NMR   Quadruped position for RUE strengthening and closed-chain ROM stretch. - Weight shifts anteriorly and posteriorly 10x. Pt required v/c to shift anteriorly into 90 degrees of shoulder flexion and extend R elbow. Pt achieved ~ 110 degrees of shoulder flexion.  - Child's pose into shoulder flexion 10x with ~5 second holds. Pt required hands on facilitation for posterior weight shift for efficient posturing during shoulder flexion. Pt achieved ~ 130 degrees. - Table slides with RUE into shoulder abduction 10x. Pt achieved ~ 90 degrees. - Weight bearing through RUE while performing R shoulder taps with L hand 5x.  Pt required Max hands on facilitation to inhibit shifting weight posteriorly. Therapeutic Exercises: Exercises in bold performed in department today. Items not bolded are carried forward from prior visits for continuity of the record. Exercise/Equipment Resistance/Repetitions HEP Other comments   BUE Shoulder flexion w/ dowel tatyana (supine)   1# dowel tatyana, x15 reps []    RUE shoulder abduction w/ dowel tatyana (supine)     1# dowel tatyana, x10 reps []    BUE shoulder flexion with walker   ~10 reps to ~100 degrees; ~5 second holds [x]    RUE shoulder flexion w/ UE Ketchum   13 reps [] Hands on to inhibit trunk rotation and scapular elevation, v/c to inhibit R lateral lean during elbow extension - ROM achieved ~ 90 degrees     RUE shoulder abduction/horizontal abduction w/ UE Ketchum    10 reps [] Hands on to inhibit trunk rotation, v/c to extend elbow - ROM achieved ~ 90 degrees   RUE AROM \"L\" with UE Ketchum   10 reps [] Hands on to inhibit trunk rotation - fatigued after 7 reps and required one rest break   BUE AROM Shoulder Flexion (supine)     10 reps [x]       BUE AROM Shoulder Abduction (supine)     10 reps [x]    BUE horizontal ab/adduction (supine)     10 reps [x]    BUE shoulder flexion w/ theraball 10 reps w/ 5-15 second holds []         []        []        []        []        []        []        []        []      Home Exercise Program:  Self-assisted stretches for RUE using RW and wall as described above  Force use RUE/R hand    Access Code: [email or web address]  URL: [email or web address]  Date: 04/12/2022  Prepared by: Michael Erp    Exercises  Supine Shoulder Flexion Extension Full Range AROM - 2 x daily - 7 x weekly - 1 sets - 10 reps  Supine Shoulder Abduction AROM - 2 x daily - 7 x weekly - 1 sets - 10 reps  Supine Shoulder Horizontal Abduction - 2 x daily - 7 x weekly - 1 sets - 10 reps    Therapeutic Exercise:   [x] (82862) Provided verbal/tactile cueing for activities related to strengthening, flexibility, endurance, ROM.  Includes review/progression of HEP activities related to strengthening, flexibility, endurance, AROM, proximal shoulder and UE for functional transfers/mobility, self-care, IADLs, and community re-entry    Therapeutic Activities:    [x] (96459) Provided verbal/tactile cueing for activities related to improving balance, coordination, kinesthetic sense, posture, motor skill, proprioception and motor activation to allow for proper function of core, proximal shoulder and UE with self-care, and ADLs, IADLs, functional mobility, work-related and leisure based activities. Includes review/progression of HEP activities to improve balance, coordination, kinesthetic sense, posture, motor skill, proprioception, proximal shoulder and UE for functional transfers/mobility, self-care, IADLs, and community re-entry    Sensory Integration Techniques:  [] (08747)Provided verbal/tactile feedback to enhance sensory processing and promoting responses that are adaptive to environmental demands    Self-Care/Home Management Training:  [] (63394) Provided training and education in restorative and compensatory strategies/activity modifications in activities of daily living, meal preparation, laundry and other various house maintenance activities. Provided training and education in use of adaptive equipment/devices and assistive technology, as needed, to promote participation and independence. Cognitive Function:  [] (48874 x15 minutes, 93431 +15 minutes) Integrated activities that address attention, memory, reasoning, executive functioning, problem solving, and/or pragmatic functioning in addition to compensatory strategies to manage the performance of an activity (for example, managing time or schedules, initiating, organizing, and sequencing tasks).     NMR:  [x] (56794) During functional activities/therapeutic exercises, provided facilitation of normal movement patterns and provided handling/verbal cues to promote postural alignment and stability during static and dynamic movement (sitting or standing). Activities may also include visual perceptual training, proprioception training, and balance retraining during functional activities    Manual Treatments:    [] (45311) Provided manual therapy to mobilize UB for the purpose of modulating pain, promoting relaxation,  increasing ROM, reducing/eliminating soft tissue swelling/inflammation/restriction, improving soft tissue extensibility and allowing for proper ROM for normal function with self-care, functional mobility, transfers, reaching and lifting    Modalities:     [] Electrical Stimulation (07103):     [] Ultrasound (15530):    Charges:  Timed Code Treatment Minutes: 53   Total Treatment Minutes: 53      [] EVAL (LOW) 47188 (typically 20 minutes face-to-face)  [] EVAL (MOD) 32479 (typically 30 minutes face-to-face)  [] EVAL (HIGH) 11369 (typically 45 minutes face-to-face)  [] RE-EVAL     [x] AP(56801)  x12 (1)  [x] NMR (61124) x27 (2)       [] Manual (18199)   [x] TA (32199) x14 (1)  [] ES(attended) (77630)       [] ES (un) (69614)  [] Other:    GOALS: Patient stated goal: \"get back to normal\"       []? Progressing: []? Met: []? Not Met: []? Adjusted     Therapist goals for Patient:      Short Term Goals: To be achieved in: 6 weeks  1. Pt will improve score on UEFS to 40/100 for a subjective report of decreased difficulty with completing every day activities with RUE and R hand  []? Progressing: [x]? Met: []? Not Met: []? Adjusted  2. Pt will improve time on NHPT to more than 45 seconds to demonstrate improved fine motor coordination of R hand  []? Progressing: [x]? Met: []? Not Met: []? Adjusted  3. Pt will improve score on BBT to 40 blocks/minute to demonstrate improved timing, speed and accuracy with functional grasp of right hand. [x]? Progressing: []? Met: []? Not Met: []? Adjusted  4.  Pt will be Min A with HEP/Home activities program to facilitate independence with carryover from sessions and to progress towards returning to PLOF  [x]? Progressing: []? Met: []? Not Met: []? Adjusted   5. Pt will write a pangram sentence in no more than 1 minute demo'ing 85% legibility to demo improved fine motor control and speed of R hand. [x]? Progressing: []? Met: []? Not Met: []? Adjusted      Long Term Goals: To be achieved in: 12 weeks  1. Pt will improve score on UEFS to 65/100 for a subjective report of decreased difficulty with completing every day activities with RUE and R hand  []? Progressing: [x]? Met: []? Not Met: [x]? Adjusted    1 Adjusted: Pt will improve score on UEFS to 100/100 for a subjective report of decreased difficulty with completing every day activities with RUE and R hand  []? Progressing: []? Met: []? Not Met: []? Adjusted     2. Pt will improve time on NHPT to more than 30 seconds to demonstrate improved fine motor coordination of R hand  []? Progressing: []? Met: []? Not Met: []? Adjusted  3. Pt will improve score on BBT to 50 blocks/minute to demonstrate improved timing, speed and accuracy with functional grasp of right hand. []? Progressing: []? Met: []? Not Met: []? Adjusted  4. Pt will be Independent with HEP/Home activities program to facilitate independence with carryover from sessions and to progress towards returning to PLOF  []? Progressing: []? Met: []? Not Met: []? Adjusted   5. Pt will write a pangram sentence in no more than 45 seconds minute demo'ing 95% legibility to demo improved fine motor control and speed of R hand. []? Progressing: []? Met: []? Not Met: []? Adjusted      ASSESSMENT:     During session, pt demo'd good tolerance to quadruped position and achieved increased shoulder flexion during closed-chain stretch by shifting body posteriorly with BUE planted on mat. Pt demo'd difficulty with RUE timing and accuracy during coordination task but showed improved accuracy with v/c to decrease speed.  Pt reports inconsistent satisfaction with functional use of RUE but that it is \"coming along.\" Plan to continue monitoring satisfaction of handwriting with R hand during sessions. Pt continues to benefit from skilled OT intervention including therapeutic exercises and facilitation of effective posturing, cont per POC. Treatment/Activity Tolerance:  [x] Patient tolerated treatment well [] Patient limited by fatique  [] Patient limited by pain  [] Patient limited by other medical complications  [] Other:     Overall Progression Towards Functional goals/ Treatment Progress Update:  [x] Patient is progressing as expected towards functional goals listed. [] Progression is slowed due to complexities/Impairments listed. [] Progression has been slowed due to co-morbidities. [] Plan just implemented, too soon to assess goals progression <30days   [] Goals require adjustment due to lack of progress  [] Patient is not progressing as expected and requires additional follow up with physician  [] Other    Prognosis for POC: [x] Good [] Fair  [] Poor    Patient requires continued skilled intervention: [x] Yes  [] No      PLAN:   [x] Continue per plan of care [] Alter current plan (see comments)  [] Plan of care initiated [] Hold pending MD visit [] Discharge    Electronically signed by: Ransom Snellen OTS    Note: If patient does not return for scheduled/recommended follow up visits, this note will serve as a discharge from care along with the most recent update on progress.

## 2022-04-21 ENCOUNTER — HOSPITAL ENCOUNTER (OUTPATIENT)
Dept: OCCUPATIONAL THERAPY | Age: 77
Setting detail: THERAPIES SERIES
Discharge: HOME | End: 2022-04-21
Payer: MEDICARE

## 2022-04-21 ENCOUNTER — HOSPITAL ENCOUNTER (OUTPATIENT)
Dept: PHYSICAL THERAPY | Age: 77
Setting detail: THERAPIES SERIES
Discharge: HOME | End: 2022-04-21
Payer: MEDICARE

## 2022-04-21 PROCEDURE — 97112 NEUROMUSCULAR REEDUCATION: CPT

## 2022-04-21 PROCEDURE — 97530 THERAPEUTIC ACTIVITIES: CPT

## 2022-04-21 PROCEDURE — 97116 GAIT TRAINING THERAPY: CPT

## 2022-04-21 PROCEDURE — 97110 THERAPEUTIC EXERCISES: CPT

## 2022-04-21 NOTE — FLOWSHEET NOTE
[facility]  [address] [place] [address], [address] [facility], [address] [place] [address] Av  Phone: [phone]   Fax: [phone]    Occupational Therapy Treatment Note/ Progress Report:     Date:  2022    Patient Name:  Karli Chirinos    :  1945  MRN: [id number]      Medical/Treatment Diagnosis Information:  I63.9 (ICD-10-CM) - Cerebral infarction, unspecified  M25.611 (ICD-10-CM) Stiffness of right shoulder, not elsewhere classified, R27.9 (ICD-10-CM) Unspecified lack of coordination, R53.1 (ICD-10-CM) Weakness        Insurance/Certification information: Medicare A & B  Physician Information:  Dr. Caryle Gold of care signed:    Yes    Date of Patient follow up with Physician: none known at this time     Progress Report: []  Yes  [x]  No     Date Range for reporting period:  Beginnin2022   Ending:       Progress report due:      Recertification due (POC duration/ or 90 days whichever is less): 2022     Visit # Insurance Allowable Auth Needed    BMN No     Latex Allergy:  [x]NO      []YES  Preferred Language for Healthcare:   [x]English       []other:  Functional Scale:  (2022)  -- 95/100    Pain level: 0/10     SUBJECTIVE: Pt reports continued \"tightness\" in RUE. Pt also reported that activities in quadruped during last session were followed by some soreness in her R hip and that she wanted to trial a different stretching exercise on this date. OBJECTIVE:    Observation: Pt demo'd increased RUE speed and accuracy during coordination activity. Pt also demo'd increased RUE ROM during closed-chain stretches in stance with BUE placed on wall.  Test measurements:       RESTRICTIONS/PRECAUTIONS: Plavix and aspirin     Therapeutic Activities:    Activity #1: Pt sat edge of mat and participated in cone stacking activity to increase speed, accuracy, and coordination of R hand.  Pt instructed to use R hand to place 5 cones into a pyramid and then into a stack on a table. Pt performed series 3x. Trial one was untimed, trial two was 40.25 seconds with one error, and trial three was 34.16 seconds. Pt then used R hand to place 10 cones into a pyramid  and then into a stack on a table. Pt performed series 3x and demo'd occasional errors with cone placement. Trial one was untimed, trial two was 1 min and 10.28 seconds, and trial three was 50.25 seconds. Pt used R hand to place 10 cones into a pyramid and then into a stack on an elevated table to incorporate functional reach with the RUE. Pt completed series a second time while retrieving the 10 cones with RUE from OTS in various planes. Pt required min v/c to maintain effective posturing and occasionally used L hand to assist.       Patient Education:    Pt received educational review of HEP to promote carryover and independence. - Pt verb understanding and received additional print out of exercises  Pt received education to perform HEP without added # to promote safety. - Pt verb understanding    Manual Therapy: RUE, supine unless otherwise indicated    Therapeutic Exercises: Exercises in bold performed in department today. Items not bolded are carried forward from prior visits for continuity of the record.     Exercise/Equipment Resistance/Repetitions HEP Other comments   BUE Shoulder flexion w/ dowel tatyana (supine)   1# dowel tatyana, x15 reps []    RUE shoulder abduction w/ dowel tatyana (supine)     1# dowel tatyana, x10 reps []    BUE shoulder flexion with walker   ~10 reps to ~100 degrees; ~5 second holds [x]    RUE shoulder flexion w/ UE Willard   13 reps []      RUE shoulder abduction/horizontal abduction w/ UE Willard    10 reps []    RUE AROM \"L\" with UE Willard   10 reps []    RUE AROM Shoulder Flexion (supine)     10 reps [x] V/c to inhibit shoulder abduction - ROM achieved about ~ 110 degrees     RUE AROM Shoulder Abduction (supine)     10 reps [x] Hands on to support RUE - ROM achieved ~  degrees   RUE horizontal ab/adduction (supine)     10 reps [x] Hands on to support distal RUE during horizontal abduction at end range and v/c to slow movement to facilitate good alignment   BUE shoulder flexion w/ theraball 10 reps w/ 5-15 second holds []    Shoulder flexion w/ BUE placed on wall (stance) 10 reps w/ 10-15 second holds [] Occasional tactile input to inhibit trunk rotation and v/c to extend elbow - ROM achieved ~120 - 130 degrees     Shoulder abduction w/ RUE placed on wall (stance) 5 reps w 10-15 second holds [] V/c to inhibit trunk rotation, patient fatigued after 5 repetitions - ROM achieved ~110 degrees       []        []        []        []        []        []      Home Exercise Program:  Self-assisted stretches for RUE using RW and wall as described above  Force use RUE/R hand    Access Code: [email or web address]  URL: [email or web address]  Date: 04/12/2022  Prepared by: Adeline Ellis    Exercises  Supine Shoulder Flexion Extension Full Range AROM - 2 x daily - 7 x weekly - 1 sets - 10 reps  Supine Shoulder Abduction AROM - 2 x daily - 7 x weekly - 1 sets - 10 reps  Supine Shoulder Horizontal Abduction - 2 x daily - 7 x weekly - 1 sets - 10 reps    Therapeutic Exercise:   [x] (30881) Provided verbal/tactile cueing for activities related to strengthening, flexibility, endurance, ROM. Includes review/progression of HEP activities related to strengthening, flexibility, endurance, AROM, proximal shoulder and UE for functional transfers/mobility, self-care, IADLs, and community re-entry    Therapeutic Activities:    [x] (88121) Provided verbal/tactile cueing for activities related to improving balance, coordination, kinesthetic sense, posture, motor skill, proprioception and motor activation to allow for proper function of core, proximal shoulder and UE with self-care, and ADLs, IADLs, functional mobility, work-related and leisure based activities.  Includes review/progression of HEP activities to improve balance, coordination, kinesthetic sense, posture, motor skill, proprioception, proximal shoulder and UE for functional transfers/mobility, self-care, IADLs, and community re-entry    Sensory Integration Techniques:  [] (24252)Provided verbal/tactile feedback to enhance sensory processing and promoting responses that are adaptive to environmental demands    Self-Care/Home Management Training:  [] (52509) Provided training and education in restorative and compensatory strategies/activity modifications in activities of daily living, meal preparation, laundry and other various house maintenance activities. Provided training and education in use of adaptive equipment/devices and assistive technology, as needed, to promote participation and independence. Cognitive Function:  [] (63585 x15 minutes, 48313 +15 minutes) Integrated activities that address attention, memory, reasoning, executive functioning, problem solving, and/or pragmatic functioning in addition to compensatory strategies to manage the performance of an activity (for example, managing time or schedules, initiating, organizing, and sequencing tasks). NMR:  [] (82713) During functional activities/therapeutic exercises, provided facilitation of normal movement patterns and provided handling/verbal cues to promote postural alignment and stability during static and dynamic movement (sitting or standing).  Activities may also include visual perceptual training, proprioception training, and balance retraining during functional activities    Manual Treatments:    [] (03593) Provided manual therapy to mobilize UB for the purpose of modulating pain, promoting relaxation,  increasing ROM, reducing/eliminating soft tissue swelling/inflammation/restriction, improving soft tissue extensibility and allowing for proper ROM for normal function with self-care, functional mobility, transfers, reaching and lifting    Modalities:     [] Electrical Stimulation (09388):     [] Ultrasound (52666):    Charges:  Timed Code Treatment Minutes: 54   Total Treatment Minutes: 54      [] EVAL (LOW) 26357 (typically 20 minutes face-to-face)  [] EVAL (MOD) 95653 (typically 30 minutes face-to-face)  [] EVAL (HIGH) 37593 (typically 45 minutes face-to-face)  [] RE-EVAL     [x] YO(55661)  30x (2)  [] NMR (58428)     [] Manual (20182)   [x] TA (50163)  24x (2)  [] ES(attended) (95868)       [] ES (un) (26252)  [] Other:    GOALS: Patient stated goal: \"get back to normal\"       []? Progressing: []? Met: []? Not Met: []? Adjusted     Therapist goals for Patient:      Short Term Goals: To be achieved in: 6 weeks  1. Pt will improve score on UEFS to 40/100 for a subjective report of decreased difficulty with completing every day activities with RUE and R hand  []? Progressing: [x]? Met: []? Not Met: []? Adjusted  2. Pt will improve time on NHPT to more than 45 seconds to demonstrate improved fine motor coordination of R hand  []? Progressing: [x]? Met: []? Not Met: []? Adjusted  3. Pt will improve score on BBT to 40 blocks/minute to demonstrate improved timing, speed and accuracy with functional grasp of right hand. [x]? Progressing: []? Met: []? Not Met: []? Adjusted  4. Pt will be Min A with HEP/Home activities program to facilitate independence with carryover from sessions and to progress towards returning to PLOF  [x]? Progressing: []? Met: []? Not Met: []? Adjusted   5. Pt will write a pangram sentence in no more than 1 minute demo'ing 85% legibility to demo improved fine motor control and speed of R hand. [x]? Progressing: []? Met: []? Not Met: []? Adjusted      Long Term Goals: To be achieved in: 12 weeks  1. Pt will improve score on UEFS to 65/100 for a subjective report of decreased difficulty with completing every day activities with RUE and R hand  []? Progressing: [x]? Met: []? Not Met: [x]?  Adjusted    1 Adjusted: Pt will improve score on UEFS to 100/100 for a subjective report of decreased difficulty with completing every day activities with RUE and R hand  []? Progressing: []? Met: []? Not Met: []? Adjusted     2. Pt will improve time on NHPT to more than 30 seconds to demonstrate improved fine motor coordination of R hand  []? Progressing: []? Met: []? Not Met: []? Adjusted  3. Pt will improve score on BBT to 50 blocks/minute to demonstrate improved timing, speed and accuracy with functional grasp of right hand. []? Progressing: []? Met: []? Not Met: []? Adjusted  4. Pt will be Independent with HEP/Home activities program to facilitate independence with carryover from sessions and to progress towards returning to PLOF  []? Progressing: []? Met: []? Not Met: []? Adjusted   5. Pt will write a pangram sentence in no more than 45 seconds minute demo'ing 95% legibility to demo improved fine motor control and speed of R hand. []? Progressing: []? Met: []? Not Met: []? Adjusted      ASSESSMENT:    Pt progressing well with AAROM/AROM of RUE. During session, pt demo'd increased R shoulder ROM in flexion and abduction during closed-chain stretch in stance with hands placed on wall. Pt also demo'd increased speed with R hand during coordination activity with additional trials and increased accuracy during activity overall. Pt continues to benefit from skilled OT intervention including therapeutic exercises and facilitation of effective posturing, cont per POC. Treatment/Activity Tolerance:  [x] Patient tolerated treatment well [] Patient limited by fatique  [] Patient limited by pain  [] Patient limited by other medical complications  [] Other:     Overall Progression Towards Functional goals/ Treatment Progress Update:  [x] Patient is progressing as expected towards functional goals listed. [] Progression is slowed due to complexities/Impairments listed. [] Progression has been slowed due to co-morbidities.   [] Plan just implemented, too soon to assess goals progression <30days   [] Goals require adjustment due to lack of progress  [] Patient is not progressing as expected and requires additional follow up with physician  [] Other    Prognosis for POC: [x] Good [] Fair  [] Poor    Patient requires continued skilled intervention: [x] Yes  [] No      PLAN:   [x] Continue per plan of care [] Alter current plan (see comments)  [] Plan of care initiated [] Hold pending MD visit [] Discharge    Electronically signed by: Jacki RECINOS    Note: If patient does not return for scheduled/recommended follow up visits, this note will serve as a discharge from care along with the most recent update on progress.

## 2022-04-21 NOTE — FLOWSHEET NOTE
[facility] Outpatient Therapy  [address] [place] [address], MYA Silvestre 52, [address]  Phone: [phone]   Fax: [phone]    Physical Therapy Treatment Note/ Progress Report:     Date:  2022    Patient Name:  Sidney Monroe    :  1945  MRN: [id number]    Medical/Treatment Diagnosis Information:  · Diagnosis: I63.9 (ICD-10-CM) - Cerebral infarction, unspecified  ·    R 26 abnormality of gait and mobility; R53.1 weakness, I63.9 cerebral infarction  Insurance/Certification information:  PT Insurance Information: Medicare  Physician Information:  Referring Practitioner: Dr. Billye Blizzard of care signed:    [x] Yes  [] No    Date of Patient follow up with Physician:      Progress Report: [x]  Yes  []  No     Date Range for reporting period:  Beginning:  3/24/22  Endin22    Progress report due (10 Rx/or 30 days whichever is less):     Recertification due (POC duration/ or 90 days whichever is less):      Visit # Insurance Allowable Auth Needed    BMN []Yes   [x]No     RESTRICTIONS/PRECAUTIONS: receptive aphasia, Plavix and aspirin  Latex Allergy:  [x]NO      []YES  Preferred Language for Healthcare:   [x]English       []other:  Functional Scale: 10m walk= 0.37m/s; TUG 35.8 sec w/ RW; Molly Arnett   Date assessed:3/24/22    Functional Scale: 10m walk= 0.56 m/s w/ SPC (after TM training 0.63m/s w/ SPC) ; TUG  20.6 sec (average) w/ SPC and close SBA; Molly Arnett   Date assessed:22      Pain level:  0/10 R shoulder     SUBJECTIVE:  Pt reports she is tired today, not sleeping well. She thinks it is just a phase like she had when she didn't have an appetite, but it got better. OBJECTIVE:    Observation: Pt presents amb without AD. She reports she forgot it in someone's car and her son brought her today, he helped her get into the clinic.  Test measurements:        Exercises/Interventions: Exercises in bold performed in department today.   Items not bolded are carried forward from prior visits for continuity of the record. Exercise/Equipment Resistance/Repetitions HEP Other comments   Nustep 8 min., L 4  Total: 8  Warm up: 2 min. Intervals: 1 min fast (120's spm x 1 min comfortable 80's spm)  Cool down: 1 min. [] Seat 8, arms 11  Less VC for R hip control due to excessive adduction  Cues for full ROM, limited by challenged to maintain hip control/foot placement. R hamstring stretch 60 sec x 3 x Seated w/ belt   R gastroc stretch 30 sec x 3 [x] Standing lunge stretch   R soleus stretch 30 sec x 3 [] Standing lunge, B UE support, verbal cues for correct form    R knee to chest, heel on SB 5 x 2 [x] Supine, VC for neutral hip rotation and slow control of lowering   R hip abd 5 x 2 [x] Hooklying, VC for neutral hip rotation   R bridging 10 x 1, 3 sec hold [] Blocking at R lateral knee and ankle to avoid hip adduction   R ankle DF/PF 10 x 1 [x] Long sitting, propped on extended UEs. Active assist to reduce inversion and knee flexion           []      []    NMR  []    Alt toe taps 10 x 1 to 4\" cones    10x 1 to 4\" cones w/ 2lb ankle R weight  [x] Standing w/ RW    Reduced ataxic movement, especially returning R foot to starting position     []    NMES to R ant tib  6 min. , standing w/ forward stepping in place for active ankle DF during swing [] VMS, phase duration 200, frequency 50pps, cycle time 5/5. Good contraction, nearly full ROM achieved. Narrow SHU 30 sec    Trunk/UE rotations- 5x   Cervical/UE flex/ext- 5x    []   Post weight shift; poor eye hand coordination, cues to maintain eye contact on hands     Semi tandem 15 sec  Cervical rotation- 3x  []   LOB R w/ either foot in back   R knee to chest 8 x 2, (5 sec pause in lunge position)  yellow tband [] //bars w/ B UE support, assist to positive tband in between reps, pt somewhat impulsive, cueing to reposition for hip flexor stretch in between each rep. Good ROM w/ overflow to ant tib.     R toe taps Lunge to R foot on step- 10x    Less Cues after initial instruction for slow controlled movement to achieve hip flexor stretch and full foot clearance   R step ups 10x- light B UE support //bars, 4\" step  Cues to move slowly to prevent R knee hyperextension both at full WB on step and just before off loading to step down. Improved R knee control w/ practice   Gait      amb    Figure 8 amb through cones w/ SPC    amb without AD, 70' x 6 w/ close CGA,       amb 20' x4 immediately after TM training, w/ SPC, close SBA- good zac and maintains 2 point pattern more consistently, slight instability w/ mult minor LOB to R, able to self correct          CGA due to posterior LOB, going L worse than R    Improved continuity of stepping, improved B arm swing, but moderate guard posturing. Improved cane manegement and step continuity after TM training compared to before. [place] ladder 10' x 10, L UE support  VC to widen SHU, R toe catch on >50% of trials    Biode[employer] TM  10 min. 1.5-1. 6mph,   B UE support  No metronome due overall loud volume of clinic noise. VC to attend to visual feedback for step length/symmetry. Less ataxia and inconsistent foot placement compared to previous sessions     3 min. Backwards walking 0.6mph, B UE support  Max HR 119bpm  Pt w/ reduced ataxia overall, but still inconsistent gait pattern on the TM, less R knee hyperextension w/ increased zac. Freq VC to reduce upper body tension and breath holding. Used safety switch only, no harness           When attempts longer step length, R LE very ataxic   Lateral, forward/backwards walking Side stepping without UE support, 20' x1 each w/ CGA          backwards walking- 20' x 1 each without UE support    frequent cueing for R foot clearance and avoiding pelvic rotation.  R LE placement ataxic        Cues for increased attention to R foot placement, symmetrical step length w/ backwards walk   TA: Pt plans to check in at [facility] to see if she still has access so she can continue her cardio exer. Home Exercise Program:  Access Code: Judy Grier: [email or web address]  Date: 03/24/2022  Prepared by: Darrian Dumont  Exercises  Supine Knee to Chest with Leg Straight - 1 x daily - 7 x weekly - 1-3 sets - 5-10 reps  Supine Hip Abduction - 1 x daily - 7 x weekly - 1-3 sets - 5-10 reps  Seated Ankle Pumps - 1 x daily - 7 x weekly - 3 sets - 10 reps  Seated Toe Taps - 2 x daily - 7 x weekly - 3 sets - 10 reps     Access Code: [email or web address]  URL: [email or web address]  Date: 03/28/2022  Prepared by: Darrian Dumont  Exercises  Standing Gastroc Stretch at Counter - 1 x daily - 7 x weekly - 3 sets - 10 reps    Access Code: [email or web address]  URL: [email or web address]  Date: 04/19/2022  Prepared by: Darrian Dumont  Exercises  [facility] Back and DAMARIS - 1 x daily - 5 x weekly - 3 sets - 10 reps      Therapeutic Exercise:   [x] (53474) Provided verbal/tactile cueing for activities related to strengthening, flexibility, endurance, ROM for improvements in LE, proximal hip, and core control with self-care, mobility, lifting, ambulation. NMR:  [x] (28946) Provided verbal/tactile cueing for activities related to improving balance, coordination, kinesthetic sense, posture, motor skill, proprioception to assist with LE, proximal hip, and core control in self-care, mobility, lifting, ambulation and eccentric single leg control. Therapeutic Activities:    [] (52852) Provided verbal/tactile cueing for activities related to improving balance, coordination, kinesthetic sense, posture, motor skill, proprioception and motor activation to allow for proper function of core, proximal hip and LE with self-care and ADLs and functional mobility.      Gait Training:    [x] (94816) Provided training and instruction to the patient for proper LE, core and proximal hip recruitment and positioning and eccentric body weight control with ambulation re-education including up and down stairs     Manual Treatments:  PROM / STM / Oscillations-Mobs:  G-I, II, III, IV (PA's, Inf., Post.)  [] (78840) Provided manual therapy to mobilize LE, proximal hip and/or LS spine soft tissue/joints for the purpose of modulating pain, promoting relaxation,  increasing ROM, reducing/eliminating soft tissue swelling/inflammation/restriction, improving soft tissue extensibility and allowing for proper ROM for normal function with self-care, mobility, lifting and ambulation. Home Exercise Program:    [] (39587) Reviewed/Progressed HEP activities related to strengthening, flexibility, endurance, ROM of core, proximal hip and LE for functional self-care, mobility, lifting and ambulation/stair navigation   [] (72224) Reviewed/Progressed HEP activities related to improving balance, coordination, kinesthetic sense, posture, motor skill, proprioception of core, proximal hip and LE for self-care, mobility, lifting, and ambulation/stair navigation      Modalities:    [] Electric Stimulation:   [] Ultrasound:   [] Other:       Charges:  Timed Code Treatment Minutes:  GT32, NM 15  TE10   Total Treatment Minutes: 57      [] EVAL (LOW) 28247 (typically 20 minutes face-to-face)  [] EVAL (MOD) 41099 (typically 30 minutes face-to-face)  [] EVAL (HIGH) 78482 (typically 45 minutes face-to-face)  [] RE-EVAL     [x] NU(02067) x   1    [x] NMR (37216) x 1    [] Manual (41747) x       [] TA (01487) x      [x] Gait Training (87203) x2      [] ES(attended) (55339)  [] ES (un) (87321)   [] DRY NEEDLE 1 OR 2 MUSCLES  [] DRY NEEDLE 3+ MUSCLES  [] [facility] (11963)  [] Ultrasound (08305)  [] Other:    GOALS: Goals established 3/24/22   Patient stated goal: achieve better balance and walk. Pt reports walking better, now walking in the grocery store (previously using motorized cart). [x] Progressing: [] Met: [] Not Met: [] Adjusted    Therapist goals for Patient:   Short Term Goals:  To be achieved in: 6 weeks   1. Independent in HEP and progression per patient tolerance. [x] Progressing: [] Met: [] Not Met: [] Adjusted   2. Pt will improve TUG time to 25 sec to improve functional ambulation. 21 sec w/ SPC and close SBA  [] Progressing: [x] Met: [] Not Met: [] Adjusted  3. Pt to perform transfers via stand-pivot technique with use of  small base quad cane [facility]) on left Modified Independent. SBA due to inconsistent management of AD (RW, quad cane or SPC)   [x] Progressing: [] Met: [] Not Met: [] Adjusted  4. Pt will improve Rosa to 32/56 to demonstrate reduced all risk. 38/61  [] Progressing: [x] Met: [] Not Met: [] Adjusted    Long Term Goals: To be achieved in: 12 weeks  1. Pt will improve ABC scale to 55% confidence for reduced fall risk. [] Progressing: [] Met: [] Not Met: [] Adjusted  2. Pt to negotiate up/down 7 stairs with step to pattern w/ single rail mod I.   [] Progressing: [] Met: [] Not Met: [] Adjusted   3. Pt to demonstrate improved gait pattern including improved R foot clearance and knee control without cues with use of AFO on right and an appropriate based cane. [] Progressing: [] Met: [] Not Met: [] Adjusted  4. Pt will improve Rosa to 42/56 to demonstrate reduced all risk. [x] Progressing: [] Met: [] Not Met: [] Adjusted  5. Pt independent with HEP. [] Progressing: [] Met: [] Not Met: [] Adjusted  6. Pt to report improved confidence with ambulating in community   [x] Progressing: [] Met: [] Not Met: [] Adjusted  7. Pt will improve TUG time to 18 sec w/ least restrictive AD to improve functional ambulation. [x] Progressing: [] Met: [] Not Met: [] Adjusted  8. Pt will demonstrate 10m walk speed to 0.80m/s. [x] Progressing: [] Met: [] Not Met: [] Adjusted    ASSESSMENT: Pt demonstrates good progression of gait speed and step length on TM today. Good carryover to OG gait, but stil limited by R LE weakness and spasticity.   Focused on mult directional walking in/out of parallel bars as well as gait training w/ and without SPC. Pt will continue to benefit from skilled PT to improve functional mobility, reduce fall risk and maximize independence. Treatment/Activity Tolerance:  [x] Patient tolerated treatment well [] Patient limited by fatigue  [] Patient limited by pain  [] Patient limited by other medical complications  [] Other:     Overall Progression Towards Functional goals/ Treatment Progress Update:  [x] Patient is progressing as expected towards functional goals listed. [] Progression is slowed due to complexities/Impairments listed. [] Progression has been slowed due to co-morbidities. [] Plan just implemented, too soon to assess goals progression <30days   [] Goals require adjustment due to lack of progress  [] Patient is not progressing as expected and requires additional follow up with physician  [] Other    Prognosis for POC: [x] Good [] Fair  [] Poor    Patient requires continued skilled intervention: [x] Yes  [] No        PLAN: 3x/week for 4 weeks then 2x/week for 8 weeks   [x] Continue per plan of care [] Alter current plan (see comments)  [] Plan of care initiated [] Hold pending MD visit [] Discharge    Electronically signed by: Greg Dawn PT, DPT    Note: If patient does not return for scheduled/recommended follow up visits, this note will serve as a discharge from care along with the most recent update on progress.

## 2022-04-25 ENCOUNTER — APPOINTMENT (OUTPATIENT)
Dept: PHYSICAL THERAPY | Age: 77
End: 2022-04-25
Payer: MEDICARE

## 2022-04-25 ENCOUNTER — APPOINTMENT (OUTPATIENT)
Dept: OCCUPATIONAL THERAPY | Age: 77
End: 2022-04-25
Payer: MEDICARE

## 2022-04-26 ENCOUNTER — HOSPITAL ENCOUNTER (OUTPATIENT)
Dept: SPEECH THERAPY | Age: 77
Setting detail: THERAPIES SERIES
Discharge: HOME | End: 2022-04-26
Payer: MEDICARE

## 2022-04-26 ENCOUNTER — HOSPITAL ENCOUNTER (OUTPATIENT)
Dept: PHYSICAL THERAPY | Age: 77
Setting detail: THERAPIES SERIES
Discharge: HOME | End: 2022-04-26
Payer: MEDICARE

## 2022-04-26 ENCOUNTER — HOSPITAL ENCOUNTER (OUTPATIENT)
Dept: OCCUPATIONAL THERAPY | Age: 77
Setting detail: THERAPIES SERIES
Discharge: HOME | End: 2022-04-26
Payer: MEDICARE

## 2022-04-26 PROCEDURE — 97116 GAIT TRAINING THERAPY: CPT

## 2022-04-26 PROCEDURE — 97110 THERAPEUTIC EXERCISES: CPT

## 2022-04-26 PROCEDURE — 97129 THER IVNTJ 1ST 15 MIN: CPT

## 2022-04-26 PROCEDURE — 97530 THERAPEUTIC ACTIVITIES: CPT

## 2022-04-26 PROCEDURE — 97130 THER IVNTJ EA ADDL 15 MIN: CPT

## 2022-04-26 NOTE — FLOWSHEET NOTE
[facility] Outpatient [facility]  [address] [place] [address], [address] [place] [address], [address] [place] [address]  Phone: [phone]   Fax: [phone]    Physical Therapy Treatment Note/ Progress Report:     Date:  2022    Patient Name:  Radha Travis    :  1945  MRN: [id number]    Medical/Treatment Diagnosis Information:  · Diagnosis: I63.9 (ICD-10-CM) - Cerebral infarction, unspecified  ·    R 26 abnormality of gait and mobility; R53.1 weakness, I63.9 cerebral infarction  Insurance/Certification information:  PT Insurance Information: Medicare  Physician Information:  Referring Practitioner: Dr. Erwin Shone of care signed:    [x] Yes  [] No    Date of Patient follow up with Physician:      Progress Report: []  Yes  [x]  No     Date Range for reporting period:  Beginning:  3/24/22   Endin22    Progress report due (10 Rx/or 30 days whichever is less):     Recertification due (POC duration/ or 90 days whichever is less):      Visit # Insurance Allowable Auth Needed    BMN []Yes   [x]No     RESTRICTIONS/PRECAUTIONS: receptive aphasia, Plavix and aspirin  Latex Allergy:  [x]NO      []YES  Preferred Language for Healthcare:   [x]English       []other:  Functional Scale: 10m walk= 0.37m/s; TUG 35.8 sec w/ RW; Alize Hendricks   Date assessed:3/24/22    Functional Scale: 10m walk= 0.56 m/s w/ SPC (after TM training 0.63m/s w/ SPC) ; TUG  20.6 sec (average) w/ SPC and close SBA; Alize Hendricks 37/56  Date assessed:22      Pain level:  0/10 R shoulder     SUBJECTIVE:  Pt reports she [place] the rec center and rode the stationary bike for 15 min. And did 5 min. On another similar piece of equipment. OBJECTIVE:    Observation: Pt presents amb without AD. She reports she forgot it in someone's car and her son brought her today, he helped her get into the clinic.  Test measurements:        Exercises/Interventions: Exercises in bold performed in department today.   Items not bolded are carried forward from prior visits for continuity of the record. Exercise/Equipment Resistance/Repetitions HEP Other comments   Nustep 8 min., L 5  Total: 8  Warm up: 2 min. Intervals: 1 min fast (120's spm x 1 min comfortable 80's spm)  Cool down: 1 min. [] Seat 8, arms 11  Less VC for R hip control due to excessive adduction  Cues for full ROM, limited by challenged to maintain hip control/foot placement. R hamstring stretch 60 sec x 3 x Seated w/ belt   R gastroc stretch 30 sec x 3 [x] Standing lunge stretch   R soleus stretch 30 sec x 3 [] Standing lunge, B UE support, verbal cues for correct form    R knee to chest, heel on SB 5 x 2 [x] Supine, VC for neutral hip rotation and slow control of lowering   R hip abd 5 x 2 [x] Hooklying, VC for neutral hip rotation   R bridging 10 x 1, 3 sec hold [] Blocking at R lateral knee and ankle to avoid hip adduction   R ankle DF/PF 10 x 1 [x] Long sitting, propped on extended UEs. Active assist to reduce inversion and knee flexion   Heel raises R only 5 x 2  B 10 x 1  B UE support, VC to avoid excessive lordosis as compensation     []      []    NMR  []    Alt toe taps 10 x 1 to 4\" cones    10x 1 to 4\" cones w/ 2lb ankle R weight  [x] Standing w/ RW    Reduced ataxic movement, especially returning R foot to starting position     []    NMES to R ant tib  6 min. , standing w/ forward stepping in place for active ankle DF during swing [] VMS, phase duration 200, frequency 50pps, cycle time 5/5. Good contraction, nearly full ROM achieved. Narrow SHU 30 sec    Trunk/UE rotations- 5x   Cervical/UE flex/ext- 5x    []   Post weight shift; poor eye hand coordination, cues to maintain eye contact on hands     Semi tandem 15 sec  Cervical rotation- 3x  []   LOB R w/ either foot in back   R knee to chest 5 x 3, (5 sec pause in lunge position)  yellow tband [] //bars w/ B UE support, less cueing on correct positioning. good ROM w/ overflow to ant tib.     R toe taps Lunge to R foot on step- 10x    Less Cues after initial instruction for slow controlled movement to achieve hip flexor stretch and full foot clearance   R step ups 10x- light B UE support //bars, 4\" step  Cues to move slowly to prevent R knee hyperextension both at full WB on step and just before off loading to step down. Improved R knee control w/ practice   Gait      amb    Figure 8 amb through cones w/ SPC    amb without AD,20' x 4 w/ close SBA,       amb 20' x4 immediately after TM training, w/ SPC, close SBA- good zac and maintains 2 point pattern more consistently, slight instability w/ mult minor LOB to R, able to self correct          SBA due to posterior LOB, kicked 3 cones of 5 on 4 laps    Improved continuity of stepping, improved B arm swing, but moderate guard posturing. Improved cane manegement and step continuity after TM training compared to before. [place] 10' x 10, L UE support  VC to widen SHU, R toe catch on >50% of trials    Biodex TM  10 min. 1.4-1. 6mph,   B UE support  metronome 54-60bpm. VC to attend to visual feedback for step length/symmetry. Less ataxia and inconsistent foot placement compared to previous sessions and narrower SHU w/ mild circumduction. Attempted 1.5 min. Without R UE support    3 min. Backwards walking 0.6mph, B UE support  Max HR 119bpm  Pt w/ reduced ataxia overall, but still inconsistent gait pattern on the TM, less R knee hyperextension w/ increased zac. Freq VC to reduce upper body tension and breath holding. Used safety switch only, no harness. Pt requires reassurance that cues for step length are based on objective measures on biodex TM comparing her current step length to those of women her age and height.           When attempts longer step length, R LE very ataxic   Lateral, forward/backwards walking Side stepping without UE support, 20' x1 each w/ CGA          backwards walking- 20' x 1 each without UE support    frequent cueing for R foot clearance and avoiding pelvic rotation. R LE placement ataxic        Cues for increased attention to R foot placement, symmetrical step length w/ backwards walk   TA:       Home Exercise Program:  Access Code: [email or web address]  URL: [email or web address]/  Date: 03/24/2022  Prepared by: Valentina Bautista  Exercises  Supine Knee to Chest with Leg Straight - 1 x daily - 7 x weekly - 1-3 sets - 5-10 reps  Supine Hip Abduction - 1 x daily - 7 x weekly - 1-3 sets - 5-10 reps  Seated Ankle Pumps - 1 x daily - 7 x weekly - 3 sets - 10 reps  Seated Toe Taps - 2 x daily - 7 x weekly - 3 sets - 10 reps     Access Code: [email or web address]  URL: [email or web address]/  Date: 03/28/2022  Prepared by: Valentina Bautista  Exercises  Standing Gastroc Stretch at Counter - 1 x daily - 7 x weekly - 3 sets - 10 reps    Access Code: [email or web address]  URL: [email or web address]  Date: 04/19/2022  Prepared by: Valentina Bautista  Exercises  [facility] AND [facility] Back and DAMARIS - 1 x daily - 5 x weekly - 3 sets - 10 reps      Therapeutic Exercise:   [x] (05724) Provided verbal/tactile cueing for activities related to strengthening, flexibility, endurance, ROM for improvements in LE, proximal hip, and core control with self-care, mobility, lifting, ambulation. NMR:  [x] (27761) Provided verbal/tactile cueing for activities related to improving balance, coordination, kinesthetic sense, posture, motor skill, proprioception to assist with LE, proximal hip, and core control in self-care, mobility, lifting, ambulation and eccentric single leg control. Therapeutic Activities:    [] (12128) Provided verbal/tactile cueing for activities related to improving balance, coordination, kinesthetic sense, posture, motor skill, proprioception and motor activation to allow for proper function of core, proximal hip and LE with self-care and ADLs and functional mobility.      Gait Training:    [x] (93718) Provided training and instruction to the patient for proper LE, core Not Met: [] Adjusted    Therapist goals for Patient:   Short Term Goals: To be achieved in: 6 weeks   1. Independent in HEP and progression per patient tolerance. [x] Progressing: [] Met: [] Not Met: [] Adjusted   2. Pt will improve TUG time to 25 sec to improve functional ambulation. 21 sec w/ SPC and close SBA  [] Progressing: [x] Met: [] Not Met: [] Adjusted  3. Pt to perform transfers via stand-pivot technique with use of  small base quad cane [facility]) on left Modified Independent. SBA due to inconsistent management of AD (RW, quad cane or SPC)   [x] Progressing: [] Met: [] Not Met: [] Adjusted  4. Pt will improve Rosa to 32/56 to demonstrate reduced all risk. 38/61  [] Progressing: [x] Met: [] Not Met: [] Adjusted    Long Term Goals: To be achieved in: 12 weeks  1. Pt will improve ABC scale to 55% confidence for reduced fall risk. [] Progressing: [] Met: [] Not Met: [] Adjusted  2. Pt to negotiate up/down 7 stairs with step to pattern w/ single rail mod I.   [] Progressing: [] Met: [] Not Met: [] Adjusted   3. Pt to demonstrate improved gait pattern including improved R foot clearance and knee control without cues with use of AFO on right and an appropriate based cane. [] Progressing: [] Met: [] Not Met: [] Adjusted  4. Pt will improve Rosa to 42/56 to demonstrate reduced all risk. [x] Progressing: [] Met: [] Not Met: [] Adjusted  5. Pt independent with HEP. [] Progressing: [] Met: [] Not Met: [] Adjusted  6. Pt to report improved confidence with ambulating in community   [x] Progressing: [] Met: [] Not Met: [] Adjusted  7. Pt will improve TUG time to 18 sec w/ least restrictive AD to improve functional ambulation. [x] Progressing: [] Met: [] Not Met: [] Adjusted  8. Pt will demonstrate 10m walk speed to 0.80m/s. [x] Progressing: [] Met: [] Not Met: [] Adjusted    ASSESSMENT: Pt continues to demonstrate good progression of gait speed and step length on TM.   Good carryover to OG gait, but stil limited by R LE weakness and spasticity. Pt demos more incoordination, decreased zac when focused on amb w/ turning around obstacles. Pt will continue to benefit from skilled PT to improve functional mobility, reduce fall risk and maximize independence. Treatment/Activity Tolerance:  [x] Patient tolerated treatment well [] Patient limited by fatigue  [] Patient limited by pain  [] Patient limited by other medical complications  [] Other:     Overall Progression Towards Functional goals/ Treatment Progress Update:  [x] Patient is progressing as expected towards functional goals listed. [] Progression is slowed due to complexities/Impairments listed. [] Progression has been slowed due to co-morbidities. [] Plan just implemented, too soon to assess goals progression <30days   [] Goals require adjustment due to lack of progress  [] Patient is not progressing as expected and requires additional follow up with physician  [] Other    Prognosis for POC: [x] Good [] Fair  [] Poor    Patient requires continued skilled intervention: [x] Yes  [] No        PLAN: 3x/week for 4 weeks then 2x/week for 8 weeks   [x] Continue per plan of care [] Alter current plan (see comments)  [] Plan of care initiated [] Hold pending MD visit [] Discharge    Electronically signed by: Ora Fabry, PT, DPT    Note: If patient does not return for scheduled/recommended follow up visits, this note will serve as a discharge from care along with the most recent update on progress.

## 2022-04-26 NOTE — FLOWSHEET NOTE
[facility]  [address] [place] [address], [address], [address]  Phone: [phone]   Fax: [phone]    Occupational Therapy Treatment Note/ Progress Report:     Date:  2022    Patient Name:  Natalie Martinez    :  1945  MRN: [id number]      Medical/Treatment Diagnosis Information:  I63.9 (ICD-10-CM) - Cerebral infarction, unspecified  M25.611 (ICD-10-CM) Stiffness of right shoulder, not elsewhere classified, R27.9 (ICD-10-CM) Unspecified lack of coordination, R53.1 (ICD-10-CM) Weakness        Insurance/Certification information: Medicare A & B  Physician Information:  Dr. Jose Jenkins of care signed:    Yes    Date of Patient follow up with Physician: none known at this time     Progress Report: []  Yes  [x]  No     Date Range for reporting period:  Beginnin2022   Ending:       Progress report due: 1826     Recertification due (POC duration/ or 90 days whichever is less): 2022     Visit # Insurance Allowable Auth Needed    BMN No     Latex Allergy:  [x]NO      []YES  Preferred Language for Healthcare:   [x]English       []other:  Functional Scale:  (2022)  -- 95/100    Pain level: 0/10     SUBJECTIVE: Pt reports that she did not sleep well the night prior and feels tired on this date. Pt does not know what contributed to her poor night of sleep. Pt complained of continued \"tightness\" in RUE but no other new concerns. OBJECTIVE:    Observation: Pt demo'd increased compensatory middle delt activation and decreased speed of R hand during fine motor coordination activity.  Test measurements:       RESTRICTIONS/PRECAUTIONS: Plavix and aspirin     Therapeutic Activities:    Activity #1:  Pt sat edge of mat and participated in coin manipulation and stacking activity to increase fine motor coordination of R hand. Pt was instructed to use R hand to flip five nickels and then stack them into a pile on a table.  Pt performed the set 3x. Set one was untimed, set two was 44 seconds, and set three was 38 seconds. Pt was then instructed to flip five dimes on a table and then stack them into a pile. Pt performed the set 3x. Sets one and two were untimed and set three was 23.5 seconds. Pt required v/c to maintain effective posturing and inhibit middle delt activation at the shoulder      Activity #2:  Pt sat edge of mat and engaged in functional reach activity to decrease diffculty while completing everyday tasks with RUE and R hand. Pt placed and then removed 6 resistance clothes pins (1#) on/off Theraband held by OTS in various planes using RUE. Pt performed 3 sets. Pt achieved ~ degrees of active shoulder flexion and ~75-85 degrees of shoulder abduction. Pt required min v/c to inhibit left lean. Patient Education:    Pt received education to promote good alignment during shoulder abduction HEP review. - Pt verb and demo'd understanding by performing exercise in good alignment    Manual Therapy: RUE, supine unless otherwise indicated    Shoulder abduction stretch with STM to coracobrachialis muscle/tendon. 4 sets of 15-20 seconds. ROM achieved ~90 degrees. Therapeutic Exercises: Exercises in bold performed in department today. Items not bolded are carried forward from prior visits for continuity of the record.     Exercise/Equipment Resistance/Repetitions HEP Other comments   BUE Shoulder flexion w/ dowel tatyana (supine)   1# dowel tatyana, x15 reps []    RUE shoulder abduction w/ dowel tatyana (supine)     1# dowel tatyana, x10 reps []    BUE shoulder flexion with walker   ~10 reps to ~100 degrees; ~5 second holds [x]    RUE shoulder flexion w/ UE [place]   13 reps []      RUE shoulder abduction/horizontal abduction w/ UE [place]    10 reps []    RUE AROM \"L\" with UE [place]   10 reps []    RUE AROM Shoulder Flexion (supine)     10 reps w/ 0-3 second holds [x] V/c to facilitate good alignment - ROM achieved about ~ 100-110 degrees     RUE AROM Shoulder Abduction (supine)     15 reps [x] V/c to facilitate good alignment and hands on to support distal RUE - ROM achieved ~ 90 degrees       RUE horizontal ab/adduction (supine)     10 reps [x] V/c to inhibit activation of the middle delt   BUE shoulder flexion w/ theraball 10 reps w/ 5-15 second holds []    Shoulder flexion w/ BUE placed on wall (stance) 10 reps w/ 5-10 second holds [] Occasionally hands on to inhibit trunk rotation - ROM achieved ~120 - 130 degrees   Shoulder abduction w/ RUE placed on wall (stance) 10 reps w 5-10 second holds [] V/c to inhibit trunk rotation - ROM achieved ~110 degrees     []        []        []        []        []        []      Home Exercise Program:  Self-assisted stretches for RUE using RW and wall as described above  Force use RUE/R hand    Access Code: [email or web address]  URL: [email or web address]  Date: 04/12/2022  Prepared by: Taylor Howard    Exercises  Supine Shoulder Flexion Extension Full Range AROM - 2 x daily - 7 x weekly - 1 sets - 10 reps  Supine Shoulder Abduction AROM - 2 x daily - 7 x weekly - 1 sets - 10 reps  Supine Shoulder Horizontal Abduction - 2 x daily - 7 x weekly - 1 sets - 10 reps    Therapeutic Exercise:   [x] (35569) Provided verbal/tactile cueing for activities related to strengthening, flexibility, endurance, ROM. Includes review/progression of HEP activities related to strengthening, flexibility, endurance, AROM, proximal shoulder and UE for functional transfers/mobility, self-care, IADLs, and community re-entry    Therapeutic Activities:    [x] (92763) Provided verbal/tactile cueing for activities related to improving balance, coordination, kinesthetic sense, posture, motor skill, proprioception and motor activation to allow for proper function of core, proximal shoulder and UE with self-care, and ADLs, IADLs, functional mobility, work-related and leisure based activities.  Includes review/progression of HEP activities to improve balance, coordination, kinesthetic sense, posture, motor skill, proprioception, proximal shoulder and UE for functional transfers/mobility, self-care, IADLs, and community re-entry    Sensory Integration Techniques:  [] (31729)Provided verbal/tactile feedback to enhance sensory processing and promoting responses that are adaptive to environmental demands    Self-Care/Home Management Training:  [] (16606) Provided training and education in restorative and compensatory strategies/activity modifications in activities of daily living, meal preparation, laundry and other various house maintenance activities. Provided training and education in use of adaptive equipment/devices and assistive technology, as needed, to promote participation and independence. Cognitive Function:  [] (79419 x15 minutes, 06948 +15 minutes) Integrated activities that address attention, memory, reasoning, executive functioning, problem solving, and/or pragmatic functioning in addition to compensatory strategies to manage the performance of an activity (for example, managing time or schedules, initiating, organizing, and sequencing tasks). NMR:  [] (27252) During functional activities/therapeutic exercises, provided facilitation of normal movement patterns and provided handling/verbal cues to promote postural alignment and stability during static and dynamic movement (sitting or standing).  Activities may also include visual perceptual training, proprioception training, and balance retraining during functional activities    Manual Treatments:    [x] (36058) Provided manual therapy to mobilize UB for the purpose of modulating pain, promoting relaxation,  increasing ROM, reducing/eliminating soft tissue swelling/inflammation/restriction, improving soft tissue extensibility and allowing for proper ROM for normal function with self-care, functional mobility, transfers, reaching and lifting    Modalities:     [] Electrical Stimulation (87982):     [] Ultrasound (03555):    Charges:  Timed Code Treatment Minutes: 54   Total Treatment Minutes: 54      [] EVAL (LOW) 05797 (typically 20 minutes face-to-face)  [] EVAL (MOD) 99988 (typically 30 minutes face-to-face)  [] EVAL (HIGH) 44783 (typically 45 minutes face-to-face)  [] RE-EVAL     [x] BG(81982)  x28 (2)  [] NMR (72078)     [x] Manual (17581) x2 (0)  [x] TA (72051)  x24 (2)  [] ES(attended) (73657)       [] ES (un) (57684)  [] Other:    GOALS: Patient stated goal: \"get back to normal\"       []? Progressing: []? Met: []? Not Met: []? Adjusted     Therapist goals for Patient:      Short Term Goals: To be achieved in: 6 weeks  1. Pt will improve score on UEFS to 40/100 for a subjective report of decreased difficulty with completing every day activities with RUE and R hand  []? Progressing: [x]? Met: []? Not Met: []? Adjusted  2. Pt will improve time on NHPT to more than 45 seconds to demonstrate improved fine motor coordination of R hand  []? Progressing: [x]? Met: []? Not Met: []? Adjusted  3. Pt will improve score on BBT to 40 blocks/minute to demonstrate improved timing, speed and accuracy with functional grasp of right hand. [x]? Progressing: []? Met: []? Not Met: []? Adjusted  4. Pt will be Min A with HEP/Home activities program to facilitate independence with carryover from sessions and to progress towards returning to PLOF  [x]? Progressing: []? Met: []? Not Met: []? Adjusted   5. Pt will write a pangram sentence in no more than 1 minute demo'ing 85% legibility to demo improved fine motor control and speed of R hand. [x]? Progressing: []? Met: []? Not Met: []? Adjusted      Long Term Goals: To be achieved in: 12 weeks  1. Pt will improve score on UEFS to 65/100 for a subjective report of decreased difficulty with completing every day activities with RUE and R hand  []? Progressing: [x]? Met: []? Not Met: [x]?  Adjusted    1 Adjusted: Pt will improve score on UEFS to 100/100 for a subjective report of decreased difficulty with completing every day activities with RUE and R hand  []? Progressing: []? Met: []? Not Met: []? Adjusted     2. Pt will improve time on NHPT to more than 30 seconds to demonstrate improved fine motor coordination of R hand  []? Progressing: []? Met: []? Not Met: []? Adjusted  3. Pt will improve score on BBT to 50 blocks/minute to demonstrate improved timing, speed and accuracy with functional grasp of right hand. []? Progressing: []? Met: []? Not Met: []? Adjusted  4. Pt will be Independent with HEP/Home activities program to facilitate independence with carryover from sessions and to progress towards returning to PLOF  []? Progressing: []? Met: []? Not Met: []? Adjusted   5. Pt will write a pangram sentence in no more than 45 seconds minute demo'ing 95% legibility to demo improved fine motor control and speed of R hand. []? Progressing: []? Met: []? Not Met: []? Adjusted      ASSESSMENT:    Pt continues to demo decreased AROM of RUE due to muscle shortening and weakness. On this date, pt demo'd good tolerance and increased ROM during closed-chain stretch with bilateral hands placed on wall. Pt demo'd increased fine motor coordination in R hand with v/c to inhibit compensatory shoulder abduction consistent with habitual movement patterns. Pt continues to benefit from skilled OT intervention including therapeutic exercises and facilitation of effective posturing and good alignment, cont per POC. Treatment/Activity Tolerance:  [x] Patient tolerated treatment well [] Patient limited by fatique  [] Patient limited by pain  [] Patient limited by other medical complications  [] Other:     Overall Progression Towards Functional goals/ Treatment Progress Update:  [x] Patient is progressing as expected towards functional goals listed. [] Progression is slowed due to complexities/Impairments listed. [] Progression has been slowed due to co-morbidities.   [] Plan just implemented, too soon to assess goals progression <30days   [] Goals require adjustment due to lack of progress  [] Patient is not progressing as expected and requires additional follow up with physician  [] Other    Prognosis for POC: [x] Good [] Fair  [] Poor    Patient requires continued skilled intervention: [x] Yes  [] No      PLAN:   [x] Continue per plan of care [] Alter current plan (see comments)  [] Plan of care initiated [] Hold pending MD visit [] Discharge    Electronically signed by: Nuvia RECINOS    Note: If patient does not return for scheduled/recommended follow up visits, this note will serve as a discharge from care along with the most recent update on progress.

## 2022-04-27 ENCOUNTER — HOSPITAL ENCOUNTER (OUTPATIENT)
Dept: PHYSICAL THERAPY | Age: 77
Setting detail: THERAPIES SERIES
Discharge: HOME | End: 2022-04-27
Payer: MEDICARE

## 2022-04-27 ENCOUNTER — HOSPITAL ENCOUNTER (OUTPATIENT)
Dept: OCCUPATIONAL THERAPY | Age: 77
Setting detail: THERAPIES SERIES
Discharge: HOME | End: 2022-04-27
Payer: MEDICARE

## 2022-04-27 PROCEDURE — 97110 THERAPEUTIC EXERCISES: CPT

## 2022-04-27 PROCEDURE — 97116 GAIT TRAINING THERAPY: CPT

## 2022-04-27 PROCEDURE — 97530 THERAPEUTIC ACTIVITIES: CPT

## 2022-04-27 PROCEDURE — 97112 NEUROMUSCULAR REEDUCATION: CPT

## 2022-04-27 NOTE — FLOWSHEET NOTE
[facility] Outpatient Therapy  [address] [place] [address], MYA Silvestre [address], [address]o  Phone: [phone]   Fax: [phone]    Physical Therapy Treatment Note/ Progress Report:     Date:  2022    Patient Name:  Chito Bai    :  1945  MRN: [id number]    Medical/Treatment Diagnosis Information:  · Diagnosis: I63.9 (ICD-10-CM) - Cerebral infarction, unspecified  ·    R 26 abnormality of gait and mobility; R53.1 weakness, I63.9 cerebral infarction  Insurance/Certification information:  PT Insurance Information: Medicare  Physician Information:  Referring Practitioner: Dr. Madina Leos of care signed:    [x] Yes  [] No    Date of Patient follow up with Physician:      Progress Report: []  Yes  [x]  No     Date Range for reporting period:  Beginning:  3/24/22   Endin22    Progress report due (10 Rx/or 30 days whichever is less): 02    Recertification due (POC duration/ or 90 days whichever is less):      Visit # Insurance Allowable Auth Needed    BMN []Yes   [x]No     RESTRICTIONS/PRECAUTIONS: receptive aphasia, Plavix and aspirin  Latex Allergy:  [x]NO      []YES  Preferred Language for Healthcare:   [x]English       []other:  Functional Scale: 10m walk= 0.37m/s; TUG 35.8 sec w/ RW; Vivica Relic   Date assessed:3/24/22    Functional Scale: 10m walk= 0.56 m/s w/ SPC (after TM training 0.63m/s w/ SPC) ; TUG  20.6 sec (average) w/ SPC and close SBA; Vivica Relic   Date assessed:22      Pain level:  0/10 R shoulder     SUBJECTIVE:  Pt reports she [place] the rec center [place], did the R LE leg press, no cardio, but lots of walking. OBJECTIVE:    Observation: Pt presents amb without AD. She reports she forgot it in someone's car and her son brought her today, he helped her get into the clinic.  Test measurements:        Exercises/Interventions: Exercises in bold performed in department today.   Items not bolded are carried forward from prior visits for continuity of the record. Exercise/Equipment Resistance/Repetitions HEP Other comments   Nustep 8 min., L 5  Total: 8  Warm up: 2 min. Intervals: 1 min fast (120's spm x 1 min comfortable 80's spm)  Cool down: 1 min. [] Seat 8, arms 11  Less VC for R hip control due to excessive adduction  challenged to increase zac on fast interval.    R hamstring stretch 60 sec x 2 x Seated w/ belt   R gastroc stretch 30 sec x 3 [x] Standing lunge stretch   R soleus stretch 30 sec x 3 [] Standing lunge, B UE support, verbal cues for correct form    R knee to chest, heel on SB 5 x 2 [x] Supine, VC for neutral hip rotation and slow control of lowering   R/L hip abd 10 x 2 each [x] standing, VC for neutral hip rotation. Mirror required to correct ext rot, hip hiking, knee flexion. Facilitation at pelvis on 2nd set to reduce substitutions   R bridging 10 x 1, 3 sec hold [] Blocking at R lateral knee and ankle to avoid hip adduction   R ankle DF/PF 10 x 1 [x] Long sitting, propped on extended UEs. Active assist to reduce inversion and knee flexion   Heel raises R only 5 x 2  B 10 x 1  B UE support, VC to avoid excessive lordosis as compensation     []      []    NMR  []    Alt toe taps 10 x 1 to 4\" cones    10x 1 to 4\" cones w/ 2lb ankle R weight  [x] Standing w/ RW    Reduced ataxic movement, especially returning R foot to starting position     []    NMES to R ant tib  6 min. , standing w/ forward stepping in place for active ankle DF during swing [] VMS, phase duration 200, frequency 50pps, cycle time 5/5. Good contraction, nearly full ROM achieved.     Narrow SHU 30 sec    Trunk/UE rotations- 5x   Cervical/UE flex/ext- 5x    []   Post weight shift; poor eye hand coordination, cues to maintain eye contact on hands     Semi tandem 15 sec  Cervical rotation- 3x  []   LOB R w/ either foot in back   R knee to chest 5 x 3, (5 sec pause in lunge position)  yellow tband [] //bars w/ B UE support, less cueing on correct positioning. good ROM w/ overflow to ant tib. R toe taps Lunge to R foot on step- 10x    Less Cues after initial instruction for slow controlled movement to achieve hip flexor stretch and full foot clearance   R step ups 10x- light B UE support //bars, 4\" step  Cues to move slowly to prevent R knee hyperextension both at full WB on step and just before off loading to step down. Improved R knee control w/ practice   Gait      amb    Figure 8 amb through large objects w/ SPC    amb without AD,20' x 4 w/ close SBA,       amb 30' x5 immediately after TM training, w/ SPC, close SBA- good zac and maintains 2 point pattern more consistently, mild recurvatum worsened w/ fatigue          1 ant LOB due to pt stopping suddenly, mod A to right    Improved continuity of stepping, improved B arm swing, but moderate guard posturing. Improved cane manegement and step continuity after TM training compared to before. [employer] ladder 10' x 10, L UE support  VC to widen SHU, R toe catch on >50% of trials    [employer] TM  10 min. 1.8mph,   B UE support  metronome 70bpm. VC to attend to visual feedback for step length/symmetry. Less ataxia and inconsistent foot placement compared to previous sessions and narrower SHU w/ less circumduction. 3 min. Backwards walking 0.6mph, B UE support  Max HR 119bpm  Pt w/ reduced ataxia overall, best step length since starting TM training. Demos less R knee hyperextension w/ increased zac. Freq VC to reduce upper body tension and breath holding. Used safety switch only, no harness. When attempts longer step length, R LE very ataxic   Lateral, forward/backwards walking Side stepping with min UE support, 10' x3  each in //bars         backwards walking- 20' x 1 each without UE support    frequent cueing for R foot clearance and avoiding pelvic rotation.  R LE placement ataxic        Cues for increased attention to R foot placement, symmetrical step length w/ backwards walk   TA: reviewed AHA guidelines for cardio exercise, goal is 150 min. Per week in 1-2 months. Home Exercise Program:  Access Code: Sylwia Barajas: [employer]/  Date: 03/24/2022  Prepared by: Manuela Tyra  Exercises  Supine Knee to Chest with Leg Straight - 1 x daily - 7 x weekly - 1-3 sets - 5-10 reps  Supine Hip Abduction - 1 x daily - 7 x weekly - 1-3 sets - 5-10 reps  Seated Ankle Pumps - 1 x daily - 7 x weekly - 3 sets - 10 reps  Seated Toe Taps - 2 x daily - 7 x weekly - 3 sets - 10 reps     Access Code: [email or web address]  URL: [email or web address]  Date: 03/28/2022  Prepared by: Manuela Table Rock  Exercises  Standing Gastroc Stretch at Counter - 1 x daily - 7 x weekly - 3 sets - 10 reps    Access Code: [email or web address]  URL: [email or web address]  Date: 04/19/2022  Prepared by: Manulea Table Rock  Exercises  [facility] Back and DAMARIS - 1 x daily - 5 x weekly - 3 sets - 10 reps      Therapeutic Exercise:   [x] (85634) Provided verbal/tactile cueing for activities related to strengthening, flexibility, endurance, ROM for improvements in LE, proximal hip, and core control with self-care, mobility, lifting, ambulation. NMR:  [x] (00720) Provided verbal/tactile cueing for activities related to improving balance, coordination, kinesthetic sense, posture, motor skill, proprioception to assist with LE, proximal hip, and core control in self-care, mobility, lifting, ambulation and eccentric single leg control. Therapeutic Activities:    [] (18881) Provided verbal/tactile cueing for activities related to improving balance, coordination, kinesthetic sense, posture, motor skill, proprioception and motor activation to allow for proper function of core, proximal hip and LE with self-care and ADLs and functional mobility.      Gait Training:    [x] (07620) Provided training and instruction to the patient for proper LE, core and proximal hip recruitment and positioning and eccentric body weight control with ambulation re-education including up and down stairs     Manual Treatments:  PROM / STM / Oscillations-Mobs:  G-I, II, III, IV (PA's, Inf., Post.)  [] (74924) Provided manual therapy to mobilize LE, proximal hip and/or LS spine soft tissue/joints for the purpose of modulating pain, promoting relaxation,  increasing ROM, reducing/eliminating soft tissue swelling/inflammation/restriction, improving soft tissue extensibility and allowing for proper ROM for normal function with self-care, mobility, lifting and ambulation. Home Exercise Program:    [] (05603) Reviewed/Progressed HEP activities related to strengthening, flexibility, endurance, ROM of core, proximal hip and LE for functional self-care, mobility, lifting and ambulation/stair navigation   [] (68137) Reviewed/Progressed HEP activities related to improving balance, coordination, kinesthetic sense, posture, motor skill, proprioception of core, proximal hip and LE for self-care, mobility, lifting, and ambulation/stair navigation      Modalities:    [] Electric Stimulation:   [] Ultrasound:   [] Other:       Charges:  Timed Code Treatment Minutes:  GT25, NM 12  TE18   Total Treatment Minutes: 55      [] EVAL (LOW) 15565 (typically 20 minutes face-to-face)  [] EVAL (MOD) 49173 (typically 30 minutes face-to-face)  [] EVAL (HIGH) 13730 (typically 45 minutes face-to-face)  [] RE-EVAL     [x] CW(17185) x   1    [x] NMR (17439) x 1    [] Manual (07404) x       [] TA (08519) x      [x] Gait Training (03849) x2      [] ES(attended) (40985)  [] ES (un) (94567)   [] DRY NEEDLE 1 OR 2 MUSCLES  [] DRY NEEDLE 3+ MUSCLES  [] Mech Traction (17171)  [] Ultrasound (32689)  [] Other:    GOALS: Goals established 3/24/22   Patient stated goal: achieve better balance and walk. Pt reports walking better, now walking in the grocery store (previously using motorized cart).    [x] Progressing: [] Met: [] Not Met: [] Adjusted    Therapist goals for Patient:   Short Term Goals: To be achieved in: 6 weeks   1. Independent in HEP and progression per patient tolerance. [x] Progressing: [] Met: [] Not Met: [] Adjusted   2. Pt will improve TUG time to 25 sec to improve functional ambulation. 21 sec w/ SPC and close SBA  [] Progressing: [x] Met: [] Not Met: [] Adjusted  3. Pt to perform transfers via stand-pivot technique with use of  small base quad cane [facility]) on left Modified Independent. SBA due to inconsistent management of AD (RW, quad cane or SPC)   [x] Progressing: [] Met: [] Not Met: [] Adjusted  4. Pt will improve Rosa to 32/56 to demonstrate reduced all risk. 38/61  [] Progressing: [x] Met: [] Not Met: [] Adjusted    Long Term Goals: To be achieved in: 12 weeks  1. Pt will improve ABC scale to 55% confidence for reduced fall risk. [] Progressing: [] Met: [] Not Met: [] Adjusted  2. Pt to negotiate up/down 7 stairs with step to pattern w/ single rail mod I.   [] Progressing: [] Met: [] Not Met: [] Adjusted   3. Pt to demonstrate improved gait pattern including improved R foot clearance and knee control without cues with use of AFO on right and an appropriate based cane. [] Progressing: [] Met: [] Not Met: [] Adjusted  4. Pt will improve Rosa to 42/56 to demonstrate reduced all risk. [x] Progressing: [] Met: [] Not Met: [] Adjusted  5. Pt independent with HEP. [] Progressing: [] Met: [] Not Met: [] Adjusted  6. Pt to report improved confidence with ambulating in community   [x] Progressing: [] Met: [] Not Met: [] Adjusted  7. Pt will improve TUG time to 18 sec w/ least restrictive AD to improve functional ambulation. [x] Progressing: [] Met: [] Not Met: [] Adjusted  8. Pt will demonstrate 10m walk speed to 0.80m/s. [x] Progressing: [] Met: [] Not Met: [] Adjusted    ASSESSMENT: Pt continues to demonstrate good progression of gait speed and step length on TM w/ best measurements to date. Good carryover to OG gait, but stil limited by R LE fatigue and spasticity.  Pt demos more incoordination, decreased zac when focused on amb w/ turning around obstacles. Pt will continue to benefit from skilled PT to improve functional mobility, reduce fall risk and maximize independence. Treatment/Activity Tolerance:  [x] Patient tolerated treatment well [] Patient limited by fatigue  [] Patient limited by pain  [] Patient limited by other medical complications  [] Other:     Overall Progression Towards Functional goals/ Treatment Progress Update:  [x] Patient is progressing as expected towards functional goals listed. [] Progression is slowed due to complexities/Impairments listed. [] Progression has been slowed due to co-morbidities. [] Plan just implemented, too soon to assess goals progression <30days   [] Goals require adjustment due to lack of progress  [] Patient is not progressing as expected and requires additional follow up with physician  [] Other    Prognosis for POC: [x] Good [] Fair  [] Poor    Patient requires continued skilled intervention: [x] Yes  [] No        PLAN: 3x/week for 4 weeks then 2x/week for 8 weeks   [x] Continue per plan of care [] Alter current plan (see comments)  [] Plan of care initiated [] Hold pending MD visit [] Discharge    Electronically signed by: Maria De Jesus Juarez, PT, DPT    Note: If patient does not return for scheduled/recommended follow up visits, this note will serve as a discharge from care along with the most recent update on progress.

## 2022-04-27 NOTE — FLOWSHEET NOTE
[facility] Outpatient Therapy  [address] [place] [address] Asim Silvestre [address], [address]  Phone: [phone]   Fax: [phone]    Occupational Therapy Treatment Note/ Progress Report:     Date:  2022    Patient Name:  Stephanie Vann    :  1945  MRN: [id number]      Medical/Treatment Diagnosis Information:  I63.9 (ICD-10-CM) - Cerebral infarction, unspecified  M25.611 (ICD-10-CM) Stiffness of right shoulder, not elsewhere classified, R27.9 (ICD-10-CM) Unspecified lack of coordination, R53.1 (ICD-10-CM) Weakness        Insurance/Certification information: Medicare A & B  Physician Information:  Dr. Alex Gracia of care signed:    Yes    Date of Patient follow up with Physician: none known at this time     Progress Report: []  Yes  [x]  No     Date Range for reporting period:  Beginnin2022   Ending:       Progress report due:      Recertification due (POC duration/ or 90 days whichever is less): 2022     Visit # Insurance Allowable Auth Needed   15/28 BMN No     Latex Allergy:  [x]NO      []YES  Preferred Language for Healthcare:   [x]English       []other:  Functional Scale:  (2022)  -- 95/100    Pain level: 0/10     SUBJECTIVE: Pt reports that she slept better the night prior. She put heat on her R shoulder this morning and feels it has helped to relieve some of the \"tightness. \"    OBJECTIVE:    Observation:  Pt arrived at appointment without rolling walker on this date. She reports she left it at home. Pt demo'd increased compensatory trap activation during shoulder flexion while seated edge of mat.  Test measurements:        RESTRICTIONS/PRECAUTIONS: Plavix and aspirin     Therapeutic Activities:    Activity #1:   Pt sat edge of mat and participated in bean manipulation activity to increase fine motor coordination of R hand. Pt instructed to collect 12 beans in R hand and place them one by one into the shape of a letter 'L. \" Pt performed with increased time and 2 errors. Pt then instructed to  12 beans in R hand and place them one by one into the shape of a letter 'B'. Pt performed with increased time and 2 errors. Pt then instructed to  15 beans in R hand and place them in a star shape. Pt performed with increased time and 4 errors. Pt required mod v/c to inhibit left lean and inhibit middle delt activation at shoulder during activity. Patient Education:     Pt received education on upcoming progress report, and to report back to OT/OTS with any activities she is having particular difficulty with to promote client-centered plan of care - verb understanding   Pt received education on benefits of utilizing heat on RUE in the morning before activity - verb understanding    Manual Therapy: RUE, supine unless otherwise indicated     Therapeutic Exercises: Exercises in bold performed in department today. Items not bolded are carried forward from prior visits for continuity of the record. Exercise/Equipment Resistance/Repetitions HEP Other comments   BUE Shoulder flexion w/ dowel tatyana (supine)   1# dowel tatyana, x15 reps []    RUE shoulder abduction w/ dowel tatyana (supine)     1# dowel tatyana, x10 reps []    BUE shoulder flexion with walker   ~10 reps to ~100 degrees; ~5 second holds [x]    RUE shoulder flexion w/ UE [place]   13 reps []      RUE shoulder abduction/horizontal abduction w/ UE [place]    10 reps []    RUE AROM \"L\" with UE [place]   10 reps []    RUE AROM Shoulder Flexion (supine)     10 reps [x] Pt tolerated well.  Increased difficulty reaching/maintaining full elbow extension ROM despite min v/c.   - ROM achieved ~ 120 -130 degrees     RUE AROM Shoulder Abduction (supine)     8 reps [x] Increased difficulty reaching/maintaining full elbow extension ROM despite min v/c  Min v/c to inhibit ant delt activation  - ROM achieved ~110-120   RUE horizontal ab/adduction (supine)     10 reps [x] Increased difficulty maintaining full elbow extension ROM despite min v/c   BUE shoulder flexion w/ theraball 10 reps w/ 5-15 second holds []    Shoulder flexion w/ BUE placed on wall (stance) 10 reps w/ 5-10 second holds [] Occasionally hands-on to inhibit trunk rotation  Increased difficulty reaching/maintaining full elbow extension ROM despite min v/c  - ROM achieved ~ 130 degrees   Shoulder abduction w/ RUE placed on wall (stance) 10 reps w 5-10 second holds [] Occasionally hands-on to inhibit trunk rotation - ROM achieved ~ 130 degrees   RUE AROM Shoulder Abduction (unsupported short sit) 10 reps [] Increased difficulty reaching/maintaining full elbow extension ROM despite min-mod v/c   - ROM achieved ~120 degrees   RUE AROM shoulder Flexion (unsupported short sit)  ~5 reps [] Hands-on to inhibit compensatory activation of trap muscle   - ROM achieved ~ degrees  Pt unable to achieve/maintain full elbow extension ROM despite min v/c       []        []        []        []      Home Exercise Program:  Self-assisted stretches for RUE using RW and wall as described above  Force use RUE/R hand    Access Code: [email or web address]  URL: [email or web address]  Date: 04/12/2022  Prepared by: Alireza Gomez    Exercises  Supine Shoulder Flexion Extension Full Range AROM - 2 x daily - 7 x weekly - 1 sets - 10 reps  Supine Shoulder Abduction AROM - 2 x daily - 7 x weekly - 1 sets - 10 reps  Supine Shoulder Horizontal Abduction - 2 x daily - 7 x weekly - 1 sets - 10 reps    Therapeutic Exercise:   [x] (11929) Provided verbal/tactile cueing for activities related to strengthening, flexibility, endurance, ROM.  Includes review/progression of HEP activities related to strengthening, flexibility, endurance, AROM, proximal shoulder and UE for functional transfers/mobility, self-care, IADLs, and community re-entry    Therapeutic Activities:    [x] (65004) Provided verbal/tactile cueing for activities related to improving balance, coordination, kinesthetic sense, posture, motor skill, proprioception and motor activation to allow for proper function of core, proximal shoulder and UE with self-care, and ADLs, IADLs, functional mobility, work-related and leisure based activities. Includes review/progression of HEP activities to improve balance, coordination, kinesthetic sense, posture, motor skill, proprioception, proximal shoulder and UE for functional transfers/mobility, self-care, IADLs, and community re-entry    Sensory Integration Techniques:  [] (83378)Provided verbal/tactile feedback to enhance sensory processing and promoting responses that are adaptive to environmental demands    Self-Care/Home Management Training:  [] (50170) Provided training and education in restorative and compensatory strategies/activity modifications in activities of daily living, meal preparation, laundry and other various house maintenance activities. Provided training and education in use of adaptive equipment/devices and assistive technology, as needed, to promote participation and independence. Cognitive Function:  [] (87757 x15 minutes, 13066 +15 minutes) Integrated activities that address attention, memory, reasoning, executive functioning, problem solving, and/or pragmatic functioning in addition to compensatory strategies to manage the performance of an activity (for example, managing time or schedules, initiating, organizing, and sequencing tasks). NMR:  [] (67049) During functional activities/therapeutic exercises, provided facilitation of normal movement patterns and provided handling/verbal cues to promote postural alignment and stability during static and dynamic movement (sitting or standing).  Activities may also include visual perceptual training, proprioception training, and balance retraining during functional activities    Manual Treatments:    [] (40159) Provided manual therapy to mobilize UB for the purpose of modulating pain, promoting relaxation,  increasing ROM, reducing/eliminating soft tissue swelling/inflammation/restriction, improving soft tissue extensibility and allowing for proper ROM for normal function with self-care, functional mobility, transfers, reaching and lifting    Modalities:     [] Electrical Stimulation (19666):     [] Ultrasound (86932):    Charges:  Timed Code Treatment Minutes: 56   Total Treatment Minutes: 56      [] EVAL (LOW) 44554 (typically 20 minutes face-to-face)  [] EVAL (MOD) 50896 (typically 30 minutes face-to-face)  [] EVAL (HIGH) 14763 (typically 45 minutes face-to-face)  [] RE-EVAL     [x] DZ(07362)  x38 (3)  [] NMR (78078)     [] Manual ([id number])  [x] TA (88621)  x18 (1)   [] ES(attended) (42365)       [] ES (un) (67987)  [] Other:    GOALS: Patient stated goal: \"get back to normal\"       []? Progressing: []? Met: []? Not Met: []? Adjusted     Therapist goals for Patient:      Short Term Goals: To be achieved in: 6 weeks  1. Pt will improve score on UEFS to 40/100 for a subjective report of decreased difficulty with completing every day activities with RUE and R hand  []? Progressing: [x]? Met: []? Not Met: []? Adjusted  2. Pt will improve time on NHPT to more than 45 seconds to demonstrate improved fine motor coordination of R hand  []? Progressing: [x]? Met: []? Not Met: []? Adjusted  3. Pt will improve score on BBT to 40 blocks/minute to demonstrate improved timing, speed and accuracy with functional grasp of right hand. [x]? Progressing: []? Met: []? Not Met: []? Adjusted  4. Pt will be Min A with HEP/Home activities program to facilitate independence with carryover from sessions and to progress towards returning to PLOF  [x]? Progressing: []? Met: []? Not Met: []? Adjusted   5. Pt will write a pangram sentence in no more than 1 minute demo'ing 85% legibility to demo improved fine motor control and speed of R hand. [x]? Progressing: []? Met: []? Not Met: []? Adjusted      Long Term Goals: To be achieved in: 12 weeks  1.  Pt will improve score on UEFS to 65/100 for a subjective report of decreased difficulty with completing every day activities with RUE and R hand  []? Progressing: [x]? Met: []? Not Met: [x]? Adjusted    1 Adjusted: Pt will improve score on UEFS to 100/100 for a subjective report of decreased difficulty with completing every day activities with RUE and R hand  []? Progressing: []? Met: []? Not Met: []? Adjusted     2. Pt will improve time on NHPT to more than 30 seconds to demonstrate improved fine motor coordination of R hand  []? Progressing: []? Met: []? Not Met: []? Adjusted  3. Pt will improve score on BBT to 50 blocks/minute to demonstrate improved timing, speed and accuracy with functional grasp of right hand. []? Progressing: []? Met: []? Not Met: []? Adjusted  4. Pt will be Independent with HEP/Home activities program to facilitate independence with carryover from sessions and to progress towards returning to PLOF  []? Progressing: []? Met: []? Not Met: []? Adjusted   5. Pt will write a pangram sentence in no more than 45 seconds minute demo'ing 95% legibility to demo improved fine motor control and speed of R hand. []? Progressing: []? Met: []? Not Met: []? Adjusted      ASSESSMENT:    Pt demo'd good tolerance to closed-chain ROM stretch and AROM exercises in gravity eliminated planes with RUE. Pt continues to demo decreased AROM in against gravity planes due to muscle shortening and weakness in R shoulder. Pt continues to require additional time during fine motor coordination tasks with R hand due to impaired accuracy and speed. Pt continues to benefit from skilled OT intervention including therapeutic exercises and facilitation of effective posturing and good alignment, cont per POC.        Treatment/Activity Tolerance:  [x] Patient tolerated treatment well [] Patient limited by fatique  [] Patient limited by pain  [] Patient limited by other medical complications  [] Other:     Overall Progression Towards Functional goals/ Treatment Progress Update:  [x] Patient is progressing as expected towards functional goals listed. [] Progression is slowed due to complexities/Impairments listed. [] Progression has been slowed due to co-morbidities. [] Plan just implemented, too soon to assess goals progression <30days   [] Goals require adjustment due to lack of progress  [] Patient is not progressing as expected and requires additional follow up with physician  [] Other    Prognosis for POC: [x] Good [] Fair  [] Poor    Patient requires continued skilled intervention: [x] Yes  [] No      PLAN:   [x] Continue per plan of care [] Alter current plan (see comments)  [] Plan of care initiated [] Hold pending MD visit [] Discharge    Electronically signed by: Alejandro RECINOS    Note: If patient does not return for scheduled/recommended follow up visits, this note will serve as a discharge from care along with the most recent update on progress.

## 2022-04-28 ENCOUNTER — APPOINTMENT (OUTPATIENT)
Dept: OCCUPATIONAL THERAPY | Age: 77
End: 2022-04-28
Payer: MEDICARE

## 2022-04-28 ENCOUNTER — APPOINTMENT (OUTPATIENT)
Dept: PHYSICAL THERAPY | Age: 77
End: 2022-04-28
Payer: MEDICARE

## 2022-05-02 ENCOUNTER — HOSPITAL ENCOUNTER (OUTPATIENT)
Dept: PHYSICAL THERAPY | Age: 77
Setting detail: THERAPIES SERIES
Discharge: HOME | End: 2022-05-02
Payer: MEDICARE

## 2022-05-02 ENCOUNTER — HOSPITAL ENCOUNTER (OUTPATIENT)
Dept: OCCUPATIONAL THERAPY | Age: 77
Setting detail: THERAPIES SERIES
Discharge: HOME | End: 2022-05-02
Payer: MEDICARE

## 2022-05-02 PROCEDURE — 97110 THERAPEUTIC EXERCISES: CPT

## 2022-05-02 PROCEDURE — 97140 MANUAL THERAPY 1/> REGIONS: CPT

## 2022-05-02 PROCEDURE — 97112 NEUROMUSCULAR REEDUCATION: CPT

## 2022-05-02 PROCEDURE — 97530 THERAPEUTIC ACTIVITIES: CPT

## 2022-05-02 PROCEDURE — 97116 GAIT TRAINING THERAPY: CPT

## 2022-05-02 NOTE — FLOWSHEET NOTE
[facility]  [address], [place] [address], [address]  Phone: [phone]   Fax: [phone]    Occupational Therapy Treatment Note/ Progress Report:     Date:  2022    Patient Name:  Pradeep Gaines    :  1945  MRN: [id number]      Medical/Treatment Diagnosis Information:  I63.9 (ICD-10-CM) - Cerebral infarction, unspecified  M25.611 (ICD-10-CM) Stiffness of right shoulder, not elsewhere classified, R27.9 (ICD-10-CM) Unspecified lack of coordination, R53.1 (ICD-10-CM) Weakness        Insurance/Certification information: Medicare A & B  Physician Information:  Dr. Arleth Benitez of [facility] signed:    Yes    Date of Patient follow up with Physician: none known at this time     Progress Report: []  Yes  [x]  No     Date Range for reporting period:  Beginnin2022   Ending:       Progress report due: 7007     Recertification due (POC duration/ or 90 days whichever is less): 2022     Visit # Insurance Allowable Auth Needed    BMN No     Latex Allergy:  [x]NO      []YES  Preferred Language for Healthcare:   [x]English       []other:  Functional Scale:  (2022)  -- 95/100    Pain level: 0/10     SUBJECTIVE:  Pt reports no new concerns or complaints. She stated that things are going well at home. OBJECTIVE:    Observation:  Pt demo'd overactivation of upper trap during shoulder flexion exercise while seated edge of mat.  Test measurements:        RESTRICTIONS/PRECAUTIONS: Plavix and aspirin     Therapeutic Activities:    Activity #1:  Pt sat edge of mat with heat pack in place on R shoulder and participated in fine motor coordination activity. Pt was instructed to use R digits to flatten out a tightly crumpled paper. Pt performed 3 sets: One 8x11 piece of paper, one 5x7 inch piece of paper, and one 3.5x5 inch piece of paper. Pt required min-mod v/c to inhibit R shoulder abduction during activity.  Pt demo'd difficulty attending to activity due to the heat pack placement being distracting. Patient Education:     Pt received continued education on proper alignment during HEP exercises and the benefit of doing them while someone is home to assist - verb understanding    Manual Therapy: RUE, supine unless otherwise indicated     Shoulder flexion stretch: 4 sets x30 seconds  Shoulder abduction stretch with STM to coracobrachialis: 4 sets x30 seconds. ROM achieved ~110 degrees. Therapeutic Exercises: Exercises in bold performed in department today. Items not bolded are carried forward from prior visits for continuity of the record.     Exercise/Equipment Resistance/Repetitions HEP Other comments   BUE Shoulder flexion w/ dowel tatyana (supine)   1# dowel tatyana, x15 reps []    RUE shoulder abduction w/ dowel tatyana (supine)     1# dowel tatyana, x10 reps []    BUE shoulder flexion with walker   ~10 reps to ~100 degrees; ~5 second holds [x]    RUE shoulder flexion w/ UE [place]   13 reps []      RUE shoulder abduction/horizontal abduction w/ UE [place]    10 reps []    RUE AROM \"L\" with UE [place]   10 reps []    RUE AROM Shoulder Flexion (supine)     10 reps with 5-10 second holds [x] Min A to maintain proper alignment and inhibit shoulder abduction  Air splint in place to inhibit elbow flexion      - ROM achieved ~ 120 -130 degrees   RUE AROM Shoulder Abduction (supine)     10 reps with one rest break [x] Hands-on to support distal arm to facilitate good alignment  Air splint in place to inhibit elbow flexion    - ROM achieved ~110-120   RUE horizontal ab/adduction (supine)     10 reps [x] Mod v/c to inhibit middle delt activation into shoulder abduction and hands on at distal limb to facilitate good motor control     Air splint in place to inhibit elbow flexion   BUE shoulder flexion w/ theraball 10 reps w/ 5-15 second holds []    Shoulder flexion w/ BUE placed on wall (stance) 10 reps w/ 5-10 second holds []    Shoulder abduction w/ RUE placed on wall (stance) 10 reps w 5-10 second holds []     RUE AROM Shoulder Abduction (unsupported short sit) 10 reps []    RUE AROM shoulder Flexion (unsupported short sit)  ~6 reps [] Hands on to inhibit trap activation, to inhibit hyperextension at trunk, and to palpate for scapular upward rotation and posterior tilt    Pt demo'd ineffective timing and sequencing of shoulder girdle muscles       []        []        []        []      Home Exercise Program:  Self-assisted stretches for RUE using RW and wall as described above  Force use RUE/R hand    Access Code: [email or web address]  URL: [email or web address]  Date: 04/12/2022  Prepared by: Christina Huertas    Exercises  Supine Shoulder Flexion Extension Full Range AROM - 2 x daily - 7 x weekly - 1 sets - 10 reps  Supine Shoulder Abduction AROM - 2 x daily - 7 x weekly - 1 sets - 10 reps  Supine Shoulder Horizontal Abduction - 2 x daily - 7 x weekly - 1 sets - 10 reps    Therapeutic Exercise:   [x] (43825) Provided verbal/tactile cueing for activities related to strengthening, flexibility, endurance, ROM. Includes review/progression of HEP activities related to strengthening, flexibility, endurance, AROM, proximal shoulder and UE for functional transfers/mobility, self-care, IADLs, and community re-entry    Therapeutic Activities:    [x] (14193) Provided verbal/tactile cueing for activities related to improving balance, coordination, kinesthetic sense, posture, motor skill, proprioception and motor activation to allow for proper function of core, proximal shoulder and UE with self-care, and ADLs, IADLs, functional mobility, work-related and leisure based activities.  Includes review/progression of HEP activities to improve balance, coordination, kinesthetic sense, posture, motor skill, proprioception, proximal shoulder and UE for functional transfers/mobility, self-care, IADLs, and community re-entry    Sensory Integration Techniques:  [] (56744)Provided verbal/tactile feedback to enhance sensory processing and promoting responses that are adaptive to environmental demands    Self-Care/Home Management Training:  [] (31629) Provided training and education in restorative and compensatory strategies/activity modifications in activities of daily living, meal preparation, laundry and other various house maintenance activities. Provided training and education in use of adaptive equipment/devices and assistive technology, as needed, to promote participation and independence. Cognitive Function:  [] (88188 x15 minutes, 08113 +15 minutes) Integrated activities that address attention, memory, reasoning, executive functioning, problem solving, and/or pragmatic functioning in addition to compensatory strategies to manage the performance of an activity (for example, managing time or schedules, initiating, organizing, and sequencing tasks). NMR:  [] (39809) During functional activities/therapeutic exercises, provided facilitation of normal movement patterns and provided handling/verbal cues to promote postural alignment and stability during static and dynamic movement (sitting or standing).  Activities may also include visual perceptual training, proprioception training, and balance retraining during functional activities    Manual Treatments:    [x] (11032) Provided manual therapy to mobilize UB for the purpose of modulating pain, promoting relaxation,  increasing ROM, reducing/eliminating soft tissue swelling/inflammation/restriction, improving soft tissue extensibility and allowing for proper ROM for normal function with self-care, functional mobility, transfers, reaching and lifting    Modalities:     [] Electrical Stimulation (54023):     [] Ultrasound (43402):    Charges:  Timed Code Treatment Minutes: 53   Total Treatment Minutes: 53      [] EVAL (LOW) 58438 (typically 20 minutes face-to-face)  [] EVAL (MOD) 46790 (typically 30 minutes face-to-face)  [] EVAL (HIGH) 16318 (typically 45 minutes face-to-face)  [] RE-EVAL     [x] FJ(40309)  x23 (2)  [] NMR (44351)     [x] Manual ([id number]) x15 (1)   [x] TA (62823)  x15 (1)   [] ES(attended) (19035)       [] ES (un) (87888)  [] Other:    GOALS: Patient stated goal: \"get back to normal\"       []? Progressing: []? Met: []? Not Met: []? Adjusted     Therapist goals for Patient:      Short Term Goals: To be achieved in: 6 weeks  1. Pt will improve score on UEFS to 40/100 for a subjective report of decreased difficulty with completing every day activities with RUE and R hand  []? Progressing: [x]? Met: []? Not Met: []? Adjusted  2. Pt will improve time on NHPT to more than 45 seconds to demonstrate improved fine motor coordination of R hand  []? Progressing: [x]? Met: []? Not Met: []? Adjusted  3. Pt will improve score on BBT to 40 blocks/minute to demonstrate improved timing, speed and accuracy with functional grasp of right hand. [x]? Progressing: []? Met: []? Not Met: []? Adjusted  4. Pt will be Min A with HEP/Home activities program to facilitate independence with carryover from sessions and to progress towards returning to PLOF  [x]? Progressing: []? Met: []? Not Met: []? Adjusted   5. Pt will write a pangram sentence in no more than 1 minute demo'ing 85% legibility to demo improved fine motor control and speed of R hand. [x]? Progressing: []? Met: []? Not Met: []? Adjusted      Long Term Goals: To be achieved in: 12 weeks  1. Pt will improve score on UEFS to 65/100 for a subjective report of decreased difficulty with completing every day activities with RUE and R hand  []? Progressing: [x]? Met: []? Not Met: [x]? Adjusted    1 Adjusted: Pt will improve score on UEFS to 100/100 for a subjective report of decreased difficulty with completing every day activities with RUE and R hand  []? Progressing: []? Met: []? Not Met: []? Adjusted     2.  Pt will improve time on NHPT to more than 30 seconds to demonstrate improved fine motor coordination of R hand  []? Progressing: []? Met: []? Not Met: []? Adjusted  3. Pt will improve score on BBT to 50 blocks/minute to demonstrate improved timing, speed and accuracy with functional grasp of right hand. []? Progressing: []? Met: []? Not Met: []? Adjusted  4. Pt will be Independent with HEP/Home activities program to facilitate independence with carryover from sessions and to progress towards returning to PLOF  []? Progressing: []? Met: []? Not Met: []? Adjusted   5. Pt will write a pangram sentence in no more than 45 seconds minute demo'ing 95% legibility to demo improved fine motor control and speed of R hand. []? Progressing: []? Met: []? Not Met: []? Adjusted      ASSESSMENT:      Pt tolerated AROM exercises well with air splint to inhibit elbow flexion. Pt demo'd decreased scapular depression during shoulder flexion exercises while seated edge of mat - suspected due to R lower trap weakness. Pt continues to benefit from supervision and v/c during therapeutic exercises and activities to inhibit ineffective posturing and poor alignment consistent with habitual movement patterns. Pt continues to benefit from skilled OT intervention to promote normal movement patterns, cont per POC. Treatment/Activity Tolerance:  [x] Patient tolerated treatment well [] Patient limited by fatique  [] Patient limited by pain  [] Patient limited by other medical complications  [] Other:     Overall Progression Towards Functional goals/ Treatment Progress Update:  [x] Patient is progressing as expected towards functional goals listed. [] Progression is slowed due to complexities/Impairments listed. [] Progression has been slowed due to co-morbidities.   [] Plan just implemented, too soon to assess goals progression <30days   [] Goals require adjustment due to lack of progress  [] Patient is not progressing as expected and requires additional follow up with physician  [] Other    Prognosis for POC: [x] Good [] Fair  [] Poor    Patient requires continued skilled intervention: [x] Yes  [] No      PLAN:   [x] Continue per plan of care [] Alter current plan (see comments)  [] Plan of care initiated [] Hold pending MD visit [] Discharge    Electronically signed by: Haydee RECINOS    Note: If patient does not return for scheduled/recommended follow up visits, this note will serve as a discharge from care along with the most recent update on progress.

## 2022-05-02 NOTE — FLOWSHEET NOTE
[facility] Outpatient Therapy  [address], [address], [address]  Phone: [phone]   Fax: [phone]    Physical Therapy Treatment Note/ Progress Report:     Date:  2022    Patient Name:  Jocelyn Staples    :  1945  MRN: [id number]    Medical/Treatment Diagnosis Information:  · Diagnosis: I63.9 (ICD-10-CM) - Cerebral infarction, unspecified  ·    R 26 abnormality of gait and mobility; R53.1 weakness, I63.9 cerebral infarction  Insurance/Certification information:  PT Insurance Information: Medicare  Physician Information:  Referring Practitioner: Dr. Demetris Trejo of [facility] signed:    [x] Yes  [] No    Date of Patient follow up with Physician:      Progress Report: []  Yes  [x]  No     Date Range for reporting period:  Beginning:  3/24/22   Endin22    Progress report due (10 Rx/or 30 days whichever is less):     Recertification due (POC duration/ or 90 days whichever is less):      Visit # Insurance Allowable Auth Needed   15/24 BMN []Yes   [x]No     RESTRICTIONS/PRECAUTIONS: receptive aphasia, Plavix and aspirin  Latex Allergy:  [x]NO      []YES  Preferred Language for Healthcare:   [x]English       []other:  Functional Scale: 10m walk= 0.37m/s; TUG 35.8 sec w/ RW; Zoran [address]es 28  Date assessed:3/24/22    Functional Scale: 10m walk= 0.56 m/s w/ SPC (after TM training 0.63m/s w/ SPC) ; TUG  20.6 sec (average) w/ SPC and close SBA; Zoran Corrales 37/56  Date assessed:22      Pain level:  0/10 R shoulder     SUBJECTIVE:  Pt reports she [place] the rec [place] on [place]., had a good weekend. OBJECTIVE: Pt 10 min. Late.  Observation: Pt presents amb with walking stick L hand.  Test measurements:        Exercises/Interventions: Exercises in bold performed in department today. Items not bolded are carried forward from prior visits for continuity of the record. Pt requires cueing to maintain accurate rep count.    VC to maintain eyes open during exercises to improve midline alignment due to mild R neglect still   Exercise/Equipment Resistance/Repetitions HEP Other comments   Nustep 8 min., L 5  Total: 8  Warm up: 2 min. Intervals: 1 min fast (120's spm x 1 min comfortable 80's spm)  Cool down: 1 min. [] Seat 8, arms 11  Less VC for R hip control due to excessive adduction  challenged to increase zac on fast interval.    R hamstring stretch 60 sec x 3 x Seated w/ belt   R gastroc stretch 30 sec x 3 [x] Standing lunge stretch   R soleus stretch 30 sec x 3 [] Standing lunge, B UE support, verbal cues for correct form    R knee to chest, heel on SB 5 x 2 [x] Supine, VC for neutral hip rotation and slow control of lowering   R/L hip abd 10 x 2 each [x] standing, VC for neutral hip rotation. Facilitation at pelvis on 2nd set to reduce substitutions   R bridging 10 x 1, 3 sec hold [] Blocking at R lateral knee and ankle to avoid hip adduction   R ankle DF/PF 10 x 1 [x] Long sitting, propped on extended UEs. Active assist to reduce inversion and knee flexion   Heel raises R only 5 x 2  B 10 x 1  B UE support, VC to avoid excessive lordosis as compensation   Sit to stand 8x [] Facilitation at lateral R knee to avoid medial knee collapse     []    NMR  []    Alt toe taps 10 x 1 to 4\" cones    10x 1 to 4\" cones w/ 2lb ankle R weight  [x] Standing w/ RW    Reduced ataxic movement, especially returning R foot to starting position     []    NMES to R ant tib  6 min. , standing w/ forward stepping in place for active ankle DF during swing [] VMS, phase duration 200, frequency 50pps, cycle time 5/5. Good contraction, nearly full ROM achieved.     Narrow SHU 30 sec    Trunk/UE rotations- 5x   Cervical/UE flex/ext- 5x    []   Post weight shift; poor eye hand coordination, cues to maintain eye contact on hands     Semi tandem 15 sec  Cervical rotation- 3x  []   LOB R w/ either foot in back   R knee to chest 5 x 3, (5 sec pause in lunge position)  yellow tband [] //bars w/ B UE support, less cueing on correct positioning. good ROM w/ overflow to ant tib. R toe taps Lunge to R foot on step- 10x    Less Cues after initial instruction for slow controlled movement to achieve hip flexor stretch and full foot clearance   R step ups 10x- light B UE support //bars, 4\" step  Cues to move slowly to prevent R knee hyperextension both at full WB on step and just before off loading to step down. Improved R knee control w/ practice   Gait      amb    Figure 8 amb through large objects w/ SPC    amb without AD,20' x 4 w/ close SBA,           amb 30' x6 immediately after TM training, w/ SPC, close SBA- good zac and maintains 2 point pattern more consistently    10m walk without AD= 0.54m/s  10m walk w/ walking stick= 0.57m/s              Improved continuity of stepping, improved B arm swing, but moderate guard posturing. Mult LOB typically to R and posterior, self corrects w/ min A      Improved cane manegement and step continuity after TM training compared to before. Continues w/ instability and minor LOB   [employer] ladder 10' x 10, L UE support  VC to widen SHU, R toe catch on >50% of trials    Biodex TM  10 min. 1.8mph,   B UE support  metronome 70bpm. VC to attend to visual feedback for step length/symmetry. Less ataxia and inconsistent foot placement compared to previous sessions and narrower SHU w/ less circumduction. 3.5 min. @ 1.5mph and 2% incline, B UE support    3 min. Backwards walking 0.6mph, B UE support  Max HR 119bpm  Pt w/ reduced ataxia overall, best step length since starting TM training. Demos less R knee hyperextension w/ increased zac. Freq VC to reduce upper body tension and breath holding. Used safety switch only, no harness.   decreased R foot clearance w/ fatigue          VC for upright posture, decreased R foot clearance w/ fatigue   Lateral, forward/backwards walking Side stepping with min UE support, 10' x3  each in //bars         backwards walking- 20' x 1 each without UE support    frequent cueing for R foot clearance and avoiding pelvic rotation. R LE placement ataxic        Cues for increased attention to R foot placement, symmetrical step length w/ backwards walk   TA: Reinforced previous recommendation that pt should be walking w/ SPC or walking stick at a minimum due to fall risk after pt states she walked outdoors on uneven terrain without AD, had someone w/ her. Home Exercise Program:  Access Code: Jachernan Santillano: [employer]/  Date: 03/24/2022  Prepared by: Flynn Jacobsen  Exercises  Supine Knee to Chest with Leg Straight - 1 x daily - 7 x weekly - 1-3 sets - 5-10 reps  Supine Hip Abduction - 1 x daily - 7 x weekly - 1-3 sets - 5-10 reps  Seated Ankle Pumps - 1 x daily - 7 x weekly - 3 sets - 10 reps  Seated Toe Taps - 2 x daily - 7 x weekly - 3 sets - 10 reps     Access Code: [email or web address]  URL: [employer]/  Date: 03/28/2022  Prepared by: Flynn Jacobsen  Exercises  Standing Gastroc Stretch at Counter - 1 x daily - 7 x weekly - 3 sets - 10 reps    Access Code: [email or web address]  URL: [email or web address]  Date: 04/19/2022  Prepared by: Flynn Jacobsen  Exercises  [facility] Back and DAMARIS - 1 x daily - 5 x weekly - 3 sets - 10 reps      Therapeutic Exercise:   [x] (38289) Provided verbal/tactile cueing for activities related to strengthening, flexibility, endurance, ROM for improvements in LE, proximal hip, and core control with self-care, mobility, lifting, ambulation. NMR:  [x] (26204) Provided verbal/tactile cueing for activities related to improving balance, coordination, kinesthetic sense, posture, motor skill, proprioception to assist with LE, proximal hip, and core control in self-care, mobility, lifting, ambulation and eccentric single leg control.      Therapeutic Activities:    [] (81701) Provided verbal/tactile cueing for activities related to improving balance, coordination, kinesthetic sense, posture, motor skill, proprioception and motor activation to allow for proper function of core, proximal hip and LE with self-care and ADLs and functional mobility. Gait Training:    [x] (83404) Provided training and instruction to the patient for proper LE, core and proximal hip recruitment and positioning and eccentric body weight control with ambulation re-education including up and down stairs     Manual Treatments:  PROM / STM / Oscillations-Mobs:  G-I, II, III, IV (PA's, Inf., Post.)  [] (65349) Provided manual therapy to mobilize LE, proximal hip and/or LS spine soft tissue/joints for the purpose of modulating pain, promoting relaxation,  increasing ROM, reducing/eliminating soft tissue swelling/inflammation/restriction, improving soft tissue extensibility and allowing for proper ROM for normal function with self-care, mobility, lifting and ambulation.      Home Exercise Program:    [] (64820) Reviewed/Progressed HEP activities related to strengthening, flexibility, endurance, ROM of core, proximal hip and LE for functional self-care, mobility, lifting and ambulation/stair navigation   [] (51954) Reviewed/Progressed HEP activities related to improving balance, coordination, kinesthetic sense, posture, motor skill, proprioception of core, proximal hip and LE for self-care, mobility, lifting, and ambulation/stair navigation      Modalities:    [] Electric Stimulation:   [] Ultrasound:   [] Other:       Charges:  Timed Code Treatment Minutes:  GT20, NM 14  TE14   Total Treatment Minutes: 48      [] EVAL (LOW) 35752 (typically 20 minutes face-to-face)  [] EVAL (MOD) 43474 (typically 30 minutes face-to-face)  [] EVAL (HIGH) 47520 (typically 45 minutes face-to-face)  [] RE-EVAL     [x] GI(19519) x   1    [x] NMR (10760) x 1    [] Manual (46272) x       [] TA (65704) x      [x] Gait Training (15382) x1      [] ES(attended) (63203)  [] ES (un) (56824)   [] DRY NEEDLE 1 OR 2 MUSCLES  [] DRY NEEDLE 3+ MUSCLES  [] [facility] Traction (09596)  [] Ultrasound (45355)  [] Other:    GOALS: Goals established 3/24/22   Patient stated goal: achieve better balance and walk. Pt reports walking better, now walking in the grocery store (previously using motorized cart). [x] Progressing: [] Met: [] Not Met: [] Adjusted    Therapist goals for Patient:   Short Term Goals: To be achieved in: 6 weeks   1. Independent in HEP and progression per patient tolerance. [x] Progressing: [] Met: [] Not Met: [] Adjusted   2. Pt will improve TUG time to 25 sec to improve functional ambulation. 21 sec w/ SPC and close SBA  [] Progressing: [x] Met: [] Not Met: [] Adjusted  3. Pt to perform transfers via stand-pivot technique with use of  small base quad cane [facility]) on left Modified Independent. SBA due to inconsistent management of AD (RW, quad cane or SPC)   [x] Progressing: [] Met: [] Not Met: [] Adjusted  4. Pt will improve Rosa to 32/56 to demonstrate reduced all risk. 38/61  [] Progressing: [x] Met: [] Not Met: [] Adjusted    Long Term Goals: To be achieved in: 12 weeks  1. Pt will improve ABC scale to 55% confidence for reduced fall risk. [] Progressing: [] Met: [] Not Met: [] Adjusted  2. Pt to negotiate up/down 7 stairs with step to pattern w/ single rail mod I.   [] Progressing: [] Met: [] Not Met: [] Adjusted   3. Pt to demonstrate improved gait pattern including improved R foot clearance and knee control without cues with use of AFO on right and an appropriate based cane. [] Progressing: [] Met: [] Not Met: [] Adjusted  4. Pt will improve Rosa to 42/56 to demonstrate reduced all risk. [x] Progressing: [] Met: [] Not Met: [] Adjusted  5. Pt independent with HEP. [] Progressing: [] Met: [] Not Met: [] Adjusted  6. Pt to report improved confidence with ambulating in community   [x] Progressing: [] Met: [] Not Met: [] Adjusted  7.  Pt will improve TUG time to 18 sec w/ least restrictive AD to improve functional ambulation. [x] Progressing: [] Met: [] Not Met: [] Adjusted  8. Pt will demonstrate 10m walk speed to 0.80m/s. [x] Progressing: [] Met: [] Not Met: [] Adjusted    ASSESSMENT: Pt continues to demonstrate good progression of gait speed and step length on TM. Attempted to further reduce B UE support on TM, consequence was slightly less tolerance of TM speed. Good carryover to OG gait, but stil limited by R LE fatigue and spasticity. Focused on R hip activation during there ex and neuromusc re-ed to reduce retraction during ambulation. Pt non-compliant w/ recommendation for AD use especially outdoors despite repeated education given on fall risk related to pt's current deficits. Pt will continue to benefit from skilled PT to improve functional mobility, reduce fall risk and maximize independence. Treatment/Activity Tolerance:  [x] Patient tolerated treatment well [] Patient limited by fatigue  [] Patient limited by pain  [] Patient limited by other medical complications  [] Other:     Overall Progression Towards Functional goals/ Treatment Progress Update:  [x] Patient is progressing as expected towards functional goals listed. [] Progression is slowed due to complexities/Impairments listed. [] Progression has been slowed due to co-morbidities.   [] Plan just implemented, too soon to assess goals progression <30days   [] Goals require adjustment due to lack of progress  [] Patient is not progressing as expected and requires additional follow up with physician  [] Other    Prognosis for POC: [x] Good [] Fair  [] Poor    Patient requires continued skilled intervention: [x] Yes  [] No        PLAN: 3x/week for 4 weeks then 2x/week for 8 weeks   [x] Continue per plan of care [] Alter current plan (see comments)  [] Plan of care initiated [] Hold pending MD visit [] Discharge    Electronically signed by: Dayron Walton, PT, DPT    Note: If patient does not return for scheduled/recommended follow up visits, this note will serve as a discharge from care along with the most recent update on progress.

## 2022-05-03 ENCOUNTER — HOSPITAL ENCOUNTER (OUTPATIENT)
Dept: PHYSICAL THERAPY | Age: 77
Setting detail: THERAPIES SERIES
Discharge: HOME | End: 2022-05-03
Payer: MEDICARE

## 2022-05-03 PROCEDURE — 97112 NEUROMUSCULAR REEDUCATION: CPT

## 2022-05-03 PROCEDURE — 97116 GAIT TRAINING THERAPY: CPT

## 2022-05-03 NOTE — FLOWSHEET NOTE
[facility] Outpatient Therapy  [address], [address], [address]  Phone: [phone]   Fax: [phone]    Physical Therapy Treatment Note/ Progress Report:     Date:  5/3/2022    Patient Name:  Jocelyn Staples    :  1945  MRN: [id number]    Medical/Treatment Diagnosis Information:  · Diagnosis: I63.9 (ICD-10-CM) - Cerebral infarction, unspecified  ·    R 26 abnormality of gait and mobility; R53.1 weakness, I63.9 cerebral infarction  Insurance/Certification information:  PT Insurance Information: Medicare  Physician Information:  Referring Practitioner: Dr. Demetris Trejo of [facility] signed:    [x] Yes  [] No    Date of Patient follow up with Physician:      Progress Report: []  Yes  [x]  No     Date Range for reporting period:  Beginning:  3/24/22   Endin22    Progress report due (10 Rx/or 30 days whichever is less):     Recertification due (POC duration/ or 90 days whichever is less):      Visit # Insurance Allowable Auth Needed    BMN []Yes   [x]No     RESTRICTIONS/PRECAUTIONS: receptive aphasia, Plavix and aspirin  Latex Allergy:  [x]NO      []YES  Preferred Language for Healthcare:   [x]English       []other:  Functional Scale: 10m walk= 0.37m/s; TUG 35.8 sec w/ RW; Zoran Corrales   Date assessed:3/24/22    Functional Scale: 10m walk= 0.56 m/s w/ SPC (after TM training 0.63m/s w/ SPC) ; TUG  20.6 sec (average) w/ SPC and close SBA; Zoran Corrales 37/56  Date assessed:22      Pain level:  0/10 R shoulder     SUBJECTIVE:  Pt reports she was running late and left home without her walking stick. OBJECTIVE: Pt 10 min. Late.  Observation: Pt presents amb without AD, inconsistent zac, unstable w/ minor veering to correct LOB.  Test measurements:        Exercises/Interventions: Exercises in bold performed in department today. Items not bolded are carried forward from prior visits for continuity of the record.   Pt requires cueing to maintain accurate rep count. VC to maintain eyes open during exercises to improve midline alignment due to mild R neglect still   Exercise/Equipment Resistance/Repetitions HEP Other comments   Nustep 8 min., L 5  Total: 8  Warm up: 2 min. Intervals: 1 min fast (120's spm x 1 min comfortable 80's spm)  Cool down: 1 min. [] Seat 8, arms 11  Less VC for R hip control due to excessive adduction  challenged to increase zac on fast interval.    R hamstring stretch 60 sec x 3 x Seated w/ belt   R gastroc stretch 30 sec x 3 [x] Standing lunge stretch   R soleus stretch 30 sec x 3 [] Standing lunge, B UE support, verbal cues for correct form    R knee to chest, heel on SB 5 x 2 [x] Supine, VC for neutral hip rotation and slow control of lowering   R/L hip abd 10 x 2 each [x] standing, VC for neutral hip rotation. Facilitation at pelvis on 2nd set to reduce substitutions   R bridging 10 x 1, 3 sec hold [] Blocking at R lateral knee and ankle to avoid hip adduction   R ankle DF/PF 10 x 1 [x] Long sitting, propped on extended UEs. Active assist to reduce inversion and knee flexion   Heel raises R only 5 x 2  B 10 x 1  B UE support, VC to avoid excessive lordosis as compensation   Sit to stand 8x [] Facilitation at lateral R knee to avoid medial knee collapse     []    NMR  []    Alt toe taps 10 x 1 to 4\" cones    10x 1 to 4\" cones w/ 2lb ankle R weight  [x] Standing w/ RW    Reduced ataxic movement, especially returning R foot to starting position     []    NMES to R ant tib  6 min. , standing w/ forward stepping in place for active ankle DF during swing [] VMS, phase duration 200, frequency 50pps, cycle time 5/5. Good contraction, nearly full ROM achieved.     Narrow SHU 30 sec    Trunk/UE rotations- 5x   Cervical/UE flex/ext- 5x    []   Post weight shift; poor eye hand coordination, cues to maintain eye contact on hands     Semi tandem 15 sec  Cervical rotation- 3x  []   LOB R w/ either foot in back   R knee to chest 5 x 3, (5 sec pause in lunge position)  yellow tband [] //bars w/ B UE support, less cueing on correct positioning. good ROM w/ overflow to ant tib. R toe taps Lunge to R foot on step- 10x    Less Cues after initial instruction for slow controlled movement to achieve hip flexor stretch and full foot clearance   R step ups 10x- light B UE support //bars, 4\" step  Cues to move slowly to prevent R knee hyperextension both at full WB on step and just before off loading to step down. Improved R knee control w/ practice   Gait      amb    Figure 8 amb through large objects w/ SPC    amb without AD,20' x 2 w/ close SBA,           amb 30' x4 immediately after TM training, w/ SPC, close SBA- good zac and maintains 2 point pattern more consistently          Improved continuity of stepping, improved B arm swing, but moderate guard posturing. Mult LOB typically to R and posterior, self corrects w/ min A      Improved cane manegement and step continuity after TM training compared to before. Continues w/ instability and minor LOB   [place] ladder 10' x 10, L UE support  VC to widen SHU, R toe catch on >50% of trials    Biodex TM  6 min + 4 min 1.8mph,   B UE support  metronome 70-72bpm. VC to attend to visual feedback for step length/symmetry. Less ataxia and inconsistent foot placement compared to previous sessions. worsening narrow SHU w/ circumduction w/ fatigue        3.5 min. @ 1.5mph and 2% incline, B UE support    3 min. Backwards walking 0.6mph, B UE support  Max HR 119bpm  Pt w/ reduced ataxia overall, best step length since starting TM training. Demos less R knee hyperextension w/ increased zac. Freq VC to reduce upper body tension and breath holding. Used safety switch only, no harness.   decreased R foot clearance w/ fatigue          VC for upright posture, decreased R foot clearance w/ fatigue   Lateral, forward/backwards walking Side stepping with min UE support, 10' x3  each in //bars forwards/backwards walking w/ SPC-10' x 4 each     frequent cueing for R foot clearance and avoiding pelvic rotation. R LE placement ataxic        Cues for increased attention to R foot placement, symmetrical step length w/ backwards walk   4 square step (tband used instead of canes) 3x each direction  18.4 sec on 1st trial, mult LOB, partially able to self correct, mod A on 1 occasion   TA: Reinforced again previous recommendation that pt should be walking w/ SPC or walking stick at a minimum due to fall risk after pt states she walks without it in the house too. At end of session recommended pt use clinic's SPC to amb to waiting room mod I, however she declines, \"I'm fine\" despite repeated encouragement to use SPC to both reduce fall risk and reduce deviations that will become habitual engrained pattern of walking if she continues to amb without AD at this point in her recovery. Home Exercise Program:  Access Code: [place] Fee: [email or web address]/  Date: 03/24/2022  Prepared by: Eric Madden  Exercises  Supine Knee to Chest with Leg Straight - 1 x daily - 7 x weekly - 1-3 sets - 5-10 reps  Supine Hip Abduction - 1 x daily - 7 x weekly - 1-3 sets - 5-10 reps  Seated Ankle Pumps - 1 x daily - 7 x weekly - 3 sets - 10 reps  Seated Toe Taps - 2 x daily - 7 x weekly - 3 sets - 10 reps     Access Code: [email or web address]  URL: [email or web address]  Date: 03/28/2022  Prepared by: Eric Madden  Exercises  Standing Gastroc Stretch at Counter - 1 x daily - 7 x weekly - 3 sets - 10 reps    Access Code: [email or web address]  URL: [email or web address]  Date: 04/19/2022  Prepared by: Eric Madden  Exercises  [facility] and DAMARIS - 1 x daily - 5 x weekly - 3 sets - 10 reps      Therapeutic Exercise:   [] (04289) Provided verbal/tactile cueing for activities related to strengthening, flexibility, endurance, ROM for improvements in LE, proximal hip, and core control with self-care, mobility, lifting, ambulation. NMR:  [x] (57454) Provided verbal/tactile cueing for activities related to improving balance, coordination, kinesthetic sense, posture, motor skill, proprioception to assist with LE, proximal hip, and core control in self-care, mobility, lifting, ambulation and eccentric single leg control. Therapeutic Activities:    [] (26400) Provided verbal/tactile cueing for activities related to improving balance, coordination, kinesthetic sense, posture, motor skill, proprioception and motor activation to allow for proper function of core, proximal hip and LE with self-care and ADLs and functional mobility. Gait Training:    [x] (23875) Provided training and instruction to the patient for proper LE, core and proximal hip recruitment and positioning and eccentric body weight control with ambulation re-education including up and down stairs     Manual Treatments:  PROM / STM / Oscillations-Mobs:  G-I, II, III, IV (PA's, Inf., Post.)  [] (36294) Provided manual therapy to mobilize LE, proximal hip and/or LS spine soft tissue/joints for the purpose of modulating pain, promoting relaxation,  increasing ROM, reducing/eliminating soft tissue swelling/inflammation/restriction, improving soft tissue extensibility and allowing for proper ROM for normal function with self-care, mobility, lifting and ambulation.      Home Exercise Program:    [] (27155) Reviewed/Progressed HEP activities related to strengthening, flexibility, endurance, ROM of core, proximal hip and LE for functional self-care, mobility, lifting and ambulation/stair navigation   [] (61329) Reviewed/Progressed HEP activities related to improving balance, coordination, kinesthetic sense, posture, motor skill, proprioception of core, proximal hip and LE for self-care, mobility, lifting, and ambulation/stair navigation      Modalities:    [] Electric Stimulation:   [] Ultrasound:   [] Other:       Charges:  Timed Code Treatment Minutes:  GT30, NM 16 ; TE 2   Total Treatment Minutes: 48      [] EVAL (LOW) 36717 (typically 20 minutes face-to-face)  [] EVAL (MOD) 58831 (typically 30 minutes face-to-face)  [] EVAL (HIGH) 94409 (typically 45 minutes face-to-face)  [] RE-EVAL     [] IW(26641) x       [x] NMR (17570) x 1    [] Manual (95539) x       [] TA (78882) x      [x] Gait Training (90106) x2      [] ES(attended) (64535)  [] ES (un) 33 93 31)   [] DRY NEEDLE 1 OR 2 MUSCLES  [] DRY NEEDLE 3+ MUSCLES  [] Mech Traction (81001)  [] Ultrasound (05316)  [] Other:    GOALS: Goals established 3/24/22   Patient stated goal: achieve better balance and walk. Pt reports walking better, now walking in the grocery store (previously using motorized cart). [x] Progressing: [] Met: [] Not Met: [] Adjusted    Therapist goals for Patient:   Short Term Goals: To be achieved in: 6 weeks   1. Independent in HEP and progression per patient tolerance. [x] Progressing: [] Met: [] Not Met: [] Adjusted   2. Pt will improve TUG time to 25 sec to improve functional ambulation. 21 sec w/ SPC and close SBA  [] Progressing: [x] Met: [] Not Met: [] Adjusted  3. Pt to perform transfers via stand-pivot technique with use of  small base quad cane [facility]) on left Modified Independent. SBA due to inconsistent management of AD (RW, quad cane or SPC)   [x] Progressing: [] Met: [] Not Met: [] Adjusted  4. Pt will improve Rosa to 32/56 to demonstrate reduced all risk. 38/61  [] Progressing: [x] Met: [] Not Met: [] Adjusted    Long Term Goals: To be achieved in: 12 weeks  1. Pt will improve ABC scale to 55% confidence for reduced fall risk. [] Progressing: [] Met: [] Not Met: [] Adjusted  2. Pt to negotiate up/down 7 stairs with step to pattern w/ single rail mod I.   [] Progressing: [] Met: [] Not Met: [] Adjusted   3. Pt to demonstrate improved gait pattern including improved R foot clearance and knee control without cues with use of AFO on right and an appropriate based cane. [] Progressing: [] Met: [] Not Met: [] Adjusted  4. Pt will improve Rosa to 42/56 to demonstrate reduced all risk. [x] Progressing: [] Met: [] Not Met: [] Adjusted  5. Pt independent with HEP. [] Progressing: [] Met: [] Not Met: [] Adjusted  6. Pt to report improved confidence with ambulating in community   [x] Progressing: [] Met: [] Not Met: [] Adjusted  7. Pt will improve TUG time to 18 sec w/ least restrictive AD to improve functional ambulation. [x] Progressing: [] Met: [] Not Met: [] Adjusted  8. Pt will demonstrate 10m walk speed to 0.80m/s. [x] Progressing: [] Met: [] Not Met: [] Adjusted    ASSESSMENT: Pt continues to demonstrate good progression of gait speed and step length on TM. Pt slightly more easily fatigued w/ TM requiring rest break part way through typical 10 minute bout. Good carryover to OG gait, but stil limited by R LE fatigue and spasticity causing instability and intermittent LOB. Pt non-compliant w/ recommendation for AD use despite repeated education given on fall risk related to pt's current deficits and risk of developing poor gait pattern. Pt will continue to benefit from skilled PT to improve functional mobility, reduce fall risk and maximize independence. Treatment/Activity Tolerance:  [x] Patient tolerated treatment well [] Patient limited by fatigue  [] Patient limited by pain  [] Patient limited by other medical complications  [] Other:     Overall Progression Towards Functional goals/ Treatment Progress Update:  [x] Patient is progressing as expected towards functional goals listed. [] Progression is slowed due to complexities/Impairments listed. [] Progression has been slowed due to co-morbidities.   [] Plan just implemented, too soon to assess goals progression <30days   [] Goals require adjustment due to lack of progress  [] Patient is not progressing as expected and requires additional follow up with physician  [] Other    Prognosis for POC: [x] Good [] Fair  [] Poor    Patient requires continued skilled intervention: [x] Yes  [] No        PLAN: 3x/week for 4 weeks then 2x/week for 8 weeks   [x] Continue per plan of care [] Alter current plan (see comments)  [] Plan of care initiated [] Hold pending MD visit [] Discharge    Electronically signed by: Ora Fabry, PT, DPT    Note: If patient does not return for scheduled/recommended follow up visits, this note will serve as a discharge from care along with the most recent update on progress.

## 2022-05-04 ENCOUNTER — HOSPITAL ENCOUNTER (OUTPATIENT)
Dept: OCCUPATIONAL THERAPY | Age: 77
Setting detail: THERAPIES SERIES
Discharge: HOME | End: 2022-05-04
Payer: MEDICARE

## 2022-05-04 PROCEDURE — 97110 THERAPEUTIC EXERCISES: CPT

## 2022-05-04 PROCEDURE — 97530 THERAPEUTIC ACTIVITIES: CPT

## 2022-05-04 NOTE — FLOWSHEET NOTE
[facility] Outpatient Therapy  [address] [place] [address], [address], [address]  Phone: [phone]   Fax: [phone]    Occupational Therapy Treatment Note/ Progress Report:     Date:  2022    Patient Name:  Dayanna Espinoza    :  1945  MRN: [id number]      Medical/Treatment Diagnosis Information:  I63.9 (ICD-10-CM) - Cerebral infarction, unspecified  M25.611 (ICD-10-CM) Stiffness of right shoulder, not elsewhere classified, R27.9 (ICD-10-CM) Unspecified lack of coordination, R53.1 (ICD-10-CM) Weakness        Insurance/Certification information: Medicare A & B  Physician Information:  Dr. Maximino Boogie of care signed:    Yes    Date of Patient follow up with Physician: none known at this time     Progress Report: []  Yes  [x]  No     Date Range for reporting period:  Beginnin2022   Ending:       Progress report due: 209     Recertification due (POC duration/ or 90 days whichever is less): 2022     Visit # Insurance Allowable Auth Needed    BMN No     Latex Allergy:  [x]NO      []YES  Preferred Language for Healthcare:   [x]English       []other:  Functional Scale:  (2022)  -- 95/100    Pain level: 0/10     SUBJECTIVE:  Pt reports no new complaints or concerns. She stated \"everything is going great except for my walking. \"  OBJECTIVE:   Observation:  Pt demo'd increased compensatory activation of R upper trap during AROM exercises in stance and unsupported short sit.  Test measurements:        RESTRICTIONS/PRECAUTIONS: Plavix and aspirin     Therapeutic Activities:    Activity #1:   Pt sat edge of mat and participated in block building activity to increase speed, accuracy, and coordination of R hand. Pt instructed to use R hand to place FtTyra velásquez blocks into various tower configurations modeled by OTS. Pt copied a tower using four blocks. Pt performed three sets: first untimed, second in 16 seconds, and third in 16.5 seconds.  Pt then copied a tower using seven blocks. Pt performed three sets: first untimed, second in 38.7 seconds, and third in 26.1 seconds. Finally, pt copied a tower using nine blocks. Pt performed two sets: first untimed and second in 34.4 seconds. Pt performed entire activity with one error and required occasional v/c to inhibit left lean. Patient Education:       Pt received education on the increased difficulty of AROM shoulder exercises in seated and supine vs gravity minimized planes. - verb understanding  Pt received continued education on self-monitoring during functional tasks and reporting difficulties back to therapy team to promote collaborative and client-centered POC. - verb understanding    Manual Therapy: RUE, supine unless otherwise indicated       Therapeutic Exercises: Exercises in bold performed in department today. Items not bolded are carried forward from prior visits for continuity of the record.     Exercise/Equipment Resistance/Repetitions HEP Other comments   BUE Shoulder flexion w/ dowel tatyana (supine)   1# dowel tatyana, x15 reps []    RUE shoulder abduction w/ dowel tatyana (supine)     1# dowel tatyana, x10 reps []    BUE shoulder flexion with walker   ~10 reps to ~100 degrees; ~5 second holds [x]    RUE shoulder flexion w/ UE Jackson   10 reps with 5 second holds [] Hands-on to inhibit compensatory activation of upper trap muscle and trunk rotation  Pt unable to achieve/maintain full elbow extension ROM despite min-mod v/c  ROM achieved ~ degrees     RUE shoulder abduction w/ UE Jackson    ~3 reps [] Hands-on to inhibit compensatory activation of upper trap muscle and trunk rotation  Pt unable to achieve full elbow extension despite v/c  ROM achieved ~90 degrees  Exercise discontinued due to increased R shoulder pain/fatigue   RUE AROM \"L\" with UE Jackson   10 reps []    RUE AROM Shoulder Flexion (supine)     10 reps with 5-10 second holds [x]    RUE AROM Shoulder Abduction (supine)     10 reps with one rest break [x]    RUE horizontal ab/adduction (supine)     10 reps [x]    BUE shoulder flexion w/ theraball 10 reps w/ 5-15 second holds []    Shoulder flexion w/ BUE placed on wall (stance) 10 reps w/ 5-10 second holds []    Shoulder abduction w/ RUE placed on wall (stance) 10 reps w 5-10 second holds []     RUE AROM Shoulder Abduction (unsupported short sit) ~6 reps [] Hands-on to inhibit compensatory activation of upper trap muscle  Pt unable to achieve/maintain full elbow extension ROM despite mod v/c  - ROM achieved ~ 90 degrees, pt reported fatigue with activity   RUE AROM shoulder Flexion (unsupported short sit)  ~6 reps []    Scapular depression (seated) 10 reps with 5 second holds    Bilateral hands placed on mat for closed chain scapular strengthening. [] Hands-on to inhibit compensatory activation of upper trap muscle and occasional trunk rotation    Intermittent tapping/tactile feedback to facilitate lower trap activation. Pt tolerated well. Scapular protraction / retraction RUE (seated) 20 reps    Cone slides on table to facilitate movement [] Hands-on to inhibit compensatory activation of upper trap muscle    Intermittent tactile feedback to facilitate activation of pectoralis minor and lower trap muscles, min - mod v/c to maintain elbow extension    Pt tolerated well. Scapular protraction / retraction BUE (seated) 10 reps    Cone slides on table to facilitate movement [] Hands-on to inhibit compensatory activation of upper trap muscle    Intermittent tactile feedback to facilitate activation of pectoralis minor and lower trap muscles       []      Home Exercise Program:  Self-assisted stretches for RUE using RW and wall as described above  Force use RUE/R hand    Access Code: [email or web address]  URL: [email or web address]  Date: 04/12/2022  Prepared by: Roney Okeefe    Exercises  Supine Shoulder Flexion Extension Full Range AROM - 2 x daily - 7 x weekly - 1 sets - 10 reps  Supine Shoulder Abduction AROM - 2 x daily - 7 x weekly - 1 sets - 10 reps  Supine Shoulder Horizontal Abduction - 2 x daily - 7 x weekly - 1 sets - 10 reps    Therapeutic Exercise:   [x] (75892) Provided verbal/tactile cueing for activities related to strengthening, flexibility, endurance, ROM. Includes review/progression of HEP activities related to strengthening, flexibility, endurance, AROM, proximal shoulder and UE for functional transfers/mobility, self-care, IADLs, and community re-entry    Therapeutic Activities:    [x] (44527) Provided verbal/tactile cueing for activities related to improving balance, coordination, kinesthetic sense, posture, motor skill, proprioception and motor activation to allow for proper function of core, proximal shoulder and UE with self-care, and ADLs, IADLs, functional mobility, work-related and leisure based activities. Includes review/progression of HEP activities to improve balance, coordination, kinesthetic sense, posture, motor skill, proprioception, proximal shoulder and UE for functional transfers/mobility, self-care, IADLs, and community re-entry    Sensory Integration Techniques:  [] (20908)Provided verbal/tactile feedback to enhance sensory processing and promoting responses that are adaptive to environmental demands    Self-Care/Home Management Training:  [] (68849) Provided training and education in restorative and compensatory strategies/activity modifications in activities of daily living, meal preparation, laundry and other various house maintenance activities. Provided training and education in use of adaptive equipment/devices and assistive technology, as needed, to promote participation and independence.     Cognitive Function:  [] (46509 x15 minutes, 71826 +15 minutes) Integrated activities that address attention, memory, reasoning, executive functioning, problem solving, and/or pragmatic functioning in addition to compensatory strategies to manage the performance of an activity (for example, managing time or schedules, initiating, organizing, and sequencing tasks). NMR:  [] (72193) During functional activities/therapeutic exercises, provided facilitation of normal movement patterns and provided handling/verbal cues to promote postural alignment and stability during static and dynamic movement (sitting or standing). Activities may also include visual perceptual training, proprioception training, and balance retraining during functional activities    Manual Treatments:    [] (42860) Provided manual therapy to mobilize UB for the purpose of modulating pain, promoting relaxation,  increasing ROM, reducing/eliminating soft tissue swelling/inflammation/restriction, improving soft tissue extensibility and allowing for proper ROM for normal function with self-care, functional mobility, transfers, reaching and lifting    Modalities:     [] Electrical Stimulation (24963):     [] Ultrasound (54004):    Charges:  Timed Code Treatment Minutes: 54   Total Treatment Minutes: 54      [] EVAL (LOW) 14185 (typically 20 minutes face-to-face)  [] EVAL (MOD) 93523 (typically 30 minutes face-to-face)  [] EVAL (HIGH) 69105 (typically 45 minutes face-to-face)  [] RE-EVAL     [x] ED(05603)  x 38 (3)  [] NMR (90951)     [] Manual ([id number])   [x] TA (11116)  X 16 (1)   [] ES(attended) (39742)       [] ES (un) (59811)  [] Other:    GOALS: Patient stated goal: \"get back to normal\"       []? Progressing: []? Met: []? Not Met: []? Adjusted     Therapist goals for Patient:      Short Term Goals: To be achieved in: 6 weeks  1. Pt will improve score on UEFS to 40/100 for a subjective report of decreased difficulty with completing every day activities with RUE and R hand  []? Progressing: [x]? Met: []? Not Met: []? Adjusted  2. Pt will improve time on NHPT to more than 45 seconds to demonstrate improved fine motor coordination of R hand  []? Progressing: [x]? Met: []? Not Met: []? Adjusted  3.  Pt will improve score on BBT to 40 blocks/minute to demonstrate improved timing, speed and accuracy with functional grasp of right hand. [x]? Progressing: []? Met: []? Not Met: []? Adjusted  4. Pt will be Min A with HEP/Home activities program to facilitate independence with carryover from sessions and to progress towards returning to PLOF  [x]? Progressing: []? Met: []? Not Met: []? Adjusted   5. Pt will write a pangram sentence in no more than 1 minute demo'ing 85% legibility to demo improved fine motor control and speed of R hand. [x]? Progressing: []? Met: []? Not Met: []? Adjusted      Long Term Goals: To be achieved in: 12 weeks  1. Pt will improve score on UEFS to 65/100 for a subjective report of decreased difficulty with completing every day activities with RUE and R hand  []? Progressing: [x]? Met: []? Not Met: [x]? Adjusted    1 Adjusted: Pt will improve score on UEFS to 100/100 for a subjective report of decreased difficulty with completing every day activities with RUE and R hand  []? Progressing: []? Met: []? Not Met: []? Adjusted     2. Pt will improve time on NHPT to more than 30 seconds to demonstrate improved fine motor coordination of R hand  []? Progressing: []? Met: []? Not Met: []? Adjusted  3. Pt will improve score on BBT to 50 blocks/minute to demonstrate improved timing, speed and accuracy with functional grasp of right hand. []? Progressing: []? Met: []? Not Met: []? Adjusted  4. Pt will be Independent with HEP/Home activities program to facilitate independence with carryover from sessions and to progress towards returning to PLOF  []? Progressing: []? Met: []? Not Met: []? Adjusted   5. Pt will write a pangram sentence in no more than 45 seconds minute demo'ing 95% legibility to demo improved fine motor control and speed of R hand. []? Progressing: []? Met: []? Not Met: []?  Adjusted      ASSESSMENT:      Pt demo'd good tolerance to seated scapular strengthening exercises and benefited from tactile feedback to facilitate effective coordination of shoulder girdle muscles. Throughout session, pt also benefited from handling to inhibit compensatory activation of upper trap causing R shoulder hike and ineffective posturing during AROM in stance or seated positions. Pt continues to demo decreased AROM of RUE due to muscle shortening and weakness during sessions. Pt continues to benefit from skilled OT intervention to promote normal movement patterns, cont per POC. Treatment/Activity Tolerance:  [x] Patient tolerated treatment well [] Patient limited by fatique  [] Patient limited by pain  [] Patient limited by other medical complications  [] Other:     Overall Progression Towards Functional goals/ Treatment Progress Update:  [x] Patient is progressing as expected towards functional goals listed. [] Progression is slowed due to complexities/Impairments listed. [] Progression has been slowed due to co-morbidities. [] Plan just implemented, too soon to assess goals progression <30days   [] Goals require adjustment due to lack of progress  [] Patient is not progressing as expected and requires additional follow up with physician  [] Other    Prognosis for POC: [x] Good [] Fair  [] Poor    Patient requires continued skilled intervention: [x] Yes  [] No      PLAN:   [x] Continue per plan of care [] Alter current plan (see comments)  [] Plan of care initiated [] Hold pending MD visit [] Discharge    Electronically signed by: Adamaris Tillman OTS    Note: If patient does not return for scheduled/recommended follow up visits, this note will serve as a discharge from care along with the most recent update on progress.

## 2022-05-09 ENCOUNTER — HOSPITAL ENCOUNTER (OUTPATIENT)
Dept: PHYSICAL THERAPY | Age: 77
Setting detail: THERAPIES SERIES
Discharge: HOME | End: 2022-05-09
Payer: MEDICARE

## 2022-05-09 ENCOUNTER — HOSPITAL ENCOUNTER (OUTPATIENT)
Dept: OCCUPATIONAL THERAPY | Age: 77
Setting detail: THERAPIES SERIES
Discharge: HOME | End: 2022-05-09
Payer: MEDICARE

## 2022-05-09 PROCEDURE — 97112 NEUROMUSCULAR REEDUCATION: CPT

## 2022-05-09 PROCEDURE — 97530 THERAPEUTIC ACTIVITIES: CPT

## 2022-05-09 PROCEDURE — 97110 THERAPEUTIC EXERCISES: CPT

## 2022-05-09 PROCEDURE — 97116 GAIT TRAINING THERAPY: CPT

## 2022-05-09 NOTE — FLOWSHEET NOTE
[facility]  [address] [place] [address], MYA Silvestre [address], [address]  Phone: [phone]   Fax: [phone]    Occupational Therapy Treatment Note/ Progress Report:     Date:  2022    Patient Name:  Jessica Beasley    :  1945  MRN: [id number]      Medical/Treatment Diagnosis Information:  I63.9 (ICD-10-CM) - Cerebral infarction, unspecified  M25.611 (ICD-10-CM) Stiffness of right shoulder, not elsewhere classified, R27.9 (ICD-10-CM) Unspecified lack of coordination, R53.1 (ICD-10-CM) Weakness        Insurance/Certification information: Medicare A & B  Physician Information:  Dr. Elliot Bazan of care signed:    Yes    Date of Patient follow up with Physician: none known at this time     Progress Report: []  Yes  [x]  No     Date Range for reporting period:  Beginnin2022   Ending:       Progress report due:      Recertification due (POC duration/ or 90 days whichever is less): 2022     Visit # Insurance Allowable Auth Needed    BMN No     Latex Allergy:  [x]NO      []YES  Preferred Language for Healthcare:   [x]English       []other:  Functional Scale:  (2022)  -- 95/100    Pain level: 0/10     SUBJECTIVE:  Pt reports reports consistently completing HEP exercises at home and that they are going well. Pt reports no new complaints or concerns. OBJECTIVE:   Observation:  Pt demo'd good awareness to activate elbow extensors and inhibit trunk rotation with visual feedback.  Test measurements:        RESTRICTIONS/PRECAUTIONS: Plavix and aspirin     Therapeutic Activities:    Activity #1:   Pt participated in reaching and folding activity. Pt instructed to fold towel using BUE while holding towel at or above eye level. Pt performed one time while seated edge of mat. Pt was then instructed to fold a sheet while in stance using BUE and holding sheet at or above eye level whenever possible. Pt performed with supervision for safety. Pt then ambulated ~ 10 feet while holding two towels and one sheet and simulated placing linens on high shelf 3x. Pt performed with CGA for safety due to refusal to utilize cane during activity. Pt tolerated well and reported activities to be easy. Patient Education:     Pt received education on importance of setting aside dedicated time for isolated shoulder stretches at home - verb understanding  Pt received continued education on use of heat prior to therapy and stretching - verb understanding  Pt received continued education on reporting back to therapy team with functional limitations noted at home to promote client-centered POC - verb understanding      Manual Therapy: RUE, supine unless otherwise indicated      Therapeutic Exercises: Exercises in bold performed in department today. Items not bolded are carried forward from prior visits for continuity of the record.     Exercise/Equipment Resistance/Repetitions HEP Other comments   BUE Shoulder flexion w/ dowel tatyana (supine)   1# dowel tatyana, x10 reps with five second holds [] ROM achieved ~ 120-130 degrees    Pt demo'd increased carryover and awareness to activate elbow extensors    RUE shoulder abduction w/ dowel tatyana (supine)     1# dowel tatyana, x10 reps with 10 second holds [] Intermittent STM to coracobrachialis     Mod support to reach end range, pt unable to maintain elbow extension despite min v/c     ROM achieved ~ 100 degrees   BUE shoulder flexion with walker   ~10 reps to ~100 degrees; ~5 second holds [x]    RUE shoulder flexion w/ UE [place]   10 reps with 5 second holds []      RUE shoulder abduction w/ UE [place]    ~3 reps []    RUE AROM \"L\" with UE [place]   10 reps []    RUE AROM Shoulder Flexion (supine)     10 reps with 5-10 second holds [x]    RUE AROM Shoulder Abduction (supine)     10 reps with one rest break [x]    RUE horizontal ab/adduction (supine)     10 reps [x]    BUE shoulder flexion w/ theraball 10 reps w/ 5-15 second holds [] Shoulder flexion w/ BUE placed on wall (stance) 10 reps w/ 5-10 second holds []    Shoulder abduction w/ RUE placed on wall (stance) 10 reps w 5-10 second holds []     RUE AROM Shoulder Abduction (unsupported short sit) 10 reps [] Hands-on to inhibit over activation of upper trap     Min v/c to inhibit trunk rotation and mirror added for visual feedback    Intermittent tapping to facilitate activation of middle delt    Pt demo'd increased awareness to activate elbow extensors by self-initiating in later reps    ROM achieved ~ 90 degrees   RUE AROM shoulder Flexion (unsupported short sit)  10 reps [] Hands-on to inhibit over activation of upper trap  Tapping to facilitate activation of anterior delt    Mirror for visual feedback    min v/c to extend elbow, pt demo'd increased awareness to activate elbow extensors by self-initiating in later reps    ROM achieved ~ 110 degrees   Scapular depression (seated) 10 reps with 5 second holds    Bilateral hands placed on mat for closed chain scapular strengthening. [] Hands-on to inhibit compensatory activation of upper trap muscle     Intermittent tapping/tactile feedback to facilitate lower trap activation. Mirror added for visual feedback, pt tolerated well. Scapular protraction / retraction RUE (seated) 20 reps    Cone slides on table to facilitate movement []       Scapular protraction / retraction BUE (seated) 10 reps    Cone slides on table to facilitate movement []        []      Home Exercise Program:  Self-assisted stretches for RUE using RW and wall as described above   Force use RUE/R hand    Access Code: [email or web address]  URL: [email or web address]  Date: 04/12/2022  Prepared by: King Bartolo    Exercises  Supine Shoulder Flexion Extension Full Range AROM - 2 x daily - 7 x weekly - 1 sets - 10 reps  Supine Shoulder Abduction AROM - 2 x daily - 7 x weekly - 1 sets - 10 reps  Supine Shoulder Horizontal Abduction - 2 x daily - 7 x weekly - 1 sets - 10 reps    Therapeutic Exercise:   [x] (38562) Provided verbal/tactile cueing for activities related to strengthening, flexibility, endurance, ROM. Includes review/progression of HEP activities related to strengthening, flexibility, endurance, AROM, proximal shoulder and UE for functional transfers/mobility, self-care, IADLs, and community re-entry    Therapeutic Activities:    [x] (46180) Provided verbal/tactile cueing for activities related to improving balance, coordination, kinesthetic sense, posture, motor skill, proprioception and motor activation to allow for proper function of core, proximal shoulder and UE with self-care, and ADLs, IADLs, functional mobility, work-related and leisure based activities. Includes review/progression of HEP activities to improve balance, coordination, kinesthetic sense, posture, motor skill, proprioception, proximal shoulder and UE for functional transfers/mobility, self-care, IADLs, and community re-entry    Sensory Integration Techniques:  [] (43681)Provided verbal/tactile feedback to enhance sensory processing and promoting responses that are adaptive to environmental demands    Self-Care/Home Management Training:  [] (73723) Provided training and education in restorative and compensatory strategies/activity modifications in activities of daily living, meal preparation, laundry and other various house maintenance activities. Provided training and education in use of adaptive equipment/devices and assistive technology, as needed, to promote participation and independence. Cognitive Function:  [] (78807 x15 minutes, 72329 +15 minutes) Integrated activities that address attention, memory, reasoning, executive functioning, problem solving, and/or pragmatic functioning in addition to compensatory strategies to manage the performance of an activity (for example, managing time or schedules, initiating, organizing, and sequencing tasks).     NMR:  [] (22715) During functional activities/therapeutic exercises, provided facilitation of normal movement patterns and provided handling/verbal cues to promote postural alignment and stability during static and dynamic movement (sitting or standing). Activities may also include visual perceptual training, proprioception training, and balance retraining during functional activities    Manual Treatments:    [] (76081) Provided manual therapy to mobilize UB for the purpose of modulating pain, promoting relaxation,  increasing ROM, reducing/eliminating soft tissue swelling/inflammation/restriction, improving soft tissue extensibility and allowing for proper ROM for normal function with self-care, functional mobility, transfers, reaching and lifting    Modalities:     [] Electrical Stimulation (29879):     [] Ultrasound (05469):    Charges:  Timed Code Treatment Minutes: 53   Total Treatment Minutes: 53      [] EVAL (LOW) 30170 (typically 20 minutes face-to-face)  [] EVAL (MOD) 51776 (typically 30 minutes face-to-face)  [] EVAL (HIGH) 16849 (typically 45 minutes face-to-face)  [] RE-EVAL     [x] VH(22577)  x38  (3)  [] NMR (86015)     [] Manual ([id number])   [x] TA ([id number]  (1)   [] ES(attended) (52630)       [] ES (un) (75003)  [] Other:    GOALS: Patient stated goal: \"get back to normal\"       []? Progressing: []? Met: []? Not Met: []? Adjusted     Therapist goals for Patient:      Short Term Goals: To be achieved in: 6 weeks  1. Pt will improve score on UEFS to 40/100 for a subjective report of decreased difficulty with completing every day activities with RUE and R hand  []? Progressing: [x]? Met: []? Not Met: []? Adjusted  2. Pt will improve time on NHPT to more than 45 seconds to demonstrate improved fine motor coordination of R hand  []? Progressing: [x]? Met: []? Not Met: []? Adjusted  3. Pt will improve score on BBT to 40 blocks/minute to demonstrate improved timing, speed and accuracy with functional grasp of right hand. [x]?  Progressing: []? Met: []? Not Met: []? Adjusted  4. Pt will be Min A with HEP/Home activities program to facilitate independence with carryover from sessions and to progress towards returning to PLOF  [x]? Progressing: []? Met: []? Not Met: []? Adjusted   5. Pt will write a pangram sentence in no more than 1 minute demo'ing 85% legibility to demo improved fine motor control and speed of R hand. [x]? Progressing: []? Met: []? Not Met: []? Adjusted      Long Term Goals: To be achieved in: 12 weeks  1. Pt will improve score on UEFS to 65/100 for a subjective report of decreased difficulty with completing every day activities with RUE and R hand  []? Progressing: [x]? Met: []? Not Met: [x]? Adjusted    1 Adjusted: Pt will improve score on UEFS to 100/100 for a subjective report of decreased difficulty with completing every day activities with RUE and R hand  []? Progressing: []? Met: []? Not Met: []? Adjusted     2. Pt will improve time on NHPT to more than 30 seconds to demonstrate improved fine motor coordination of R hand  []? Progressing: []? Met: []? Not Met: []? Adjusted  3. Pt will improve score on BBT to 50 blocks/minute to demonstrate improved timing, speed and accuracy with functional grasp of right hand. []? Progressing: []? Met: []? Not Met: []? Adjusted  4. Pt will be Independent with HEP/Home activities program to facilitate independence with carryover from sessions and to progress towards returning to PLOF  []? Progressing: []? Met: []? Not Met: []? Adjusted   5. Pt will write a pangram sentence in no more than 45 seconds minute demo'ing 95% legibility to demo improved fine motor control and speed of R hand. []? Progressing: []? Met: []? Not Met: []? Adjusted      ASSESSMENT:      Pt demo'd good tolerance to functional reach activity. However, pt continues to be limited in AROM in RUE in against gravity planes due to muscle weakness, shortening, and tightness.  Pt benefited from tactile feedback to promote coordinated movement of shoulder girdle muscles during seated exercises. Overall, pt continues to benefit from handling to inhibit ineffective posturing and poor alignment consistent with habitual movement patterns. Pt continues to benefit from skilled OT intervention, cont per POC. Treatment/Activity Tolerance:  [x] Patient tolerated treatment well [] Patient limited by fatique  [] Patient limited by pain  [] Patient limited by other medical complications  [] Other:     Overall Progression Towards Functional goals/ Treatment Progress Update:  [x] Patient is progressing as expected towards functional goals listed. [] Progression is slowed due to complexities/Impairments listed. [] Progression has been slowed due to co-morbidities. [] Plan just implemented, too soon to assess goals progression <30days   [] Goals require adjustment due to lack of progress  [] Patient is not progressing as expected and requires additional follow up with physician  [] Other    Prognosis for POC: [x] Good [] Fair  [] Poor    Patient requires continued skilled intervention: [x] Yes  [] No      PLAN:   [x] Continue per plan of care [] Alter current plan (see comments)  [] Plan of care initiated [] Hold pending MD visit [] Discharge    Electronically signed by: Sascha Forrester OTS    Note: If patient does not return for scheduled/recommended follow up visits, this note will serve as a discharge from care along with the most recent update on progress.

## 2022-05-09 NOTE — FLOWSHEET NOTE
[facility] Outpatient Therapy  [address] [place] [address], MYA Silvest[address], [address]  Phone: [phone]   Fax: [phone]    Physical Therapy Treatment Note/ Progress Report:     Date:  2022    Patient Name:  Sophia Wells    :  1945  MRN: [id number]    Medical/Treatment Diagnosis Information:  · Diagnosis: I63.9 (ICD-10-CM) - Cerebral infarction, unspecified  ·    R 26 abnormality of gait and mobility; R53.1 weakness, I63.9 cerebral infarction  Insurance/Certification information:  PT Insurance Information: Medicare  Physician Information:  Referring Practitioner: Dr. Merline Quiroz of care signed:    [x] Yes  [] No    Date of Patient follow up with Physician:      Progress Report: []  Yes  [x]  No     Date Range for reporting period:  Beginning:  3/24/22   Endin22    Progress report due (10 Rx/or 30 days whichever is less):     Recertification due (POC duration/ or 90 days whichever is less):      Visit # Insurance Allowable Auth Needed    BMN []Yes   [x]No     RESTRICTIONS/PRECAUTIONS: receptive aphasia, Plavix and aspirin  Latex Allergy:  [x]NO      []YES  Preferred Language for Healthcare:   [x]English       []other:  Functional Scale: 10m walk= 0.37m/s; TUG 35.8 sec w/ RW; Marsha Monten[place] 27/  Date assessed:3/24/22    Functional Scale: 10m walk= 0.56 m/s w/ SPC (after TM training 0.63m/s w/ SPC) ; TUG  20.6 sec (average) w/ SPC and close SBA; Marsha [place] 37/56  Date assessed:22      Pain level:  0/10 R shoulder     SUBJECTIVE:  Pt reports she rode stationary bike for 15 min this weekend. Pt reports walking for 20 min w/ grocery cart at store. Reports compliance w/ HEP. OBJECTIVE:       Observation: Pt presents amb with walking stick.  Test measurements:        Exercises/Interventions: Exercises in bold performed in department today. Items not bolded are carried forward from prior visits for continuity of the record.   Pt requires cueing to maintain accurate rep count. VC to maintain eyes open during exercises to improve midline alignment due to mild R neglect still   Exercise/Equipment Resistance/Repetitions HEP Other comments   Nustep 8 min., L 5  Total: 8  Warm up: 2 min. Intervals: 1 min fast (120's spm x 1 min comfortable 80's spm)  Cool down: 1 min. [] Seat 8, arms 11  Less VC for R hip control due to excessive adduction  challenged to increase zac on fast interval.    R hamstring stretch 60 sec x 3 x Seated w/ belt   R gastroc stretch 30 sec x 3 [x] Standing lunge stretch   R soleus stretch 30 sec x 3 [] Standing lunge, B UE support, verbal cues for correct form    R knee to chest, heel on SB 5 x 2 [x] Supine, VC for neutral hip rotation and slow control of lowering   R/L hip abd 10 x 2 each [x] standing, VC for neutral hip rotation. Less compensation   R bridging 10 x 1, 3 sec hold [] Blocking at R lateral knee and ankle to avoid hip adduction   R ankle DF/PF 10 x 1 [x] Long sitting, propped on extended UEs. Active assist to reduce inversion and knee flexion   Heel raises R only 5 x 2  B 5 x 1  B UE support, VC to avoid excessive UE supprt and lordosis as compensation   Sit to stand 8x [] Facilitation at lateral R knee to avoid medial knee collapse     []    NMR  []    Alt toe taps 10 x 1 to 4\" cones    10x 1 to 4\" cones w/ 2lb ankle R weight  [x] Standing w/ RW    Reduced ataxic movement, especially returning R foot to starting position     []    NMES to R ant tib  6 min. , standing w/ forward stepping in place for active ankle DF during swing [] VMS, phase duration 200, frequency 50pps, cycle time 5/5. Good contraction, nearly full ROM achieved.     Narrow SHU 30 sec    Trunk/UE rotations- 5x   Cervical/UE flex/ext- 5x    []   Post weight shift; poor eye hand coordination, cues to maintain eye contact on hands     Semi tandem 15 sec  Cervical rotation- 3x  []   LOB R w/ either foot in back   R knee to chest 10 x 2, (5 sec pause in lunge position)  red tband [] //bars w/ B UE support, less cueing on correct positioning. good ROM w/ overflow to ant tib. R toe taps Lunge to R foot on step- 10x    Less Cues after initial instruction for slow controlled movement to achieve hip flexor stretch and full foot clearance   R step ups 10x- light B UE support //bars, 4\" step  Cues to move slowly to prevent R knee hyperextension both at full WB on step and just before off loading to step down. Improved R knee control w/ practice   Gait      amb    Figure 8 amb through large objects w/ SPC    amb without AD,20' x 3 w/ close SBA        amb 30' [id number] after TM training, without AD, close SBA/CGA due to mult LOB and self corrections. Improved continuity of stepping, improved B arm swing, but moderate guard posturing. Improved cane manegement and step continuity after TM training compared to before. Continues w/ instability and minor LOB   Local Rep[employer] ladder 10' x 10, L UE support  VC to widen SHU, R toe catch on >50% of trials    Biodex TM  10 min 1.8mph,   B UE support  metronome 70-74bpm. Added dual task of counting by 3's for final 4 minutes. Reduced breath control, discontinuous counting. Less ataxia and inconsistent foot placement compared to previous sessions. more circumduction w/ fatigue        5 min. @ 1.5mph and 4% incline, B UE support    4 min. Backwards walking 0.7mph, B UE support  Max HR 123bpm  Pt w/ reduced ataxia overall, continues w/ gradual improvements in step length. Demos less R knee hyperextension w/ increased zac. Freq VC to reduce upper body tension and breath holding. Used safety switch only, no harness. decreased R foot clearance w/ fatigue              VC for reduced UE support   Lateral, forward/backwards walking Side stepping with min UE support, 10' x3  each in //bars       forwards/backwards walking w/ SPC-20' x 2 each     frequent cueing for R foot clearance and avoiding pelvic rotation.  R LE placement ataxic      Cues for increased attention to R foot placement, symmetrical step length w/ backwards walk   4 square step (tband used instead of canes) 3x each direction  18.4 sec on 1st trial, mult LOB, partially able to self correct, mod A on 1 occasion   TA:       Home Exercise Program:  Access Code: [email or web address]  URL: [email or web address]  Date: 03/24/2022  Prepared by: Greg Dawn  Exercises  Supine Knee to Chest with Leg Straight - 1 x daily - 7 x weekly - 1-3 sets - 5-10 reps  Supine Hip Abduction - 1 x daily - 7 x weekly - 1-3 sets - 5-10 reps  Seated Ankle Pumps - 1 x daily - 7 x weekly - 3 sets - 10 reps  Seated Toe Taps - 2 x daily - 7 x weekly - 3 sets - 10 reps     Access Code: [email or web address]  URL: [email or web address]  Date: 03/28/2022  Prepared by: Greg Dawn  Exercises  Standing Gastroc Stretch at Counter - 1 x daily - 7 x weekly - 3 sets - 10 reps  Access Code: [email or web address]  URL: [email or web address]  Date: 04/19/2022  Prepared by: Gerg Hill  Exercises  [facility] Back and DAMARIS - 1 x daily - 5 x weekly - 3 sets - 10 reps      Therapeutic Exercise:   [x] (56273) Provided verbal/tactile cueing for activities related to strengthening, flexibility, endurance, ROM for improvements in LE, proximal hip, and core control with self-care, mobility, lifting, ambulation. NMR:  [x] (91293) Provided verbal/tactile cueing for activities related to improving balance, coordination, kinesthetic sense, posture, motor skill, proprioception to assist with LE, proximal hip, and core control in self-care, mobility, lifting, ambulation and eccentric single leg control.      Therapeutic Activities:    [] (48361) Provided verbal/tactile cueing for activities related to improving balance, coordination, kinesthetic sense, posture, motor skill, proprioception and motor activation to allow for proper function of core, proximal hip and LE with self-care and ADLs and functional mobility. Gait Training:    [x] (15390) Provided training and instruction to the patient for proper LE, core and proximal hip recruitment and positioning and eccentric body weight control with ambulation re-education including up and down stairs     Manual Treatments:  PROM / STM / Oscillations-Mobs:  G-I, II, III, IV (PA's, Inf., Post.)  [] (78525) Provided manual therapy to mobilize LE, proximal hip and/or LS spine soft tissue/joints for the purpose of modulating pain, promoting relaxation,  increasing ROM, reducing/eliminating soft tissue swelling/inflammation/restriction, improving soft tissue extensibility and allowing for proper ROM for normal function with self-care, mobility, lifting and ambulation. Home Exercise Program:    [] (58964) Reviewed/Progressed HEP activities related to strengthening, flexibility, endurance, ROM of core, proximal hip and LE for functional self-care, mobility, lifting and ambulation/stair navigation   [] (41178) Reviewed/Progressed HEP activities related to improving balance, coordination, kinesthetic sense, posture, motor skill, proprioception of core, proximal hip and LE for self-care, mobility, lifting, and ambulation/stair navigation      Modalities:    [] Electric Stimulation:   [] Ultrasound:   [] Other:       Charges:  Timed Code Treatment Minutes:  GT27, NM 20 ; TE 8   Total Treatment Minutes: 55      [] EVAL (LOW) 61025 (typically 20 minutes face-to-face)  [] EVAL (MOD) 75274 (typically 30 minutes face-to-face)  [] EVAL (HIGH) 08780 (typically 45 minutes face-to-face)  [] RE-EVAL     [x] GE(09061) x  1    [x] NMR (30281) x 1    [] Manual (35534) x       [] TA (63840) x      [x] Gait Training (32946) x2      [] ES(attended) (50901)  [] ES (un) (84020)   [] DRY NEEDLE 1 OR 2 MUSCLES  [] DRY NEEDLE 3+ MUSCLES  [] Mech Traction (85551)  [] Ultrasound (10433)  [] Other:    GOALS: Goals established 3/24/22   Patient stated goal: achieve better balance and walk.  Pt reports walking better, now walking in the grocery store (previously using motorized cart). [x] Progressing: [] Met: [] Not Met: [] Adjusted    Therapist goals for Patient:   Short Term Goals: To be achieved in: 6 weeks   1. Independent in HEP and progression per patient tolerance. [x] Progressing: [] Met: [] Not Met: [] Adjusted   2. Pt will improve TUG time to 25 sec to improve functional ambulation. 21 sec w/ SPC and close SBA  [] Progressing: [x] Met: [] Not Met: [] Adjusted  3. Pt to perform transfers via stand-pivot technique with use of  small base quad cane [facility]) on left Modified Independent. SBA due to inconsistent management of AD (RW, quad cane or SPC)   [x] Progressing: [] Met: [] Not Met: [] Adjusted  4. Pt will improve Rosa to 32/56 to demonstrate reduced all risk. 38/61  [] Progressing: [x] Met: [] Not Met: [] Adjusted    Long Term Goals: To be achieved in: 12 weeks  1. Pt will improve ABC scale to 55% confidence for reduced fall risk. [] Progressing: [] Met: [] Not Met: [] Adjusted  2. Pt to negotiate up/down 7 stairs with step to pattern w/ single rail mod I.   [] Progressing: [] Met: [] Not Met: [] Adjusted   3. Pt to demonstrate improved gait pattern including improved R foot clearance and knee control without cues with use of AFO on right and an appropriate based cane. [] Progressing: [] Met: [] Not Met: [] Adjusted  4. Pt will improve Rosa to 42/56 to demonstrate reduced all risk. [x] Progressing: [] Met: [] Not Met: [] Adjusted  5. Pt independent with HEP. [] Progressing: [] Met: [] Not Met: [] Adjusted  6. Pt to report improved confidence with ambulating in community   [x] Progressing: [] Met: [] Not Met: [] Adjusted  7. Pt will improve TUG time to 18 sec w/ least restrictive AD to improve functional ambulation. [x] Progressing: [] Met: [] Not Met: [] Adjusted  8. Pt will demonstrate 10m walk speed to 0.80m/s.    [x] Progressing: [] Met: [] Not Met: [] Adjusted    ASSESSMENT: Pt continues to demonstrate good progression of gait speed and step length on TM. Pt's stamina slightly better today than end of last week requiring minimal seated rest break until end of session. Good carryover to OG gait, but stil limited by R LE fatigue and spasticity causing instability and intermittent LOB. Pt will continue to benefit from skilled PT to improve functional mobility, reduce fall risk and maximize independence. Treatment/Activity Tolerance:  [x] Patient tolerated treatment well [] Patient limited by fatigue  [] Patient limited by pain  [] Patient limited by other medical complications  [] Other:     Overall Progression Towards Functional goals/ Treatment Progress Update:  [x] Patient is progressing as expected towards functional goals listed. [] Progression is slowed due to complexities/Impairments listed. [] Progression has been slowed due to co-morbidities. [] Plan just implemented, too soon to assess goals progression <30days   [] Goals require adjustment due to lack of progress  [] Patient is not progressing as expected and requires additional follow up with physician  [] Other    Prognosis for POC: [x] Good [] Fair  [] Poor    Patient requires continued skilled intervention: [x] Yes  [] No        PLAN: 3x/week for 4 weeks then 2x/week for 8 weeks   [x] Continue per plan of care [] Alter current plan (see comments)  [] Plan of care initiated [] Hold pending MD visit [] Discharge    Electronically signed by: Flynn Jacobsen PT, DPT    Note: If patient does not return for scheduled/recommended follow up visits, this note will serve as a discharge from care along with the most recent update on progress.

## 2022-05-11 ENCOUNTER — HOSPITAL ENCOUNTER (OUTPATIENT)
Dept: OCCUPATIONAL THERAPY | Age: 77
Setting detail: THERAPIES SERIES
Discharge: HOME | End: 2022-05-11
Payer: MEDICARE

## 2022-05-11 ENCOUNTER — HOSPITAL ENCOUNTER (OUTPATIENT)
Dept: PHYSICAL THERAPY | Age: 77
Setting detail: THERAPIES SERIES
Discharge: HOME | End: 2022-05-11
Payer: MEDICARE

## 2022-05-11 PROCEDURE — 97110 THERAPEUTIC EXERCISES: CPT

## 2022-05-11 PROCEDURE — 97116 GAIT TRAINING THERAPY: CPT

## 2022-05-11 PROCEDURE — 97112 NEUROMUSCULAR REEDUCATION: CPT

## 2022-05-11 PROCEDURE — 97530 THERAPEUTIC ACTIVITIES: CPT

## 2022-05-11 NOTE — FLOWSHEET NOTE
[facility] Outpatient Therapy  [address] [place] [address], MYA [address], [address]  Phone: [phone]   Fax: [phone]    Physical Therapy Treatment Note/ Progress Report:     Date:  2022    Patient Name:  Michelle Saeed    :  1945  MRN: [id number]    Medical/Treatment Diagnosis Information:  · Diagnosis: I63.9 (ICD-10-CM) - Cerebral infarction, unspecified  ·    R 26 abnormality of gait and mobility; R53.1 weakness, I63.9 cerebral infarction  Insurance/Certification information:  PT Insurance Information: Medicare  Physician Information:  Referring Practitioner: Dr. Estephania Minor of care signed:    [x] Yes  [] No    Date of Patient follow up with Physician:      Progress Report: []  Yes  [x]  No     Date Range for reporting period:  Beginning:  3/24/22   Endin22    Progress report due (10 Rx/or 30 days whichever is less):     Recertification due (POC duration/ or 90 days whichever is less):      Visit # Insurance Allowable Auth Needed    BMN []Yes   [x]No     RESTRICTIONS/PRECAUTIONS: receptive aphasia, Plavix and aspirin  Latex Allergy:  [x]NO      []YES  Preferred Language for Healthcare:   [x]English       []other:  Functional Scale: 10m walk= 0.37m/s; TUG 35.8 sec w/ RW; Marti Poles   Date assessed:3/24/22    Functional Scale: 10m walk= 0.56 m/s w/ SPC (after TM training 0.63m/s w/ SPC) ; TUG  20.6 sec (average) w/ SPC and close SBA; Rosa   Date assessed:22       Pain level:  0/10 R shoulder     SUBJECTIVE:  Pt reports doing well. Did stepper machine this morning. OBJECTIVE:       Observation: Pt presents amb with walking stick.  Test measurements:        Exercises/Interventions: Exercises in bold performed in department today. Items not bolded are carried forward from prior visits for continuity of the record. Pt requires cueing to maintain accurate rep count.    VC to maintain eyes open during exercises to improve midline alignment due to mild R neglect still   Exercise/Equipment Resistance/Repetitions HEP Other comments   Nustep 8 min., L 5  Total: 8  Warm up: 2 min. Intervals: 1 min fast (120's spm x 1 min comfortable 80's spm)  Cool down: 1 min. [] Seat 8, arms 11  Less VC for R hip control due to excessive adduction  challenged to increase zac on fast interval.    R hamstring stretch 60 sec x 3 x Seated w/ belt   R gastroc stretch 30 sec x 3 [x] Standing lunge stretch   R soleus stretch 30 sec x 3 [] Standing lunge, B UE support, verbal cues for correct form    R knee to chest, heel on SB 5 x 2 [x] Supine, VC for neutral hip rotation and slow control of lowering   R/L hip abd 10 x 2 each [x] standing, VC for neutral hip rotation. facilitation for R hip neutral.    R bridging 10 x 1, 3 sec hold [] Blocking at R lateral knee and ankle to avoid hip adduction   R ankle DF/PF 10 x 1 [x] Long sitting, propped on extended UEs. Active assist to reduce inversion and knee flexion   Heel raises B w/ weight shifted R 10 x 2, partial ROM    B UE support, VC to follow demo accurately    Sit to stand 8x [] Facilitation at lateral R knee to avoid medial knee collapse     []    NMR  []    Step ups 10x 2 to 6\" step from lunge    10x 1, foot lift off from 6\" step, focus on hip flex, ankle DF for full clearance   [x] Progressed to Light L UE support, cues to exaggerate R LE lift due to circumduction ascending, catching toes on descent, inconsistent foot placecment. []    NMES to R ant tib  6 min. , standing w/ forward stepping in place for active ankle DF during swing [] VMS, phase duration 200, frequency 50pps, cycle time 5/5. Good contraction, nearly full ROM achieved.     Narrow SHU 30 sec    Trunk/UE rotations- 5x   Cervical/UE flex/ext- 5x    []   Post weight shift; poor eye hand coordination, cues to maintain eye contact on hands     Semi tandem 15 sec  Cervical rotation- 3x  []   LOB R w/ either foot in back   R knee to chest 10 x 2, (5 sec pause in lunge position)  red tband [] //bars w/ B UE support, less cueing on correct positioning. good ROM w/ overflow to ant tib. R toe taps Lunge to R foot on step- 10x    Less Cues after initial instruction for slow controlled movement to achieve hip flexor stretch and full foot clearance   R step ups 10x- light B UE support //bars, 4\" step  Cues to move slowly to prevent R knee hyperextension both at full WB on step and just before off loading to step down. Improved R knee control w/ practice   Gait      amb    Figure 8 amb through large objects w/ SPC    amb without AD, 20' 2 w/ close SBA          amb 80' x 2 immediately after TM training, without AD, close SBA/CGA due to mult LOB and self corrections. Improved continuity of stepping, improved B arm swing, but moderate guard posturing until cued,         Continues w/ spatic gait, instability and minor LOB   [employer] ladder 10' x 10, L UE support  VC to widen SHU, R toe catch on >50% of trials    [employer] TM  10 min 1.8-1.9mph,   B UE support  metronome 70-74bpm. Added dual task of categorical naming, counting by 4's for final 4 minutes. Pt firmly refused spelling backwards task \"that's too yrn\". circumduction w/ fatigue        5 min. @ 1.5mph and 4% incline, B UE support     4 min. Backwards walking 0.7mph, B UE support  Max HR 123bpm  Pt w/ reduced ataxia overall, continues w/ gradual improvements in step length. Demos less R knee hyperextension w/ increased zac. Freq VC to reduce upper body tension and breath holding. Used safety switch only, no harness. decreased R foot clearance w/ fatigue            VC for reduced UE support   Lateral, forward/backwards walking Side stepping with min UE support, 10' x3  each in //bars       forwards/backwards walking w/ SPC-20' x 2 each     frequent cueing for R foot clearance and avoiding pelvic rotation.  R LE placement ataxic      Cues for increased attention to R foot placement, symmetrical step length w/ backwards walk   4 square step (tband used instead of canes) 3x each direction  mult LOB, partially able to self correct, mod A on 1 occasion   TA:       Home Exercise Program:  Access Code: [email or web address]  URL: [email or web address]  Date: 03/24/2022  Prepared by: Mimi West  Exercises  Supine Knee to Chest with Leg Straight - 1 x daily - 7 x weekly - 1-3 sets - 5-10 reps  Supine Hip Abduction - 1 x daily - 7 x weekly - 1-3 sets - 5-10 reps  Seated Ankle Pumps - 1 x daily - 7 x weekly - 3 sets - 10 reps  Seated Toe Taps - 2 x daily - 7 x weekly - 3 sets - 10 reps     Access Code: [email or web address]  URL: [email or web address]/  Date: 03/28/2022  Prepared by: Mimi West  Exercises  Standing Gastroc Stretch at Counter - 1 x daily - 7 x weekly - 3 sets - 10 reps  Access Code: [email or web address]  URL: [email or web address]  Date: 04/19/2022  Prepared by: Mimi West  Exercises  [facility] AND [facility] Back and DAMARIS - 1 x daily - 5 x weekly - 3 sets - 10 reps       Therapeutic Exercise:   [x] (51890) Provided verbal/tactile cueing for activities related to strengthening, flexibility, endurance, ROM for improvements in LE, proximal hip, and core control with self-care, mobility, lifting, ambulation. NMR:  [x] (77489) Provided verbal/tactile cueing for activities related to improving balance, coordination, kinesthetic sense, posture, motor skill, proprioception to assist with LE, proximal hip, and core control in self-care, mobility, lifting, ambulation and eccentric single leg control. Therapeutic Activities:    [] (39494) Provided verbal/tactile cueing for activities related to improving balance, coordination, kinesthetic sense, posture, motor skill, proprioception and motor activation to allow for proper function of core, proximal hip and LE with self-care and ADLs and functional mobility.      Gait Training:    [x] (65842) Provided training and instruction to the patient for proper LE, core and proximal hip recruitment and positioning and eccentric body weight control with ambulation re-education including up and down stairs     Manual Treatments:  PROM / STM / Oscillations-Mobs:  G-I, II, III, IV (PA's, Inf., Post.)  [] (30277) Provided manual therapy to mobilize LE, proximal hip and/or LS spine soft tissue/joints for the purpose of modulating pain, promoting relaxation,  increasing ROM, reducing/eliminating soft tissue swelling/inflammation/restriction, improving soft tissue extensibility and allowing for proper ROM for normal function with self-care, mobility, lifting and ambulation. Home Exercise Program:    [] (58860) Reviewed/Progressed HEP activities related to strengthening, flexibility, endurance, ROM of core, proximal hip and LE for functional self-care, mobility, lifting and ambulation/stair navigation   [] (80438) Reviewed/Progressed HEP activities related to improving balance, coordination, kinesthetic sense, posture, motor skill, proprioception of core, proximal hip and LE for self-care, mobility, lifting, and ambulation/stair navigation      Modalities:    [] Electric Stimulation:   [] Ultrasound:   [] Other:       Charges:  Timed Code Treatment Minutes:  GT27, NM 20 ; TE 8   Total Treatment Minutes: 57      [] EVAL (LOW) 78624 (typically 20 minutes face-to-face)  [] EVAL (MOD) 93837 (typically 30 minutes face-to-face)  [] EVAL (HIGH) 86180 (typically 45 minutes face-to-face)  [] RE-EVAL     [x] RE(40430) x  1    [x] NMR (89514) x 1    [] Manual (46070) x       [] TA (75403) x      [x] Gait Training (36668) x2      [] ES(attended) (18906)  [] ES (un) (71414)   [] DRY NEEDLE 1 OR 2 MUSCLES  [] DRY NEEDLE 3+ MUSCLES  [] Mech Traction (25201)  [] Ultrasound (06145)  [] Other:    GOALS: Goals established 3/24/22   Patient stated goal: achieve better balance and walk. Pt reports walking better, now walking in the grocery store (previously using motorized cart).    [x] Progressing: [] Met: [] Not Met: [] Adjusted    Therapist goals for Patient:   Short Term Goals: To be achieved in: 6 weeks   1. Independent in HEP and progression per patient tolerance. [x] Progressing: [] Met: [] Not Met: [] Adjusted   2. Pt will improve TUG time to 25 sec to improve functional ambulation. 21 sec w/ SPC and close SBA  [] Progressing: [x] Met: [] Not Met: [] Adjusted  3. Pt to perform transfers via stand-pivot technique with use of  small base quad cane [facility]) on left Modified Independent. SBA due to inconsistent management of AD (RW, quad cane or SPC)   [x] Progressing: [] Met: [] Not Met: [] Adjusted  4. Pt will improve Rosa to 32/56 to demonstrate reduced all risk. 38/61  [] Progressing: [x] Met: [] Not Met: [] Adjusted    Long Term Goals: To be achieved in: 12 weeks  1. Pt will improve ABC scale to 55% confidence for reduced fall risk. [] Progressing: [] Met: [] Not Met: [] Adjusted  2. Pt to negotiate up/down 7 stairs with step to pattern w/ single rail mod I.   [] Progressing: [] Met: [] Not Met: [] Adjusted   3. Pt to demonstrate improved gait pattern including improved R foot clearance and knee control without cues with use of AFO on right and an appropriate based cane. [] Progressing: [] Met: [] Not Met: [] Adjusted  4. Pt will improve Rosa to 42/56 to demonstrate reduced all risk. [x] Progressing: [] Met: [] Not Met: [] Adjusted  5. Pt independent with HEP. [] Progressing: [] Met: [] Not Met: [] Adjusted  6. Pt to report improved confidence with ambulating in community   [x] Progressing: [] Met: [] Not Met: [] Adjusted  7. Pt will improve TUG time to 18 sec w/ least restrictive AD to improve functional ambulation. [x] Progressing: [] Met: [] Not Met: [] Adjusted  8. Pt will demonstrate 10m walk speed to 0.80m/s. [x] Progressing: [] Met: [] Not Met: [] Adjusted    ASSESSMENT: Pt continues to demonstrate good progression of gait speed and step length on TM.  Challenged by added dual cognitive task. Good carryover to OG gait, but stil limited by R LE fatigue and spasticity causing instability and intermittent LOB. Pt recommended to continue amb w/ SPC. Pt will continue to benefit from skilled PT to improve functional mobility, reduce fall risk and maximize independence. Treatment/Activity Tolerance:  [x] Patient tolerated treatment well [] Patient limited by fatigue  [] Patient limited by pain  [] Patient limited by other medical complications  [] Other:     Overall Progression Towards Functional goals/ Treatment Progress Update:  [x] Patient is progressing as expected towards functional goals listed. [] Progression is slowed due to complexities/Impairments listed. [] Progression has been slowed due to co-morbidities. [] Plan just implemented, too soon to assess goals progression <30days   [] Goals require adjustment due to lack of progress  [] Patient is not progressing as expected and requires additional follow up with physician  [] Other    Prognosis for POC: [x] Good [] Fair  [] Poor    Patient requires continued skilled intervention: [x] Yes  [] No        PLAN: 3x/week for 4 weeks then 2x/week for 8 weeks   [x] Continue per plan of care [] Alter current plan (see comments)  [] Plan of care initiated [] Hold pending MD visit [] Discharge    Electronically signed by: Little Wright, PT, DPT    Note: If patient does not return for scheduled/recommended follow up visits, this note will serve as a discharge from care along with the most recent update on progress.

## 2022-05-11 NOTE — PROGRESS NOTES
[facility]  [address] [place] [address] [address], [address]  Phone: [phone]   Fax: [phone]    Occupational Therapy Treatment Note/ Progress Report:     Date:  2022    Patient Name:  Natalie Martinez    :  1945  MRN: [id number]      Medical/Treatment Diagnosis Information:  I63.9 (ICD-10-CM) - Cerebral infarction, unspecified  M25.611 (ICD-10-CM) Stiffness of right shoulder, not elsewhere classified, R27.9 (ICD-10-CM) Unspecified lack of coordination, R53.1 (ICD-10-CM) Weakness        Insurance/Certification information: Medicare A & B  Physician Information:  Dr. Jose Jenkins of care signed:    Yes    Date of Patient follow up with Physician: none known at this time     Progress Report: [x]  Yes  []  No     Date Range for reporting period:  Beginnin2022    Endin2022    Progress report due: 1705     Recertification due (POC duration/ or 90 days whichever is less): 2022     Visit # Insurance Allowable Auth Needed    BMN No     Latex Allergy:  [x]NO      []YES  Preferred Language for Healthcare:   [x]English       []other:  Functional Scale:  (2022)  -- 98.75/100    Pain level: 0/10     SUBJECTIVE: Pt reports no new concerns or complaints. Pt also reports driving since last appointment and having no difficulty with task. OBJECTIVE:   Observation: Pt demo'd good awareness to inhibit trunk rotation and self-correct ineffective posturing during closed chain bimanual shoulder flexion stretch.     Test measurements:          Initial Assessment (3/24/2022) Progress Note (2022) Progress Note (2022)     UEFS     8.75/100  95/100 98.75/100   NHPT  1 minute 2 seconds  43.03 seconds 30.16 (2nd trial due to misunderstanding directions)    Pt satisfaction with performance: 9/10   BBT  27 blocks in one minute  38 blocks in one minute 37 blocks in one minute    Pt satisfaction with performance: 10/10   HEP NA  Pt given formal HEP during session - plan to follow up. Pt performed shoulder abduction and flexion with intermittent v/c to maintain elbow extension. (supine) Pt reports being consistent with exercises at home. Pt reports not completing shoulder horizontal abd/adduction ex at home because she feels it isn't needed.    Writing a pangram (time and legibility)     100% legibility  1 minute 29 seconds ~62.3% legibility   50.470 seconds 100% legibility  59.15 seconds                RESTRICTIONS/PRECAUTIONS: Plavix and aspirin     Therapeutic Activities:    Activity #1:     Patient Education:      Pt received education on importance of waiting until receiving clearance from PCP before driving for her safety - verb understanding  Pt received education on liability associated with driving before readiness has been fully assessed. - verb understanding  Pt received education on OP OT resources available to assist with driving readiness assessment - verb understanding   Pt received education on progress assessment norms and interpretation of assessment results - verb understanding  Pt received education on evolving POC - verb understanding and participated in collaborative goal making     Manual Therapy: RUE, supine unless otherwise indicated      Therapeutic Exercises: Exercises in bold performed in department today. Items not bolded are carried forward from prior visits for continuity of the record.     Exercise/Equipment Resistance/Repetitions HEP Other comments   BUE Shoulder flexion w/ dowel tatyana (supine)   1# dowel tatyana, x10 reps with five second holds []    RUE shoulder abduction w/ dowel tatyana (supine)     1# dowel tatyana, x10 reps with 10 second holds []    BUE shoulder flexion with walker   ~10 reps to ~100 degrees; ~5 second holds []    RUE shoulder flexion w/ UE [place]   10 reps with 5 second holds []      RUE shoulder abduction w/ UE [place]    ~3 reps []    RUE AROM \"L\" with UE [place]   10 reps []    RUE AROM Shoulder Flexion (supine)     10 reps  [x] Difficulty maintaining elbow extension despite v/c     ROM achieved ~130 degrees   RUE AROM Shoulder Abduction (supine)     10 reps  [x] Progressive ROM with continued reps, pt able to maintain elbow extension with min v/c    ROM achieved ~120 degrees in final 3 reps   RUE horizontal ab/adduction (supine)     8 reps [x] Required hands-on demonstration for first rep, able to maintain elbow extension through full ROM for remaining reps   BUE shoulder flexion w/ theraball 10 reps w/ 5-15 second holds []    Shoulder flexion w/ BUE placed on wall (stance) 10 reps with 5 second holds [] Occasionally hands-on to inhibit trunk rotation and over activation of upper trap, pt self-corrected malalignment at trunk in final reps    ROM achieved ~ 130 degrees   Shoulder abduction w/ RUE placed on wall (stance) 8 reps with 5 second holds [] Occasionally hands-on to inhibit trunk rotation    Pt demo'd difficulty maintaining elbow extension despite min v/c    ROM achieved ~120 degrees   RUE AROM Shoulder Abduction (unsupported short sit) 10 reps []    RUE AROM shoulder Flexion (unsupported short sit)  10 reps []    Scapular depression (seated) 10 reps with 5 second holds    Bilateral hands placed on mat for closed chain scapular strengthening. []    Scapular protraction / retraction RUE (seated) 20 reps    Cone slides on table to facilitate movement []       Scapular protraction / retraction BUE (seated) 10 reps    Cone slides on table to facilitate movement []        []      Home Exercise Program:  Self-assisted stretches for RUE using RW and wall as described above   Force use RUE/R hand    Access Code: [email or web address]  URL: [email or web address]  Date: 04/12/2022  Prepared by: Amon Callas    Exercises  Supine Shoulder Flexion Extension Full Range AROM - 2 x daily - 7 x weekly - 1 sets - 10 reps  Supine Shoulder Abduction AROM - 2 x daily - 7 x weekly - 1 sets - 10 reps  Supine Shoulder Horizontal Abduction - 2 x daily - 7 x weekly - 1 sets - 10 reps    Therapeutic Exercise:   [x] (46996) Provided verbal/tactile cueing for activities related to strengthening, flexibility, endurance, ROM. Includes review/progression of HEP activities related to strengthening, flexibility, endurance, AROM, proximal shoulder and UE for functional transfers/mobility, self-care, IADLs, and community re-entry    Therapeutic Activities:    [x] (65540) Provided verbal/tactile cueing for activities related to improving balance, coordination, kinesthetic sense, posture, motor skill, proprioception and motor activation to allow for proper function of core, proximal shoulder and UE with self-care, and ADLs, IADLs, functional mobility, work-related and leisure based activities. Includes review/progression of HEP activities to improve balance, coordination, kinesthetic sense, posture, motor skill, proprioception, proximal shoulder and UE for functional transfers/mobility, self-care, IADLs, and community re-entry    Sensory Integration Techniques:  [] (48245)Provided verbal/tactile feedback to enhance sensory processing and promoting responses that are adaptive to environmental demands    Self-Care/Home Management Training:  [] (81895) Provided training and education in restorative and compensatory strategies/activity modifications in activities of daily living, meal preparation, laundry and other various house maintenance activities. Provided training and education in use of adaptive equipment/devices and assistive technology, as needed, to promote participation and independence.     Cognitive Function:  [] (54601 x15 minutes, 29936 +15 minutes) Integrated activities that address attention, memory, reasoning, executive functioning, problem solving, and/or pragmatic functioning in addition to compensatory strategies to manage the performance of an activity (for example, managing time or schedules, initiating, organizing, and sequencing tasks). NMR:  [] (02102) During functional activities/therapeutic exercises, provided facilitation of normal movement patterns and provided handling/verbal cues to promote postural alignment and stability during static and dynamic movement (sitting or standing). Activities may also include visual perceptual training, proprioception training, and balance retraining during functional activities    Manual Treatments:    [] (14962) Provided manual therapy to mobilize UB for the purpose of modulating pain, promoting relaxation,  increasing ROM, reducing/eliminating soft tissue swelling/inflammation/restriction, improving soft tissue extensibility and allowing for proper ROM for normal function with self-care, functional mobility, transfers, reaching and lifting    Modalities:     [] Electrical Stimulation (97132):     [] Ultrasound (40605):    Charges:  Timed Code Treatment Minutes: 57   Total Treatment Minutes: 57      [] EVAL (LOW) 59857 (typically 20 minutes face-to-face)  [] EVAL (MOD) 69028 (typically 30 minutes face-to-face)  [] EVAL (HIGH) 84399 (typically 45 minutes face-to-face)  [] RE-EVAL     [x] RK(66655)  x24  (2)  [] NMR (16922)     [] Manual ([id number])   [x] TA ()  X33  (2)   [] ES(attended) (90702)       [] ES (un) (34618)  [] Other:    GOALS: Patient stated goal: \"get back to normal\"       []? Progressing: []? Met: []? Not Met: []? Adjusted     Therapist goals for Patient:      Short Term Goals: To be achieved in: 6 weeks  1. Pt will improve score on UEFS to 40/100 for a subjective report of decreased difficulty with completing every day activities with RUE and R hand  []? Progressing: [x]? Met: []? Not Met: []? Adjusted  2. Pt will improve time on NHPT to more than 45 seconds to demonstrate improved fine motor coordination of R hand  []? Progressing: [x]? Met: []? Not Met: []? Adjusted  3.  Pt will improve score on BBT to 40 blocks/minute to demonstrate improved timing, speed and accuracy with functional grasp of right hand. [x]? Progressing: []? Met: []? Not Met: []? Adjusted  4. Pt will be Min A with HEP/Home activities program to facilitate independence with carryover from sessions and to progress towards returning to PLOF   []? Progressing: [x]? Met: (05/11/22) []? Not Met: []? Adjusted   5. Pt will write a pangram sentence in no more than 1 minute demo'ing 85% legibility to demo improved fine motor control and speed of R hand. []? Progressing: [x]? Met: (05/11/22) []? Not Met: []? Adjusted      Long Term Goals: To be achieved in: 12 weeks  1. Pt will improve score on UEFS to 65/100 for a subjective report of decreased difficulty with completing every day activities with RUE and R hand  []? Progressing: [x]? Met: []? Not Met: [x]? Adjusted    1 Adjusted: Pt will improve score on UEFS to 100/100 for a subjective report of decreased difficulty with completing every day activities with RUE and R hand  [x]? Progressing: []? Met: [x]? Not Met: []? Adjusted     2. Pt will improve time on NHPT to more than 30 seconds to demonstrate improved fine motor coordination of R hand  [x]? Progressing: []? Met: [x]? Not Met: []? Adjusted - Discontinued  3. Pt will improve score on BBT to 50 blocks/minute to demonstrate improved timing, speed and accuracy with functional grasp of right hand. [x]? Progressing: []? Met: [x]? Not Met: []? Adjusted - Discontinued  4. Pt will be Independent with HEP/Home activities program to facilitate independence with carryover from sessions and to progress towards returning to PLOF  []? Progressing: []? Met: [x]? Not Met: [x]? Adjusted   4 Adjusted. Pt will be Independent with HEP/Home activities program in supine to facilitate independence with carryover from sessions and to progress towards returning to PLOF   []? Progressing: []? Met: []? Not Met: []? Adjusted  5.  Pt will write a pangram sentence in no more than 45 seconds minute demo'ing 95% legibility to demo improved fine motor control and speed of R hand. [x]? Progressing: []? Met: [x]? Not Met: []? Adjusted - Discontinued    Additional Long Term Goals:  6. Pt will utilize teach-back method with OTS to report driving readiness recommendations with less than two errors to demonstrate understanding. []? Progressing: []? Met: []? Not Met: []? Adjusted   7. Pt will complete additional HEP/home activities program in supported short sit with supervision to promote carryover of R shoulder strengthening exercises and to progress towards PLOF. []? Progressing: []? Met: []? Not Met: []? Adjusted      ASSESSMENT:      On this date, pt met two additional short-term goals since most recent progress note for a total of 4/5 short-term goals and 0/5 long-term goals achieved. Pt demo'd increased fine motor control and fine motor coordination with R hand during [place] Peg Test and handwriting assessments. Pt satisfied with functionality of R hand including timing and speed. Long-term goals targeting R hand speed, timing, grasp, and fine motor control discontinued due to patient preference. Pt continues to be limited in RUE AROM due to muscle shortening, weakness, and tightness, but benefits from skilled handling to inhibit ineffective posturing consistent with habitual movement patterns. New goals were created collaboratively to address pt's interest in return to driving and to promote functional use of RUE through increased carryover and independence with HEP. Pt continues to benefit from skilled OT intervention, continue per POC. Pt in agreement with plan. Treatment/Activity Tolerance:  [x] Patient tolerated treatment well [] Patient limited by fatique  [] Patient limited by pain  [] Patient limited by other medical complications  [] Other:     Overall Progression Towards Functional goals/ Treatment Progress Update:  [x] Patient is progressing as expected towards functional goals listed.     [] Progression is slowed due to complexities/Impairments listed. [] Progression has been slowed due to co-morbidities. [] Plan just implemented, too soon to assess goals progression <30days   [] Goals require adjustment due to lack of progress  [] Patient is not progressing as expected and requires additional follow up with physician  [] Other    Prognosis for POC: [x] Good [] Fair  [] Poor    Patient requires continued skilled intervention: [x] Yes  [] No      PLAN:   [x] Continue per plan of care with additional goals [] Alter current plan (see comments)  [] Plan of care initiated [] Hold pending MD visit [] Discharge    Electronically signed by: Adamaris RECINOS    Note: If patient does not return for scheduled/recommended follow up visits, this note will serve as a discharge from care along with the most recent update on progress.

## 2022-05-16 ENCOUNTER — HOSPITAL ENCOUNTER (OUTPATIENT)
Dept: PHYSICAL THERAPY | Age: 77
Setting detail: THERAPIES SERIES
Discharge: HOME | End: 2022-05-16
Payer: MEDICARE

## 2022-05-16 ENCOUNTER — HOSPITAL ENCOUNTER (OUTPATIENT)
Dept: OCCUPATIONAL THERAPY | Age: 77
Setting detail: THERAPIES SERIES
Discharge: HOME | End: 2022-05-16
Payer: MEDICARE

## 2022-05-16 PROCEDURE — 97530 THERAPEUTIC ACTIVITIES: CPT

## 2022-05-16 PROCEDURE — 97110 THERAPEUTIC EXERCISES: CPT

## 2022-05-16 PROCEDURE — 97116 GAIT TRAINING THERAPY: CPT

## 2022-05-16 PROCEDURE — 97112 NEUROMUSCULAR REEDUCATION: CPT

## 2022-05-16 NOTE — FLOWSHEET NOTE
[facility]  [address] [place] [address], [facility], [address]  Phone: [phone]   Fax: [phone]    Physical Therapy Treatment Note/ Progress Report:     Date:  2022    Patient Name:  Michelle Saeed    :  1945  MRN: [id number]    Medical/Treatment Diagnosis Information:  · Diagnosis: I63.9 (ICD-10-CM) - Cerebral infarction, unspecified  ·    R 26 abnormality of gait and mobility; R53.1 weakness, I63.9 cerebral infarction  Insurance/Certification information:  PT Insurance Information: Medicare  Physician Information:  Referring Practitioner: Dr. Estephania Minor of care signed:    [x] Yes  [] No    Date of Patient follow up with Physician:      Progress Report: []  Yes  [x]  No     Date Range for reporting period:  Beginning:  3/24/22   Endin22    Progress report due (10 Rx/or 30 days whichever is less): 30    Recertification due (POC duration/ or 90 days whichever is less):      Visit # Insurance Allowable Auth Needed    BMN []Yes   [x]No     RESTRICTIONS/PRECAUTIONS: receptive aphasia, Plavix and aspirin  Latex Allergy:  [x]NO      []YES  Preferred Language for Healthcare:   [x]English       []other:  Functional Scale: 10m walk= 0.37m/s; TUG 35.8 sec w/ RW; Marti Poles   Date assessed:3/24/22    Functional Scale: 10m walk= 0.56 m/s w/ SPC (after TM training 0.63m/s w/ SPC) ; TUG  20.6 sec (average) w/ SPC and close SBA; Rosa   Date assessed:22       Pain level:  0/10 R shoulder     SUBJECTIVE:  Pt reports doing well. OBJECTIVE:       Observation: Pt presents amb with walking stick.  Test measurements:        Exercises/Interventions: Exercises in bold performed in department today. Items not bolded are carried forward from prior visits for continuity of the record. Pt requires cueing to maintain accurate rep count.    VC to maintain eyes open during exercises to improve midline alignment due to mild R neglect still   Exercise/Equipment Resistance/Repetitions HEP Other comments   Nustep 8 min., L 5  Total: 8  Warm up: 2 min. Intervals: 1 min fast (120's spm x 1 min comfortable 80's spm)  Cool down: 1 min. [] Seat 8, arms 11  Less VC for R hip control due to excessive adduction  challenged to increase zac on fast interval.    R hamstring stretch 60 sec x 3 x Seated w/ belt   R gastroc stretch 30 sec x 3 [x] Standing lunge stretch   R soleus stretch 30 sec x 3 [] Standing lunge, B UE support, verbal cues for correct form    R knee to chest, heel on SB 5 x 2 [x] Supine, VC for neutral hip rotation and slow control of lowering   R/L hip abd 10 x 3 each [x] standing, VC for neutral hip rotation. facilitation for R hip neutral.    R bridging 10 x 1, 3 sec hold [] Blocking at R lateral knee and ankle to avoid hip adduction   R ankle DF/PF 10 x 1 [x] Long sitting, propped on extended UEs. Active assist to reduce inversion and knee flexion   Heel raises B w/ weight shifted R 10 x 2, partial ROM    B UE support, VC to follow demo accurately    Sit to stand 8x [] Facilitation at lateral R knee to avoid medial knee collapse     []    NMR  []    Step ups 10x 2 to 6\" step from lunge    10x 2, foot lift off from 6\" step, focus on hip flex, ankle DF for full clearance   [x] Progressed to Light L UE support, cues to exaggerate R LE lift due to circumduction ascending, catching toes on descent, inconsistent foot placecment. []    NMES to R ant tib  6 min. , standing w/ forward stepping in place for active ankle DF during swing [] VMS, phase duration 200, frequency 50pps, cycle time 5/5. Good contraction, nearly full ROM achieved.     Narrow SHU 30 sec    Trunk/UE rotations- 5x   Cervical/UE flex/ext- 5x    []   Post weight shift; poor eye hand coordination, cues to maintain eye contact on hands     Semi tandem 15 sec  Cervical rotation- 3x  []   LOB R w/ either foot in back   R knee to chest 10 x 2, (5 sec pause in lunge position)  red tband [] //bars w/ B UE support, less cueing on correct positioning. good ROM w/ overflow to ant tib. R toe taps Lunge to R foot on step- 10x    Less Cues after initial instruction for slow controlled movement to achieve hip flexor stretch and full foot clearance   R step ups 10x- light B UE support //bars, 4\" step  Cues to move slowly to prevent R knee hyperextension both at full WB on step and just before off loading to step down. Improved R knee control w/ practice   Gait      amb    Figure 8 amb through large objects w/ SPC    amb without AD, 20' 2 w/ close SBA          amb 80' x 2 immediately after TM training, without AD, close SBA/CGA due to mult LOB and self corrections. Improved continuity of stepping, improved B arm swing, but moderate guard posturing until cued,         Continues w/ spatic gait, instability and minor LOB   [employer] ladder 10' x 10, L UE support  VC to widen SHU, R toe catch on >50% of trials    [employer] TM  10 min 1.9-2.0mph,   B UE support  metronome 78-80bpm.   1 min. Bouts x 4 of no L UE support. Pt very challenged and self select return to B UE support. Added dual task of categorical naming,       5 min. @ 1.5mph and 4% incline, B UE support     4 min. Backwards walking 0.7mph, B UE support  Max HR 121bpm  Pt w/ reduced ataxia overall, continues w/ gradual improvements in step length. Demos less R knee hyperextension w/ increased zac. Freq VC to reduce upper body tension and breath holding. Used safety switch only, no harness. decreased R foot clearance w/ fatigue            VC for reduced UE support   Lateral, forward/backwards walking Side stepping with min UE support, 10' x3  each in //bars       forwards/backwards walking w/ SPC-20' x 2 each     frequent cueing for R foot clearance and avoiding pelvic rotation.  R LE placement ataxic      Cues for increased attention to R foot placement, symmetrical step length w/ backwards walk   4 square step (canes) 3x each direction  mult LOB, partially able to self correct, mod A mult occasions   TA:       Home Exercise Program:  Access Code: [email or web address]  URL: [employer]/  Date: 03/24/2022  Prepared by: Rosendo Marker  Exercises  Supine Knee to Chest with Leg Straight - 1 x daily - 7 x weekly - 1-3 sets - 5-10 reps  Supine Hip Abduction - 1 x daily - 7 x weekly - 1-3 sets - 5-10 reps  Seated Ankle Pumps - 1 x daily - 7 x weekly - 3 sets - 10 reps  Seated Toe Taps - 2 x daily - 7 x weekly - 3 sets - 10 reps     Access Code: [email or web address]  URL: [email or web address]  Date: 03/28/2022  Prepared by: Rosendo Marker  Exercises  Standing Gastroc Stretch at Counter - 1 x daily - 7 x weekly - 3 sets - 10 reps  Access Code: [email or web address]  URL: [email or web address]  Date: 04/19/2022  Prepared by: Juline Marker  Exercises  [facility] AND [facility] Back and DAMARIS - 1 x daily - 5 x weekly - 3 sets - 10 reps       Therapeutic Exercise:   [x] (44735) Provided verbal/tactile cueing for activities related to strengthening, flexibility, endurance, ROM for improvements in LE, proximal hip, and core control with self-care, mobility, lifting, ambulation. NMR:  [x] (99934) Provided verbal/tactile cueing for activities related to improving balance, coordination, kinesthetic sense, posture, motor skill, proprioception to assist with LE, proximal hip, and core control in self-care, mobility, lifting, ambulation and eccentric single leg control. Therapeutic Activities:    [] (20911) Provided verbal/tactile cueing for activities related to improving balance, coordination, kinesthetic sense, posture, motor skill, proprioception and motor activation to allow for proper function of core, proximal hip and LE with self-care and ADLs and functional mobility.      Gait Training:    [x] (26019) Provided training and instruction to the patient for proper LE, core and proximal hip recruitment and positioning and eccentric body weight control with ambulation re-education including up and down stairs     Manual Treatments:  PROM / STM / Oscillations-Mobs:  G-I, II, III, IV (PA's, Inf., Post.)  [] (48571) Provided manual therapy to mobilize LE, proximal hip and/or LS spine soft tissue/joints for the purpose of modulating pain, promoting relaxation,  increasing ROM, reducing/eliminating soft tissue swelling/inflammation/restriction, improving soft tissue extensibility and allowing for proper ROM for normal function with self-care, mobility, lifting and ambulation. Home Exercise Program:    [] (76684) Reviewed/Progressed HEP activities related to strengthening, flexibility, endurance, ROM of core, proximal hip and LE for functional self-care, mobility, lifting and ambulation/stair navigation   [] (91800) Reviewed/Progressed HEP activities related to improving balance, coordination, kinesthetic sense, posture, motor skill, proprioception of core, proximal hip and LE for self-care, mobility, lifting, and ambulation/stair navigation      Modalities:    [] Electric Stimulation:   [] Ultrasound:   [] Other:       Charges:  Timed Code Treatment Minutes:  GT27, NM 22; TE 8   Total Treatment Minutes: 57      [] EVAL (LOW) 90790 (typically 20 minutes face-to-face)  [] EVAL (MOD) 60798 (typically 30 minutes face-to-face)  [] EVAL (HIGH) 82490 (typically 45 minutes face-to-face)  [] RE-EVAL     [x] VC(94624) x  1    [x] NMR (34292) x 1    [] Manual (27639) x       [] TA (58512) x      [x] Gait Training (45323) x2      [] ES(attended) (75183)  [] ES (un) (80412)   [] DRY NEEDLE 1 OR 2 MUSCLES  [] DRY NEEDLE 3+ MUSCLES  [] [facility] (50145)  [] Ultrasound (29064)  [] Other:    GOALS: Goals established 3/24/22   Patient stated goal: achieve better balance and walk. Pt reports walking better, now walking in the grocery store (previously using motorized cart).    [x] Progressing: [] Met: [] Not Met: [] Adjusted    Therapist goals for Patient:   Short Term Goals: To be achieved in: 6 weeks   1. Independent in HEP and progression per patient tolerance. [x] Progressing: [] Met: [] Not Met: [] Adjusted   2. Pt will improve TUG time to 25 sec to improve functional ambulation. 21 sec w/ SPC and close SBA  [] Progressing: [x] Met: [] Not Met: [] Adjusted  3. Pt to perform transfers via stand-pivot technique with use of  small base quad cane [facility]) on left Modified Independent. SBA due to inconsistent management of AD (RW, quad cane or SPC)   [x] Progressing: [] Met: [] Not Met: [] Adjusted  4. Pt will improve Rosa to 32/56 to demonstrate reduced all risk. 38/61  [] Progressing: [x] Met: [] Not Met: [] Adjusted    Long Term Goals: To be achieved in: 12 weeks  1. Pt will improve ABC scale to 55% confidence for reduced fall risk. [] Progressing: [] Met: [] Not Met: [] Adjusted  2. Pt to negotiate up/down 7 stairs with step to pattern w/ single rail mod I.   [] Progressing: [] Met: [] Not Met: [] Adjusted   3. Pt to demonstrate improved gait pattern including improved R foot clearance and knee control without cues with use of AFO on right and an appropriate based cane. [] Progressing: [] Met: [] Not Met: [] Adjusted  4. Pt will improve Rosa to 42/56 to demonstrate reduced all risk. [x] Progressing: [] Met: [] Not Met: [] Adjusted  5. Pt independent with HEP. [] Progressing: [] Met: [] Not Met: [] Adjusted  6. Pt to report improved confidence with ambulating in community   [x] Progressing: [] Met: [] Not Met: [] Adjusted  7. Pt will improve TUG time to 18 sec w/ least restrictive AD to improve functional ambulation. [x] Progressing: [] Met: [] Not Met: [] Adjusted  8. Pt will demonstrate 10m walk speed to 0.80m/s. [x] Progressing: [] Met: [] Not Met: [] Adjusted    ASSESSMENT: Pt continues to demonstrate good progression of gait speed and step length on TM. Challenged to reduce UE support on TM.  Good carryover to OG gait, but stil limited by R LE fatigue and
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
## 2022-05-18 ENCOUNTER — HOSPITAL ENCOUNTER (OUTPATIENT)
Dept: OCCUPATIONAL THERAPY | Age: 77
Setting detail: THERAPIES SERIES
Discharge: HOME | End: 2022-05-18
Payer: MEDICARE

## 2022-05-18 ENCOUNTER — HOSPITAL ENCOUNTER (OUTPATIENT)
Dept: PHYSICAL THERAPY | Age: 77
Setting detail: THERAPIES SERIES
Discharge: HOME | End: 2022-05-18
Payer: MEDICARE

## 2022-05-18 PROCEDURE — 97110 THERAPEUTIC EXERCISES: CPT

## 2022-05-18 PROCEDURE — 97112 NEUROMUSCULAR REEDUCATION: CPT

## 2022-05-18 PROCEDURE — 97116 GAIT TRAINING THERAPY: CPT

## 2022-05-18 PROCEDURE — 97530 THERAPEUTIC ACTIVITIES: CPT

## 2022-05-18 NOTE — FLOWSHEET NOTE
[facility] Outpatient Therapy  [address] [place] [address], MY[address], [address]  Phone: [phone]   Fax: [phone]    Occupational Therapy Treatment Note/ Progress Report:     Date:  2022    Patient Name:  Jose Mota    :  1945  MRN: [id number]      Medical/Treatment Diagnosis Information:  I63.9 (ICD-10-CM) - Cerebral infarction, unspecified  M25.611 (ICD-10-CM) Stiffness of right shoulder, not elsewhere classified, R27.9 (ICD-10-CM) Unspecified lack of coordination, R53.1 (ICD-10-CM) Weakness        Insurance/Certification information: Medicare A & B  Physician Information:  Dr. Yael Petit of care signed:    Yes    Date of Patient follow up with Physician: 22 follow-up with optometrist      Progress Report: []  Yes  [x]  No     Date Range for reporting period:  Beginnin22    Ending:       Progress report due:      Recertification due (POC duration/ or 90 days whichever is less): 2022     Visit # Insurance Allowable Auth Needed    BMN No     Latex Allergy:  [x]NO      []YES  Preferred Language for Healthcare:   [x]English       []other:  Functional Scale:  (2022)  -- 98.75/100    Pain level: 0/10     SUBJECTIVE: Pt reports noticing recent improvements in her R shoulder mobility noted by being able to twist her hair which she had not been able to do before. OBJECTIVE:   Observation: Continued in-clinic driving screening/testing completed.    Test measurements:     Acuity Both eyes tested together (OU)  Near: 20/40  Far: Pt preferred to wait until upcoming appointment with eye optometrist.  [address]  requires 20/40 for night driving and 11/79 for daytime driving     [employer] (based on observations during acuity testing): intact  Peripheral Fields: OS - 75, OD - 85     [address] [place] requires 70 degrees visual field on both sides of a fixation point for a non-restricted license, 45 degrees on the other side     Saccades (ability to change fixation point rapidly)  - intact  Pursuits (maintaining fixation on a moving target) - impaired - Pt had difficulty tracking moving object moving inferiorly. Both eyes tested together (OU)  SHEEX Mixed Contrast Card (25 inches) -  20/32 in black print compared to 20/63 in light gray print (3 row difference). Both scores indicate \"mild\" acuity loss. Motor-Free Visual Perception Test (MVPT) - Visual Closure portion only   Score: 11/11 (expect 9/11 or better for safe )   Average response time: 11.4 seconds (expect 3.0-5.4 for safe )   Comment: Pt demo'd delayed response time compared to expected speed for safe . Both eyes tested together (OU)  Ishihara's Tests for visual deficiency - intact    Digit Symbol Test: 29/100  Clinical significance = <80  Subclinical = 80-85  Normal status = >85  Comment: Pt's score consistent with clinically significant cognitive impairment. RESTRICTIONS/PRECAUTIONS: Plavix and aspirin      Therapeutic Activities:    Activity #1:     Patient Education:      Pt educated on resources for a formal driving evaluation to further assess driving readiness - verb understanding  Pt educated on testing findings and interpretations. - verb understanding    Manual Therapy: RUE, supine unless otherwise indicated      Therapeutic Exercises: Exercises in bold performed in department today. Items not bolded are carried forward from prior visits for continuity of the record.     Exercise/Equipment Resistance/Repetitions HEP Other comments   BUE Shoulder flexion w/ dowel tatyana (supine)   1# dowel tatyana, x10 reps with five second holds []    RUE shoulder abduction w/ dowel tatyana (supine)     1# dowel tatyana, x10 reps with 10 second holds []    BUE shoulder flexion with walker   ~10 reps to ~100 degrees; ~5 second holds []    RUE shoulder flexion w/ UE [place]   10 reps with 5 second holds []      RUE shoulder abduction w/ UE [place]    ~3 reps []    RUE AROM \"L\" with UE [place]   10 reps []    RUE AROM Shoulder Flexion (supine)     10 reps  [x]    RUE AROM Shoulder Abduction (supine)     10 reps  [x]    RUE horizontal ab/adduction (supine)     8 reps [x]    BUE shoulder flexion w/ theraball 10 reps w/ 5-15 second holds []    Shoulder flexion w/ BUE placed on wall (stance) 10 reps with 5 second holds [] 130 degrees ROM achieved    Pt unable to maintain elbow extension despite min v/c   Shoulder abduction w/ RUE placed on wall (stance) 8 reps with 5 second holds [] 130 degrees ROM achieved    Occasionally hands-on to inhibit trunk rotation, pt able to self-correct in later reps    Occasional v/c to maintain elbow extension during stretch     RUE AROM Shoulder Abduction (unsupported short sit) 10 reps [] Hands-on to inhibit overactivation of upper trap    Mod v/c to sustain elbow extension    ~90 degrees ROM achieved    Pt noted fatigue after exercise   RUE AROM shoulder Flexion (unsupported short sit)  10 reps [] Hands-on to inhibit over activation at upper trap and middle delt    Pt unable to sustain elbow extension despite min v/    ~100 degrees ROM achieved    Pt noted fatigue after exercise   Scapular depression (seated) 10 reps with 5 second holds    Bilateral hands placed on mat for closed chain scapular strengthening. [] Hands-on to inhibit over activation of upper trap for first 5 reps    Pt tolerated well   Scapular protraction / retraction RUE (seated) 20 reps    Cone slides on table to facilitate movement []       Scapular protraction / retraction BUE (seated) 10 reps    [place]one slides on table to facilitate movement []        []      Home Exercise Program:  Self-assisted stretches for RUE using RW and wall as described above   Force use RUE/R hand    Access Code: [email or web address]  URL: [email or web address]  Date: 04/12/2022  Prepared by: Alireza Gomez    Exercises  Supine Shoulder Flexion Extension Full Range AROM - 2 x daily - 7 x weekly - 1 sets - 10 reps  Supine Shoulder Abduction AROM - 2 x daily - 7 x weekly - 1 sets - 10 reps  Supine Shoulder Horizontal Abduction - 2 x daily - 7 x weekly - 1 sets - 10 reps    Therapeutic Exercise:   [x] (36761) Provided verbal/tactile cueing for activities related to strengthening, flexibility, endurance, ROM. Includes review/progression of HEP activities related to strengthening, flexibility, endurance, AROM, proximal shoulder and UE for functional transfers/mobility, self-care, IADLs, and community re-entry    Therapeutic Activities:    [x] (35845) Provided verbal/tactile cueing for activities related to improving balance, coordination, kinesthetic sense, posture, motor skill, proprioception and motor activation to allow for proper function of core, proximal shoulder and UE with self-care, and ADLs, IADLs, functional mobility, work-related and leisure based activities. Includes review/progression of HEP activities to improve balance, coordination, kinesthetic sense, posture, motor skill, proprioception, proximal shoulder and UE for functional transfers/mobility, self-care, IADLs, and community re-entry    Sensory Integration Techniques:  [] (55379)Provided verbal/tactile feedback to enhance sensory processing and promoting responses that are adaptive to environmental demands    Self-Care/Home Management Training:  [] (96109) Provided training and education in restorative and compensatory strategies/activity modifications in activities of daily living, meal preparation, laundry and other various house maintenance activities. Provided training and education in use of adaptive equipment/devices and assistive technology, as needed, to promote participation and independence.     Cognitive Function:  [] (13867 x15 minutes, 90149 +15 minutes) Integrated activities that address attention, memory, reasoning, executive functioning, problem solving, and/or pragmatic functioning in addition to compensatory strategies to manage the performance of an activity (for example, managing time or schedules, initiating, organizing, and sequencing tasks). NMR:  [] (39459) During functional activities/therapeutic exercises, provided facilitation of normal movement patterns and provided handling/verbal cues to promote postural alignment and stability during static and dynamic movement (sitting or standing). Activities may also include visual perceptual training, proprioception training, and balance retraining during functional activities    Manual Treatments:    [] (26531) Provided manual therapy to mobilize UB for the purpose of modulating pain, promoting relaxation,  increasing ROM, reducing/eliminating soft tissue swelling/inflammation/restriction, improving soft tissue extensibility and allowing for proper ROM for normal function with self-care, functional mobility, transfers, reaching and lifting    Modalities:     [] Electrical Stimulation (65461):     [] Ultrasound (80953):    Charges:  Timed Code Treatment Minutes: 57   Total Treatment Minutes: 57      [] EVAL (LOW) 73205 (typically 20 minutes face-to-face)  [] EVAL (MOD) 32099 (typically 30 minutes face-to-face)  [] EVAL (HIGH) 19900 (typically 45 minutes face-to-face)  [] RE-EVAL     [x] FI(31613)  x27 (2)  [] NMR (87776)     [] Manual ([id number])   [x] TA ()  X30 (2)   [] ES(attended) (60177)       [] ES (un) (32870)  [] Other:    GOALS: Patient stated goal: \"get back to normal\"       []? Progressing: []? Met: []? Not Met: []? Adjusted     Therapist goals for Patient:      Short Term Goals: To be achieved in: 6 weeks  1. Pt will improve score on UEFS to 40/100 for a subjective report of decreased difficulty with completing every day activities with RUE and R hand  []? Progressing: [x]? Met: []? Not Met: []? Adjusted  2. Pt will improve time on NHPT to more than 45 seconds to demonstrate improved fine motor coordination of R hand  []? Progressing: [x]? Met: []?  Not Met: []? Adjusted  3. Pt will improve score on BBT to 40 blocks/minute to demonstrate improved timing, speed and accuracy with functional grasp of right hand. [x]? Progressing: []? Met: []? Not Met: []? Adjusted  4. Pt will be Min A with HEP/Home activities program to facilitate independence with carryover from sessions and to progress towards returning to PLOF   []? Progressing: [x]? Met: (05/11/22) []? Not Met: []? Adjusted   5. Pt will write a pangram sentence in no more than 1 minute demo'ing 85% legibility to demo improved fine motor control and speed of R hand. []? Progressing: [x]? Met: (05/11/22) []? Not Met: []? Adjusted      Long Term Goals: To be achieved in: 12 weeks  1. Pt will improve score on UEFS to 65/100 for a subjective report of decreased difficulty with completing every day activities with RUE and R hand  []? Progressing: [x]? Met: []? Not Met: [x]? Adjusted    1 Adjusted: Pt will improve score on UEFS to 100/100 for a subjective report of decreased difficulty with completing every day activities with RUE and R hand  [x]? Progressing: []? Met: [x]? Not Met: []? Adjusted     2. Pt will improve time on NHPT to more than 30 seconds to demonstrate improved fine motor coordination of R hand  [x]? Progressing: []? Met: [x]? Not Met: []? Adjusted - Discontinued  3. Pt will improve score on BBT to 50 blocks/minute to demonstrate improved timing, speed and accuracy with functional grasp of right hand. [x]? Progressing: []? Met: [x]? Not Met: []? Adjusted - Discontinued  4. Pt will be Independent with HEP/Home activities program to facilitate independence with carryover from sessions and to progress towards returning to PLOF  []? Progressing: []? Met: [x]? Not Met: [x]? Adjusted   4 Adjusted. Pt will be Independent with HEP/Home activities program in supine to facilitate independence with carryover from sessions and to progress towards returning to PLOF   []? Progressing: []? Met: []? Not Met: []? Adjusted  5. Pt will write a pangram sentence in no more than 45 seconds minute demo'ing 95% legibility to demo improved fine motor control and speed of R hand. [x]? Progressing: []? Met: [x]? Not Met: []? Adjusted - Discontinued    Additional Long Term Goals:  6. Pt will utilize teach-back method with OTS to report driving readiness recommendations with less than two errors to demonstrate understanding. []? Progressing: []? Met: []? Not Met: []? Adjusted   7. Pt will complete additional HEP/home activities program in supported short sit with supervision to promote carryover of R shoulder strengthening exercises and to progress towards PLOF. []? Progressing: []? Met: []? Not Met: []? Adjusted      ASSESSMENT:      Based upon driving screen testing conducted on this date and during the previous session, it has been recommended that the patient stop driving for the present and pursue a formal driving evaluation. Pt in agreement with recommendation. Pt demo'd good tolerance to seated RUE strengthening exercises on this date but continues to be limited in AROM against gravity due to insufficient force production at ant delt and middle delt of R shoulder. Pt continues to benefit from skilled handling to facilitate effective posturing and good alignment during exercises. Pt continues to benefit from skilled OT intervention, continue per POC. Treatment/Activity Tolerance:  [x] Patient tolerated treatment well [] Patient limited by fatique  [] Patient limited by pain  [] Patient limited by other medical complications  [] Other:     Overall Progression Towards Functional goals/ Treatment Progress Update:  [x] Patient is progressing as expected towards functional goals listed. [] Progression is slowed due to complexities/Impairments listed. [] Progression has been slowed due to co-morbidities.   [] Plan just implemented, too soon to assess goals progression <30days   [] Goals require adjustment due to lack of progress  [] Patient is not progressing as expected and requires additional follow up with physician  [] Other    Prognosis for POC: [x] Good [] Fair  [] Poor    Patient requires continued skilled intervention: [x] Yes  [] No      PLAN:   [x] Continue per plan of care with additional goals [] Alter current plan (see comments)  [] Plan of care initiated [] Hold pending MD visit [] Discharge    Electronically signed by: Nuvia RECINOS    Note: If patient does not return for scheduled/recommended follow up visits, this note will serve as a discharge from care along with the most recent update on progress.

## 2022-05-18 NOTE — PROGRESS NOTES
[facility] Outpatient Therapy  [address] [place] [address], MYA Silvest[address], [address]  Phone: [phone]   Fax: [phone]    Physical Therapy Treatment Note/ Progress Report:     Date:  2022    Patient Name:  Clif Martinez    :  1945  MRN: [id number]    Medical/Treatment Diagnosis Information:  · Diagnosis: I63.9 (ICD-10-CM) - Cerebral infarction, unspecified  ·    R 26 abnormality of gait and mobility; R53.1 weakness, I63.9 cerebral infarction  Insurance/Certification information:  PT Insurance Information: Medicare  Physician Information:  Referring Practitioner: Dr. Gerardo Floor of care signed:    [x] Yes  [] No    Date of Patient follow up with Physician:      Progress Report: []  Yes  [x]  No     Date Range for reporting period:  Beginnin22  Endin22    Progress report due (10 Rx/or 30 days whichever is less): 84    Recertification due (POC duration/ or 90 days whichever is less):      Visit # Insurance Allowable Auth Needed   + 0/4 BMN []Yes   [x]No     RESTRICTIONS/PRECAUTIONS: receptive aphasia, Plavix and aspirin  Latex Allergy:  [x]NO      []YES  Preferred Language for Healthcare:   [x]English       []other:  Functional Scale: 10m walk= 0.37m/s; TUG 35.8 sec w/ RW; Alvarado Campos   Date assessed:3/24/22    Functional Scale: 10m walk= 0.56 m/s w/ SPC (after TM training 0.63m/s w/ SPC) ; TUG  20.6 sec (average) w/ SPC and close SBA; Alvarado Campos 37/56  Date assessed:22     Functional Scale: 10m walk= 0.68m/s w/ walking stick  (after TM training 0.70m/s w/ walking stick); TUG 20.5 sec (average) w/ walking stick; 20.1 sec (average) without AD but w/ close SBA; Rosa 41/56; ABC scale  97.5% confidence  Date assessed:22         Pain level:  0/10 R shoulder     SUBJECTIVE:  Pt reports doing well.         OBJECTIVE:       Observation: Pt presents amb with walking stick (carrying instead of using at times demonstrating more obvious spastic gait w/ inconsistent R foot clearance).  Test measurements:        Exercises/Interventions: Exercises in bold performed in department today. Items not bolded are carried forward from prior visits for continuity of the record. Exercise/Equipment Resistance/Repetitions HEP Other comments   Nustep 8 min., L 5  Total: 8  Warm up: 2 min. Intervals: 1 min fast (120's spm x 1 min comfortable 80's spm)  Cool down: 1 min. [] Seat 8, arms 11  Less VC for R hip control due to excessive adduction  challenged to increase zac on fast interval.    R hamstring stretch 60 sec x 3 x Seated w/ belt   R gastroc stretch 30 sec x 3 [x] Standing lunge stretch   R soleus stretch 30 sec x 3 [] Standing lunge, B UE support, verbal cues for correct form    R knee to chest, heel on SB 5 x 2 [x] Supine, VC for neutral hip rotation and slow control of lowering   R/L hip abd 10 x 3 each [x] standing, VC for neutral hip rotation. facilitation for R hip neutral.    R bridging 10 x 1, 3 sec hold [] Blocking at R lateral knee and ankle to avoid hip adduction   R ankle DF/PF 10 x 1 [x] Long sitting, propped on extended UEs. Active assist to reduce inversion and knee flexion   Heel raises B w/ weight shifted R 10 x 2, partial ROM    B UE support, VC to follow demo accurately    Sit to stand 8x [] Facilitation at lateral R knee to avoid medial knee collapse     []    NMR  []    Step ups 10x 2 to 6\" step from lunge    10x 2, foot lift off from 6\" step, focus on hip flex, ankle DF for full clearance   [x] Progressed to Light L UE support, cues to exaggerate R LE lift due to circumduction ascending, catching toes on descent, inconsistent foot placecment. SLS 12 sec on L; <2 on R [x] Reviewed safe set up for HEP   NMES to R ant tib  6 min. , standing w/ forward stepping in place for active ankle DF during swing [] VMS, phase duration 200, frequency 50pps, cycle time 5/5. Good contraction, nearly full ROM achieved.     Narrow SHU 30 sec    Trunk/UE rotations- 5x   Cervical/UE flex/ext- 5x    []   Post weight shift; poor eye hand coordination, cues to maintain eye contact on hands     Semi tandem 15 sec  Cervical rotation- 3x  []   LOB R w/ either foot in back   R knee to chest 10 x 2, (5 sec pause in lunge position)  red tband [] //bars w/ B UE support, less cueing on correct positioning. good ROM w/ overflow to ant tib. R toe taps 10x  R only w/ cues for increased lifting effort  10x alternating. L UE support x Reviewed safe set up for HEP   R step ups 10x- light B UE support //bars, 4\" step  Cues to move slowly to prevent R knee hyperextension both at full WB on step and just before off loading to step down. Improved R knee control w/ practice   Gait      amb    Figure 8 amb through large objects w/ SPC    amb without AD, 20' 2 w/ close SBA          amb 30' x 6 immediately after TM training, with AD, supervision. Use of walking stick to correct minor LOBs          Improved continuity of stepping, improved B arm swing, but moderate guard posturing until cued,         Continues w/ spatic gait, instability and minor LOB   Caption Data ladder 10' x 10, L UE support  VC to widen SHU, R toe catch on >50% of trials    Biodex TM  10 min 1.6-1.7mph,   B UE progressed to L UE support  metronome 75bpm.    Increased R foot clearance w/ attempts to increase speed. Added dual task of categorical naming,       5 min. @ 1.5mph and 4% incline, B UE support     4 min. Backwards walking 0.7mph, B UE support  Max HR 121bpm  Pt w/ reduced ataxia overall, continues w/ gradual improvements in step length. Demos less R knee hyperextension w/ increased zac. Freq VC to reduce upper body tension and breath holding. Used safety switch only, no harness.   decreased R foot clearance w/ fatigue            VC for reduced UE support   Lateral, forward/backwards walking Side stepping with min UE support, 10' x3  each in //bars       forwards/backwards walking w/ SPC-20' x 2 each     frequent cueing for R foot clearance and avoiding pelvic rotation. R LE placement ataxic      Cues for increased attention to R foot placement, symmetrical step length w/ backwards walk   4 square step (canes) 3x each direction  mult LOB, partially able to self correct, mod A mult occasions   TA: Discussed pt's goals and progress to date. Reinforced recommendation to use SPC or walking stick to reduce gait deviations and reduce fall risk. Pt had large amplitude LOB during alt toe taps on step of Rosa w/ no functional balance reactions due to spasticity. Pt then reports she climbed on a chair to reach something and has been walking on very uneven terrain without AD when reviewing results of ABC scale. Educated pt on the discrepancy between her confidence (excessive) in her balance and her actual balance (mod-low fall risk). Encouraged pt to think in a more safety conscious way regarding her spasticity when attempting risking tasks and to continue using AD especially when outside the home in order to reduce R ankle injury and fall risk. Pt verbalized understanding. Home Exercise Program:  Access Code: Lis Mckeon: [email or web address]/  Date: 03/24/2022  Prepared by: Juline Marker  Exercises  Supine Knee to Chest with Leg Straight - 1 x daily - 7 x weekly - 1-3 sets - 5-10 reps  Supine Hip Abduction - 1 x daily - 7 x weekly - 1-3 sets - 5-10 reps  Seated Ankle Pumps - 1 x daily - 7 x weekly - 3 sets - 10 reps  Seated Toe Taps - 2 x daily - 7 x weekly - 3 sets - 10 reps     Access Code: [email or web address]  URL: [email or web address]  Date: 03/28/2022  Prepared by: Juline Marker  Exercises  Standing Gastroc Stretch at Counter - 1 x daily - 7 x weekly - 3 sets - 10 reps  Access Code: [email or web address]  URL: [email or web address]  Date: 04/19/2022  Prepared by: Juline Marker  Exercises  [facility] AND [facility] Back and DAMARIS - 1 x daily - 5 x weekly - 3 sets - 10 reps     Access Code: [email or web address]  URL: [email or web address]  Date: 05/18/2022  Prepared by: Rosendo Gallardo    Exercises  Single Leg Stance with Support - 1 x daily - 5 x weekly - 3 sets - 3-5 reps - 5-10 hold  Standing Marching - 1 x daily - 5 x weekly - 3 sets - 10 reps      Therapeutic Exercise:   [x] (26643) Provided verbal/tactile cueing for activities related to strengthening, flexibility, endurance, ROM for improvements in LE, proximal hip, and core control with self-care, mobility, lifting, ambulation. NMR:  [x] (89335) Provided verbal/tactile cueing for activities related to improving balance, coordination, kinesthetic sense, posture, motor skill, proprioception to assist with LE, proximal hip, and core control in self-care, mobility, lifting, ambulation and eccentric single leg control. Therapeutic Activities:    [] (89499) Provided verbal/tactile cueing for activities related to improving balance, coordination, kinesthetic sense, posture, motor skill, proprioception and motor activation to allow for proper function of core, proximal hip and LE with self-care and ADLs and functional mobility. Gait Training:    [x] (85682) Provided training and instruction to the patient for proper LE, core and proximal hip recruitment and positioning and eccentric body weight control with ambulation re-education including up and down stairs     Manual Treatments:  PROM / STM / Oscillations-Mobs:  G-I, II, III, IV (PA's, Inf., Post.)  [] (03986) Provided manual therapy to mobilize LE, proximal hip and/or LS spine soft tissue/joints for the purpose of modulating pain, promoting relaxation,  increasing ROM, reducing/eliminating soft tissue swelling/inflammation/restriction, improving soft tissue extensibility and allowing for proper ROM for normal function with self-care, mobility, lifting and ambulation.      Home Exercise Program:    [] (44667) Reviewed/Progressed HEP activities related to strengthening, flexibility, endurance, ROM of core, proximal hip and LE for functional self-care, mobility, lifting and ambulation/stair navigation   [] (76782) Reviewed/Progressed HEP activities related to improving balance, coordination, kinesthetic sense, posture, motor skill, proprioception of core, proximal hip and LE for self-care, mobility, lifting, and ambulation/stair navigation      Modalities:    [] Electric Stimulation:   [] Ultrasound:   [] Other:       Charges:  Timed Code Treatment Minutes:  GT27, NM 22; TE 8   Total Treatment Minutes: 57      [] EVAL (LOW) 35796 (typically 20 minutes face-to-face)  [] EVAL (MOD) 12257 (typically 30 minutes face-to-face)  [] EVAL (HIGH) 25924 (typically 45 minutes face-to-face)  [] RE-EVAL     [x] RE(53524) x  1    [x] NMR (17871) x 1    [] Manual (99671) x       [] TA (33145) x      [x] Gait Training (48277) x2      [] ES(attended) (77179)  [] ES (un) (84909)   [] DRY NEEDLE 1 OR 2 MUSCLES  [] DRY NEEDLE 3+ MUSCLES  [] Mech Traction (34284)  [] Ultrasound (08834)  [] Other:    GOALS: Goals established 3/24/22   Patient stated goal: achieve better balance and walk. Pt reports walking better, now walking in the grocery store (previously using motorized cart). [x] Progressing: [] Met: [] Not Met: [] Adjusted    Therapist goals for Patient:   Short Term Goals: To be achieved in: 6 weeks   1. Independent in HEP and progression per patient tolerance. [] Progressing: [x] Met: [] Not Met: [] Adjusted   2. Pt will improve TUG time to 25 sec to improve functional ambulation. 21 sec w/ SPC and close SBA  [] Progressing: [x] Met: [] Not Met: [] Adjusted  3. Pt to perform transfers via stand-pivot technique with use of  small base quad cane [facility]) on left Modified Independent. [] Progressing: [x] Met: [] Not Met: [] Adjusted  4. Pt will improve Rosa to 32/56 to demonstrate reduced all risk. 38/61  [] Progressing: [x] Met: [] Not Met: [] Adjusted    Long Term Goals: To be achieved in: 12 weeks  1.  Pt will improve ABC scale to 55% confidence for reduced fall risk. 97.5% (reviewed discrepancy between very high rating and FGA score re: fall risk). [x] Progressing: [] Met: [] Not Met: [] Adjusted  2. Pt to negotiate up/down 7 stairs with step to pattern w/ single rail mod I.   [] Progressing: [x] Met: [] Not Met: [] Adjusted   3. Pt to demonstrate improved gait pattern including improved R foot clearance and knee control without cues with use of AFO on right and an appropriate based cane. Still assessing for most appropriate R orthotic  [x] Progressing: [] Met: [] Not Met: [] Adjusted  4. Pt will improve Rosa to 42/56 to demonstrate reduced all risk. 41/56  [x] Progressing: [] Met: [] Not Met: [] Adjusted  5. Pt independent with HEP. [x] Progressing: [] Met: [] Not Met: [] Adjusted  6. Pt to report improved confidence with ambulating in community possibly over confident  [x] Progressing: [] Met: [] Not Met: [] Adjusted  7. Pt will improve TUG time to 18 sec w/ least restrictive AD to improve functional ambulation. 20.5 sec  [x] Progressing: [] Met: [] Not Met: [] Adjusted   8. Pt will demonstrate 10m walk speed to 0.80m/s.  0.70m/s  [x] Progressing: [] Met: [] Not Met: [] Adjusted     ASSESSMENT: Pt met 4/4 ST and 1/8 LT goals. Pt demonstrates good progress towards all other LT goals. Pt's gait speed, balance and transfers has all significantly improved. Pt's ABC scale has improved significantly, so much so that it is somewhat out of proportion w/ her actual balance score on the FGA determining her fall risk. Have educated pt on this discrepancy and discussed the importance of good safety awareness and improved awareness of her limitations especially re: R LE spasticity and impaired balance reactions. Pt recommended to continue amb w/ SPC. Pt will benefit from further assessment of AFO vs ankle brace need to protect ankle instability.  Pt will continue to benefit from skilled PT to improve functional mobility, reduce fall risk and maximize independence. Treatment/Activity Tolerance:  [x] Patient tolerated treatment well [] Patient limited by fatigue  [] Patient limited by pain  [] Patient limited by other medical complications  [] Other:     Overall Progression Towards Functional goals/ Treatment Progress Update:  [x] Patient is progressing as expected towards functional goals listed. [] Progression is slowed due to complexities/Impairments listed. [] Progression has been slowed due to co-morbidities. [] Plan just implemented, too soon to assess goals progression <30days   [] Goals require adjustment due to lack of progress  [] Patient is not progressing as expected and requires additional follow up with physician  [] Other    Prognosis for POC: [x] Good [] Fair  [] Poor    Patient requires continued skilled intervention: [x] Yes  [] No            PLAN: 3x/week for 4 weeks then 2x/week for 8 weeks   [] Continue per plan of care [x] Alter current plan (see comments)- plan to add 1x/week for 4 weeks after visit #24  [] Plan of care initiated [] Hold pending MD visit [] Discharge    Electronically signed by: Mason Green PT, DPT    Note: If patient does not return for scheduled/recommended follow up visits, this note will serve as a discharge from care along with the most recent update on progress.

## 2022-05-19 ENCOUNTER — APPOINTMENT (OUTPATIENT)
Dept: OCCUPATIONAL THERAPY | Age: 77
End: 2022-05-19
Payer: MEDICARE

## 2022-05-19 ENCOUNTER — APPOINTMENT (OUTPATIENT)
Dept: PHYSICAL THERAPY | Age: 77
End: 2022-05-19
Payer: MEDICARE

## 2022-05-23 ENCOUNTER — HOSPITAL ENCOUNTER (OUTPATIENT)
Dept: PHYSICAL THERAPY | Age: 77
Setting detail: THERAPIES SERIES
Discharge: HOME | End: 2022-05-23
Payer: MEDICARE

## 2022-05-23 PROCEDURE — 97112 NEUROMUSCULAR REEDUCATION: CPT

## 2022-05-23 PROCEDURE — 97116 GAIT TRAINING THERAPY: CPT

## 2022-05-23 NOTE — FLOWSHEET NOTE
[facility] Outpatient [facility]  [address], [address], [address]  Phone: [phone]   Fax: [phone]    Physical Therapy Treatment Note/ Progress Report:     Date:  2022    Patient Name:  Gaston Marion    :  1945  MRN: [id number]    Medical/Treatment Diagnosis Information:  · Diagnosis: I63.9 (ICD-10-CM) - Cerebral infarction, unspecified  ·    R 26 abnormality of gait and mobility; R53.1 weakness, I63.9 cerebral infarction  Insurance/Certification information:  PT Insurance Information: Medicare  Physician Information:  Referring Practitioner: Dr. Karla Jacob of care signed:    [x] Yes  [] No    Date of Patient follow up with Physician:      Progress Report: []  Yes  [x]  No      Date Range for reporting period:  Beginnin22  Endin22    Progress report due (10 Rx/or 30 days whichever is less):     Recertification due (POC duration/ or 90 days whichever is less):      Visit # Insurance Allowable Auth Needed   + 0/4 BMN []Yes   [x]No     RESTRICTIONS/PRECAUTIONS: receptive aphasia, Plavix and aspirin  Latex Allergy:  [x]NO      []YES  Preferred Language for Healthcare:   [x]English       []other:  Functional Scale: 10m walk= 0.37m/s; TUG 35.8 sec w/ RW; Adrienne [address] 27  Date assessed:3/24/22    Functional Scale: 10m walk= 0.56 m/s w/ SPC (after TM training 0.63m/s w/ SPC) ; TUG  20.6 sec (average) w/ SPC and close SBA; Adrienne Sagastume 37/56  Date assessed:22     Functional Scale: 10m walk= 0.68m/s w/ walking stick  (after TM training 0.70m/s w/ walking stick); TUG 20.5 sec (average) w/ walking stick; 20.1 sec (average) without AD but w/ close SBA; Rosa 41/56; ABC scale  97.5% confidence  Date assessed:22          Pain level:  0/10 R shoulder     SUBJECTIVE:  Pt reports doing well. Doing lots of walking, working on balance.         OBJECTIVE:       Observation: Pt presents amb with walking stick (carrying instead of using at times demonstrating more obvious spastic gait w/ inconsistent R foot clearance).  Test measurements:        Exercises/Interventions: Exercises in bold performed in department today. Items not bolded are carried forward from prior visits for continuity of the record. VC for accurate rep count. Exercise/Equipment Resistance/Repetitions HEP Other comments   Nustep 8 min., L 5  Total: 8  Warm up: 2 min. Intervals: 1 min fast (120's spm x 1 min comfortable 80's spm)  Cool down: 1 min. [] Seat 8, arms 11  Less VC for R hip control due to excessive adduction  challenged to increase zac on fast interval.    R hamstring stretch 60 sec x 3 x Seated w/ belt   R gastroc stretch 30 sec x 2 [x] Standing lunge stretch   R soleus stretch 30 sec x 2 [] Standing lunge, B UE support, verbal cues for correct form    R knee to chest, heel on SB 5 x 2 [x] Supine, VC for neutral hip rotation and slow control of lowering   R/L hip abd 15 x 2 each [x] standing, VC for neutral hip rotation. facilitation for R hip neutral.    R bridging 10 x 1, 3 sec hold [] Blocking at R lateral knee and ankle to avoid hip adduction   R ankle DF/PF 10 x 1 [x] Long sitting, propped on extended UEs. Active assist to reduce inversion and knee flexion   Heel raises B w/ weight shifted R 10 x 2, partial ROM    B UE support, VC to follow demo accurately    Sit to stand 5x 1 R foot posterior to L  5x 1 w/ resistance at lateral knees for abduction  5x 2 standing on foam [] Facilitation at lateral R knee to avoid medial knee collapse  All without UE support     []    NMR  []          SLS 12 sec on L; <2 on R [x] Reviewed safe set up for HEP   NMES to R ant tib  6 min. , standing w/ forward stepping in place for active ankle DF during swing [] VMS, phase duration 200, frequency 50pps, cycle time 5/5. Good contraction, nearly full ROM achieved.     Narrow SHU 30 sec    Trunk/UE rotations- 5x   Cervical/UE flex/ext- 5x    []   Post weight shift; poor eye hand coordination, cues to maintain eye contact on hands     Semi tandem 15 sec  Cervical rotation- 3x  []   LOB R w/ either foot in back   R knee to chest 10 x 2, (5 sec pause in lunge position)  red tband [] //bars w/ B UE support, less cueing on correct positioning. good ROM w/ overflow to ant tib. R toe taps 10x  R only w/ cues for increased lifting effort  10x alternating. L UE support x Reviewed safe set up for HEP   R single leg squat w/ L toe tap- 2\" step 15 x 2, B UE support  VC to avoid medial knee collapse, however somewhat limited by genu valgus, upright posture and reduced R UE support   R step ups 10x- light B UE support //bars, 4\" step  moves slowly to prevent R knee hyperextension both at full WB on step and just before off loading to step down. Gait      amb    Squat walking- x8      Figure 8 amb through large objects w/ SPC    amb without AD, 20' 2 w/ close SBA          amb 30' x 6 immediately after TM training, with AD, supervision. Use of walking stick to correct minor LOBs    Cues to maintain knees laterally over toes          Improved continuity of stepping, improved B arm swing, but moderate guard posturing until cued,         Continues w/ spatic gait, instability and minor LOB   Laisha ladder 10' x 10, L UE support  VC to widen SHU, R toe catch on >50% of trials    Biodex TM  10 min 1.8-1.9mph,   B UE progressed to L UE support  metronome 75bpm.    decreased R foot clearance w/ attempts to reduce UE support    Added dual task of categorical naming,       5 min. @ 1.5mph and 4% incline, B UE support     3 min. Backwards walking 0.7mph, B UE support  Max HR 121bpm  Pt w/ reduced ataxia overall, continues w/ gradual improvements in step length. Demos less R knee hyperextension w/ increased zac. Freq VC to reduce upper body tension and breath holding. Used safety switch only, no harness.   decreased R foot clearance w/ fatigue            VC for reduced UE support   Lateral, forward/backwards walking Side stepping with min UE support, 10' x3  each in //bars       forwards/backwards walking w/ SPC-20' x 2 each     frequent cueing for R foot clearance and avoiding pelvic rotation. R LE placement ataxic      Cues for increased attention to R foot placement, symmetrical step length w/ backwards walk   4 square step (canes) 3x each direction  mult LOB, partially able to self correct, mod A mult occasions   TA:       Home Exercise Program:  Access Code: [email or web address]  URL: [email or web address]  Date: 03/24/2022  Prepared by: Rosendo Marker  Exercises  Supine Knee to Chest with Leg Straight - 1 x daily - 7 x weekly - 1-3 sets - 5-10 reps  Supine Hip Abduction - 1 x daily - 7 x weekly - 1-3 sets - 5-10 reps  Seated Ankle Pumps - 1 x daily - 7 x weekly - 3 sets - 10 reps  Seated Toe Taps - 2 x daily - 7 x weekly - 3 sets - 10 reps     Access Code: [email or web address]  URL: [email or web address]  Date: 03/28/2022  Prepared by: Rosendo Marker  Exercises  Standing Gastroc Stretch at Counter - 1 x daily - 7 x weekly - 3 sets - 10 reps  Access Code: [email or web address]  URL: [email or web address]  Date: 04/19/2022  Prepared by: Juline Marker  Exercises  [facility] Back and DAMARIS - 1 x daily - 5 x weekly - 3 sets - 10 reps     Access Code: [email or web address]  URL: [email or web address]  Date: 05/18/2022  Prepared by: Juline Marker    Exercises  Single Leg Stance with Support - 1 x daily - 5 x weekly - 3 sets - 3-5 reps - 5-10 hold  Standing Marching - 1 x daily - 5 x weekly - 3 sets - 10 reps      Therapeutic Exercise:   [x] (33297) Provided verbal/tactile cueing for activities related to strengthening, flexibility, endurance, ROM for improvements in LE, proximal hip, and core control with self-care, mobility, lifting, ambulation.     NMR:  [x] (56068) Provided verbal/tactile cueing for activities related to improving balance, coordination, kinesthetic sense, posture, motor skill, proprioception to assist with LE, proximal hip, and core control in self-care, mobility, lifting, ambulation and eccentric single leg control. Therapeutic Activities:    [] (49458) Provided verbal/tactile cueing for activities related to improving balance, coordination, kinesthetic sense, posture, motor skill, proprioception and motor activation to allow for proper function of core, proximal hip and LE with self-care and ADLs and functional mobility. Gait Training:    [x] (39356) Provided training and instruction to the patient for proper LE, core and proximal hip recruitment and positioning and eccentric body weight control with ambulation re-education including up and down stairs     Manual Treatments:  PROM / STM / Oscillations-Mobs:  G-I, II, III, IV (PA's, Inf., Post.)  [] (43538) Provided manual therapy to mobilize LE, proximal hip and/or LS spine soft tissue/joints for the purpose of modulating pain, promoting relaxation,  increasing ROM, reducing/eliminating soft tissue swelling/inflammation/restriction, improving soft tissue extensibility and allowing for proper ROM for normal function with self-care, mobility, lifting and ambulation. Home Exercise Program:    [] (77068) Reviewed/Progressed HEP activities related to strengthening, flexibility, endurance, ROM of core, proximal hip and LE for functional self-care, mobility, lifting and ambulation/stair navigation   [] (48609) Reviewed/Progressed HEP activities related to improving balance, coordination, kinesthetic sense, posture, motor skill, proprioception of core, proximal hip and LE for self-care, mobility, lifting, and ambulation/stair navigation      Modalities:    [] Electric Stimulation:   [] Ultrasound:   [] Other:       Charges:  Timed Code Treatment Minutes:  DAINA Solares 24;     Total Treatment Minutes: 56      [] EVAL (LOW) 60137 (typically 20 minutes face-to-face)  [] EVAL (MOD) 67323 (typically 30 minutes face-to-face)  [] EVAL (HIGH) 27819 (typically 45 minutes face-to-face)  [] RE-EVAL     [] VO(11273) x    [x] NMR (70151) x2    [] Manual (25349) x       [] TA (23987) x      [x] Gait Training (42125) x2      [] ES(attended) (44762)  [] ES (un) (80917)   [] DRY NEEDLE 1 OR 2 MUSCLES  [] DRY NEEDLE 3+ MUSCLES  [] Mech Traction (85105)  [] Ultrasound (16698)  [] Other:    GOALS: Goals established 3/24/22   Patient stated goal: achieve better balance and walk. Pt reports walking better, now walking in the grocery store (previously using motorized cart). [x] Progressing: [] Met: [] Not Met: [] Adjusted    Therapist goals for Patient:   Short Term Goals: To be achieved in: 6 weeks   1. Independent in HEP and progression per patient tolerance. [] Progressing: [x] Met: [] Not Met: [] Adjusted   2. Pt will improve TUG time to 25 sec to improve functional ambulation. 21 sec w/ SPC and close SBA  [] Progressing: [x] Met: [] Not Met: [] Adjusted  3. Pt to perform transfers via stand-pivot technique with use of  small base quad cane [facility]) on left Modified Independent. [] Progressing: [x] Met: [] Not Met: [] Adjusted  4. Pt will improve Rosa to 32/56 to demonstrate reduced all risk. 38/61  [] Progressing: [x] Met: [] Not Met: [] Adjusted    Long Term Goals: To be achieved in: 12 weeks  1. Pt will improve ABC scale to 55% confidence for reduced fall risk. 97.5% (reviewed discrepancy between very high rating and FGA score re: fall risk). [x] Progressing: [] Met: [] Not Met: [] Adjusted  2. Pt to negotiate up/down 7 stairs with step to pattern w/ single rail mod I.   [] Progressing: [x] Met: [] Not Met: [] Adjusted   3. Pt to demonstrate improved gait pattern including improved R foot clearance and knee control without cues with use of AFO on right and an appropriate based cane. Still assessing for most appropriate R orthotic  [x] Progressing: [] Met: [] Not Met: [] Adjusted  4. Pt will improve Rosa to 42/56 to demonstrate reduced all risk. 41/56  [x] Progressing: [] Met: [] Not Met: [] Adjusted  5. Pt independent with HEP. [x] Progressing: [] Met: [] Not Met: [] Adjusted  6. Pt to report improved confidence with ambulating in community possibly over confident  [x] Progressing: [] Met: [] Not Met: [] Adjusted  7. Pt will improve TUG time to 18 sec w/ least restrictive AD to improve functional ambulation. 20.5 sec  [x] Progressing: [] Met: [] Not Met: [] Adjusted   8. Pt will demonstrate 10m walk speed to 0.80m/s.  0.70m/s  [x] Progressing: [] Met: [] Not Met: [] Adjusted     ASSESSMENT: Pt w/ good tolerance of gait and balance training. Pt challenged to reduce B hip adduction during sit to stand, but improved w/ practice. Pt challenged to reduce UE support during TM training. Pt will benefit from further assessment of AFO vs ankle brace need to protect ankle instability. Pt will continue to benefit from skilled PT to improve functional mobility, reduce fall risk and maximize independence. Treatment/Activity Tolerance:  [x] Patient tolerated treatment well [] Patient limited by fatigue  [] Patient limited by pain  [] Patient limited by other medical complications  [] Other:     Overall Progression Towards Functional goals/ Treatment Progress Update:  [x] Patient is progressing as expected towards functional goals listed. [] Progression is slowed due to complexities/Impairments listed. [] Progression has been slowed due to co-morbidities.   [] Plan just implemented, too soon to assess goals progression <30days   [] Goals require adjustment due to lack of progress  [] Patient is not progressing as expected and requires additional follow up with physician  [] Other    Prognosis for POC: [x] Good [] Fair  [] Poor    Patient requires continued skilled intervention: [x] Yes  [] No            PLAN: 3x/week for 4 weeks then 2x/week for 8 weeks   [x] Continue per plan of care [] Alter current plan (see comments)- plan to add 1x/week for 4 weeks after visit #24  [] Plan of care initiated [] Hold pending MD visit [] Discharge    Electronically signed by: Uday Abernathy PT, DPT    Note: If patient does not return for scheduled/recommended follow up visits, this note will serve as a discharge from care along with the most recent update on progress.

## 2022-05-26 ENCOUNTER — HOSPITAL ENCOUNTER (OUTPATIENT)
Dept: PHYSICAL THERAPY | Age: 77
Setting detail: THERAPIES SERIES
Discharge: HOME | End: 2022-05-26
Payer: MEDICARE

## 2022-05-26 ENCOUNTER — APPOINTMENT (OUTPATIENT)
Dept: PHYSICAL THERAPY | Age: 77
End: 2022-05-26
Payer: MEDICARE

## 2022-05-26 PROCEDURE — 97110 THERAPEUTIC EXERCISES: CPT

## 2022-05-26 PROCEDURE — 97116 GAIT TRAINING THERAPY: CPT

## 2022-05-26 PROCEDURE — 97112 NEUROMUSCULAR REEDUCATION: CPT

## 2022-05-26 NOTE — FLOWSHEET NOTE
[facility] Outpatient Therapy  [address] [place] [address], MYA Silvestre [address], [address]  Phone: [phone]   Fax: [phone]    Physical Therapy Treatment Note/ Progress Report:     Date:  2022    Patient Name:  Dayanna Espinoza    :  1945  MRN: [id number]    Medical/Treatment Diagnosis Information:  · Diagnosis: I63.9 (ICD-10-CM) - Cerebral infarction, unspecified  ·    R 26 abnormality of gait and mobility; R53.1 weakness, I63.9 cerebral infarction  Insurance/Certification information:  PT Insurance Information: Medicare  Physician Information:  Referring Practitioner: Dr. Mcclure Odor of care signed:    [x] Yes  [] No    Date of Patient follow up with Physician:      Progress Report: []  Yes  [x]  No      Date Range for reporting period:  Beginnin22  Endin22    Progress report due (10 Rx/or 30 days whichever is less):     Recertification due (POC duration/ or 90 days whichever is less):      Visit # Insurance Allowable Auth Needed   + 0/4 BMN []Yes   [x]No     RESTRICTIONS/PRECAUTIONS: receptive aphasia, Plavix and aspirin  Latex Allergy:  [x]NO      []YES  Preferred Language for Healthcare:   [x]English       []other:  Functional Scale: 10m walk= 0.37m/s; TUG 35.8 sec w/ RW; [facility]   Date assessed:3/24/22    Functional Scale: 10m walk= 0.56 m/s w/ SPC (after TM training 0.63m/s w/ SPC) ; TUG  20.6 sec (average) w/ SPC and close SBA; [facility] 37/  Date assessed:22     Functional Scale: 10m walk= 0.68m/s w/ walking stick  (after TM training 0.70m/s w/ walking stick); TUG 20.5 sec (average) w/ walking stick; 20.1 sec (average) without AD but w/ close SBA; Rosa 41/56; ABC scale  97.5% confidence  Date assessed:22          Pain level:  0/10 R shoulder     SUBJECTIVE:  Pt reports doing well, but not sleeping well. Pt's thoughts a bit unorganized initially, but improved as session went on.        OBJECTIVE:       Observation: Pt presents amb with walking stick (carrying instead of using at times demonstrating more obvious spastic gait w/ inconsistent R foot clearance).  Test measurements:        Exercises/Interventions: Exercises in bold performed in department today. Items not bolded are carried forward from prior visits for continuity of the record. VC for accurate rep count. Exercise/Equipment Resistance/Repetitions HEP Other comments   Nustep 8 min., L 5  Total: 8  Warm up: 2 min. Intervals: 1 min fast (120's spm x 1 min comfortable 80's spm)  Cool down: 1 min. [] Seat 8, arms 11  Less VC for R hip control due to excessive adduction  challenged to increase zac on fast interval.    R hamstring stretch 60 sec x 3 x Seated w/ belt   R gastroc stretch 30 sec x 2 [x] Standing lunge stretch   R soleus stretch 30 sec x 2 [] Standing lunge, B UE support, verbal cues for correct form    R knee to chest, heel on SB 5 x 2 [x] Supine, VC for neutral hip rotation and slow control of lowering   R/L hip abd 15 x 2 each [x] standing,    R bridging 10 x 1, 3 sec hold [] Blocking at R lateral knee and ankle to avoid hip adduction   R ankle DF/PF 10 x 1 [x] Long sitting, propped on extended UEs.  Active assist to reduce inversion and knee flexion   Heel raises B w/ weight shifted R 10 x 2, partial ROM    B UE support, VC to weight shift R    Sit to stand 5x 1 R foot posterior to L  5x 1 w/ resistance at lateral knees for abduction  5x 2 standing on foam [] Facilitation at lateral R knee to avoid medial knee collapse  All without UE support     []    NMR  []          SLS 12 sec on L; <2 on R [x] Reviewed safe set up for HEP         Narrow SHU 30 sec    Trunk/UE rotations- 5x   Cervical/UE flex/ext- 5x    [] Post weight shift; poor eye hand coordination, cues to maintain eye contact on hands     Semi tandem 15 sec  Cervical rotation- 3x  []   LOB R w/ either foot in back   R knee to chest 10 x 2, (5 sec pause in lunge position)  red tband [] //bars w/ B UE support, less cueing on correct positioning. good ROM w/ overflow to ant tib. R toe taps 10x  R only w/ cues for increased lifting effort  10x alternating. L UE support x Reviewed safe set up for HEP   R single leg squat w/ L toe tap- 4\" step 15 x 2, B UE support  VC to avoid medial knee collapse, however somewhat limited by genu valgus, upright posture and reduced R UE support   R step ups 10x- light B UE support //bars, 4\" step  10x 6\" step  Less R knee hyperextension    Gait      amb    amb without AD, 30' 2 w/ SBA            Slightly unsteady, but corrects mod I.          Biodex TM  10 min 1.9-2.2mph,   B UE progressed to L UE support, but speed decreased and then decreased further to attempt no UE support briefly (<1 min)                   5 min. Backwards walking 0.7mph, B UE support  Max HR 128bpm  Pt w/ reduced ataxia overall, continues w/ gradual improvements in step length. Demos less R knee hyperextension w/ increased zac. Freq VC to reduce upper body tension and breath holding. Used safety switch only, no harness. decreased R foot clearance w/ fatigue            VC for reduced UE support   Lateral, forward/backwards walking Side stepping with min UE support, 10' x3  each in //bars       forwards/backwards walking w/ SPC-20' x 2 each     frequent cueing for R foot clearance and avoiding pelvic rotation.  R LE placement ataxic      Cues for increased attention to R foot placement, symmetrical step length w/ backwards walk   4 square step (canes) 3x each direction  mult LOB, partially able to self correct, mod A mult occasions   amb w/ self ball toss Stationary-challenged so make small amplitude tosses    30' x 2 w/ CGA          Increased LE ataxia and spastic gait   amb w/ carrying cup of water 60' w/ 1/2 full cone cup      60' w/ 3/4 full cone cup  Decreased zac, B UE high guard positioning,increased spastic gait w/ divided attention    Minor Spill x 1   Sit to stand holding cup of water 5x   VC for knees apart  No water spill    TA:       Home Exercise Program:  Access Code: Vicky Che: aWhere/  Date: 03/24/2022  Prepared by: Anson Hi  Exercises  Supine Knee to Chest with Leg Straight - 1 x daily - 7 x weekly - 1-3 sets - 5-10 reps  Supine Hip Abduction - 1 x daily - 7 x weekly - 1-3 sets - 5-10 reps  Seated Ankle Pumps - 1 x daily - 7 x weekly - 3 sets - 10 reps  Seated Toe Taps - 2 x daily - 7 x weekly - 3 sets - 10 reps     Access Code: [email or web address]  URL: aWhere/  Date: 03/28/2022  Prepared by: Anson Hi  Exercises  Standing Gastroc Stretch at Counter - 1 x daily - 7 x weekly - 3 sets - 10 reps  Access Code: [email or web address]  URL: [email or web address]  Date: 04/19/2022  Prepared by: Anson Hi  Exercises  [facility] Back and DAMARIS - 1 x daily - 5 x weekly - 3 sets - 10 reps     Access Code: [email or web address]  URL: [email or web address]  Date: 05/18/2022  Prepared by: Anson Hi    Exercises  Single Leg Stance with Support - 1 x daily - 5 x weekly - 3 sets - 3-5 reps - 5-10 hold  Standing Marching - 1 x daily - 5 x weekly - 3 sets - 10 reps      Therapeutic Exercise:   [x] (37511) Provided verbal/tactile cueing for activities related to strengthening, flexibility, endurance, ROM for improvements in LE, proximal hip, and core control with self-care, mobility, lifting, ambulation. NMR:  [x] (48553) Provided verbal/tactile cueing for activities related to improving balance, coordination, kinesthetic sense, posture, motor skill, proprioception to assist with LE, proximal hip, and core control in self-care, mobility, lifting, ambulation and eccentric single leg control.      Therapeutic Activities:    [] (23591) Provided verbal/tactile cueing for activities related to improving balance, coordination, kinesthetic sense, posture, motor skill, proprioception and motor activation to allow for proper function of core, proximal hip and LE with self-care and ADLs and functional mobility. Gait Training:    [x] (54188) Provided training and instruction to the patient for proper LE, core and proximal hip recruitment and positioning and eccentric body weight control with ambulation re-education including up and down stairs     Manual Treatments:  PROM / STM / Oscillations-Mobs:  G-I, II, III, IV (PA's, Inf., Post.)  [] (51783) Provided manual therapy to mobilize LE, proximal hip and/or LS spine soft tissue/joints for the purpose of modulating pain, promoting relaxation,  increasing ROM, reducing/eliminating soft tissue swelling/inflammation/restriction, improving soft tissue extensibility and allowing for proper ROM for normal function with self-care, mobility, lifting and ambulation. Home Exercise Program:    [] (08561) Reviewed/Progressed HEP activities related to strengthening, flexibility, endurance, ROM of core, proximal hip and LE for functional self-care, mobility, lifting and ambulation/stair navigation   [] (90756) Reviewed/Progressed HEP activities related to improving balance, coordination, kinesthetic sense, posture, motor skill, proprioception of core, proximal hip and LE for self-care, mobility, lifting, and ambulation/stair navigation      Modalities:    [] Electric Stimulation:   [] Ultrasound:   [] Other:       Charges:  Timed Code Treatment Minutes:  GT 22, TE 10; NM 24;     Total Treatment Minutes: 56      [] EVAL (LOW) 55559 (typically 20 minutes face-to-face)  [] EVAL (MOD) 25044 (typically 30 minutes face-to-face)  [] EVAL (HIGH) 86630 (typically 45 minutes face-to-face)  [] RE-EVAL     [x] WX(65550) x 1   [x] NMR (43827) x2    [] Manual (74499) x       [] TA (53504) x      [x] Gait Training (67463) x1      [] ES(attended) (88816)  [] ES (un) (56411)   [] DRY NEEDLE 1 OR 2 MUSCLES  [] DRY NEEDLE 3+ MUSCLES  [] Mech Traction (15746)  [] Ultrasound (64646)  [] Other:    GOALS: Goals established 3/24/22 Patient stated goal: achieve better balance and walk. Pt reports walking better, now walking in the grocery store (previously using motorized cart). [x] Progressing: [] Met: [] Not Met: [] Adjusted    Therapist goals for Patient:   Short Term Goals: To be achieved in: 6 weeks   1. Independent in HEP and progression per patient tolerance. [] Progressing: [x] Met: [] Not Met: [] Adjusted   2. Pt will improve TUG time to 25 sec to improve functional ambulation. 21 sec w/ SPC and close SBA  [] Progressing: [x] Met: [] Not Met: [] Adjusted  3. Pt to perform transfers via stand-pivot technique with use of  small base quad cane [facility]) on left Modified Independent. [] Progressing: [x] Met: [] Not Met: [] Adjusted  4. Pt will improve Rsoa to 32/56 to demonstrate reduced all risk. 38/61  [] Progressing: [x] Met: [] Not Met: [] Adjusted    Long Term Goals: To be achieved in: 12 weeks  1. Pt will improve ABC scale to 55% confidence for reduced fall risk. 97.5% (reviewed discrepancy between very high rating and FGA score re: fall risk). [x] Progressing: [] Met: [] Not Met: [] Adjusted  2. Pt to negotiate up/down 7 stairs with step to pattern w/ single rail mod I.   [] Progressing: [x] Met: [] Not Met: [] Adjusted   3. Pt to demonstrate improved gait pattern including improved R foot clearance and knee control without cues with use of AFO on right and an appropriate based cane. Still assessing for most appropriate R orthotic  [x] Progressing: [] Met: [] Not Met: [] Adjusted  4. Pt will improve Rosa to 42/56 to demonstrate reduced all risk. 41/56  [x] Progressing: [] Met: [] Not Met: [] Adjusted  5. Pt independent with HEP. [x] Progressing: [] Met: [] Not Met: [] Adjusted  6. Pt to report improved confidence with ambulating in community possibly over confident  [x] Progressing: [] Met: [] Not Met: [] Adjusted  7. Pt will improve TUG time to 18 sec w/ least restrictive AD to improve functional ambulation.  20.5 sec  [x] Progressing: [] Met: [] Not Met: [] Adjusted   8. Pt will demonstrate 10m walk speed to 0.80m/s.  0.70m/s  [x] Progressing: [] Met: [] Not Met: [] Adjusted     ASSESSMENT: Pt w/ good tolerance of gait and balance training. Pt demos increased velocity on TM, but still challenged to reduce UE support. Focused on gait w/ dual task which she completed but demos decreased zac and/or more spastic gait when attention divided. Pt will benefit from further assessment of AFO vs ankle brace need to protect ankle instability. Pt will continue to benefit from skilled PT to improve functional mobility, reduce fall risk and maximize independence. Treatment/Activity Tolerance:  [x] Patient tolerated treatment well [] Patient limited by fatigue  [] Patient limited by pain  [] Patient limited by other medical complications  [] Other:     Overall Progression Towards Functional goals/ Treatment Progress Update:  [x] Patient is progressing as expected towards functional goals listed. [] Progression is slowed due to complexities/Impairments listed. [] Progression has been slowed due to co-morbidities. [] Plan just implemented, too soon to assess goals progression <30days   [] Goals require adjustment due to lack of progress  [] Patient is not progressing as expected and requires additional follow up with physician  [] Other    Prognosis for POC: [x] Good [] Fair  [] Poor    Patient requires continued skilled intervention: [x] Yes  [] No            PLAN: 3x/week for 4 weeks then 2x/week for 8 weeks   [x] Continue per plan of care [] Alter current plan (see comments)- plan to add 1x/week for 4 weeks after visit #24  [] Plan of care initiated [] Hold pending MD visit [] Discharge    Electronically signed by: Greg Dawn, PT, DPT    Note: If patient does not return for scheduled/recommended follow up visits, this note will serve as a discharge from care along with the most recent update on progress.

## 2022-05-27 NOTE — FLOWSHEET NOTE
Speech-Language Pathology  Daily Treatment Note    Date:  3/29/2022  Patient Name:  Bhavin Wick      :      MRN: [id number]  Restrictions/Precautions:    Medical Diagnosis Information:  Acute CVA (I63.9)  Treatment Diagnosis Information:  Cognitive Communication Deficit (V60.301)  Insurance/Certification information:  Medicare    Physician Information:  Dr. Wells Section of care signed (Y/N):    Visit# / total visits: 3/24  Pain level: denied    Progress Note: []  Yes  [x]  No  Next due by:  22    Subjective:  Pleasant and cooperative. Objective/Progress Toward Goals:   1.  Pt will participate in ongoing assessment of cognitive communication skills with goals added as indicated. 3/28/22:  Patient was administered a functional reading portion of the GUERA (Assessment of Language-Related Functional Activities) with the following results:   Understanding Medication Labels:  10/10  3/29/22:  Goal not directly targeted during this session. Continue goal.    2.  Pt will complete functional math reasoning (time/money) exercises independently. 3/28/22:  Goal not directly targeted during this session. 3/29/22:  SLP educated pt re: HEP for math reasoning (checkbook) exercises. Pt verbalized comprehension but insists that she has family support to manage finances and she is not interested in learning a system that is different from her own. Continue goal.    3.  Pt will complete executive function exercises independently.    3/28/22:  SLP educated pt re: compensatory memory strategies; pt verbalized comprehension. 3/29/22:  SLP educated pt re: HEP for memory exercises (including card-based memory game) and rationale; pt verbalized comprehension. SLP demonstrated memory exercises, organization and rehearsal strategies to facilitate recall; pt verbalized comprehension but she requires min-mod assistance to apply.     Continue goal.      Assessment:   Pt demonstrates mild cognitive communication impairment characterized by imapiured executive function and math reasoning.       Plan:   Continue per POC, 1-2x/week x 12 weeks    Timed Code Treatment:  55 minutes    Total Treatment Time:  55 minutes    Signature:  Shin Whyte SLP, M.A., [address] [facility]  Phone: [phone]  Fax: [phone]

## 2022-05-27 NOTE — FLOWSHEET NOTE
Speech-Language Pathology  Daily Treatment Note  & PROGRESS REPORT    Date:  2022  Patient Name:  Iglesia Redding      :  1945    MRN: [id number]  Restrictions/Precautions:    Medical Diagnosis Information:  Acute CVA (I63.9)  Treatment Diagnosis Information:  Cognitive Communication Deficit (J14.056)  Insurance/Certification information:  Medicare    Physician Information:  Dr. Bryan Santos of care signed (Y/N):    Visit# / total visits:   Pain level: denied    Progress Note: [x]  Yes  []  No  Next due by:  22    Subjective:  Pleasant and cooperative. Pt initiates conversation re: driving, stating that she does not drive at night and doesn't \"rip & run & foolishness. \"    Objective/Progress Toward Goals:   1.  Pt will participate in ongoing assessment of cognitive communication skills with goals added as indicated. 3/28/22:  Patient was administered a functional reading portion of the GUERA (Assessment of Language-Related Functional Activities) with the following results:   Understanding Medication Labels:  10/10  3/29/22, 22, 22, 22:  Goal not directly targeted during this session. 22:  Pt is able to complete mazes independently and in a timely manner. SLP provided similar exercises for home practice. Goal met. Continue goal as indicated. 2.  Pt will complete functional math reasoning (time/money) exercises independently. 3/28/22:  Goal not directly targeted during this session. 3/29/22:  SLP educated pt re: HEP for math reasoning (checkbook) exercises. Pt verbalized comprehension but insists that she has family support to manage finances and she is not interested in learning a system that is different from her own. 22, 22:  Goal not directly targeted during this session.      22:  Goal not directly targeted; however in the area of functional reading/navigating, pt is able to complete map reading exercise independently with increased time. 4/14/22:  Goal not directly targeted during this session. Goal note met. Continue goal.    3.  Pt will complete executive function exercises independently.    3/28/22:  SLP educated pt re: compensatory memory strategies; pt verbalized comprehension. 3/29/22:  SLP educated pt re: HEP for memory exercises (including card-based memory game) and rationale; pt verbalized comprehension. SLP demonstrated memory exercises, organization and rehearsal strategies to facilitate recall; pt verbalized comprehension but she requires min-mod assistance to apply. 4/6/22:  SLP educated pt re: 2-parameter divergent naming exercise; pt verbalized comprehension. Pt was able to generate categorically-related vocabulary with increased time and minimal cues; she required minimal cues to recall naming parameters. 4/7/22:  Pt is able to complete 2-parameter divergent naming exercise with increased time and minimal assistance. Pt required moderate assistance to verbalize responses to cognitively complex word retrieval exercises (Flex Your Language). Pt is able to complete 10-item deductive logic exercises with minimal assistance; however when attempting 12-item deductive logic exercise, pt became frustrated and experienced difficulty with concrete thinking that resulted in her being unable to solve a problem using given parameters (because she \"would not do it that way\"). 4/12/22:  Pt is able to complete 2-parameter card sorting (if/then) exercise with 90% accuracy with increased time. 4/14/22:  Pt is able to complete divided attention exercise with 88% accuracy. Goal note met. Continue goal.      Assessment:   Pt demonstrates mild cognitive communication impairment characterized by imapiured executive function and math reasoning.       Plan:   Continue per POC, 1-2x/week x 12 weeks    Timed Code Treatment:  55 minutes    Total Treatment Time:  55 minutes    Signature:  Jacek Villalobos, SLP, M.A., [address] [facility]  Phone: [phone]  Fax: [phone]

## 2022-05-27 NOTE — FLOWSHEET NOTE
Speech-Language Pathology  Daily Treatment Note    Date:  2022  Patient Name:  Carie Hutson      :      MRN: [id number]  Restrictions/Precautions:    Medical Diagnosis Information:  Acute CVA (I63.9)  Treatment Diagnosis Information:  Cognitive Communication Deficit (R62.633)  Insurance/Certification information:  Medicare    Physician Information:  Dr. Elissa Robledo of care signed (Y/N):    Visit# / total visits: #  Pain level: denied    Progress Note: []  Yes  [x]  No  Next due by:  22    Subjective:  Pleasant and cooperative. Objective/Progress Toward Goals:   1.  Pt will participate in ongoing assessment of cognitive communication skills with goals added as indicated. 3/28/22:  Patient was administered a functional reading portion of the GUERA (Assessment of Language-Related Functional Activities) with the following results:   Understanding Medication Labels:  10/10  3/29/22, 22, 22, 22:  Goal not directly targeted during this session. Continue goal.    2.  Pt will complete functional math reasoning (time/money) exercises independently. 3/28/22:  Goal not directly targeted during this session. 3/29/22:  SLP educated pt re: HEP for math reasoning (checkbook) exercises. Pt verbalized comprehension but insists that she has family support to manage finances and she is not interested in learning a system that is different from her own. 22, 22:  Goal not directly targeted during this session. 22:  Goal not directly targeted; however in the area of functional reading/navigating, pt is able to complete map reading exercise independently with increased time. Continue goal.    3.  Pt will complete executive function exercises independently.    3/28/22:  SLP educated pt re: compensatory memory strategies; pt verbalized comprehension.     3/29/22:  SLP educated pt re: HEP for memory exercises (including card-based memory game) and rationale; pt verbalized comprehension. SLP demonstrated memory exercises, organization and rehearsal strategies to facilitate recall; pt verbalized comprehension but she requires min-mod assistance to apply. 4/6/22:  SLP educated pt re: 2-parameter divergent naming exercise; pt verbalized comprehension. Pt was able to generate categorically-related vocabulary with increased time and minimal cues; she required minimal cues to recall naming parameters. 4/7/22:  Pt is able to complete 2-parameter divergent naming exercise with increased time and minimal assistance. Pt required moderate assistance to verbalize responses to cognitively complex word retrieval exercises (Flex Your Language). Pt is able to complete 10-item deductive logic exercises with minimal assistance; however when attempting 12-item deductive logic exercise, pt became frustrated and experienced difficulty with concrete thinking that resulted in her being unable to solve a problem using given parameters (because she \"would not do it that way\"). 4/12/22:  Pt is able to complete 2-parameter card sorting (if/then) exercise with 90% accuracy with increased time. Continue goal.      Assessment:   Pt demonstrates mild cognitive communication impairment characterized by imapiured executive function and math reasoning.       Plan:   Continue per POC, 1-2x/week x 12 weeks    Timed Code Treatment:  55 minutes    Total Treatment Time:  55 minutes    Signature:  Sera Anders, SHA, M.A., [address] [facility]  Phone: [phone]  Fax: [phone]

## 2022-05-27 NOTE — FLOWSHEET NOTE
Speech-Language Pathology  Daily Treatment Note    Date:  2022  Patient Name:  Eliud Wick      :  1945    MRN: [id number]  Restrictions/Precautions:    Medical Diagnosis Information:  Acute CVA (I63.9)  Treatment Diagnosis Information:  Cognitive Communication Deficit (I75.603)  Insurance/Certification information:  Medicare    Physician Information:  Dr. Roxana Suggs of care signed (Y/N):    Visit# / total visits:   Pain level: denied    Progress Note: []  Yes  [x]  No  Next due by:  22    Subjective:  Pleasant and cooperative. Objective/Progress Toward Goals:   1.  Pt will participate in ongoing assessment of cognitive communication skills with goals added as indicated. 3/28/22:  Patient was administered a functional reading portion of the GUERA (Assessment of Language-Related Functional Activities) with the following results:   Understanding Medication Labels:  10/10  3/29/22, 22:  Goal not directly targeted during this session. Continue goal.    2.  Pt will complete functional math reasoning (time/money) exercises independently. 3/28/22:  Goal not directly targeted during this session. 3/29/22:  SLP educated pt re: HEP for math reasoning (checkbook) exercises. Pt verbalized comprehension but insists that she has family support to manage finances and she is not interested in learning a system that is different from her own. 22:  Goal not directly targeted during this session. Continue goal.    3.  Pt will complete executive function exercises independently.    3/28/22:  SLP educated pt re: compensatory memory strategies; pt verbalized comprehension. 3/29/22:  SLP educated pt re: HEP for memory exercises (including card-based memory game) and rationale; pt verbalized comprehension.   SLP demonstrated memory exercises, organization and rehearsal strategies to facilitate recall; pt verbalized comprehension but she requires min-mod assistance to apply. 4/6/22:  SLP educated pt re: 2-parameter divergent naming exercise; pt verbalized comprehension. Pt was able to generate categorically-related vocabulary with increased time and minimal cues; she required minimal cues to recall naming parameters. Continue goal.      Assessment:   Pt demonstrates mild cognitive communication impairment characterized by imapiured executive function and math reasoning.       Plan:   Continue per POC, 1-2x/week x 12 weeks    Timed Code Treatment:  55 minutes    Total Treatment Time:  55 minutes    Signature:  SHA Garcia, M.A., [address] [facility] Outpatient Speech Therapy  Phone: [phone]  Fax: [phone]

## 2022-05-27 NOTE — FLOWSHEET NOTE
Speech-Language Pathology  Daily Treatment Note    Date:  3/28/2022  Patient Name:  Cuong Castellano      :      MRN: [id number]  Restrictions/Precautions:    Medical Diagnosis Information:  Acute CVA (I63.9)  Treatment Diagnosis Information:  Cognitive Communication Deficit (W09.927)  Insurance/Certification information:  Medicare    Physician Information:  Dr. Gianfranco Vogt of care signed (Y/N):    Visit# / total visits:   Pain level: denied    Progress Note: []  Yes  [x]  No  Next due by:  22    Subjective:  Pleasant and cooperative. Objective/Progress Toward Goals:   1.  Pt will participate in ongoing assessment of cognitive communication skills with goals added as indicated. 3/28/22:  Patient was administered a functional reading portion of the GUERA (Assessment of Language-Related Functional Activities) with the following results:   Understanding Medication Labels:  10/10  Continue goal.    2.  Pt will complete functional math reasoning (time/money) exercises independently. 3/28/22:  Goal not directly targeted during this session. Continue goal.    3.  Pt will complete executive function exercises independently.    3/28/22:  SLP educated pt re: compensatory memory strategies; pt verbalized comprehension. Continue goal.      Assessment:   Pt demonstrates mild cognitive communication impairment characterized by imapiured executive function and math reasoning.       Plan:   Continue per POC, 1-2x/week x 12 weeks    Timed Code Treatment:  55 minutes    Total Treatment Time:  55 minutes    Signature:  Isa Angulo, SLP, M.A., [address] [facility]  Phone: [phone]  Fax: [phone]

## 2022-05-27 NOTE — PROGRESS NOTES
Outpatient Speech Therapy  Phone: [phone] Fax: [phone]    To: Dr. Paolo Tran D.O. From: Bernard Almendarez, SLP, MA, CCC-SLP   Date: 3/24/2022     Patient: Saige Eldridge       : 1945   MRN: [id number]  Medical Diagnosis:  Acute CVA (I63.9)  Treatment Diagnosis:  Cognitive Communication Deficit (R41.841)    Dl Mistry  Dear Dr. Jodee Espino:  The following patient has been evaluated for speech therapy services and for therapy to continue, Medicare requires monthly physician review of the treatment plan. Please review the attached evaluation and/or summary of the patient's plan of care, and verify that you agree therapy should continue by signing the attached document and sending it back to our office. Plan of Care/Treatment to date:  [x] Speech-Language Evaluation/Treatment    [] Dysphagia Evaluation/Treatment        [] Dysphagia Treatment via Neuromuscular Electrical Stimulation (NMES)   [] Modified Barium Swallowing Study   [x] Cognitive-Linguistic Skills Development  [] Voice evaluation and Treatment      [] Evaluation, modification, and Training of Voice Prosthetic     [] Evaluation for Speech-Generating Augmentative and Alternative Communication Device   [] Therapeutic Services for the use of Speech-Generating Device. [] Other:            Goals:  1. Pt will participate in ongoing assessment of cognitive communication skills with goals added as indicated. 2.  Pt will complete functional math reasoning (time/money) exercises independently. 3.  Pt will complete executive function exercises independently. Frequency/Duration:  # Days per week: [x] 1 day # Weeks: [] 1 week [] 5 weeks      [x] 2 days?    [] 2 weeks [] 6 weeks     [] 3 days   [] 3 weeks [] 8 weeks     [] 4 days   [] 4 weeks [x] 12 weeks    Rehab Potential: [] Excellent [x] Good [] Fair  [] Poor       Electronically signed by:   Bernard Almendarez, SHA, M.A., [address] Pathologist  [facility] [address] [facility]  Phone: [phone]  Fax: [phone]        If you have any questions or concerns, please don't hesitate to call.   Thank you for your referral.      Physician Signature:________________________________Date:__________________  By signing above, therapists plan is approved by physician

## 2022-05-27 NOTE — FLOWSHEET NOTE
Speech-Language Pathology  Daily Treatment Note    Date:  2022  Patient Name:  Maggie Melo      :  1945    MRN: [id number]  Restrictions/Precautions:    Medical Diagnosis Information:  Acute CVA (I63.9)  Treatment Diagnosis Information:  Cognitive Communication Deficit (U05.228)  Insurance/Certification information:  Medicare    Physician Information:  Dr. Cuauhtemoc George of care signed (Y/N):    Visit# / total visits: #  Pain level: denied    Progress Note: []  Yes  [x]  No  Next due by:  22    Subjective:  Pleasant and cooperative. Objective/Progress Toward Goals:   1.  Pt will participate in ongoing assessment of cognitive communication skills with goals added as indicated. 3/28/22:  Patient was administered a functional reading portion of the GUERA (Assessment of Language-Related Functional Activities) with the following results:   Understanding Medication Labels:  10/10  3/29/22, 22, 22:  Goal not directly targeted during this session. Continue goal.    2.  Pt will complete functional math reasoning (time/money) exercises independently. 3/28/22:  Goal not directly targeted during this session. 3/29/22:  SLP educated pt re: HEP for math reasoning (checkbook) exercises. Pt verbalized comprehension but insists that she has family support to manage finances and she is not interested in learning a system that is different from her own. 22, 22:  Goal not directly targeted during this session. Continue goal.    3.  Pt will complete executive function exercises independently.    3/28/22:  SLP educated pt re: compensatory memory strategies; pt verbalized comprehension. 3/29/22:  SLP educated pt re: HEP for memory exercises (including card-based memory game) and rationale; pt verbalized comprehension.   SLP demonstrated memory exercises, organization and rehearsal strategies to facilitate recall; pt verbalized comprehension but she requires min-mod assistance to apply. 4/6/22:  SLP educated pt re: 2-parameter divergent naming exercise; pt verbalized comprehension. Pt was able to generate categorically-related vocabulary with increased time and minimal cues; she required minimal cues to recall naming parameters. 4/7/22:  Pt is able to complete 2-parameter divergent naming exercise with increased time and minimal assistance. Pt required moderate assistance to verbalize responses to cognitively complex word retrieval exercises (Flex Your Language). Pt is able to complete 10-item deductive logic exercises with minimal assistance; however when attempting 12-item deductive logic exercise, pt became frustrated and experienced difficulty with concrete thinking that resulted in her being unable to solve a problem using given parameters (because she \"would not do it that way\"). Continue goal.      Assessment:   Pt demonstrates mild cognitive communication impairment characterized by imapiured executive function and math reasoning.       Plan:   Continue per POC, 1-2x/week x 12 weeks    Timed Code Treatment:  55 minutes    Total Treatment Time:  55 minutes    Signature:  Bayron Price, SHA, M.A., [address] Out[facility] Speech [facility]  Phone: [phone]  Fax: [phone]

## 2022-05-27 NOTE — PROGRESS NOTES
Speech Language Pathology  Facility/Department: [facility] SPEECH THERAPY  Initial Assessment    NAME: Angela Murray  : 1945  MRN: [id number]    Date of Eval: 3/24/2022  Evaluating Therapist: SHA Marks    Past Medical History: has a past medical history of Lupus ([address] and MVP (mitral valve prolapse). Past Surgical History:  has a past surgical history that includes Tonsillectomy; Colonoscopy; and Hysterectomy. Primary Complaint: none    Pain: denied Pt reports that, while speech was not initially at baseline, it is now. Assessment:  Cognitive Communication Deficit (R41.841)  Pt demonstrates mild cognitive communication impairment characterized by imapiured executive function and math reasoning. Subjective:  Previous level of function and limitations: pt reports that she does \"EVERYTHING\" for herself at baseline     Objective:  COMPREHENSION  Auditory:  Auditory comprehension is appropriate for following directions, answering questions, and participating in conversation. Reading:  Not assessed. Pt reports no concerns when reading her Bible. She wears reading glasses.       EXPRESSION  Verbal:  Expressive language is intact ofr language content, form and use. Written:  Not assessed. Pt reports no concerns.     SPEECH  Examination of oral mechanism indicates slightly reduced oral motor coordination which does not negatively impact speech intelligibility. Speech is intelligible to an unfamiliar listener without distortion at the conversation level. Pt reports her speech is at baseline.     COGNITION  Pt was administered the SLUMS ([address] [place] [address] Mental Status Examination) cognitive screening tool, on which she scored 21/30 (below the normal level of 27/30). Deficits were identified in the following areas: short term recall, functional math calculation, and verbal fluency.       Patient was administered non-standardized portions of the GUERA (Assessment of Language-Related Functional Activities) with the following results:  1. Telling Time: 8/10 correct, able to self-correct her errors   2. Counting Money: 7/10 correct, with significantly increased time and access to paper and pencil. Pt was also administered the Symbol Cancellation subtest of the Cognitive Linguistic Quick Test (CLQT) and scored WNL (able to locate 12/12 target designs. Additional Assessments:  NOMS - Problem Solving  Note: Individuals should be scored on this [facility] based on their problem solving ability during the completion of functional activities. Problem solving involves the ability to identify the problem, generate appropriate solutions and evaluate the outcome in a reasonable/timely manner. Individuals that demonstrate sufficient attention and memory skills to be scored on this FCM (functioning at a minimum of level 3 on the Attention and Memory FCMs). For the purposes of this scal, supervision is defined as follows:  1:1 supervision - for safety reasons, the individual requires monitoring at all times; close supervision - individual requires someone standing by or within arm's reach during problem solving task; and distant supervision - individual requires someone checking in during the problem solving tasks. The following are examples of problem solving tasks as used with this FCM:  · [place] Problem Solving Tasks: Picking up dropped item when knocked over, turning on/off television or light, answering telephone. · Simple Problem Solving Tasks: Following schedule, requesting assistance, using call bell, identifying basic wants/needs, cold meal preparation, and completing personal hygiene/dressing. · Complex Problem Solving Tasks: Working on a computer, managing personal, medical, and financial affairs, preparing complex meal, grocery shopping, and route finding/map reading.        []  Level 1 Problem solving skills are nonfunctional in all situations or settings regardless of cueing or additional time given. The individual does not recognize a problem given any level of cueing. 1:1 supervision is required. []  Level 2 The individual is able to solve rote problems (i.e. Picking up a cup, if knocked over) in immediate environment. With consistent, maximal cues/assistance and additional time, the individual is able to recognize problems, generate appropriate solutions and/or carry out steps to complete simple problem solving tasks in structured environments. However, problem solving attempts are rarely accurate and 1:1 supervision is required. []  Level 3  The individual usually requires moderate cues/assistance and additional time to recognize problems, generate appropriate solutions and/or carry out steps to complete simple problem solving tasks in structured environments, although accuracy may vary. Close supervision is required. []  Level 4 The individual usually requires minimal cues/assistance to complete simple problem solving tasks in structured environments. Additional time may be needed to recognize problems, generate appropriate solutions and carry out steps to solve problems. Distant supervision may be required to complete simple problem solving tasks. The individual demonstrates emerging problem solving skills for complex problem solving tasks. With consistent maximal cues/assistance and additional time, he/she is able to identify salient features of complex problems, but rarely provides appropriate solutions. The individual rarely self-monitors effectiveness of performance and/or uses strategies when encountering difficulty. Close supervision may be required during complex problem solving tasks. [x]  Level 5 The individual demonstrates functional problem solving skills in routine daily activities.   He/she rarely requires minimal cueing/assistance or additional time to recognize problems, identify various solutions and carry out steps to complete simple problem solving tasks. The individual usually requires moderate cues/assistance to identify salient features of complex problems and occasionally provides appropriate solutions. He/she usually needs additional time to complete complex problem solving tasks and occasionally self-monitors effectiveness of performance and uses strategies when encountering difficulty. Distant supervision may be required to complete problem solving tasks. []  Level 6 Problem solving skills are functional in most settings, but some limitations in problem solving are still apparent in vocational, avocational and social activities. The individual rarely requires minimal cueing/assistance or additional time to generate multiple solutions and carry out steps to complete complex problem solving tasks. He/she usually self-monitors effectiveness of performance and uses strategies when encountering difficulty. []  Level 7 The individual's ability to successfully and independently participate in vocational, avocational, or social activities is not limited by problem solving skills. Independent functioning rarely requires more than a reasonable time to complete complex problem solving tasks. The individual independently self-monitors effectiveness of performance and uses strategies when needed. Plan:    Goals:   1. Pt will participate in ongoing assessment of cognitive communication skills with goals added as indicated. 2.  Pt will complete functional math reasoning (time/money) exercises independently. 3.  Pt will complete executive function exercises independently. Patient/family involved in developing goals and treatment plan: patient          Follow Up:   Follow up in: 1-2x/week x 12 weeks    Electronically Signed by:  Juan Krishna M.A., [address] [facility]  Speech-Language Pathologist  [place]. [address] Out[facility] Speech Therapy  Phone: [phone]  Fax: [phone]

## 2022-05-28 NOTE — FLOWSHEET NOTE
Speech-Language Pathology  Daily Treatment Note  & DISCHARGE    Date:  2022  Patient Name:  Carie Hutson      :  1945    MRN: [id number]  Restrictions/Precautions:    Medical Diagnosis Information:  Acute CVA (I63.9)  Treatment Diagnosis Information:  Cognitive Communication Deficit (F13.771)  Insurance/Certification information:  Medicare    Physician Information:  Dr. Elissa Robledo of care signed (Y/N):    Visit# / total visits:   Pain level: denied    Progress Note: [x]  Yes  [x]  No  Next due by:      Subjective:  Pt confirms that she is performing at baseline on functional cognitive tasks at home and she prefers ST discharge at this time. Objective/Progress Toward Goals:   1.  Pt will participate in ongoing assessment of cognitive communication skills with goals added as indicated. 22:  SLP re-administered portions of the GUERA (Assessment of Language-Related Functional Activities) with the following results:  Counting Money: 9/10 -- Independent Functioning Rating=1 (indicates high probability of independent functioning on this task),  Understanding Medication Labels:  10/10 -- Independent Functioning Rating=1 (indicates high probability of independent functioning on this task). 22:  Pt was re-administered the SLUMS ([address] Mental Status Examination) cognitive screening tool, on which she scored 19/30 (below the normal level of 27/30 and also below her initial assessment (suspect due to disengagement with therapy). Discontinue/discharge. 2.  Pt will complete functional math reasoning (time/money) exercises independently. 22:  See goal #1.     22:  Goal not directly targeted during this session. Discontinue/discharge. 3.  Pt will complete executive function exercises independently.    22:  Pt is able to complex problem solving exercise independently with significant;y increased time.   SLP educated pt re: novel deductive logic exercise; pt required min-mod cues and became frustrated so task was discontinued. 4/26/22:  SLP educated pt re: exercises to complete for ongoing home practice; pt verbalized comprehension. Discontinue/discharge    Assessment:   Pt demonstrates mild cognitive communication impairment characterized by imapiured executive function and math reasoning. Plan:   Discharge from [address] [place] [address] per pt request and pt's confirmation that she is at cognitive baseline.     Timed Code Treatment:  55 minutes    Total Treatment Time:  55 minutes    Signature:  SHA Pineda, M.A., CCC-SLP  Speech-Language Pathologist  [address] Out[facility] Speech Therapy  Phone: [phone]  Fax: [phone]

## 2022-05-28 NOTE — FLOWSHEET NOTE
Speech-Language Pathology  Daily Treatment Note  & PROGRESS REPORT    Date:  2022  Patient Name:  Ralph Chase      :  1945    MRN: [id number]  Restrictions/Precautions:    Medical Diagnosis Information:  Acute CVA (I63.9)  Treatment Diagnosis Information:  Cognitive Communication Deficit (P21.092)  Insurance/Certification information:  Medicare    Physician Information:  Dr. Helen Zimmerman of care signed (Y/N):    Visit# / total visits: #  Pain level: denied    Progress Note: []  Yes  [x]  No  Next due by:  22    Subjective:  Pleasant and cooperative. Pt initiates conversation re: discharge from Speech Therapy. Objective/Progress Toward Goals:   1.  Pt will participate in ongoing assessment of cognitive communication skills with goals added as indicated. 22:  SLP re-administered portions of the GUERA (Assessment of Language-Related Functional Activities) with the following results:  Counting Money: 9/10 -- Independent Functioning Rating=1 (indicates high probability of independent functioning on this task),  Understanding Medication Labels:  10/10 -- Independent Functioning Rating=1 (indicates high probability of independent functioning on this task). Continue goal as indicated. 2.  Pt will complete functional math reasoning (time/money) exercises independently. 22:  See goal #1. Continue goal.    3.  Pt will complete executive function exercises independently.    22:  Pt is able to complex problem solving exercise independently with significant;y increased time. SLP educated pt re: novel deductive logic exercise; pt required min-mod cues and became frustrated so task was discontinued. Continue goal.      Assessment:   Pt demonstrates mild cognitive communication impairment characterized by imapiured executive function and math reasoning.       Plan:   Continue per POC, 1-2x/week x 12 weeks    Timed Code Treatment:  55 minutes    Total Treatment Time:  55 minutes    Signature:  Lazarus App, SLP, M.A., CCC-SLP  Speech-Language Pathologist  [address] [facility] Speech [facility]  Phone: [phone]  Fax: [phone]

## 2022-05-31 ENCOUNTER — HOSPITAL ENCOUNTER (OUTPATIENT)
Dept: PHYSICAL THERAPY | Age: 77
Setting detail: THERAPIES SERIES
Discharge: HOME | End: 2022-05-31
Payer: MEDICARE

## 2022-05-31 ENCOUNTER — HOSPITAL ENCOUNTER (OUTPATIENT)
Dept: OCCUPATIONAL THERAPY | Age: 77
Setting detail: THERAPIES SERIES
Discharge: HOME | End: 2022-05-31
Payer: MEDICARE

## 2022-05-31 PROCEDURE — 97530 THERAPEUTIC ACTIVITIES: CPT

## 2022-05-31 PROCEDURE — 97110 THERAPEUTIC EXERCISES: CPT

## 2022-05-31 PROCEDURE — 97112 NEUROMUSCULAR REEDUCATION: CPT

## 2022-05-31 PROCEDURE — 97116 GAIT TRAINING THERAPY: CPT

## 2022-05-31 NOTE — FLOWSHEET NOTE
[facility]  [address], [address], [address]  Phone: [phone]   Fax: [phone]    Physical Therapy Treatment Note/ Progress Report:     Date:  2022    Patient Name:  Cy Seo    :  1945  MRN: [id number]    Medical/Treatment Diagnosis Information:  · Diagnosis: I63.9 (ICD-10-CM) - Cerebral infarction, unspecified  ·    R 26 abnormality of gait and mobility; R53.1 weakness, I63.9 cerebral infarction  Insurance/Certification information:  PT Insurance Information: Medicare  Physician Information:  Referring Practitioner: Dr. Christine Altman of care signed:    [x] Yes  [] No    Date of Patient follow up with Physician:      Progress Report: []  Yes  [x]  No      Date Range for reporting period:  Beginnin22  Endin22    Progress report due (10 Rx/or 30 days whichever is less):     Recertification due (POC duration/ or 90 days whichever is less):      Visit # Insurance Allowable Auth Needed   + 0/4 BMN []Yes   [x]No     RESTRICTIONS/PRECAUTIONS: receptive aphasia, Plavix and aspirin  Latex Allergy:  [x]NO      []YES  Preferred Language for Healthcare:   [x]English       []other:  Functional Scale: 10m walk= 0.37m/s; TUG 35.8 sec w/ RW; [place]   Date assessed:3/24/22    Functional Scale: 10m walk= 0.56 m/s w/ SPC (after TM training 0.63m/s w/ SPC) ; TUG  20.6 sec (average) w/ SPC and close SBA; [place] [address]  Date assessed:22     Functional Scale: 10m walk= 0.68m/s w/ walking stick  (after TM training 0.70m/s w/ walking stick); TUG 20.5 sec (average) w/ walking stick; 20.1 sec (average) without AD but w/ close SBA; Rosa 41/56; ABC scale  97.5% confidence  Date assessed:22          Pain level:  0/10 R shoulder     SUBJECTIVE:  Pt reports doing well, walked indoors/outdoors at mall for couple of hours, not using any AD.        OBJECTIVE:       Observation: Pt presents amb with SPC (carrying instead of using at times demonstrating more obvious spastic gait w/ inconsistent R foot clearance).  Test measurements:         Exercises/Interventions: Exercises in bold performed in department today. Items not bolded are carried forward from prior visits for continuity of the record. VC for accurate rep count. Exercise/Equipment Resistance/Repetitions HEP Other comments   Nustep 8 min., L 5  Total: 8  Warm up: 2 min. Intervals: 1 min fast (120's spm x 1 min comfortable 80's spm)  Cool down: 1 min. [] Seat 8, arms 11  Less VC for R hip control due to excessive adduction  challenged to increase zac on fast interval.    R hamstring stretch 60 sec x 3 x Seated w/ belt   R gastroc stretch 30 sec x 2 [x] Standing lunge stretch   R soleus stretch 30 sec x 2 [] Standing lunge, B UE support, verbal cues for correct form    R knee to chest, heel on SB 5 x 2 [x] Supine, VC for neutral hip rotation and slow control of lowering   R/L hip abd 15 x 2 each [x] standing,    R bridging 10 x 1, 3 sec hold [] Blocking at R lateral knee and ankle to avoid hip adduction   R ankle DF/PF 10 x 1 [x] Long sitting, propped on extended UEs.  Active assist to reduce inversion and knee flexion   Heel raises B w/ weight shifted R 10 x 2, partial ROM    B UE support, VC to weight shift R    Sit to stand 5x 1 R foot posterior to L  5x 1 w/ resistance at lateral knees for abduction  5x 2 standing on foam [] Facilitation at lateral R knee to avoid medial knee collapse  All without UE support     []    NMR  []          SLS 12 sec on L; <2 on R [x] Reviewed safe set up for HEP         Narrow SHU 30 sec    Trunk/UE rotations- 5x   Cervical/UE flex/ext- 5x    [] Post weight shift; poor eye hand coordination, cues to maintain eye contact on hands     Semi tandem 15 sec  Cervical rotation- 3x  []   LOB R w/ either foot in back   R knee to chest 10 x 2, (5 sec pause in lunge position)  red tband [] //bars w/ B UE support, less cueing on correct positioning. good ROM w/ overflow to ant tib. R toe taps 10x  R only w/ cues for increased lifting effort  10x alternating. L UE support x Reviewed safe set up for HEP   R single leg squat w/ L toe tap- 4\" step 15 x 2, B UE support  VC to avoid medial knee collapse, however somewhat limited by genu valgus   R step ups 10x- light B UE support //bars, 4\" step  10x 6\" step  Less R knee hyperextension    Gait      amb    amb without AD, 30' x 2 w/ SBA            R side spastic, but less unsteady, intermittent high guard posture. Biodex TM  10 min 2. 2mph,   B UE support                 5 min. Backwards walking 0.7mph, B UE support  Max HR 120bpm  improvements in step length have plateaued somewhat. Used safety switch only, no harness. decreased R foot clearance w/ fatigue   Lateral, forward/backwards walking Without AD, attempted quick directions changes w/ close SBA, however pt takes long pauses in between all transitions to ensure she doesn't lose her balance     4 square step (canes) 3x each direction  mult LOB, partially able to self correct, mod A mult occasions         amb w/ carrying cup of water 60' w/ 1/2 full cone cup      60' w/ 3/4 full cone cup  Decreased zac, B UE high guard positioning,increased spastic gait w/ divided attention    Minor Spill x 1   Sit to stand holding cup of water 5x   VC for knees apart  No water spill    TA: recommended more stable footwear such as high tops or wide base gym shoes to reduce risk of ankle inversion sprain. Also recommended use of SPC or trekking pole for long distance amb to reduce deviations and long term bad habits. Pt verbalized understanding despite repeatedly being noncompliant w/ recommendation to use AD on uneven terrain or long distance ambulation. Home Exercise Program:  Access Code: Oz [place]ester[place]: [email or web address]  Date: 03/24/2022  Prepared by: Ramon Villegas  Exercises  Supine Knee to Chest with Leg Straight - 1 x daily - 7 x weekly - 1-3 sets - 5-10 reps  Supine Hip Abduction - 1 x daily - 7 x weekly - 1-3 sets - 5-10 reps  Seated Ankle Pumps - 1 x daily - 7 x weekly - 3 sets - 10 reps  Seated Toe Taps - 2 x daily - 7 x weekly - 3 sets - 10 reps     Access Code: [email or web address]  URL: [email or web address]/  Date: 03/28/2022  Prepared by: Rylan Oneal  Exercises  Standing Gastroc Stretch at Counter - 1 x daily - 7 x weekly - 3 sets - 10 reps  Access Code: [email or web address]  URL: [email or web address]  Date: 04/19/2022  Prepared by: Rylan Oneal  Exercises  [facility] AND [facility] Back and DAMARIS - 1 x daily - 5 x weekly - 3 sets - 10 reps     Access Code: [email or web address]  URL: [email or web address]/  Date: 05/18/2022  Prepared by: Rylan Oneal    Exercises  Single Leg Stance with Support - 1 x daily - 5 x weekly - 3 sets - 3-5 reps - 5-10 hold  Standing Marching - 1 x daily - 5 x weekly - 3 sets - 10 reps      Therapeutic Exercise:   [x] (43459) Provided verbal/tactile cueing for activities related to strengthening, flexibility, endurance, ROM for improvements in LE, proximal hip, and core control with self-care, mobility, lifting, ambulation. NMR:  [x] (89166) Provided verbal/tactile cueing for activities related to improving balance, coordination, kinesthetic sense, posture, motor skill, proprioception to assist with LE, proximal hip, and core control in self-care, mobility, lifting, ambulation and eccentric single leg control. Therapeutic Activities:    [] (41368) Provided verbal/tactile cueing for activities related to improving balance, coordination, kinesthetic sense, posture, motor skill, proprioception and motor activation to allow for proper function of core, proximal hip and LE with self-care and ADLs and functional mobility.      Gait Training:    [x] (29671) Provided training and instruction to the patient for proper LE, core and proximal hip recruitment and positioning and eccentric body weight control with ambulation re-education including up and down stairs     Manual Treatments:  PROM / STM / Oscillations-Mobs:  G-I, II, III, IV (PA's, Inf., Post.)  [] (31645) Provided manual therapy to mobilize LE, proximal hip and/or LS spine soft tissue/joints for the purpose of modulating pain, promoting relaxation,  increasing ROM, reducing/eliminating soft tissue swelling/inflammation/restriction, improving soft tissue extensibility and allowing for proper ROM for normal function with self-care, mobility, lifting and ambulation. Home Exercise Program:    [] (95273) Reviewed/Progressed HEP activities related to strengthening, flexibility, endurance, ROM of core, proximal hip and LE for functional self-care, mobility, lifting and ambulation/stair navigation   [] (99370) Reviewed/Progressed HEP activities related to improving balance, coordination, kinesthetic sense, posture, motor skill, proprioception of core, proximal hip and LE for self-care, mobility, lifting, and ambulation/stair navigation      Modalities:    [] Electric Stimulation:   [] Ultrasound:   [] Other:       Charges:  Timed Code Treatment Minutes:  GT 17, TE 8; NM 31; Total Treatment Minutes: 56      [] EVAL (LOW) 48492 (typically 20 minutes face-to-face)  [] EVAL (MOD) 34491 (typically 30 minutes face-to-face)  [] EVAL (HIGH) 88623 (typically 45 minutes face-to-face)  [] RE-EVAL     [x] LF(62865) x 1   [x] NMR (27351) x2    [] Manual (95633) x       [] TA (86579) x      [x] Gait Training (38370) x1      [] ES(attended) (15092)  [] ES (un) (19304)   [] DRY NEEDLE 1 OR 2 MUSCLES  [] DRY NEEDLE 3+ MUSCLES  [] [facility] T[facility] (63719)  [] Ultrasound (64280)  [] Other:    GOALS: Goals established 3/24/22   Patient stated goal: achieve better balance and walk. Pt reports walking better, now walking in the grocery store (previously using motorized cart).    [x] Progressing: [] Met: [] Not Met: [] Adjusted    Therapist goals for Patient:   Short Term Goals: To be achieved in: 6 weeks   1. Independent in HEP and progression per patient tolerance. [] Progressing: [x] Met: [] Not Met: [] Adjusted   2. Pt will improve TUG time to 25 sec to improve functional ambulation. 21 sec w/ SPC and close SBA  [] Progressing: [x] Met: [] Not Met: [] Adjusted  3. Pt to perform transfers via stand-pivot technique with use of  small base quad cane [facility]) on left Modified Independent. [] Progressing: [x] Met: [] Not Met: [] Adjusted  4. Pt will improve Rosa to 32/56 to demonstrate reduced all risk. 38/61  [] Progressing: [x] Met: [] Not Met: [] Adjusted    Long Term Goals: To be achieved in: 12 weeks  1. Pt will improve ABC scale to 55% confidence for reduced fall risk. 97.5% (reviewed discrepancy between very high rating and FGA score re: fall risk). [x] Progressing: [] Met: [] Not Met: [] Adjusted  2. Pt to negotiate up/down 7 stairs with step to pattern w/ single rail mod I.   [] Progressing: [x] Met: [] Not Met: [] Adjusted   3. Pt to demonstrate improved gait pattern including improved R foot clearance and knee control without cues with use of AFO on right and an appropriate based cane. Still assessing for most appropriate R orthotic  [x] Progressing: [] Met: [] Not Met: [] Adjusted  4. Pt will improve Rosa to 42/56 to demonstrate reduced all risk. 41/56  [x] Progressing: [] Met: [] Not Met: [] Adjusted  5. Pt independent with HEP. [x] Progressing: [] Met: [] Not Met: [] Adjusted  6. Pt to report improved confidence with ambulating in community possibly over confident  [x] Progressing: [] Met: [] Not Met: [] Adjusted  7. Pt will improve TUG time to 18 sec w/ least restrictive AD to improve functional ambulation. 20.5 sec  [x] Progressing: [] Met: [] Not Met: [] Adjusted   8. Pt will demonstrate 10m walk speed to 0.80m/s.  0.70m/s  [x] Progressing: [] Met: [] Not Met: [] Adjusted     ASSESSMENT: Pt w/ good tolerance of gait and balance training.  Pt demos increased velocity on TM, but still challenged to reduce UE support and not have increased ataxia. Focused on knee control and direction changing gait. Pt in agreement to reduce frequency of visits. Made recommendations for foot wear to improve ankle stability without recommending AFO as well as encouraged use of AD for distance walking to reduce deviations. Pt will continue to benefit from skilled PT to improve functional mobility, reduce fall risk and maximize independence. This patient has exhausted their Medicare cap for therapy services, but requires continued reasonable and medically necessary skilled treatment of therapist to work towards previously set goals. Treatment/Activity Tolerance:  [x] Patient tolerated treatment well [] Patient limited by fatigue  [] Patient limited by pain  [] Patient limited by other medical complications  [] Other:     Overall Progression Towards Functional goals/ Treatment Progress Update:  [x] Patient is progressing as expected towards functional goals listed. [] Progression is slowed due to complexities/Impairments listed. [] Progression has been slowed due to co-morbidities. [] Plan just implemented, too soon to assess goals progression <30days   [] Goals require adjustment due to lack of progress  [] Patient is not progressing as expected and requires additional follow up with physician  [] Other    Prognosis for POC: [x] Good [] Fair  [] Poor    Patient requires continued skilled intervention: [x] Yes  [] No            PLAN: 3x/week for 4 weeks then 2x/week for 8 weeks   [x] Continue per plan of care [] Alter current plan (see comments)- plan to add 1x/week for 4 weeks after visit #24  [] Plan of care initiated [] Hold pending MD visit [] Discharge    Electronically signed by: Anson Hi, PT, DPT    Note: If patient does not return for scheduled/recommended follow up visits, this note will serve as a discharge from care along with the most recent update on progress.

## 2022-05-31 NOTE — FLOWSHEET NOTE
[facility]  [address], [facility] [address]  Phone: [phone]   Fax: [phone]    Occupational Therapy Treatment Note/ Progress Report:     Date:  2022    Patient Name:  Jose Mota    :  1945  MRN: [id number]      Medical/Treatment Diagnosis Information:  I63.9 (ICD-10-CM) - Cerebral infarction, unspecified  M25.611 (ICD-10-CM) Stiffness of right shoulder, not elsewhere classified, R27.9 (ICD-10-CM) Unspecified lack of coordination, R53.1 (ICD-10-CM) Weakness        Insurance/Certification information: Medicare A & B  Physician Information:  Dr. Yael Petit of care signed:    Yes    Date of Patient follow up with Physician: 22 follow-up with optometrist      Progress Report: []  Yes  [x]  No      Date Range for reporting period:  Beginnin22    Ending:       Progress report due:      Recertification due (POC duration/ or 90 days whichever is less): 2022     Visit # Insurance Allowable Auth Needed    BMN No     Latex Allergy:  [x]NO      []YES  Preferred Language for Healthcare:   [x]English       []other:  Functional Scale:  (2022)  -- 98.75/100    Pain level: 0/10 throughout session    SUBJECTIVE: Pt reports no new concerns or complaints. Pt reports plan to schedule formal driving eval using resources provided by OTS. She also reported that she does not plan to drive unattended prior to completing evaluation. OBJECTIVE:   Observation: Pt demonstrated good awareness to inhibit over activation of upper trap during seated AROM exercises.  Test measurements:           RESTRICTIONS/PRECAUTIONS: Plavix and aspirin      Therapeutic Activities:    Activity #1:     Pt sat edge of mat and engaged in overhead tossing activity to increase functional reach with RUE. Pt instructed to use bimanual hands to lift volleyball-sized bouncy ball overhead into shoulder flexion and toss to OTS.  Pt performed 3x with min v/c to inhibit over activation of middle delt. Pt then instructed to use bimanual hands to lift tennis ball overhead into shoulder flexion and toss to OTS. Pt performed 10x and demonstrated better inhibition of activation of middle delt into shoulder abduction. Pt was then instructed to use RUE to lift soft softball-sized ball into shoulder flexion overhead and toss to OTS. Pt performed 2 sets of 6 reps with mod v/c to inhibit over-activation of middle delt. Pt required one rest break. Finally, pt instructed to use RUE to lift soft soft-ball sized ball into shoulder abduction and toss to OTS. Pt performed 2 sets of 6 reps with mod v/c to inhibit compensatory L lean. Pt required one rest break. Pt achieved ~120 degrees of R shoulder flexion and ~100 degrees of R shoulder abduction during tossing activity. Patient Education:      Pt received education on plan of care and rationale for weaning sessions to one time per week. - verb understanding and agreement  Pt received education on benefit of stretching before AROM exercises. - verb understanding  Pt received education on benefit of use of mirror for visual feedback while performing seated AROM exercises at home. - verb understanding     Manual Therapy: RUE, supine unless otherwise indicated      Therapeutic Exercises: Exercises in bold performed in department today. Items not bolded are carried forward from prior visits for continuity of the record.     Exercise/Equipment Resistance/Repetitions HEP Other comments   BUE Shoulder flexion w/ dowel tatyana (supine)   1# dowel tatyana, x10 reps with five second holds []    RUE shoulder abduction w/ dowel tatyana (supine)     1# dowel tatyana, x10 reps with 10 second holds []    BUE shoulder flexion with walker   ~10 reps to ~100 degrees; ~5 second holds []    RUE shoulder flexion w/ UE [place]   10 reps with 5 second holds []      RUE shoulder abduction w/ UE [place]    ~3 reps []    RUE AROM \"L\" with UE [place] 10 reps []    RUE AROM Shoulder Flexion (supine)     10 reps  [x]    RUE AROM Shoulder Abduction (supine)     10 reps  [x]    RUE horizontal ab/adduction (supine)     8 reps [x]    BUE shoulder flexion w/ theraball 10 reps w/ 5-15 second holds []    Shoulder flexion w/ BUE placed on wall (stance) 10 reps with 5 second holds [] ~ 130 degrees ROM achieved. Pt able to maintain trunk alignment during stretch. Pt tolerated well. Shoulder abduction w/ RUE placed on wall (stance) 10 reps with 5 second holds [] ~ 130 degrees ROM achieved    Occasionally hands-on to inhibit trunk rotation, pt able to self-correct in later reps with min v/c. Occasional v/c to maintain elbow extension during stretch     RUE AROM Shoulder Abduction (unsupported short sit) 10 reps [] Intermittent hands-on to inhibit overactivation of upper trap    Pt unable to maintain elbow extension despite min v/c but denied use of air splint to inhibit elbow flexion    ~90 degrees ROM achieved    Pt noted fatigue after exercise   RUE AROM shoulder horizontal add/abduction (unsupported short sit) 10 reps  Pt able to maintain elbow extension with min v/c    Pt tolerated well and maintained trunk alignment during exercise. RUE AROM shoulder Flexion (unsupported short sit)  10 reps [] Hands-on to inhibit over activation at upper trap and middle delt in initial reps, pt able to self correct in later reps    Pt unable to sustain elbow extension despite min v/c but denied use of air splint to inhibit elbow flexion    ~100 degrees ROM achieved   Scapular depression (seated) 10 reps     Bilateral hands placed on mat for closed chain scapular strengthening.  [] Facilitative tapping at lower trap for first 5 reps    Pt tolerated well   Scapular protraction / retraction RUE (seated) 20 reps    Cone slides on table to facilitate movement []       Scapular protraction / retraction BUE (seated) 10 reps    Cone slides on table to facilitate movement []        [] Home Exercise Program:  Self-assisted stretches for RUE using RW and wall as described above   Force use RUE/R hand    Access Code: [email or web address]  URL: [email or web address]  Date: 04/12/2022  Prepared by: Lacy Raphael    Exercises  Supine Shoulder Flexion Extension Full Range AROM - 2 x daily - 7 x weekly - 1 sets - 10 reps  Supine Shoulder Abduction AROM - 2 x daily - 7 x weekly - 1 sets - 10 reps  Supine Shoulder Horizontal Abduction - 2 x daily - 7 x weekly - 1 sets - 10 reps    Therapeutic Exercise:   [x] (30406) Provided verbal/tactile cueing for activities related to strengthening, flexibility, endurance, ROM. Includes review/progression of HEP activities related to strengthening, flexibility, endurance, AROM, proximal shoulder and UE for functional transfers/mobility, self-care, IADLs, and community re-entry    Therapeutic Activities:    [x] (46553) Provided verbal/tactile cueing for activities related to improving balance, coordination, kinesthetic sense, posture, motor skill, proprioception and motor activation to allow for proper function of core, proximal shoulder and UE with self-care, and ADLs, IADLs, functional mobility, work-related and leisure based activities. Includes review/progression of HEP activities to improve balance, coordination, kinesthetic sense, posture, motor skill, proprioception, proximal shoulder and UE for functional transfers/mobility, self-care, IADLs, and community re-entry    Sensory Integration Techniques:  [] (13037)Provided verbal/tactile feedback to enhance sensory processing and promoting responses that are adaptive to environmental demands    Self-Care/Home Management Training:  [] (36642) Provided training and education in restorative and compensatory strategies/activity modifications in activities of daily living, meal preparation, laundry and other various house maintenance activities.  Provided training and education in use of adaptive equipment/devices and assistive technology, as needed, to promote participation and independence. Cognitive Function:  [] (08265 x15 minutes, 38479 +15 minutes) Integrated activities that address attention, memory, reasoning, executive functioning, problem solving, and/or pragmatic functioning in addition to compensatory strategies to manage the performance of an activity (for example, managing time or schedules, initiating, organizing, and sequencing tasks). NMR:  [] (34606) During functional activities/therapeutic exercises, provided facilitation of normal movement patterns and provided handling/verbal cues to promote postural alignment and stability during static and dynamic movement (sitting or standing). Activities may also include visual perceptual training, proprioception training, and balance retraining during functional activities    Manual Treatments:    [] (92063) Provided manual therapy to mobilize UB for the purpose of modulating pain, promoting relaxation,  increasing ROM, reducing/eliminating soft tissue swelling/inflammation/restriction, improving soft tissue extensibility and allowing for proper ROM for normal function with self-care, functional mobility, transfers, reaching and lifting    Modalities:     [] Electrical Stimulation (53256):     [] Ultrasound (50615):    Charges:  Timed Code Treatment Minutes: 53   Total Treatment Minutes: 53      [] EVAL (LOW) 18845 (typically 20 minutes face-to-face)  [] EVAL (MOD) 58855 (typically 30 minutes face-to-face)  [] EVAL (HIGH) 73142 (typically 45 minutes face-to-face)  [] RE-EVAL     [x] KW(20532)  x38 (3)  [] NMR (52608)     [] Manual ([id number])   [x] TA ([phone] (1)   [] ES(attended) (99569)       [] ES (un) (52262)  [] Other:    GOALS: Patient stated goal: \"get back to normal\"       []? Progressing: []? Met: []? Not Met: []? Adjusted     Therapist goals for Patient:      Short Term Goals: To be achieved in: 6 weeks  1.  Pt will improve score on UEFS to 40/100 for a subjective report of decreased difficulty with completing every day activities with RUE and R hand  []? Progressing: [x]? Met: []? Not Met: []? Adjusted  2. Pt will improve time on NHPT to more than 45 seconds to demonstrate improved fine motor coordination of R hand  []? Progressing: [x]? Met: []? Not Met: []? Adjusted  3. Pt will improve score on BBT to 40 blocks/minute to demonstrate improved timing, speed and accuracy with functional grasp of right hand. [x]? Progressing: []? Met: []? Not Met: []? Adjusted  4. Pt will be Min A with HEP/Home activities program to facilitate independence with carryover from sessions and to progress towards returning to PLOF   []? Progressing: [x]? Met: (05/11/22) []? Not Met: []? Adjusted   5. Pt will write a pangram sentence in no more than 1 minute demo'ing 85% legibility to demo improved fine motor control and speed of R hand. []? Progressing: [x]? Met: (05/11/22) []? Not Met: []? Adjusted      Long Term Goals: To be achieved in: 12 weeks  1. Pt will improve score on UEFS to 65/100 for a subjective report of decreased difficulty with completing every day activities with RUE and R hand  []? Progressing: [x]? Met: []? Not Met: [x]? Adjusted    1 Adjusted: Pt will improve score on UEFS to 100/100 for a subjective report of decreased difficulty with completing every day activities with RUE and R hand  [x]? Progressing: []? Met: [x]? Not Met: []? Adjusted     2. Pt will improve time on NHPT to more than 30 seconds to demonstrate improved fine motor coordination of R hand  [x]? Progressing: []? Met: [x]? Not Met: []? Adjusted - Discontinued  3. Pt will improve score on BBT to 50 blocks/minute to demonstrate improved timing, speed and accuracy with functional grasp of right hand. [x]? Progressing: []? Met: [x]? Not Met: []? Adjusted - Discontinued  4.  Pt will be Independent with HEP/Home activities program to facilitate independence with carryover from sessions and to progress towards returning to PLOF  []? Progressing: []? Met: [x]? Not Met: [x]? Adjusted   4 Adjusted. Pt will be Independent with HEP/Home activities program in supine to facilitate independence with carryover from sessions and to progress towards returning to PLOF   []? Progressing: []? Met: []? Not Met: []? Adjusted  5. Pt will write a pangram sentence in no more than 45 seconds minute demo'ing 95% legibility to demo improved fine motor control and speed of R hand. [x]? Progressing: []? Met: [x]? Not Met: []? Adjusted - Discontinued    Additional Long Term Goals:  6. Pt will utilize teach-back method with OTS to report driving readiness recommendations with less than two errors to demonstrate understanding. []? Progressing: [x]? Met:05/31/2022 []? Not Met: []? Adjusted   7. Pt will complete additional HEP/home activities program in supported short sit with supervision to promote carryover of R shoulder strengthening exercises and to progress towards PLOF. []? Progressing: []? Met: []? Not Met: []? Adjusted      ASSESSMENT:      Pt continues to be limited by RUE weakness, shortness, tightness, and insufficient force production during AROM exercises. However, pt demo'd good attention to maintain effective posturing and inhibit over activation of upper trap during seated exercises on this date. Pt demonstrated understanding of OTS driving recommendation following screenings completed in prior sessions by following up with formal driving eval resources and stating no intent to drive unaccompanied. Pt received education on decreasing session frequency to 1x/week to continue facilitating increased independence with HEP - pt in agreement with plan. Pt continues to benefit from skilled OT intervention, continue per POC.          Treatment/Activity Tolerance:   [x] Patient tolerated treatment well [] Patient limited by fatique  [] Patient limited by pain  [] Patient limited by other medical complications  [] Other: Overall Progression Towards Functional goals/ Treatment Progress Update:  [x] Patient is progressing as expected towards functional goals listed. [] Progression is slowed due to complexities/Impairments listed. [] Progression has been slowed due to co-morbidities. [] Plan just implemented, too soon to assess goals progression <30days   [] Goals require adjustment due to lack of progress  [] Patient is not progressing as expected and requires additional follow up with physician  [] Other    Prognosis for POC: [x] Good [] Fair  [] Poor    Patient requires continued skilled intervention: [x] Yes  [] No      PLAN:   [x] Continue per plan of care with additional goals [] Alter current plan (see comments)  [] Plan of care initiated [] Hold pending MD visit [] Discharge    Electronically signed by: Caren RECINOS    Note: If patient does not return for scheduled/recommended follow up visits, this note will serve as a discharge from care along with the most recent update on progress.

## 2022-06-02 ENCOUNTER — APPOINTMENT (OUTPATIENT)
Dept: PHYSICAL THERAPY | Age: 77
End: 2022-06-02
Payer: MEDICARE

## 2022-06-02 ENCOUNTER — APPOINTMENT (OUTPATIENT)
Dept: OCCUPATIONAL THERAPY | Age: 77
End: 2022-06-02
Payer: MEDICARE

## 2022-06-07 ENCOUNTER — HOSPITAL ENCOUNTER (OUTPATIENT)
Dept: OCCUPATIONAL THERAPY | Age: 77
Setting detail: THERAPIES SERIES
Discharge: HOME | End: 2022-06-07
Payer: MEDICARE

## 2022-06-07 ENCOUNTER — HOSPITAL ENCOUNTER (OUTPATIENT)
Dept: PHYSICAL THERAPY | Age: 77
Setting detail: THERAPIES SERIES
Discharge: HOME | End: 2022-06-07
Payer: MEDICARE

## 2022-06-07 PROCEDURE — 97110 THERAPEUTIC EXERCISES: CPT

## 2022-06-07 PROCEDURE — 97116 GAIT TRAINING THERAPY: CPT

## 2022-06-07 PROCEDURE — 97112 NEUROMUSCULAR REEDUCATION: CPT

## 2022-06-07 PROCEDURE — 97530 THERAPEUTIC ACTIVITIES: CPT

## 2022-06-07 NOTE — FLOWSHEET NOTE
[facility] Outpatient [facility]  [address], [address], [address]  Phone: [phone]   Fax: [phone]    Physical Therapy Treatment Note/ Progress Report:     Date:  2022    Patient Name:  Campos Vázquez    :  1945  MRN: [id number]    Medical/Treatment Diagnosis Information:  · Diagnosis: I63.9 (ICD-10-CM) - Cerebral infarction, unspecified  ·    R 26 abnormality of gait and mobility; R53.1 weakness, I63.9 cerebral infarction  Insurance/Certification information:  PT Insurance Information: Medicare  Physician Information:  Referring Practitioner: Dr. Lelia Boudreaux of care signed:    [x] Yes  [] No    Date of Patient follow up with Physician:       Progress Report: []  Yes  [x]  No      Date Range for reporting period:  Beginnin22  Endin22    Progress report due (10 Rx/or 30 days whichever is less): 69    Recertification due (POC duration/ or 90 days whichever is less):      Visit # Insurance Allowable Auth Needed   + 0 BMN []Yes   [x]No     RESTRICTIONS/PRECAUTIONS: receptive aphasia, Plavix and aspirin  Latex Allergy:  [x]NO      []YES  Preferred Language for Healthcare:   [x]English       []other:  Functional Scale: 10m walk= 0.37m/s; TUG 35.8 sec w/ RW; Morganie Skiff 27/56  Date assessed:3/24/22    Functional Scale: 10m walk= 0.56 m/s w/ SPC (after TM training 0.63m/s w/ SPC) ; TUG  20.6 sec (average) w/ SPC and close SBA; Morganie Skiff 37/56  Date assessed:22     Functional Scale: 10m walk= 0.68m/s w/ walking stick  (after TM training 0.70m/s w/ walking stick); TUG 20.5 sec (average) w/ walking stick; 20.1 sec (average) without AD but w/ close SBA; Rosa 41/56; ABC scale  97.5% confidence  Date assessed:22          Pain level:  0/10 R shoulder     SUBJECTIVE:  Pt reports doing well, walked indoors/outdoors at mall for couple of hours, not using any AD.  Has been [place] the rec center      OBJECTIVE:       Observation: Pt presents amb with SPC (carrying instead of using at times demonstrating more obvious spastic gait w/ inconsistent R foot clearance).  Test measurements:         Exercises/Interventions: Exercises in bold performed in department today. Items not bolded are carried forward from prior visits for continuity of the record. VC for accurate rep count. Exercise/Equipment Resistance/Repetitions HEP Other comments   Nustep 8 min., L 5  Total: 8  Warm up: 2 min. Intervals: 1 min fast (120's spm x 1 min comfortable 80's spm)  Cool down: 1 min. [] Seat 8, arms 11  Less VC for R hip control due to excessive adduction  challenged to increase zac on fast interval.    R hamstring stretch 60 sec x 3 x Seated w/ belt   R gastroc stretch 30 sec x 2 [x] Standing lunge stretch   R soleus stretch 30 sec x 2 [] Standing lunge, B UE support, verbal cues for correct form    R knee to chest, heel on SB 5 x 2 [x] Supine, VC for neutral hip rotation and slow control of lowering   R/L hip abd 15 x 2 each [x] standing,    Sidestepping  10' x 3 each, yellow tband proximal to knees      R bridging 10 x 1, 3 sec hold [] Blocking at R lateral knee and ankle to avoid hip adduction   R ankle DF/PF 10 x 1 [x] Long sitting, propped on extended UEs. Active assist to reduce inversion and knee flexion   Heel raises B w/ weight shifted R 10 x 2, partial ROM    B UE support, VC to weight shift R    Sit to stand 5x 1 R foot posterior to L  5x 1 w/ resistance at lateral knees for abduction  5x 2 standing on foam [] Facilitation at lateral R knee to avoid medial knee collapse  All without UE support     []    NMR  []    Perturbations- heels up 10x 3   Heels up on foam, toes on yoga mat. A-P and P-A perturbations at hips.  Challenged both directions    SLS 12 sec on L; <2 on R [x] Reviewed safe set up for HEP         Narrow SHU 30 sec    Trunk/UE rotations- 5x   Cervical/UE flex/ext- 5x    [] Post weight shift; poor eye hand coordination, cues to maintain eye contact on hands     Semi tandem 15 sec  Cervical rotation- 3x  []   LOB R w/ either foot in back   R knee to chest 10 x 2, (5 sec pause in lunge position)  red tband [] //bars w/ B UE support, less cueing on correct positioning. good ROM w/ overflow to ant tib. R toe taps 10x  R only w/ cues for increased lifting effort  10x alternating. L UE support x Reviewed safe set up for HEP   R single leg squat w/ L toe tap- 4\" step 15 x 2, B UE support  VC to avoid medial knee collapse, however somewhat limited by genu valgus   R step ups 10x- light B UE support //bars, 4\" step  10x 6\" step  Less R knee hyperextension    Gait      amb    amb without AD, 30' x 8 w/ SBA            R side spastic, but less unsteady, intermittent high guard posture. Biodex TM  10 min 2. 2mph,   B UE support                 5 min. Backwards walking 0.8mph, B UE support  Max HR 120bpm  improvements in step length have plateaued somewhat. Used safety switch only, no harness. decreased R foot clearance w/ fatigue   Lateral, forward/backwards walking Without AD, attempted quick directions changes w/ close SBA, however pt takes long pauses in between all transitions to ensure she doesn't lose her balance     4 square step (canes) 3x each direction  mult LOB, partially able to self correct, mod A mult occasions         amb w/ carrying cup of water 60' w/ 1/2 full cone cup      60' w/ 3/4 full cone cup  Decreased zac, B UE high guard positioning,increased spastic gait w/ divided attention    Minor Spill x 1   Sit to stand holding cup of water 5x   VC for knees apart  No water spill    TA:    Home Exercise Program:  Access Code: [email or web address]  URL: [email or web address]  Date: 03/24/2022  Prepared by: Natalie Wheeler  Exercises  Supine Knee to Chest with Leg Straight - 1 x daily - 7 x weekly - 1-3 sets - 5-10 reps  Supine Hip Abduction - 1 x daily - 7 x weekly - 1-3 sets - 5-10 reps  Seated Ankle Pumps - 1 x daily - 7 x weekly - 3 sets - 10 reps  Seated Toe Taps - 2 x daily - 7 x weekly - 3 sets - 10 reps     Access Code: [email or web address]  URL: [email or web address]/  Date: 03/28/2022  Prepared by: Greg Dawn  Exercises  Standing Gastroc Stretch at Counter - 1 x daily - 7 x weekly - 3 sets - 10 reps  Access Code: [email or web address]  URL: [email or web address]  Date: 04/19/2022  Prepared by: Greg Dawn  Exercises  [facility] Back and DAMARIS - 1 x daily - 5 x weekly - 3 sets - 10 reps     Access Code: [email or web address]  URL: [email or web address]  Date: 05/18/2022  Prepared by: Greg Dawn    Exercises  Single Leg Stance with Support - 1 x daily - 5 x weekly - 3 sets - 3-5 reps - 5-10 hold  Standing Marching - 1 x daily - 5 x weekly - 3 sets - 10 reps      Therapeutic Exercise:   [x] (93449) Provided verbal/tactile cueing for activities related to strengthening, flexibility, endurance, ROM for improvements in LE, proximal hip, and core control with self-care, mobility, lifting, ambulation. NMR:  [x] (04748) Provided verbal/tactile cueing for activities related to improving balance, coordination, kinesthetic sense, posture, motor skill, proprioception to assist with LE, proximal hip, and core control in self-care, mobility, lifting, ambulation and eccentric single leg control. Therapeutic Activities:    [] (33723) Provided verbal/tactile cueing for activities related to improving balance, coordination, kinesthetic sense, posture, motor skill, proprioception and motor activation to allow for proper function of core, proximal hip and LE with self-care and ADLs and functional mobility.      Gait Training:    [x] (95169) Provided training and instruction to the patient for proper LE, core and proximal hip recruitment and positioning and eccentric body weight control with ambulation re-education including up and down stairs     Manual Treatments:  PROM / STM / Oscillations-Mobs:  G-I, II, III, IV (PA's, Inf., Post.)  [] (85852) Provided manual therapy to mobilize LE, proximal hip and/or LS spine soft tissue/joints for the purpose of modulating pain, promoting relaxation,  increasing ROM, reducing/eliminating soft tissue swelling/inflammation/restriction, improving soft tissue extensibility and allowing for proper ROM for normal function with self-care, mobility, lifting and ambulation. Home Exercise Program:    [] (31574) Reviewed/Progressed HEP activities related to strengthening, flexibility, endurance, ROM of core, proximal hip and LE for functional self-care, mobility, lifting and ambulation/stair navigation   [] (38021) Reviewed/Progressed HEP activities related to improving balance, coordination, kinesthetic sense, posture, motor skill, proprioception of core, proximal hip and LE for self-care, mobility, lifting, and ambulation/stair navigation      Modalities:    [] Electric Stimulation:   [] Ultrasound:   [] Other:       Charges:  Timed Code Treatment Minutes:  GT 26, TE 8; NM 20; Total Treatment Minutes: 54      [] EVAL (LOW) 42776 (typically 20 minutes face-to-face)  [] EVAL (MOD) 99607 (typically 30 minutes face-to-face)  [] EVAL (HIGH) 44350 (typically 45 minutes face-to-face)  [] RE-EVAL     [x] MT(83741) x 1   [x] NMR (19693) x1    [] Manual (49184) x       [] TA (06440) x      [x] Gait Training (87580) x2      [] ES(attended) (44361)  [] ES (un) (01555)   [] DRY NEEDLE 1 OR 2 MUSCLES  [] DRY NEEDLE 3+ MUSCLES  [] [facility] (14601)  [] Ultrasound (20182)  [] Other:    GOALS: Goals established 3/24/22   Patient stated goal: achieve better balance and walk. Pt reports walking better, now walking in the grocery store (previously using motorized cart). [x] Progressing: [] Met: [] Not Met: [] Adjusted    Therapist goals for Patient:   Short Term Goals: To be achieved in: 6 weeks   1. Independent in HEP and progression per patient tolerance. [] Progressing: [x] Met: [] Not Met: [] Adjusted   2.  Pt will improve TUG time to 25 sec to improve functional ambulation. 21 sec w/ SPC and close SBA  [] Progressing: [x] Met: [] Not Met: [] Adjusted  3. Pt to perform transfers via stand-pivot technique with use of  small base quad cane [facility] on left Modified Independent. [] Progressing: [x] Met: [] Not Met: [] Adjusted  4. Pt will improve Rosa to 32/56 to demonstrate reduced all risk. 38/61  [] Progressing: [x] Met: [] Not Met: [] Adjusted    Long Term Goals: To be achieved in: 12 weeks  1. Pt will improve ABC scale to 55% confidence for reduced fall risk. 97.5% (reviewed discrepancy between very high rating and FGA score re: fall risk). [x] Progressing: [] Met: [] Not Met: [] Adjusted  2. Pt to negotiate up/down 7 stairs with step to pattern w/ single rail mod I.   [] Progressing: [x] Met: [] Not Met: [] Adjusted   3. Pt to demonstrate improved gait pattern including improved R foot clearance and knee control without cues with use of AFO on right and an appropriate based cane. Still assessing for most appropriate R orthotic  [x] Progressing: [] Met: [] Not Met: [] Adjusted  4. Pt will improve Rosa to 42/56 to demonstrate reduced all risk. 41/56  [x] Progressing: [] Met: [] Not Met: [] Adjusted  5. Pt independent with HEP. [x] Progressing: [] Met: [] Not Met: [] Adjusted  6. Pt to report improved confidence with ambulating in community possibly over confident  [x] Progressing: [] Met: [] Not Met: [] Adjusted  7. Pt will improve TUG time to 18 sec w/ least restrictive AD to improve functional ambulation. 20.5 sec  [x] Progressing: [] Met: [] Not Met: [] Adjusted   8. Pt will demonstrate 10m walk speed to 0.80m/s.  0.70m/s  [x] Progressing: [] Met: [] Not Met: [] Adjusted     ASSESSMENT: Pt w/ good tolerance of session. Progressed resistance on glut strengthening. Trialed perturbation training for increased R gastroc activation. Focused on step symmetry and speed during TM training.  Good carryover to OG, but still benefits from cues to increase R UE swing and maintain similar step length to TM. Pt continues to demo slight instability during gait that is improved w/ use of SPC. Pt will continue to benefit from skilled PT to improve functional mobility, reduce fall risk and maximize independence. This patient has exhausted their Medicare cap for therapy services, but requires continued reasonable and medically necessary skilled treatment of therapist to work towards previously set goals. Treatment/Activity Tolerance:  [x] Patient tolerated treatment well [] Patient limited by fatigue  [] Patient limited by pain  [] Patient limited by other medical complications  [] Other:     Overall Progression Towards Functional goals/ Treatment Progress Update:  [x] Patient is progressing as expected towards functional goals listed. [] Progression is slowed due to complexities/Impairments listed. [] Progression has been slowed due to co-morbidities. [] Plan just implemented, too soon to assess goals progression <30days   [] Goals require adjustment due to lack of progress  [] Patient is not progressing as expected and requires additional follow up with physician  [] Other    Prognosis for POC: [x] Good [] Fair  [] Poor    Patient requires continued skilled intervention: [x] Yes  [] No            PLAN: 3x/week for 4 weeks then 2x/week for 8 weeks   [x] Continue per plan of care [] Alter current plan (see comments)- plan to add 1x/week for 4 weeks after visit #24  [] Plan of care initiated [] Hold pending MD visit [] Discharge    Electronically signed by: Anson Hi, PT, DPT    Note: If patient does not return for scheduled/recommended follow up visits, this note will serve as a discharge from care along with the most recent update on progress.

## 2022-06-07 NOTE — PROGRESS NOTES
[facility]  [address] [place] [address], MY[address], [address]  Phone: [phone]   Fax: [phone]    Occupational Therapy Treatment Note/ Progress Report:     Date:  2022    Patient Name:  Michelle Saeed    :  1945  MRN: [id number]      Medical/Treatment Diagnosis Information:  I63.9 (ICD-10-CM) - Cerebral infarction, unspecified  M25.611 (ICD-10-CM) Stiffness of right shoulder, not elsewhere classified, R27.9 (ICD-10-CM) Unspecified lack of coordination, R53.1 (ICD-10-CM) Weakness        Insurance/Certification information: Medicare A & B  Physician Information:  Dr. Sushila Laura [facility] signed:    Yes    Date of Patient follow up with Physician: 22 follow-up with optometrist      Progress Report: []  Yes  [x]  No      Date Range for reporting period:  Beginnin22    Endin2022      Progress report due:      Recertification due (POC duration/ or 90 days whichever is less): 2022     Visit # Insurance Allowable Auth Needed    BMN No     Latex Allergy:  [x]NO      []YES  Preferred Language for Healthcare:   [x]English       []other:  Functional Scale:  (2022)  -- 98.75/100    Pain level: 0/10 throughout session    SUBJECTIVE: \"just can't fall asleep\" - reported sleep to be restless but overall has improved since onset of stroke.       OBJECTIVE:   Observation:    Test measurements:          Initial Assessment (3/24/2022) Progress Note (2022) Progress Note (2022) Progress Note (2022)     UEFS     8.75/100  95/100 98.75/100 97.5/100   NHPT  1 minute 2 seconds  43.03 seconds 30.16 (2nd trial due to misunderstanding directions)     Pt satisfaction with performance: 9/10 NA - goal discontinued   BBT  27 blocks in one minute  38 blocks in one minute 37 blocks in one minute     Pt satisfaction with performance: 10/10 NA - goal discontinued   HEP NA  Pt given formal HEP during session - plan to follow up. Pt performed shoulder abduction and flexion with intermittent v/c to maintain elbow extension. (supine) Pt reports being consistent with exercises at home.     Pt reports not completing shoulder horizontal abd/adduction ex at home because she feels it isn't needed. Pt performed  shoulder abduction, shoulder flexion and horizontal abduction exercises in supine with min VCs to maintain elbow extension and maximize ROM. Pt reports being consistent with exercises at home.     Writing a pangram (time and legibility)     100% legibility  1 minute 29 seconds ~62.3% legibility   50.470 seconds 100% legibility  59.15 seconds NA - goal discontinued                 RESTRICTIONS/PRECAUTIONS: Plavix and aspirin      Therapeutic Activities:    Activity #1:  Pt in unsupported sit to participate in bilateral overhead ball toss with medium-large sized beach ball to work on gross motor coordination of RUE and to improve AROM through functional activity. Pt participated in activity x10 minutes requiring modeling to better understand effective movement patterns. Pt improved with repetition initially then fatigues. Pt required min VCs to inhibit posterior lean during throw with fair success. Pt demo'd assymetries between R and L UEs. Activity #2: Zoom ball activity completed in supported sit (armchair with back) to work on gross motor coordination of BUEs and for endurance/strengthening activities. Pt tolerated well x8 minutes with min-mod VCs to inhibit bilateral shoulder hike. Activity #3: Ring toss completed in stance to work on whole body coordination. Pt instructed to toss rings to ring stand with RUE while stepping fwd/bkwd with LLE. Pt initially required CGA for steadying balance progressing to SBA. Pt required min-mod VCs for big step back to return to starting point - improved with repetition. Pt demo'd decreased accuracy to target with 3 sets x7 rings.     Patient Education:      --POC extension through June 30th with planned DC at end of month - verb understanding  --recommendations to pursue OT driving evaluation - verb understanding  --ability/feasibility of returning to OP clinic at anytime after discharge for \"tune-ups\" as needed - verb understanding  --purpose and goals of TAs - verb understanding    Manual Therapy: RUE, supine unless otherwise indicated      Therapeutic Exercises: Exercises in bold performed in department today. Items not bolded are carried forward from prior visits for continuity of the record. Exercise/Equipment Resistance/Repetitions HEP Other comments   BUE Shoulder flexion w/ dowel tatyana (supine)   1# dowel tatyana, x10 reps with five second holds []    RUE shoulder abduction w/ dowel tatyana (supine)     1# dowel tatyana, x10 reps with 10 second holds []    BUE shoulder flexion with walker   ~10 reps to ~100 degrees; ~5 second holds []    RUE shoulder flexion w/ UE [place]   10 reps with 5 second holds []      RUE shoulder abduction w/ UE [place]    ~3 reps []    RUE AROM \"L\" with UE [place]   10 reps []    RUE AROM Shoulder Flexion (supine)     2 sets x10 reps  [x] Min VCs to maximize ROM, to inhibit abduction and to sustain elbow extension. ROM slightly limited improving on 2nd set   RUE AROM Shoulder Abduction (supine)     2 sets x10 reps  [x] ROM limited to ~145 degrees on first set improving with second set    RUE horizontal ab/adduction (supine)     2 sets x10 reps [x] Min VCs to maximize abduction and to sustain elbow extension.     BUE shoulder flexion w/ theraball 10 reps w/ 5-15 second holds []    Shoulder flexion w/ BUE placed on wall (stance) 10 reps with 5 second holds []    Shoulder abduction w/ RUE placed on wall (stance) 10 reps with 5 second holds []    RUE AROM Shoulder Abduction (unsupported short sit) 10 reps []    RUE AROM shoulder horizontal add/abduction (unsupported short sit) 10 reps     RUE AROM shoulder Flexion (unsupported short sit)  10 reps []    Scapular depression (seated) 10 reps     Bilateral hands placed on mat for closed chain scapular strengthening. []    Scapular protraction / retraction RUE (seated) 20 reps    Cone slides on table to facilitate movement []       Scapular protraction / retraction BUE (seated) 10 reps    Cone slides on table to facilitate movement []        []      Home Exercise Program:  Self-assisted stretches for RUE using RW and wall as described above   Force use RUE/R hand    Access Code: [email or web address]  URL: [email or web address]  Date: 04/12/2022  Prepared by: Noel Jobs    Exercises  Supine Shoulder Flexion Extension Full Range AROM - 1-2 x daily - 7 x weekly - 2 sets - 10 reps  Supine Shoulder Abduction AROM - 1-2 x daily - 7 x weekly - 2 sets - 10 reps  Supine Shoulder Horizontal Abduction - 1-2 x daily - 7 x weekly - 2 sets - 10 reps    Therapeutic Exercise:   [x] (80271) Provided verbal/tactile cueing for activities related to strengthening, flexibility, endurance, ROM. Includes review/progression of HEP activities related to strengthening, flexibility, endurance, AROM, proximal shoulder and UE for functional transfers/mobility, self-care, IADLs, and community re-entry    Therapeutic Activities:    [x] (37384) Provided verbal/tactile cueing for activities related to improving balance, coordination, kinesthetic sense, posture, motor skill, proprioception and motor activation to allow for proper function of core, proximal shoulder and UE with self-care, and ADLs, IADLs, functional mobility, work-related and leisure based activities.  Includes review/progression of HEP activities to improve balance, coordination, kinesthetic sense, posture, motor skill, proprioception, proximal shoulder and UE for functional transfers/mobility, self-care, IADLs, and community re-entry    Sensory Integration Techniques:  [] (34057)Provided verbal/tactile feedback to enhance sensory processing and promoting responses that are adaptive to environmental demands    Self-Care/Home Management Training:  [] (78272) Provided training and education in restorative and compensatory strategies/activity modifications in activities of daily living, meal preparation, laundry and other various house maintenance activities. Provided training and education in use of adaptive equipment/devices and assistive technology, as needed, to promote participation and independence. Cognitive Function:  [] (79062 x15 minutes, 53745 +15 minutes) Integrated activities that address attention, memory, reasoning, executive functioning, problem solving, and/or pragmatic functioning in addition to compensatory strategies to manage the performance of an activity (for example, managing time or schedules, initiating, organizing, and sequencing tasks). NMR:  [] (72686) During functional activities/therapeutic exercises, provided facilitation of normal movement patterns and provided handling/verbal cues to promote postural alignment and stability during static and dynamic movement (sitting or standing).  Activities may also include visual perceptual training, proprioception training, and balance retraining during functional activities    Manual Treatments:    [] (53825) Provided manual therapy to mobilize UB for the purpose of modulating pain, promoting relaxation,  increasing ROM, reducing/eliminating soft tissue swelling/inflammation/restriction, improving soft tissue extensibility and allowing for proper ROM for normal function with self-care, functional mobility, transfers, reaching and lifting    Modalities:     [] Electrical Stimulation (07942):     [] Ultrasound (46761):    Charges:  Timed Code Treatment Minutes: 54   Total Treatment Minutes: 54      [] EVAL (LOW) 01439 (typically 20 minutes face-to-face)  [] EVAL (MOD) 88235 (typically 30 minutes face-to-face)  [] EVAL (HIGH) 16996 (typically 45 minutes face-to-face)  [] RE-EVAL     [x] FZ(10344)  x15 (1)  [] NMR (44186)     [] Manual ([id number])   [x] TA (19986)  x39 (3)  [] ES(attended) (11323)       [] ES (un) (39558)  [] Other:    GOALS: Patient stated goal: \"get back to normal\"       [x]? Progressing: []? Met: []? Not Met: []? Adjusted     Therapist goals for Patient:      Short Term Goals: To be achieved in: 6 weeks  1. Pt will improve score on UEFS to 40/100 for a subjective report of decreased difficulty with completing every day activities with RUE and R hand  []? Progressing: [x]? Met: []? Not Met: []? Adjusted  2. Pt will improve time on NHPT to more than 45 seconds to demonstrate improved fine motor coordination of R hand  []? Progressing: [x]? Met: []? Not Met: []? Adjusted  3. Pt will improve score on BBT to 40 blocks/minute to demonstrate improved timing, speed and accuracy with functional grasp of right hand. [x]? Progressing: []? Met: []? Not Met: []? Adjusted  4. Pt will be Min A with HEP/Home activities program to facilitate independence with carryover from sessions and to progress towards returning to PLOF   []? Progressing: [x]? Met: (05/11/22) []? Not Met: []? Adjusted   5. Pt will write a pangram sentence in no more than 1 minute demo'ing 85% legibility to demo improved fine motor control and speed of R hand. []? Progressing: [x]? Met: (05/11/22) []? Not Met: []? Adjusted      Long Term Goals: To be achieved in: 12 weeks + 2 weeks through June 30th, 2022  1. Pt will improve score on UEFS to 65/100 for a subjective report of decreased difficulty with completing every day activities with RUE and R hand  []? Progressing: [x]? Met: []? Not Met: [x]? Adjusted    1 Adjusted: Pt will improve score on UEFS to 100/100 for a subjective report of decreased difficulty with completing every day activities with RUE and R hand  [x]? Ongoing, continue: []? Met: [x]? Not Met: []? Adjusted     2. Pt will improve time on NHPT to more than 30 seconds to demonstrate improved fine motor coordination of R hand  [x]? Progressing: []? Met: [x]?  Not therapeutic activities to maximize functional return of RUE while maintaining good movement patterns. Pt is doing well with change in frequency to 1x per week. Recommend an extension of POC x2 weeks through June 30th to accommodate remaining visits of POC. Plan to DC at POC expiration - pt in agreement with plan. Treatment/Activity Tolerance:   [x] Patient tolerated treatment well [] Patient limited by fatique  [] Patient limited by pain  [] Patient limited by other medical complications  [] Other:     Overall Progression Towards Functional goals/ Treatment Progress Update:  [x] Patient is progressing as expected towards functional goals listed. [x] Progression is slowed due to complexities/Impairments listed. [] Progression has been slowed due to co-morbidities. [] Plan just implemented, too soon to assess goals progression <30days   [] Goals require adjustment due to lack of progress  [] Patient is not progressing as expected and requires additional follow up with physician  [] Other    Prognosis for POC: [x] Good [] Fair  [] Poor    Patient requires continued skilled intervention: [x] Yes  [] No      PLAN:   [x] Continue per plan of care with additional goals [x] Alter current plan (see comments): extend POC x2 weeks through June 30th, 2022  [] Plan of care initiated [] Hold pending MD visit [] Discharge    Electronically signed by: Aldo Paula OT     Note: If patient does not return for scheduled/recommended follow up visits, this note will serve as a discharge from care along with the most recent update on progress.

## 2022-06-09 ENCOUNTER — APPOINTMENT (OUTPATIENT)
Dept: OCCUPATIONAL THERAPY | Age: 77
End: 2022-06-09
Payer: MEDICARE

## 2022-06-09 ENCOUNTER — APPOINTMENT (OUTPATIENT)
Dept: PHYSICAL THERAPY | Age: 77
End: 2022-06-09
Payer: MEDICARE

## 2022-06-15 ENCOUNTER — HOSPITAL ENCOUNTER (OUTPATIENT)
Dept: OCCUPATIONAL THERAPY | Age: 77
Setting detail: THERAPIES SERIES
Discharge: HOME | End: 2022-06-15
Payer: MEDICARE

## 2022-06-15 NOTE — CARE COORDINATION
[facility]  [address]  [employer], [address] [facility], [address]  Phone: [phone]   Fax: [phone]    Occupational Therapy Missed Visit Note     Date:  6/15/2022    Patient Name:  Migdalia Kaufman      :  1945    MRN: [id number]      Cancelled visits to date: 1 (06/15/22)  No-shows to date: 0    For today's appointment patient:  [x]  Cancelled  []  Rescheduled appointment  []  No-show     Reason given by patient:  []  Patient ill  []  Conflicting appointment  []  No transportation    []  Conflict with work  []  No reason given  [x]  Other: Heat / air conditioning malfunctioning in OP clinic     Comments:      Electronically signed by:  GRISEL Blake

## 2022-06-16 ENCOUNTER — HOSPITAL ENCOUNTER (OUTPATIENT)
Dept: OCCUPATIONAL THERAPY | Age: 77
Setting detail: THERAPIES SERIES
Discharge: HOME | End: 2022-06-16
Payer: MEDICARE

## 2022-06-16 ENCOUNTER — HOSPITAL ENCOUNTER (OUTPATIENT)
Dept: PHYSICAL THERAPY | Age: 77
Setting detail: THERAPIES SERIES
Discharge: HOME | End: 2022-06-16
Payer: MEDICARE

## 2022-06-16 PROCEDURE — 97110 THERAPEUTIC EXERCISES: CPT

## 2022-06-16 PROCEDURE — 97116 GAIT TRAINING THERAPY: CPT

## 2022-06-16 PROCEDURE — 97112 NEUROMUSCULAR REEDUCATION: CPT

## 2022-06-16 PROCEDURE — 97530 THERAPEUTIC ACTIVITIES: CPT

## 2022-06-16 NOTE — PROGRESS NOTES
[facility] Outpatient Therapy  [address] [place] [address] MYA Silvest[address], [address]  Phone: [phone]   Fax: [phone]    Physical Therapy Treatment Note/ Progress Report:     Date:  2022    Patient Name:  Karli Chirinos    :  1945  MRN: [id number]    Medical/Treatment Diagnosis Information:  · Diagnosis: I63.9 (ICD-10-CM) - Cerebral infarction, unspecified  ·    R 26 abnormality of gait and mobility; R53.1 weakness, I63.9 cerebral infarction  Insurance/Certification information:  PT Insurance Information: Medicare  Physician Information:  Referring Practitioner: Dr. Kaycee Oneal of care signed:    [x] Yes  [] No    Date of Patient follow up with Physician:       Progress Report: [x]  Yes  []  No      Date Range for reporting period:  Beginnin22  Endin22    Progress report due (10 Rx/or 30 days whichever is less): 26    Recertification due (POC duration/ or 90 days whichever is less):      Visit # Insurance Allowable Auth Needed   +  BMN []Yes   [x]No     RESTRICTIONS/PRECAUTIONS: receptive aphasia, Plavix and aspirin  Latex Allergy:  [x]NO      []YES  Preferred Language for Healthcare:   [x]English       []other:  Functional Scale: 10m walk= 0.37m/s; TUG 35.8 sec w/ RW; Guerra   Date assessed:3/24/22    Functional Scale: 10m walk= 0.56 m/s w/ SPC (after TM training 0.63m/s w/ SPC) ; TUG  20.6 sec (average) w/ SPC and close SBA; Guerra 37  Date assessed:22     Functional Scale: 10m walk= 0.68m/s w/ walking stick  (after TM training 0.70m/s w/ walking stick); TUG 20.5 sec (average) w/ walking stick; 20.1 sec (average) without AD but w/ close SBA; Rosa 41/56; ABC scale  97.5% confidence  Date assessed:22     Functional Scale: 10m walk=  0.68m/s w/ SPC  (after TM training 0.84m/s w/ SPC); TUG  15.3 sec (average) w/ SPC; 14.7 sec (average) without AD;  Rosa [id number];   Date assessed:22      Pain level:  0/10 R shoulder     SUBJECTIVE:  Pt reports doing well, staying active, walking and going to rec center. OBJECTIVE:       Observation: Pt presents amb with SPC (carrying instead of using at times demonstrating mildly more spastic gait).  Test measurements:         Exercises/Interventions: Exercises in bold performed in department today. Items not bolded are carried forward from prior visits for continuity of the record. Exercise/Equipment Resistance/Repetitions HEP Other comments   Nustep 8 min., L 5  Total: 8  Warm up: 2 min. Intervals: 1 min fast (120's spm x 1 min comfortable 80's spm)  Cool down: 1 min. [] Seat 8, arms 11  Less VC for R hip control due to excessive adduction  challenged to increase zac on fast interval.    R hamstring stretch 60 sec x 3 x Seated w/ belt   R gastroc stretch 30 sec x 2 [x] Standing lunge stretch   R soleus stretch 30 sec x 2 [] Standing lunge, B UE support, verbal cues for correct form    R knee to chest, heel on SB 5 x 2 [x] Supine, VC for neutral hip rotation and slow control of lowering   R/L hip abd 15 x 2 each [x] standing,    Sidestepping  10' x 3 each, yellow tband proximal to knees      R bridging 10 x 1, 3 sec hold [] Blocking at R lateral knee and ankle to avoid hip adduction   R ankle DF 10 x 3, red tband [x] Long sitting   Heel raises B w/ weight shifted R 10 x 2, partial ROM    B UE support, VC to weight shift R    Sit to stand 5x 1 R foot posterior to L  5x 1 w/ resistance at lateral knees for abduction  5x 2 standing on foam [] Facilitation at lateral R knee to avoid medial knee collapse  All without UE support     []    NMR  []    Perturbations- heels up 10x 3   Heels up on foam, toes on yoga mat. A-P and P-A perturbations at hips.  Challenged both directions    SLS 12 sec on L; <2 on R [x] Reviewed safe set up for HEP         Narrow SHU 30 sec    Trunk/UE rotations- 5x   Cervical/UE flex/ext- 5x    [] Post weight shift; poor eye hand coordination, Inf., Post.)  [] (14316) Provided manual therapy to mobilize LE, proximal hip and/or LS spine soft tissue/joints for the purpose of modulating pain, promoting relaxation,  increasing ROM, reducing/eliminating soft tissue swelling/inflammation/restriction, improving soft tissue extensibility and allowing for proper ROM for normal function with self-care, mobility, lifting and ambulation. Home Exercise Program:    [] (29367) Reviewed/Progressed HEP activities related to strengthening, flexibility, endurance, ROM of core, proximal hip and LE for functional self-care, mobility, lifting and ambulation/stair navigation   [] (63709) Reviewed/Progressed HEP activities related to improving balance, coordination, kinesthetic sense, posture, motor skill, proprioception of core, proximal hip and LE for self-care, mobility, lifting, and ambulation/stair navigation      Modalities:    [] Electric Stimulation:   [] Ultrasound:   [] Other:       Charges:  Timed Code Treatment Minutes:  GT 26, TE 3; NM 28; Total Treatment Minutes: 57      [] EVAL (LOW) 51135 (typically 20 minutes face-to-face)  [] EVAL (MOD) 67034 (typically 30 minutes face-to-face)  [] EVAL (HIGH) 58487 (typically 45 minutes face-to-face)  [] RE-EVAL     [] ZI(11079) x    [x] NMR (81483) x2    [] Manual (91124) x       [] TA (45640) x      [x] Gait Training (67229) x2      [] ES(attended) (68231)  [] ES (un) (56113)   [] DRY NEEDLE 1 OR 2 MUSCLES  [] DRY NEEDLE 3+ MUSCLES  [] Mech Traction (16905)  [] Ultrasound (15384)  [] Other:    GOALS: Goals established 3/24/22   Patient stated goal: achieve better balance and walk. Pt reports walking better, now walking in the grocery store (previously using motorized cart). [x] Progressing: [] Met: [] Not Met: [] Adjusted    Therapist goals for Patient:   Short Term Goals: To be achieved in: 6 weeks   1. Independent in HEP and progression per patient tolerance.    [] Progressing: [x] Met: [] Not Met: [] Adjusted 2. Pt will improve TUG time to 25 sec to improve functional ambulation. 21 sec w/ SPC and close SBA  [] Progressing: [x] Met: [] Not Met: [] Adjusted  3. Pt to perform transfers via stand-pivot technique with use of  small base quad cane [facility]) on left Modified Independent. [] Progressing: [x] Met: [] Not Met: [] Adjusted  4. Pt will improve Rosa to 32/56 to demonstrate reduced all risk. 38/61  [] Progressing: [x] Met: [] Not Met: [] Adjusted    Long Term Goals: To be achieved in: 12 weeks  1. Pt will improve ABC scale to 55% confidence for reduced fall risk. (not retested today) 97.5% (reviewed discrepancy between very high rating and FGA score re: fall risk). [x] Progressing: [] Met: [] Not Met: [] Adjusted  2. Pt to negotiate up/down 7 stairs with step to pattern w/ single rail mod I.   [] Progressing: [x] Met: [] Not Met: [] Adjusted   3. Pt to demonstrate improved gait pattern including improved R foot clearance and knee control without cues with use of AFO on right and an appropriate based cane. Still assessing for most appropriate R orthotic  [x] Progressing: [] Met: [] Not Met: [] Adjusted  4. Pt will improve Rosa to 42/56 to demonstrate reduced all risk. 46/56  [] Progressing: [x] Met: [] Not Met: [] Adjusted  5. Pt independent with HEP. [x] Progressing: [] Met: [] Not Met: [] Adjusted  6. Pt to report improved confidence with ambulating in community. [] Progressing: [x] Met: [] Not Met: [] Adjusted  7. Pt will improve TUG time to 18 sec w/ least restrictive AD to improve functional ambulation. 15.3 sec  [] Progressing: [x] Met: [] Not Met: [] Adjusted   8. Pt will demonstrate 10m walk speed to 0.80m/s.  0.68 m/s before TM training; 0.84m/s after TM training   [x] Progressing: [] Met: [] Not Met: [] Adjusted     ASSESSMENT: Pt met 4/5 ST and 4/8 LT goals. Pt demonstrating good steady progress both w/ ambulation technique and velocity as well as balance.  Pt made 5 more points of improvement on Rosa and improved TUG time by ~ 5 sec. w/ good tolerance of session. Pt's gait deviations are becoming less severe, but she is still limited by R LE spasticity which will impact her balance reactions and does somewhat limit her ability to progress gait speed. She appears to remaining compliant w/ HEP and has returned to some exercise at the [facility] as well as outdoor ambulation for exercise. Pt will continue to benefit from skilled PT to improve functional mobility, reduce fall risk and maximize independence. Pt requests D/C at end of this month therefor will not use final visit on POC. This patient has exhausted their Medicare cap for therapy services, but requires continued reasonable and medically necessary skilled treatment of therapist to work towards previously set goals. Treatment/Activity Tolerance:  [x] Patient tolerated treatment well [] Patient limited by fatigue  [] Patient limited by pain  [] Patient limited by other medical complications  [] Other:     Overall Progression Towards Functional goals/ Treatment Progress Update:  [x] Patient is progressing as expected towards functional goals listed. [] Progression is slowed due to complexities/Impairments listed. [] Progression has been slowed due to co-morbidities. [] Plan just implemented, too soon to assess goals progression <30days   [] Goals require adjustment due to lack of progress  [] Patient is not progressing as expected and requires additional follow up with physician  [] Other    Prognosis for POC: [x] Good [] Fair  [] Poor    Patient requires continued skilled intervention: [x] Yes  [] No            PLAN: 3x/week for 4 weeks then 2x/week for 8 weeks (pt will not use final planned visit due to requesting to be D/C at end of the month and not go into July).    [x] Continue per plan of care [] Alter current plan (see comments)- plan to add 1x/week for 4 weeks after visit #24  [] Plan of care initiated [] Hold pending MD visit [] Discharge    Electronically signed by: Meryl Estrella, PT, DPT    Note: If patient does not return for scheduled/recommended follow up visits, this note will serve as a discharge from care along with the most recent update on progress.

## 2022-06-16 NOTE — PROGRESS NOTES
[facility] Outpatient [facility]  [address] [place] [address], [address], [address]  Phone: [phone]   Fax: [phone]    Occupational Therapy Treatment Note/ Progress Report:     Date:  2022    Patient Name:  Alejandra Rene    :  1945  MRN: [id number]      Medical/Treatment Diagnosis Information:  I63.9 (ICD-10-CM) - Cerebral infarction, unspecified  M25.611 (ICD-10-CM) Stiffness of right shoulder, not elsewhere classified, R27.9 (ICD-10-CM) Unspecified lack of coordination, R53.1 (ICD-10-CM) Weakness        Insurance/Certification information: Medicare A & B  Physician Information:  Dr. Sreedhar Manzo of care signed:    Yes    Date of Patient follow up with Physician: 22 follow-up with optometrist      Progress Report: []  Yes  [x]  No      Date Range for reporting period:   Beginnin22    Ending:       Progress report due: N/A    Recertification due (POC duration/ or 90 days whichever is less): 22    Visit # Insurance Allowable Auth Needed    BMN No     Latex Allergy:  [x]NO      []YES  Preferred Language for Healthcare:   [x]English       []other:  Functional Scale:  (2022)  -- 98.75/100    Pain level: 0/10 throughout session    SUBJECTIVE: Pt denies new concerns or complaints. OBJECTIVE:   Observation: Increased R shoulder hike during seated shoulder AROM exercises. Increased perspiration due to air conditioning malfunction in OP clinic.  Test measurements:         RESTRICTIONS/PRECAUTIONS: Plavix and aspirin      Therapeutic Activities:    Activity #1:  Pt sat in armchair and participated in bilateral overhead ball toss to improve gross coordination, shoulder flexion, and elbow extension in RUE during functional activity needed for increased participation and independence in everyday tasks. Pt completed overhead ball toss with lightweight volleyball for 2 sets of 10 reps with one rest break between sets.  Pt performed with min v/c to engage core and inhibit posterior lean to promote effective posturing during activity. Pt able to achieve full elbow extension during ball release with intermittent min v/c. Pt demonstrated improved ability to isolate activation of anterior delt with continued repetition. Activity #2:      Patient Education:    Educated on selecting exercises she can complete safely - swimming not advised as she self-reports to be a poor swimmer. - verb understanding  Continued education on increased challenge associated with completing exercises in AG plane compared to GE plane. - verb understanding  Continued education on importance of blocked RUE stretching and exercising in daily routine to promote strengthening of R shoulder. - verb understanding    Manual Therapy: RUE, supine unless otherwise indicated      Therapeutic Exercises: Exercises in bold performed in department today. Items not bolded are carried forward from prior visits for continuity of the record.     Exercise/Equipment Resistance/Repetitions HEP Other comments   BUE Shoulder flexion w/ dowel tatyana (supine)   1# dowel tatyana, x10 reps with five second holds []    RUE shoulder abduction w/ dowel tatyana (supine)     1# dowel tatyana, x10 reps with 10 second holds []    BUE shoulder flexion with walker   ~10 reps to ~100 degrees; ~5 second holds []    RUE shoulder flexion w/ UE [place]   10 reps with 5 second holds []      RUE shoulder abduction w/ UE [place]    ~3 reps []    RUE AROM \"L\" with UE [place]   10 reps []    RUE AROM Shoulder Flexion (supine)     2 sets x10 reps  [x]    RUE AROM Shoulder Abduction (supine)     2 sets x10 reps  [x]     RUE horizontal ab/adduction (supine)     2 sets x10 reps [x]     BUE shoulder flexion w/ theraball 10 reps w/ 5-15 second holds []    Shoulder flexion w/ BUE placed on wall (stance) 10 reps with 10-15 second holds [] Min v/c to maintain elbow extension and establish wide SUH during initial reps    ROM achieved ~ 120 degrees Shoulder abduction w/ RUE placed on wall (stance) 10 reps with 10-15 second holds [] Pt tolerated well with intermittent handling to inhibit rotation at trunk during initial reps which progressed to v/c in later reps    ROM achieved ~ 100 degrees   RUE AROM Shoulder Abduction (unsupported short sit) 10 reps [] Inhibitory handling at upper trap, Min-mod A to inhibit trunk rotation and maintain elbow extension during exercise    ROM achieved ~ 90 degrees    Brief rest break between reps 5 and 6    Mirror used for visual feedback - required intermittent reminders to attend to mirror   RUE AROM shoulder horizontal add/abduction (unsupported short sit) 10 reps  Intermittent handling to inhibit rotation at trunk    Able to maintain elbow extension for first ~7 reps but demo'd difficulty in final reps    Mirror used for visual feedback - required intermittent reminders to attend to mirror   RUE AROM shoulder Flexion (unsupported short sit)  10 reps [] Inhibitory handling at upper trap, min A to inhibit compensatory abduction and to maintain elbow extension    ROM achieved ~  degrees      Brief rest break between reps 5 and 6   Scapular depression (seated) 10 reps     Bilateral hands placed on mat for closed chain scapular strengthening. [] Pt tolerated well with no rest breaks   Scapular protraction / retraction RUE (seated) 20 reps    Cone slides on table to facilitate movement []       Scapular protraction / retraction BUE (seated) 10 reps    Cone slides on table to facilitate movement []        []      Home Exercise Program:  Self-assisted stretches for RUE using RW and wall as described above   Force use RUE/R hand    Access Code: [email or web address]  URL: [email or web address]  Date: 04/12/2022  Prepared by: Damien Peters    Exercises  Supine Shoulder Flexion Extension Full Range AROM - 1-2 x daily - 7 x weekly - 2 sets - 10 reps  Supine Shoulder Abduction AROM - 1-2 x daily - 7 x weekly - 2 sets - 10 reps  Supine Shoulder Horizontal Abduction - 1-2 x daily - 7 x weekly - 2 sets - 10 reps    Therapeutic Exercise:   [x] (81061) Provided verbal/tactile cueing for activities related to strengthening, flexibility, endurance, ROM. Includes review/progression of HEP activities related to strengthening, flexibility, endurance, AROM, proximal shoulder and UE for functional transfers/mobility, self-care, IADLs, and community re-entry    Therapeutic Activities:    [x] (26991) Provided verbal/tactile cueing for activities related to improving balance, coordination, kinesthetic sense, posture, motor skill, proprioception and motor activation to allow for proper function of core, proximal shoulder and UE with self-care, and ADLs, IADLs, functional mobility, work-related and leisure based activities. Includes review/progression of HEP activities to improve balance, coordination, kinesthetic sense, posture, motor skill, proprioception, proximal shoulder and UE for functional transfers/mobility, self-care, IADLs, and community re-entry    Sensory Integration Techniques:  [] (01689)Provided verbal/tactile feedback to enhance sensory processing and promoting responses that are adaptive to environmental demands    Self-Care/Home Management Training:  [] (54666) Provided training and education in restorative and compensatory strategies/activity modifications in activities of daily living, meal preparation, laundry and other various house maintenance activities. Provided training and education in use of adaptive equipment/devices and assistive technology, as needed, to promote participation and independence.     Cognitive Function:  [] (14999 x15 minutes, 41991 +15 minutes) Integrated activities that address attention, memory, reasoning, executive functioning, problem solving, and/or pragmatic functioning in addition to compensatory strategies to manage the performance of an activity (for example, managing time or schedules, initiating, organizing, and sequencing tasks). NMR:  [] (44269) During functional activities/therapeutic exercises, provided facilitation of normal movement patterns and provided handling/verbal cues to promote postural alignment and stability during static and dynamic movement (sitting or standing). Activities may also include visual perceptual training, proprioception training, and balance retraining during functional activities    Manual Treatments:    [] (09920) Provided manual therapy to mobilize UB for the purpose of modulating pain, promoting relaxation,  increasing ROM, reducing/eliminating soft tissue swelling/inflammation/restriction, improving soft tissue extensibility and allowing for proper ROM for normal function with self-care, functional mobility, transfers, reaching and lifting    Modalities:     [] Electrical Stimulation (48922):     [] Ultrasound (03981):    Charges:  Timed Code Treatment Minutes: 43   Total Treatment Minutes: 43      [] EVAL (LOW) 17352 (typically 20 minutes face-to-face)  [] EVAL (MOD) 01884 (typically 30 minutes face-to-face)  [] EVAL (HIGH) 44114 (typically 45 minutes face-to-face)  [] RE-EVAL     [x] XP(52633)  x33 (2)  [] NMR (87571)     [] Manual ([id number])   [x] TA (24475)  x10 (1)  [] ES(attended) (03445)       [] ES (un) (59181)  [] Other:    GOALS: Patient stated goal: \"get back to normal\"       [x]? Progressing: []? Met: []? Not Met: []? Adjusted     Therapist goals for Patient:      Short Term Goals: To be achieved in: 6 weeks  1. Pt will improve score on UEFS to 40/100 for a subjective report of decreased difficulty with completing every day activities with RUE and R hand  []? Progressing: [x]? Met: []? Not Met: []? Adjusted  2. Pt will improve time on NHPT to more than 45 seconds to demonstrate improved fine motor coordination of R hand  []? Progressing: [x]? Met: []? Not Met: []? Adjusted  3.  Pt will improve score on BBT to 40 blocks/minute to demonstrate improved timing, speed and accuracy with functional grasp of right hand. [x]? Progressing: []? Met: []? Not Met: []? Adjusted  4. Pt will be Min A with HEP/Home activities program to facilitate independence with carryover from sessions and to progress towards returning to PLOF   []? Progressing: [x]? Met: (05/11/22) []? Not Met: []? Adjusted   5. Pt will write a pangram sentence in no more than 1 minute demo'ing 85% legibility to demo improved fine motor control and speed of R hand. []? Progressing: [x]? Met: (05/11/22) []? Not Met: []? Adjusted      Long Term Goals: To be achieved in: 12 weeks + 2 weeks through June 30th, 2022  1. Pt will improve score on UEFS to 65/100 for a subjective report of decreased difficulty with completing every day activities with RUE and R hand  []? Progressing: [x]? Met: []? Not Met: [x]? Adjusted    1 Adjusted: Pt will improve score on UEFS to 100/100 for a subjective report of decreased difficulty with completing every day activities with RUE and R hand  [x]? Ongoing, continue: []? Met: [x]? Not Met: []? Adjusted     2. Pt will improve time on NHPT to more than 30 seconds to demonstrate improved fine motor coordination of R hand  [x]? Progressing: []? Met: [x]? Not Met: []? Adjusted - Discontinued  3. Pt will improve score on BBT to 50 blocks/minute to demonstrate improved timing, speed and accuracy with functional grasp of right hand. [x]? Progressing: []? Met: [x]? Not Met: []? Adjusted - Discontinued  4. Pt will be Independent with HEP/Home activities program to facilitate independence with carryover from sessions and to progress towards returning to PLOF  []? Progressing: []? Met: [x]? Not Met: [x]? Adjusted   4 Adjusted. Pt will be Independent with HEP/Home activities program in supine to facilitate independence with carryover from sessions and to progress towards returning to PLOF   [x]? Progressing: []? Met: []? Not Met: []? Adjusted  5.  Pt will write a pangram sentence in no more than 45 seconds minute demo'ing 95% legibility to demo improved fine motor control and speed of R hand. [x]? Progressing: []? Met: [x]? Not Met: []? Adjusted - Discontinued    Additional Long Term Goals: to be achieved by June 30th, 2022  6. Pt will utilize teach-back method with OTS to report driving readiness recommendations with less than two errors to demonstrate understanding. []? Progressing: [x]? Met:05/31/2022 []? Not Met: []? Adjusted   7. Pt will complete additional HEP/home activities program in supported short sit with supervision to promote carryover of R shoulder strengthening exercises and to progress towards PLOF. [x]? Progressing: []? Met: []? Not Met: []? Adjusted    ASSESSMENT:     Pt demo'd good tolerance to seated AROM exercises on this date, and pt was able to achieve effective posturing with v/c, inhibitory handling, and visual feedback. Pt received continued education on importance of carryover at home to promote R shoulder strengthening and functional use of RUE. Pt continues to be limited by weakness, muscle shortening, muscle tightness, and insufficient force production in R shoulder girdle muscles. [place] session duration on this date due too increased heat with air conditioning malfunction in OP clinic. Pt continues to benefit from skilled OT intervention, continue per adjusted POC. Treatment/Activity Tolerance:   [x] Patient tolerated treatment well [] Patient limited by fatique  [] Patient limited by pain  [] Patient limited by other medical complications  [] Other:     Overall Progression Towards Functional goals/ Treatment Progress Update:  [x] Patient is progressing as expected towards functional goals listed. [x] Progression is slowed due to complexities/Impairments listed. [] Progression has been slowed due to co-morbidities.   [] Plan just implemented, too soon to assess goals progression <30days   [] Goals require adjustment due to lack of progress  [] Patient is not progressing as expected and requires additional follow up with physician  [] Other    Prognosis for POC: [x] Good [] Fair  [] Poor    Patient requires continued skilled intervention: [x] Yes  [] No      PLAN:   [x] Continue per plan of care with additional goals [x] Alter current plan (see comments): extend POC x2 weeks through June 30th, 2022  [] Plan of care initiated [] Hold pending MD visit [] Discharge    Electronically signed by: Janee RECINOS    Note: If patient does not return for scheduled/recommended follow up visits, this note will serve as a discharge from care along with the most recent update on progress.

## 2022-06-23 ENCOUNTER — HOSPITAL ENCOUNTER (OUTPATIENT)
Dept: PHYSICAL THERAPY | Age: 77
Setting detail: THERAPIES SERIES
Discharge: HOME | End: 2022-06-23
Payer: MEDICARE

## 2022-06-23 ENCOUNTER — HOSPITAL ENCOUNTER (OUTPATIENT)
Dept: OCCUPATIONAL THERAPY | Age: 77
Setting detail: THERAPIES SERIES
Discharge: HOME | End: 2022-06-23
Payer: MEDICARE

## 2022-06-23 PROCEDURE — 97530 THERAPEUTIC ACTIVITIES: CPT

## 2022-06-23 PROCEDURE — 97116 GAIT TRAINING THERAPY: CPT

## 2022-06-23 PROCEDURE — 97112 NEUROMUSCULAR REEDUCATION: CPT

## 2022-06-23 PROCEDURE — 97110 THERAPEUTIC EXERCISES: CPT

## 2022-06-23 NOTE — FLOWSHEET NOTE
[facility] Outpatient Therapy  [address] [place] [address], MYA [address], [address]  Phone: [phone]   Fax: [phone]    Physical Therapy Treatment Note/ Progress Report:     Date:  2022    Patient Name:  Migdalia Kaufman    :  1945  MRN: [id number]    Medical/Treatment Diagnosis Information:  · Diagnosis: I63.9 (ICD-10-CM) - Cerebral infarction, unspecified  ·    R 26 abnormality of gait and mobility; R53.1 weakness, I63.9 cerebral infarction  Insurance/Certification information:  PT Insurance Information: Medicare  Physician Information:  Referring Practitioner: Dr. Lillian Gould of care signed:    [x] Yes  [] No    Date of Patient follow up with Physician:       Progress Report: [x]  Yes  []  No      Date Range for reporting period:  Beginnin22  Endin22    Progress report due (10 Rx/or 30 days whichever is less):     Recertification due (POC duration/ or 90 days whichever is less):      Visit # Insurance Allowable Auth Needed   +  BMN []Yes   [x]No     RESTRICTIONS/PRECAUTIONS: receptive aphasia, Plavix and aspirin  Latex Allergy:  [x]NO      []YES  Preferred Language for Healthcare:   [x]English       []other:  Functional Scale: 10m walk= 0.37m/s; TUG 35.8 sec w/ RW; [place]   Date assessed:3/24/22    Functional Scale: 10m walk= 0.56 m/s w/ SPC (after TM training 0.63m/s w/ SPC) ; TUG  20.6 sec (average) w/ SPC and close SBA; [place] [address]  Date assessed:22     Functional Scale: 10m walk= 0.68m/s w/ walking stick  (after TM training 0.70m/s w/ walking stick); TUG 20.5 sec (average) w/ walking stick; 20.1 sec (average) without AD but w/ close SBA; Orsa 41/56; ABC scale  97.5% confidence  Date assessed:22     Functional Scale: 10m walk=  0.68m/s w/ SPC  (after TM training 0.84m/s w/ SPC); TUG  15.3 sec (average) w/ SPC; 14.7 sec (average) without AD;  [place]oe 56;   Date assessed:22      Pain level:  0/10 R shoulder     SUBJECTIVE:  Pt reports decreased adherence to rec center, but does mention daily activity and HEP completion. OBJECTIVE:       Observation: Pt presents amb with SPC (carrying instead of using at times demonstrating mildly more spastic gait).  Test measurements:         Exercises/Interventions: Exercises in bold performed in department today. Items not bolded are carried forward from prior visits for continuity of the record. Exercise/Equipment Resistance/Repetitions HEP Other comments   Nustep 8 min., L 5  Total: 8  Warm up: 2 min. Intervals: 1 min fast (120's spm x 1 min comfortable 80's spm)  Cool down: 1 min. [] Seat 8, arms 11  Less VC for R hip control due to excessive adduction  challenged to increase zac on fast interval.    R hamstring stretch 60 sec x 3 x Seated w/ belt   R gastroc stretch 30 sec x 2 [x] Standing lunge stretch   R soleus stretch 30 sec x 2 [] Standing lunge, B UE support, verbal cues for correct form    R knee to chest, heel on SB 5 x 2 [x] Supine, VC for neutral hip rotation and slow control of lowering   R/L hip abd 15 x 2 each [x] standing,    Sidestepping  10' x 3 each, yellow tband proximal to knees      R bridging 10 x 1, 3 sec hold [] Blocking at R lateral knee and ankle to avoid hip adduction   R ankle DF 10 x 3, red tband [x] Long sitting   Heel raises B w/ weight shifted R 10 x 2, partial ROM    B UE support, VC to weight shift R    Sit to stand 5x 1 R foot posterior to L  5x 1 w/ resistance at lateral knees for abduction  5x 2 standing on foam [] Facilitation at lateral R knee to avoid medial knee collapse  All without UE support     []    NMR  []    Perturbations- heels up 10x 3   Heels up on foam, toes on yoga mat. A-P and P-A perturbations at hips.  Challenged both directions    SLS 12 sec on L; <2 on R [x] Reviewed safe set up for HEP         Narrow SHU 30 sec    Trunk/UE rotations- 5x   Cervical/UE flex/ext- 5x    [] Post weight shift; poor eye hand coordination, cues to maintain eye contact on hands     Semi tandem 15 sec  Cervical rotation- 3x  []   LOB R w/ either foot in back   R knee to chest 10 x 2, (5 sec pause in lunge position)  red tband [] //bars w/ B UE support, less cueing on correct positioning. good ROM w/ overflow to ant tib. R toe taps 10x  R only w/ cues for increased lifting effort  10x alternating. L UE support x Reviewed safe set up for HEP   R single leg squat w/ L toe tap- 4\" step 15 x 2, B UE support  VC to avoid medial knee collapse, however somewhat limited by genu valgus   R single leg bridge 8x  CGA for appropriate alignment of R knee and distraction for improved muscle activation   Tall kneeling --> low kneeling with knees on aerex 10x  Cues for R weight shift for neutral alignment   Tall kneeling balance with knees on aerex 2'  Ball toss         R step ups 10x- light B UE support //bars, 4\" step  10x 6\" step  Less R knee hyperextension    Gait      amb  amb without AD, 20' x 2, 30' x 4 w/ sup            R side spastic, but less unsteady, less high guard posture. Biodex TM  10 min 2. 2mph,   B UE support                 3 min. Backwards walking 0.8mph, B UE support  Max HR 120bpm  improvements in step length have plateaued somewhat. Used safety switch only, no harness.   decreased R foot clearance w/ fatigue   Lateral, forward/backwards walking Without AD, attempted quick directions changes w/ close SBA, however pt takes long pauses in between all transitions to ensure she doesn't lose her balance     4 square step (canes) 3x each direction  mult LOB, partially able to self correct, mod A mult occasions         amb w/ carrying cup of water 60' w/ 1/2 full cone cup      60' w/ 3/4 full cone cup  Decreased zac, B UE high guard positioning,increased spastic gait w/ divided attention    Minor Spill x 1   Sit to stand holding cup of water 5x   VC for knees apart  No water spill    TA:    Home Exercise Program:  Access Code: Erlin Ann: [email or web address]  Date: 03/24/2022  Prepared by: Ave Bale  Exercises  Supine Knee to Chest with Leg Straight - 1 x daily - 7 x weekly - 1-3 sets - 5-10 reps  Supine Hip Abduction - 1 x daily - 7 x weekly - 1-3 sets - 5-10 reps  Seated Ankle Pumps - 1 x daily - 7 x weekly - 3 sets - 10 reps  Seated Toe Taps - 2 x daily - 7 x weekly - 3 sets - 10 reps     Access Code: [email or web address]  URL: [email or web address]  Date: 03/28/2022  Prepared by: Cherie Bale  Exercises  Standing Gastroc Stretch at Counter - 1 x daily - 7 x weekly - 3 sets - 10 reps  Access Code: [email or web address]  URL: [email or web address]  Date: 04/19/2022  Prepared by: Ave Bale  Exercises  [employer] [facility] Back and DAMARIS - 1 x daily - 5 x weekly - 3 sets - 10 reps     Access Code: [email or web address]  URL: [email or web address]  Date: 05/18/2022  Prepared by: Ave Bale    Exercises  Single Leg Stance with Support - 1 x daily - 5 x weekly - 3 sets - 3-5 reps - 5-10 hold  Standing Marching - 1 x daily - 5 x weekly - 3 sets - 10 reps      Therapeutic Exercise:   [x] (28980) Provided verbal/tactile cueing for activities related to strengthening, flexibility, endurance, ROM for improvements in LE, proximal hip, and core control with self-care, mobility, lifting, ambulation. NMR:  [x] (17243) Provided verbal/tactile cueing for activities related to improving balance, coordination, kinesthetic sense, posture, motor skill, proprioception to assist with LE, proximal hip, and core control in self-care, mobility, lifting, ambulation and eccentric single leg control. Therapeutic Activities:    [] (49217) Provided verbal/tactile cueing for activities related to improving balance, coordination, kinesthetic sense, posture, motor skill, proprioception and motor activation to allow for proper function of core, proximal hip and LE with self-care and ADLs and functional mobility.      Gait Training:    [x] (01413) Provided training and instruction to the patient for proper LE, core and proximal hip recruitment and positioning and eccentric body weight control with ambulation re-education including up and down stairs     Manual Treatments:  PROM / STM / Oscillations-Mobs:  G-I, II, III, IV (PA's, Inf., Post.)  [] (95129) Provided manual therapy to mobilize LE, proximal hip and/or LS spine soft tissue/joints for the purpose of modulating pain, promoting relaxation,  increasing ROM, reducing/eliminating soft tissue swelling/inflammation/restriction, improving soft tissue extensibility and allowing for proper ROM for normal function with self-care, mobility, lifting and ambulation. Home Exercise Program:    [] (84714) Reviewed/Progressed HEP activities related to strengthening, flexibility, endurance, ROM of core, proximal hip and LE for functional self-care, mobility, lifting and ambulation/stair navigation   [] (84296) Reviewed/Progressed HEP activities related to improving balance, coordination, kinesthetic sense, posture, motor skill, proprioception of core, proximal hip and LE for self-care, mobility, lifting, and ambulation/stair navigation      Modalities:    [] Electric Stimulation:   [] Ultrasound:   [] Other:       Charges:  Timed Code Treatment Minutes: GT 20, NM 25    Total Treatment Minutes: 45      [] EVAL (LOW) 15112 (typically 20 minutes face-to-face)  [] EVAL (MOD) 39730 (typically 30 minutes face-to-face)  [] EVAL (HIGH) 58658 (typically 45 minutes face-to-face)  [] RE-EVAL     [] YQ(60196) x    [x] NMR (83427) x2    [] Manual (99661) x       [] TA (73399) x      [x] Gait Training (38167) x2      [] ES(attended) (50975)  [] ES (un) (37015)   [] DRY NEEDLE 1 OR 2 MUSCLES  [] DRY NEEDLE 3+ MUSCLES  [] Mech Traction (19102)  [] Ultrasound (96208)  [] Other:    GOALS: Goals established 3/24/22   Patient stated goal: achieve better balance and walk.  Pt reports walking better, now walking in the grocery store (previously using motorized cart). [x] Progressing: [] Met: [] Not Met: [] Adjusted    Therapist goals for Patient:   Short Term Goals: To be achieved in: 6 weeks   1. Independent in HEP and progression per patient tolerance. [] Progressing: [x] Met: [] Not Met: [] Adjusted   2. Pt will improve TUG time to 25 sec to improve functional ambulation. 21 sec w/ SPC and close SBA  [] Progressing: [x] Met: [] Not Met: [] Adjusted  3. Pt to perform transfers via stand-pivot technique with use of  small base quad cane [facility] on left Modified Independent. [] Progressing: [x] Met: [] Not Met: [] Adjusted  4. Pt will improve Rosa to 32/56 to demonstrate reduced all risk. 38/61  [] Progressing: [x] Met: [] Not Met: [] Adjusted    Long Term Goals: To be achieved in: 12 weeks  1. Pt will improve ABC scale to 55% confidence for reduced fall risk. (not retested today) 97.5% (reviewed discrepancy between very high rating and FGA score re: fall risk). [x] Progressing: [] Met: [] Not Met: [] Adjusted  2. Pt to negotiate up/down 7 stairs with step to pattern w/ single rail mod I.   [] Progressing: [x] Met: [] Not Met: [] Adjusted   3. Pt to demonstrate improved gait pattern including improved R foot clearance and knee control without cues with use of AFO on right and an appropriate based cane. Still assessing for most appropriate R orthotic  [x] Progressing: [] Met: [] Not Met: [] Adjusted  4. Pt will improve Rosa to 42/56 to demonstrate reduced all risk. 46/56  [] Progressing: [x] Met: [] Not Met: [] Adjusted  5. Pt independent with HEP. [x] Progressing: [] Met: [] Not Met: [] Adjusted  6. Pt to report improved confidence with ambulating in community. [] Progressing: [x] Met: [] Not Met: [] Adjusted  7. Pt will improve TUG time to 18 sec w/ least restrictive AD to improve functional ambulation. 15.3 sec  [] Progressing: [x] Met: [] Not Met: [] Adjusted   8.  Pt will demonstrate 10m walk speed to 0.80m/s.  0.68 m/s before TM training; 0.84m/s after TM training   [x] Progressing: [] Met: [] Not Met: [] Adjusted     ASSESSMENT: Pt met 4/5 ST and 4/8 LT goals. Pt demonstrating good steady progress both w/ ambulation technique and velocity as well as balance. Pt demonstrates good balance techniques with ambulation outdoors including hard surfaces, managing curbs, grass, hills, and uneven terrain. Pt did require increased time and higher guard on uneven hill. Pt with good tolerance to NMR including balance on aerex pad for improved hip and knee proprioception and strengthening for appropriate balance reactions. Pt will continue to benefit from skilled PT to improve functional mobility, reduce fall risk and maximize independence. Pt requests D/C at end of this month therefor will not use final visit on POC. Treatment/Activity Tolerance:  [x] Patient tolerated treatment well [] Patient limited by fatigue  [] Patient limited by pain  [] Patient limited by other medical complications  [] Other:     Overall Progression Towards Functional goals/ Treatment Progress Update:  [x] Patient is progressing as expected towards functional goals listed. [] Progression is slowed due to complexities/Impairments listed. [] Progression has been slowed due to co-morbidities. [] Plan just implemented, too soon to assess goals progression <30days   [] Goals require adjustment due to lack of progress  [] Patient is not progressing as expected and requires additional follow up with physician  [] Other    Prognosis for POC: [x] Good [] Fair  [] Poor    Patient requires continued skilled intervention: [x] Yes  [] No            PLAN: 3x/week for 4 weeks then 2x/week for 8 weeks (pt will not use final planned visit due to requesting to be D/C at end of the month and not go into July).    [x] Continue per plan of care [] Alter current plan (see comments)- plan to add 1x/week for 4 weeks after visit #24  [] Plan of care initiated [] Hold pending MD visit [] Discharge    Electronically signed by: Neris Isaacs, PT, DPT    Note: If patient does not return for scheduled/recommended follow up visits, this note will serve as a discharge from care along with the most recent update on progress.

## 2022-06-23 NOTE — FLOWSHEET NOTE
[facility]  [address], [facility] [place] [address]  Phone: [phone]   Fax: [phone]    Occupational Therapy Treatment Note/ Progress Report:     Date:  2022    Patient Name:  Maggie Melo    :  1945  MRN: [id number]      Medical/Treatment Diagnosis Information:  I63.9 (ICD-10-CM) - Cerebral infarction, unspecified  M25.611 (ICD-10-CM) Stiffness of right shoulder, not elsewhere classified, R27.9 (ICD-10-CM) Unspecified lack of coordination, R53.1 (ICD-10-CM) Weakness        Insurance/Certification information: Medicare A & B  Physician Information:  Dr. Melina Gomez of [facility] signed:    Yes    Date of Patient follow up with Physician: 22 follow-up with optometrist      Progress Report: []  Yes  [x]  No      Date Range for reporting period:   Beginnin22    Ending:       Progress report due: N/A    Recertification due (POC duration/ or 90 days whichever is less): 22    Visit # Insurance Allowable Auth Needed    BMN No     Latex Allergy:  [x]NO      []YES  Preferred Language for Healthcare:   [x]English       []other:  Functional Scale:  (2022)  -- 98.75/100    Pain level: 0/10 throughout session    SUBJECTIVE: Pt denies new concerns or complaints and that everything is going very well. She stated that she drove to therapy appointment with no issues. OBJECTIVE:   Observation: Good attention to inhibit trunk rotation and maintain good alignment during seated AROM exercises.  Test measurements:         RESTRICTIONS/PRECAUTIONS: Plavix and aspirin      Therapeutic Activities:    Activity #1: Pt participated in overhead bounce pass activity to improve gross coordination of RUE, R shoulder flexion, and R elbow extension with functional activity. Pt instructed to bounce pass large lightweight ball from overhead while incorporating elbow extension during the release.  Pt performed 2 sets of 10 passes with rest break between sets. Pt required intermittent v/c to achieve full elbow extension during ball release. Asymmetry noted with slight R shoulder hike noted during bilateral shoulder flexion but pt tolerated activity well overall. Activity #2:       Patient Education:    Pt received continued education about risks associated with driving before medical clearance has been granted. - verb understanding    Manual Therapy: RUE, supine unless otherwise indicated      Therapeutic Exercises: Exercises in bold performed in department today. Items not bolded are carried forward from prior visits for continuity of the record.     Exercise/Equipment Resistance/Repetitions HEP Other comments   BUE Shoulder flexion w/ dowel tatyana (supine)   1# dowel tatyana, x10 reps with five second holds []    RUE shoulder abduction w/ dowel tatyana (supine)     1# dowel tatyana, x10 reps with 10 second holds []    BUE shoulder flexion with walker   ~10 reps to ~100 degrees; ~5 second holds []    RUE shoulder flexion w/ UE [place]   10 reps with 5 second holds []      RUE shoulder abduction w/ UE [place]    ~3 reps []    RUE AROM \"L\" with UE [place]   10 reps []    RUE AROM Shoulder Flexion (supine)     2 sets x10 reps  [x]    RUE AROM Shoulder Abduction (supine)     2 sets x10 reps  [x]     RUE horizontal ab/adduction (supine)     2 sets x10 reps [x]     BUE shoulder flexion w/ theraball 10 reps w/ 5-15 second holds []    Shoulder flexion w/ BUE placed on wall (stance) 10 reps with 10-15 second holds []    Shoulder abduction w/ RUE placed on wall (stance) 10 reps with 10-15 second holds []    RUE AROM Shoulder Abduction (unsupported short sit) 15 reps [] ROM achieved ~100-110 degrees    Able to maintain elbow extension with min v/c    Pt demo'd increased attention to postural alignment during exercise     RUE AROM shoulder horizontal add/abduction (unsupported short sit) 15 reps  Inhibitory handling at R upper trap throughout reps    Pt required rest break between reps 10 and 11    Pt tolerated well through full ROM. Pt self-corrected compensatory rotation at trunk. RUE AROM shoulder Flexion (unsupported short sit)  15 reps [] Pt required inhibitory handling of R upper trap in initial reps    Intermittent verbal cueing to maintain elbow extension    ROM achieved ~90 degrees       Scapular depression (seated) 10 reps with 5 second holds    Bilateral hands placed on mat for closed chain scapular strengthening. [] Facilitative tapping at middle traps during initial reps    Pt tolerated well    Scapular protraction / retraction RUE (seated) 20 reps    Cone slides on table to facilitate movement []       Scapular protraction / retraction BUE (seated) 10 reps    Cone slides on table to facilitate movement []      Child's pose stretch  2 sets for 5-10 second holds  3 sets for 20 second holds [] Pt unable to maintain stretch for more than 20 seconds despite v/c    Pt tolerated stretch well overall   Multi-modal movements (unsupported short sit) Alphabet    Lightweight volleyball  Inhibitory handling at upper traps in first few reps    Min v/c to inhibit compensatory movement at trunk during initial reps, pt able to self-correct with continued reps   Zoom Ball 20 reps with intermittent breaks for ball retrieval  Pt tolerated well demonstrating sufficient force production to send zoom ball across room to OTS     Home Exercise Program:  Self-assisted stretches for RUE using RW and wall as described above   Force use RUE/R hand    Access Code: [email or web address]  URL: [email or web address]  Date: 04/12/2022  Prepared by: Loulou Sesay    Exercises  Supine Shoulder Flexion Extension Full Range AROM - 1-2 x daily - 7 x weekly - 2 sets - 10 reps  Supine Shoulder Abduction AROM - 1-2 x daily - 7 x weekly - 2 sets - 10 reps  Supine Shoulder Horizontal Abduction - 1-2 x daily - 7 x weekly - 2 sets - 10 reps    Therapeutic Exercise:   [x] (65144) Provided verbal/tactile cueing for activities related to strengthening, flexibility, endurance, ROM. Includes review/progression of HEP activities related to strengthening, flexibility, endurance, AROM, proximal shoulder and UE for functional transfers/mobility, self-care, IADLs, and community re-entry    Therapeutic Activities:    [x] (23313) Provided verbal/tactile cueing for activities related to improving balance, coordination, kinesthetic sense, posture, motor skill, proprioception and motor activation to allow for proper function of core, proximal shoulder and UE with self-care, and ADLs, IADLs, functional mobility, work-related and leisure based activities. Includes review/progression of HEP activities to improve balance, coordination, kinesthetic sense, posture, motor skill, proprioception, proximal shoulder and UE for functional transfers/mobility, self-care, IADLs, and community re-entry    Sensory Integration Techniques:  [] (91248)Provided verbal/tactile feedback to enhance sensory processing and promoting responses that are adaptive to environmental demands    Self-Care/Home Management Training:  [] (42558) Provided training and education in restorative and compensatory strategies/activity modifications in activities of daily living, meal preparation, laundry and other various house maintenance activities. Provided training and education in use of adaptive equipment/devices and assistive technology, as needed, to promote participation and independence. Cognitive Function:  [] (38795 x15 minutes, 25961 +15 minutes) Integrated activities that address attention, memory, reasoning, executive functioning, problem solving, and/or pragmatic functioning in addition to compensatory strategies to manage the performance of an activity (for example, managing time or schedules, initiating, organizing, and sequencing tasks).     NMR:  [] (42941) During functional activities/therapeutic exercises, provided facilitation of normal movement patterns and provided handling/verbal cues to promote postural alignment and stability during static and dynamic movement (sitting or standing). Activities may also include visual perceptual training, proprioception training, and balance retraining during functional activities    Manual Treatments:    [] (47853) Provided manual therapy to mobilize UB for the purpose of modulating pain, promoting relaxation,  increasing ROM, reducing/eliminating soft tissue swelling/inflammation/restriction, improving soft tissue extensibility and allowing for proper ROM for normal function with self-care, functional mobility, transfers, reaching and lifting    Modalities:     [] Electrical Stimulation (67078):     [] Ultrasound (95902):    Charges:  Timed Code Treatment Minutes: 43   Total Treatment Minutes: 43      [] EVAL (LOW) 60186 (typically 20 minutes face-to-face)  [] EVAL (MOD) 43396 (typically 30 minutes face-to-face)  [] EVAL (HIGH) 50169 (typically 45 minutes face-to-face)  [] RE-EVAL     [x] TJ(97396)  x33 (2)  [] NMR (03504)     [] Manual ([id number])   [x] TA (38006)  x10 (1)  [] ES(attended) (29778)       [] ES (un) (17961)  [] Other:    GOALS: Patient stated goal: \"get back to normal\"       [x]? Progressing: []? Met: []? Not Met: []? Adjusted     Therapist goals for Patient:      Short Term Goals: To be achieved in: 6 weeks  1. Pt will improve score on UEFS to 40/100 for a subjective report of decreased difficulty with completing every day activities with RUE and R hand  []? Progressing: [x]? Met: []? Not Met: []? Adjusted  2. Pt will improve time on NHPT to more than 45 seconds to demonstrate improved fine motor coordination of R hand  []? Progressing: [x]? Met: []? Not Met: []? Adjusted  3. Pt will improve score on BBT to 40 blocks/minute to demonstrate improved timing, speed and accuracy with functional grasp of right hand. [x]? Progressing: []? Met: []? Not Met: []? Adjusted  4.  Pt will be Min A with HEP/Home activities program to facilitate independence with carryover from sessions and to progress towards returning to PLOF   []? Progressing: [x]? Met: (05/11/22) []? Not Met: []? Adjusted   5. Pt will write a pangram sentence in no more than 1 minute demo'ing 85% legibility to demo improved fine motor control and speed of R hand. []? Progressing: [x]? Met: (05/11/22) []? Not Met: []? Adjusted      Long Term Goals: To be achieved in: 12 weeks + 2 weeks through June 30th, 2022  1. Pt will improve score on UEFS to 65/100 for a subjective report of decreased difficulty with completing every day activities with RUE and R hand  []? Progressing: [x]? Met: []? Not Met: [x]? Adjusted    1 Adjusted: Pt will improve score on UEFS to 100/100 for a subjective report of decreased difficulty with completing every day activities with RUE and R hand  [x]? Ongoing, continue: []? Met: [x]? Not Met: []? Adjusted     2. Pt will improve time on NHPT to more than 30 seconds to demonstrate improved fine motor coordination of R hand  [x]? Progressing: []? Met: [x]? Not Met: []? Adjusted - Discontinued  3. Pt will improve score on BBT to 50 blocks/minute to demonstrate improved timing, speed and accuracy with functional grasp of right hand. [x]? Progressing: []? Met: [x]? Not Met: []? Adjusted - Discontinued  4. Pt will be Independent with HEP/Home activities program to facilitate independence with carryover from sessions and to progress towards returning to PLOF  []? Progressing: []? Met: [x]? Not Met: [x]? Adjusted   4 Adjusted. Pt will be Independent with HEP/Home activities program in supine to facilitate independence with carryover from sessions and to progress towards returning to PLOF   [x]? Progressing: []? Met: []? Not Met: []? Adjusted  5. Pt will write a pangram sentence in no more than 45 seconds minute demo'ing 95% legibility to demo improved fine motor control and speed of R hand. [x]? Progressing: []? Met: [x]?  Not Met: []? Adjusted - Discontinued    Additional Long Term Goals: to be achieved by June 30th, 2022  6. Pt will utilize teach-back method with OTS to report driving readiness recommendations with less than two errors to demonstrate understanding. []? Progressing: [x]? Met:05/31/2022 []? Not Met: []? Adjusted   7. Pt will complete additional HEP/home activities program in supported short sit with supervision to promote carryover of R shoulder strengthening exercises and to progress towards PLOF. [x]? Progressing: []? Met: []? Not Met: []? Adjusted    ASSESSMENT:     Pt continues to demo improved body mechanics and attention to sustenance of effective posturing during seated AROM exercises. Overall pt reports functional improvements and is on track to be prepared for successful upcoming discharge from OP OT. Pt continues to be limited by insufficient muscle force, tightness, and weakness in R shoulder girdle muscles and benefits from skilled handling to inhibit habitual movement patterns such as over activation of upper trap. Pt continues to benefit from skilled OT intervention, continue per adjusted POC. Treatment/Activity Tolerance:   [x] Patient tolerated treatment well [] Patient limited by fatique  [] Patient limited by pain  [] Patient limited by other medical complications  [] Other:     Overall Progression Towards Functional goals/ Treatment Progress Update:  [x] Patient is progressing as expected towards functional goals listed. [x] Progression is slowed due to complexities/Impairments listed. [] Progression has been slowed due to co-morbidities.   [] Plan just implemented, too soon to assess goals progression <30days   [] Goals require adjustment due to lack of progress  [] Patient is not progressing as expected and requires additional follow up with physician  [] Other    Prognosis for POC: [x] Good [] Fair  [] Poor    Patient requires continued skilled intervention: [x] Yes  [] No      PLAN:   [x]

## 2022-06-29 ENCOUNTER — HOSPITAL ENCOUNTER (OUTPATIENT)
Dept: OCCUPATIONAL THERAPY | Age: 77
Setting detail: THERAPIES SERIES
Discharge: HOME | End: 2022-06-29
Payer: MEDICARE

## 2022-06-29 ENCOUNTER — HOSPITAL ENCOUNTER (OUTPATIENT)
Dept: PHYSICAL THERAPY | Age: 77
Setting detail: THERAPIES SERIES
Discharge: HOME | End: 2022-06-29
Payer: MEDICARE

## 2022-06-29 PROCEDURE — 97110 THERAPEUTIC EXERCISES: CPT

## 2022-06-29 PROCEDURE — 97530 THERAPEUTIC ACTIVITIES: CPT

## 2022-06-29 PROCEDURE — 97112 NEUROMUSCULAR REEDUCATION: CPT

## 2022-06-29 NOTE — DISCHARGE SUMMARY
[facility] Outpatient Therapy  [address] [place] [address] Asim Silvestre [address], [address]  Phone: [phone]   Fax: [phone]    Occupational Therapy Treatment Discharge Report:     Date:  2022    Patient Name:  Jessica Beasley    :  1945  MRN: [id number]      Medical/Treatment Diagnosis Information:  I63.9 (ICD-10-CM) - Cerebral infarction, unspecified  M25.611 (ICD-10-CM) Stiffness of right shoulder, not elsewhere classified, R27.9 (ICD-10-CM) Unspecified lack of coordination, R53.1 (ICD-10-CM) Weakness        Insurance/Certification information: Medicare A & B  Physician Information:  Dr. Zarate [facility] signed:    Yes    Date of Patient follow up with Physician: none noted     Progress Report: []  Yes  [x]  No      Date Range for reporting period:   Beginnin22    Endin22    Progress report due: N/A    Recertification due (POC duration/ or 90 days whichever is less): 22    Visit # Insurance Allowable Auth Needed    BMN No     Latex Allergy:  [x]NO      []YES  Preferred Language for Healthcare:   [x]English       []other:  Functional Scale:  (2022)  -- 98.75/100    Pain level: 0/10 throughout session    SUBJECTIVE: Pt denies new concerns or complaints. Pt reports feeling ready for discharge stating \"I have absolutely no problems with my shoulder and no problems cooking, cleaning, or anything else. \"    OBJECTIVE:    Observation:     Test measurements:            Initial Assessment (3/24/2022) Progress Note (2022) Progress Note (2022) Progress Note (2022) Discharge Note (22)     UEFS     8.75/100  95/100 98.75/100 97.5/100 98.75/100   NHPT  1 minute 2 seconds  43.03 seconds 30.16 (2nd trial due to misunderstanding directions)     Pt satisfaction with performance: 9/10 NA - goal discontinued NA - goal discontinued   BBT  27 blocks in one minute  38 blocks in one minute 37 blocks in one minute     Pt satisfaction with performance: 10/10 NA - goal discontinued NA - goal discontinued   HEP NA  Pt given formal HEP during session - plan to follow up. Pt performed shoulder abduction and flexion with intermittent v/c to maintain elbow extension. (supine) Pt reports being consistent with exercises at home.     Pt reports not completing shoulder horizontal abd/adduction ex at [facility] she feels it isn't needed. Pt performed  shoulder abduction, shoulder flexion and horizontal abduction exercises in supine with min VCs to maintain elbow extension and maximize ROM. Pt reports being consistent with exercises at home. Pt reports consistency with seated AROM  exercises daily at home. Pt demonstrated 1 repetition of bimanual shoulder flexion and abduction in supported short sit with min v/c to maintain slow and controlled motion. Pt reports completing child's pose shoulder flexion stretch on bed as needed.    Writing a pangram (time and legibility)     100% legibility  1 minute 29 seconds ~62.3% legibility   50.470 seconds 100% legibility  59.15 seconds NA - goal discontinued NA - goal discontinued    Pt reports handwriting to be legible and functional.                        RESTRICTIONS/PRECAUTIONS: Plavix and aspirin      Therapeutic Activities:    Activity #1:      Patient Education:    Pt received continued education on use of mirror for visual feedback to promote good alignment and posturing during seated AROM exercises. - verb understanding  Pt received education on POC regarding discharge and the option to get new script from PCP to return for tune-ups 1-2 a year or as needed. - verb understanding  Pt received handouts for AROM exercises to promote independence with HEP upon discharge. - verb understanding  Pt received continued education on importance of time blocked for stretching and AROM exercises each day to promote continued strengthening and maintenance of ROM in RUE.  - verb understanding     Manual Therapy: retrieval       Home Exercise Program:  Self-assisted stretches for RUE using RW and wall as described above   Force use RUE/R hand    Access Code: [email or web address]  URL: [email or web address]  Date: 04/12/2022   Prepared by: Drew Tejada    Exercises  Supine Shoulder Flexion Extension Full Range AROM - 1-2 x daily - 7 x weekly - 2 sets - 10 reps  Supine Shoulder Abduction AROM - 1-2 x daily - 7 x weekly - 2 sets - 10 reps  Supine Shoulder Horizontal Abduction - 1-2 x daily - 7 x weekly - 2 sets - 10 reps    Therapeutic Exercise:   [] (06473) Provided verbal/tactile cueing for activities related to strengthening, flexibility, endurance, ROM. Includes review/progression of HEP activities related to strengthening, flexibility, endurance, AROM, proximal shoulder and UE for functional transfers/mobility, self-care, IADLs, and community re-entry    Therapeutic Activities:    [x] (14782) Provided verbal/tactile cueing for activities related to improving balance, coordination, kinesthetic sense, posture, motor skill, proprioception and motor activation to allow for proper function of core, proximal shoulder and UE with self-care, and ADLs, IADLs, functional mobility, work-related and leisure based activities. Includes review/progression of HEP activities to improve balance, coordination, kinesthetic sense, posture, motor skill, proprioception, proximal shoulder and UE for functional transfers/mobility, self-care, IADLs, and community re-entry    Sensory Integration Techniques:  [] (63859)Provided verbal/tactile feedback to enhance sensory processing and promoting responses that are adaptive to environmental demands    Self-Care/Home Management Training:  [] (30255) Provided training and education in restorative and compensatory strategies/activity modifications in activities of daily living, meal preparation, laundry and other various house maintenance activities.  Provided training and education in use of adaptive equipment/devices and assistive technology, as needed, to promote participation and independence. Cognitive Function:  [] (53388 x15 minutes, 13395 +15 minutes) Integrated activities that address attention, memory, reasoning, executive functioning, problem solving, and/or pragmatic functioning in addition to compensatory strategies to manage the performance of an activity (for example, managing time or schedules, initiating, organizing, and sequencing tasks). NMR:  [] (86626) During functional activities/therapeutic exercises, provided facilitation of normal movement patterns and provided handling/verbal cues to promote postural alignment and stability during static and dynamic movement (sitting or standing). Activities may also include visual perceptual training, proprioception training, and balance retraining during functional activities    Manual Treatments:    [] (68416) Provided manual therapy to mobilize UB for the purpose of modulating pain, promoting relaxation,  increasing ROM, reducing/eliminating soft tissue swelling/inflammation/restriction, improving soft tissue extensibility and allowing for proper ROM for normal function with self-care, functional mobility, transfers, reaching and lifting    Modalities:     [] Electrical Stimulation (26105):      [] Ultrasound (08496):    Charges:  Timed Code Treatment Minutes: 24   Total Treatment Minutes: 24      [] EVAL (LOW) 85918 (typically 20 minutes face-to-face)  [] EVAL (MOD) 19017 (typically 30 minutes face-to-face)  [] EVAL (HIGH) 24144 (typically 45 minutes face-to-face)  [] Jenni Coburn     [] LT(60053)   [] NMR (65288)     [] Manual ([id number])   [x] TA (87898)  x24 (2)  [] ES(attended) (82504)       [] ES (un) (78162)  [] Other:    GOALS: Patient stated goal: \"get back to normal\"       []? Progressing: [x]? Met: []? Not Met: []? Adjusted     Therapist goals for Patient:       Short Term Goals: To be achieved in: 6 weeks  1.  Pt will improve score on UEFS to 40/100 for a subjective report of decreased difficulty with completing every day activities with RUE and R hand  []? Progressing: [x]? Met: []? Not Met: []? Adjusted  2. Pt will improve time on NHPT to more than 45 seconds to demonstrate improved fine motor coordination of R hand  []? Progressing: [x]? Met: []? Not Met: []? Adjusted  3. Pt will improve score on BBT to 40 blocks/minute to demonstrate improved timing, speed and accuracy with functional grasp of right hand. []? Progressing: []? Met: [x]? Not Met: []? Adjusted  4. Pt will be Min A with HEP/Home activities program to facilitate independence with carryover from sessions and to progress towards returning to PLOF   []? Progressing: [x]? Met: (05/11/22) []? Not Met: []? Adjusted   5. Pt will write a pangram sentence in no more than 1 minute demo'ing 85% legibility to demo improved fine motor control and speed of R hand. []? Progressing: [x]? Met: (05/11/22) []? Not Met: []? Adjusted      Long Term Goals: To be achieved in: 12 weeks + 2 weeks through June 30th, 2022  1. Pt will improve score on UEFS to 65/100 for a subjective report of decreased difficulty with completing every day activities with RUE and R hand  []? Progressing: [x]? Met: []? Not Met: [x]? Adjusted    1 Adjusted: Pt will improve score on UEFS to 100/100 for a subjective report of decreased difficulty with completing every day activities with RUE and R hand  [x]? Ongoing, continue: []? Met: [x]? Not Met: []? Adjusted     2. Pt will improve time on NHPT to more than 30 seconds to demonstrate improved fine motor coordination of R hand  []? Progressing: []? Met: [x]? Not Met: []? Adjusted - Discontinued  3. Pt will improve score on BBT to 50 blocks/minute to demonstrate improved timing, speed and accuracy with functional grasp of right hand. []? Progressing: []? Met: [x]? Not Met: []? Adjusted - Discontinued  4.  Pt will be Independent with HEP/Home activities program to facilitate independence with carryover from sessions and to progress towards returning to PLOF  []? Progressing: []? Met: [x]? Not Met: [x]? Adjusted   4 Adjusted. Pt will be Independent with HEP/Home activities program in supine to facilitate independence with carryover from sessions and to progress towards returning to PLOF   []? Progressing: []? Met: [x]? Not Met: []? Adjusted   5. Pt will write a pangram sentence in no more than 45 seconds minute demo'ing 95% legibility to demo improved fine motor control and speed of R hand. []? Progressing: []? Met: [x]? Not Met: []? Adjusted - Discontinued    Additional Long Term Goals: to be achieved by June 30th, 2022  6. Pt will utilize teach-back method with OTS to report driving readiness recommendations with less than two errors to demonstrate understanding. []? Progressing: [x]? Met:05/31/2022 []? Not Met: []? Adjusted   7. Pt will complete additional HEP/home activities program in supported short sit with supervision to promote carryover of R shoulder strengthening exercises and to progress towards PLOF. []? Progressing: [x]? Met: 06/29/22 []? Not Met: []? Adjusted    ASSESSMENT:     Pt met 1/ 7 additional long-term goals on this date demonstrating improved independence and reported compliance with HEP AROM exercises. Pt continues to be limited by R shoulder weakness, muscle shortening, and tightness. However, she reports functional use of her RUE is near her baseline and she has no functional complaints regarding her RUE on this date. It is recommended at this time that pt is discharged from skilled OT services to allow pt to continue improving strength and ROM in RUE independently with continued participation in [employer]. Pt received education to return to OP OT with new scripts from PCP for tune-ups 1-2 times a year for continued maintenance of strength and ROM in RUE or as needed if new functional deficits emerge. - Pt in agreement with plan.             Treatment/Activity Tolerance:   [x] Patient tolerated treatment well [] Patient limited by fatique  [] Patient limited by pain  [] Patient limited by other medical complications  [] Other:     Overall Progression Towards Functional goals/ Treatment Progress Update:  [x] Patient is progressing as expected towards functional goals listed. [] Progression is slowed due to complexities/Impairments listed. [] Progression has been slowed due to co-morbidities. [] Plan just implemented, too soon to assess goals progression <30days   [] Goals require adjustment due to lack of progress  [] Patient is not progressing as expected and requires additional follow up with physician  [] Other     Prognosis for POC: [x] Good [] Fair  [] Poor    Patient requires continued skilled intervention: [] Yes  [x] No      PLAN:   [] Continue per plan of care with additional goals [] Alter current plan (see comments): extend POC x2 weeks through June 30th, 2022  [] Plan of care initiated [] Hold pending MD visit [x] Discharge     Cancels/No-shows to Date: 1 (06/15/22)    Plan of Care/Treatment Used to Date:  [x] Therapeutic Exercise  [x] Therapeutic Activity   [x] ADL Training  [x] Neuromuscular Re-education  [x] Manual Therapy  [] Modalities:         [] Ultrasound  [] Electrical Stimulation            []       Pain at Eval: 0 /10  Pain at DC:  0 /10    Functional Outcome Used:    [] ABC Scale  [x] UEFI/UEFS     [] WOODARD  [] Other:     Initial Score (% disability):  8.75/100  Discharge Score (% disability):  98.75/100      Episode of Care:   # Visits: 26  Duration (# Weeks): 14       Treatment Approach:  [] Surgical  [x] Conservative    Special Certifications/Equipment Used (check all that apply):  [] LSVT  [] Dry Needling  [] CHT  [] Other:     Co-morbidities/Complexities:   # of relevant co-morbidities:  1     []None     []Other:    []Covid-19   Arthritic conditions   []Rheumatoid arthritis (M05.9)  []Osteoarthritis (M19.91)   Cardiovascular conditions []Hypertension (I10)  []Hyperlipidemia (E78.5)  []Angina pectoris (I20)  []Atherosclerosis (I70)   Musculoskeletal conditions   []Disc pathology   []Congenital spine pathologies   []Prior surgical intervention  []Osteoporosis (M81.8)  []Osteopenia (M85.8)   Endocrine conditions   []Hypothyroid (E03.9)  []Hyperthyroid Gastrointestinal conditions   []Constipation (W93.24)   Metabolic conditions   []Morbid obesity (E66.01)  []Obesity (E66)  []Diabetes type 1(E10.65)  []Diabetes type 2 (E11.65)  []Neuropathy (G60.9)   Pulmonary conditions   []Asthma (J45)  []Coughing   []COPD (J44.9) Psychological Disorders  []Anxiety (F41.9)  []Depression (F32.9)   []Bipolar (F31.9) Neurological conditions  [x]CVA (I69)  []Parkinsons (G20)  []Other:          Goal Status:  [] Achieved [x] Partially Achieved  [] Not Achieved     Patient Status: [x] Patient now discharged      Electronically signed by: Seb Aguero

## 2022-06-29 NOTE — DISCHARGE SUMMARY
[facility] Outpatient Therapy  [address] [place] [address], MYA Silvest[address], [address]  Phone: [phone]   Fax: [phone]    Physical Therapy Treatment Note/ Discharge Summary:     Date:  2022    Patient Name:  Miki Dawn    :  1945  MRN: [id number]    Medical/Treatment Diagnosis Information:  · Diagnosis: I63.9 (ICD-10-CM) - Cerebral infarction, unspecified  ·    R 26 abnormality of gait and mobility; R53.1 weakness, I63.9 cerebral infarction  Insurance/Certification information:  PT Insurance Information: Medicare  Physician Information:  Referring Practitioner: Dr. Carisa Rubio of care signed:    [x] Yes  [] No    Date of Patient follow up with Physician:       Progress Report: []  Yes  [x]  No      Date Range for reporting period:  Beginnin22  Endin22    Progress report due (10 Rx/or 30 days whichever is less):     Recertification due (POC duration/ or 90 days whichever is less):      Visit # Insurance Allowable Auth Needed   + 3/4 BMN []Yes   [x]No     RESTRICTIONS/PRECAUTIONS: receptive aphasia, Plavix and aspirin  Latex Allergy:  [x]NO      []YES  Preferred Language for Healthcare:   [x]English       []other:  Functional Scale: 10m walk= 0.37m/s; TUG 35.8 sec w/ RW; Tello Distel   Date assessed:3/24/22    Functional Scale: 10m walk= 0.56 m/s w/ SPC (after TM training 0.63m/s w/ SPC) ; TUG  20.6 sec (average) w/ SPC and close SBA; Tello Distel 37/56  Date assessed:22     Functional Scale: 10m walk= 0.68m/s w/ walking stick  (after TM training 0.70m/s w/ walking stick); TUG 20.5 sec (average) w/ walking stick; 20.1 sec (average) without AD but w/ close SBA; Rosa 41/56; ABC scale  97.5% confidence  Date assessed:22     Functional Scale: 10m walk=  0.68m/s w/ SPC  (after TM training 0.84m/s w/ SPC); TUG  15.3 sec (average) w/ SPC; 14.7 sec (average) without AD;  Rosa 46/56;   Date assessed:22      Functional Scale: 10m walk=  0.80m/s without AD; TUG  12. 2(average) without AD; Woodard 48/56; ABC scale 93% confidence   Date assessed: 6/29/22       Pain level:  0/10 R shoulder     SUBJECTIVE:  Pt reports she is 80% of what she was before the CVA. Walking is much better. OBJECTIVE:       Observation: Pt presents amb without AD, mildly spastic gait w/ decreased R foot clearance intermittently.  Test measurements:      MMT     R L    Hip flexion 4+ 5   Hip extension  3- 5   Hip abduction 4- 5   Hip IR 4+ 5   Hip ER 4+ 5   Knee extension 5 5   Knee flexion 3 5   Ankle DF 4+ 5   Ankle PF     Ankle eversion 4- 5   Ankle inversion 4- 5      Functional Scale:    [] LEFS   [] ABC Scale  [] UEFI/UEFS  [] Functional Gait Index  [] Modified Oswestry  [x] WOODARD  [] NDI    [] LLIS  [] [address] [place] [address]    [] Other:     Initial Score (% disability): 27/56= 52% impaired  Discharge Score (% disability): 48/56= 14% impaired          Episode of Care: # Visits:  27  Duration (# Weeks):  12      Treatment Approach:  [] Surgical  [x] Conservative    Special Certifications/Equipment Used (check all that apply):  [] Lymphedema  [] LSVT  [] OMT-C   [] Women's Health  [] Vestibular   [x] Music Treadmill  [] Dry Needling  [] CHT  [] Other:     Co-morbidities/Complexities (which will affect course of rehabilitation):   []? None          []? Hx of COVID   Arthritic conditions   []? Rheumatoid arthritis (M05.9)  []? Osteoarthritis (M19.91)  []? Gout    Cardiovascular conditions   []? Hypertension (I10)  []? Hyperlipidemia (E78.5)  []? Angina pectoris (I20)  []? Atherosclerosis (I70)  []? Pacemaker  []? Hx of CABG/stent/  cardiac surgeries  Mitral valve prolapse  Lupus Musculoskeletal conditions   []? Disc pathology   []? Congenital spine pathologies   []? Osteoporosis (M81.8)  []? Osteopenia (M85.8)  []? Scoliosis         Endocrine conditions   []? Hypothyroid (E03.9)  []? Hyperthyroid  hyperparathyroidism possibly HTN Gastrointestinal conditions   []? Constipation (K59.00)  []?  Diarrhea    Metabolic conditions   []? Morbid obesity (E66.01)  []? Diabetes type 1(E10.65) or 2 (E11.65)   []? Neuropathy (G60.9)      Cardio/Pulmonary conditions   []? Asthma (J45)  []? Coughing   []? COPD (J44.9)  []? CHF  []? A-fib    Psychological Disorders  []? Anxiety (F41.9)  []? Depression (F32.9)   []? Other:    Developmental Disorders  []? Autism (F84.0)  []?CP (G80)  []? Down Syndrome (Q90.9)  []? Developmental delay      Neurological conditions  []? Prior Stroke (I69.30)  []? Parkinson's (González Fly)  []? Encephalopathy (G93.40)  []?MS (G35)  []? Post-polio (G14)  []? SCI  []? TBI  []? ALS Other conditions  []? Fibromyalgia (M79.7)  []? Vertigo  []? Syncope  []? Kidney Failure  []? Cancer      []? currently undergoing                treatment  []? Pregnancy  []? Incontinence    Prior surgeries  []? involved limb  []? previous spinal surgery  []?  section birth  []?hysterectomy  []? bowel / bladder surgery  []? other relevant surgeries              Exercises/Interventions: Exercises in bold performed in department today. Items not bolded are carried forward from prior visits for continuity of the record.      Exercise/Equipment Resistance/Repetitions HEP Other comments   R hamstring curl  10 x 2  Prone, attempted w/ yellow tband, unable to complete 50% ROM   R hamstring stretch 60 sec x 3 x Seated w/ belt   R gastroc stretch 30 sec x 2 [x] Standing lunge stretch   R soleus stretch 30 sec x 2 [] Standing lunge, B UE support, verbal cues for correct form    R knee to chest, heel on SB 5 x 2 [x] Supine, VC for neutral hip rotation and slow control of lowering   R ankle eversion 10x  x Long sitting, without resistance due to unable achieve full ROM   Sidestepping  10' x 3 each, yellow tband at ankles   x    R bridging 10 x 1, 3 sec hold [x] Blocking at R lateral knee and ankle to avoid hip adduction   R ankle DF 10 x 3, red tband [x] Long sitting   Heel raises B w/ weight shifted R 10 x 2, partial ROM    B UE support, VC to weight shift R    Marching  10x  x Single UE support, cues to avoid trunk flexion and to maintain R foot to starting position. Pt demos spastic movement, VC for slow controlled motion. []    NMR  []    Perturbations- heels up 10x 3   Heels up on foam, toes on yoga mat. A-P and P-A perturbations at hips. Challenged both directions    SLS 12 sec on L; <2 on R [x] Reviewed safe set up for HEP         Narrow SHU 30 sec    Trunk/UE rotations- 5x   Cervical/UE flex/ext- 5x    [] Post weight shift; poor eye hand coordination, cues to maintain eye contact on hands     Semi tandem 15 sec  Cervical rotation- 3x  []   LOB R w/ either foot in back   R knee to chest 10 x 2, (5 sec pause in lunge position)  red tband [] //bars w/ B UE support, less cueing on correct positioning. good ROM w/ overflow to ant tib. R toe taps 10x  R only w/ cues for increased lifting effort  10x alternating. L UE support x Reviewed safe set up for HEP   R single leg squat w/ L toe tap- 4\" step 15 x 2, B UE support  VC to avoid medial knee collapse, however somewhat limited by genu valgus   Tall kneeling --> low kneeling with knees on aerex 10x  Cues for R weight shift for neutral alignment   Tall kneeling balance with knees on aerex 2'  Ball toss   R step ups 10x- light B UE support //bars, 4\" step  10x 6\" step  Less R knee hyperextension    Gait      amb  amb without AD, 20' x 2, 30' x 4 w/ sup            R side spastic, but less unsteady, less high guard posture. Biodex TM  10 min 2. 2mph,   B UE support                 3 min. Backwards walking 0.8mph, B UE support  Max HR 120bpm  improvements in step length have plateaued somewhat. Used safety switch only, no harness.   decreased R foot clearance w/ fatigue   Lateral, forward/backwards walking Without AD, attempted quick directions changes w/ close SBA, however pt takes long pauses in between all transitions to ensure she doesn't lose her balance     4 square step (canes) 3x each direction  mult LOB, partially able to self correct, mod A mult occasions   amb w/ carrying cup of water 60' w/ 1/2 full cone cup      60' w/ 3/4 full cone cup  Decreased zac, B UE high guard positioning,increased spastic gait w/ divided attention    Minor Spill x 1   Sit to stand holding cup of water 5x   VC for knees apart  No water spill    TA:reviewed goals and progress to date, recommendations for ongoing HEP after updates made and reviewed. Finalized Home Exercise Program:  Access Code: [email or web address]  URL: [email or web address]  Date: 06/29/2022  Prepared by: Rylan Oneal    Exercises  Standing Gastroc Stretch (Mirrored) - 1 x daily - 7 x weekly - 1 sets - 3 reps - 30-60 hold  Standing Soleus Stretch (Mirrored) - 1 x daily - 7 x weekly - 1 sets - 3 reps - 30-60 hold  Side Stepping with Resistance at Ankles and Counter Support - 1 x daily - 5 x weekly - 3 sets - 10 reps  Standing Marching - 1 x daily - 5 x weekly - 3 sets - 10 reps  Single Leg Stance with Support - 1 x daily - 5 x weekly - 3 sets - 3-5 reps - 5-10 hold  Prone Knee Flexion (Mirrored) - 1 x daily - 5 x weekly - 3 sets - 10 reps  Figure 4 Bridge (Mirrored) - 1 x daily - 5 x weekly - 3 sets - 10 reps - 3 hold  Supine Ankle Eversion AROM - 1 x daily - 7 x weekly - 3 sets - 10 reps      Therapeutic Exercise:   [x] (32318) Provided verbal/tactile cueing for activities related to strengthening, flexibility, endurance, ROM for improvements in LE, proximal hip, and core control with self-care, mobility, lifting, ambulation. NMR:  [x] (00770) Provided verbal/tactile cueing for activities related to improving balance, coordination, kinesthetic sense, posture, motor skill, proprioception to assist with LE, proximal hip, and core control in self-care, mobility, lifting, ambulation and eccentric single leg control.      Therapeutic Activities:    [x] (41246) Provided verbal/tactile cueing for activities related to improving balance, coordination, kinesthetic sense, posture, motor skill, proprioception and motor activation to allow for proper function of core, proximal hip and LE with self-care and ADLs and functional mobility. Gait Training:    [x] (02588) Provided training and instruction to the patient for proper LE, core and proximal hip recruitment and positioning and eccentric body weight control with ambulation re-education including up and down stairs     Manual Treatments:  PROM / STM / Oscillations-Mobs:  G-I, II, III, IV (PA's, Inf., Post.)  [] (84793) Provided manual therapy to mobilize LE, proximal hip and/or LS spine soft tissue/joints for the purpose of modulating pain, promoting relaxation,  increasing ROM, reducing/eliminating soft tissue swelling/inflammation/restriction, improving soft tissue extensibility and allowing for proper ROM for normal function with self-care, mobility, lifting and ambulation.      Home Exercise Program:    [x] (17053) Reviewed/Progressed HEP activities related to strengthening, flexibility, endurance, ROM of core, proximal hip and LE for functional self-care, mobility, lifting and ambulation/stair navigation   [x] (94218) Reviewed/Progressed HEP activities related to improving balance, coordination, kinesthetic sense, posture, motor skill, proprioception of core, proximal hip and LE for self-care, mobility, lifting, and ambulation/stair navigation      Modalities:    [] Electric Stimulation:   [] Ultrasound:   [] Other:       Charges:  Timed Code Treatment Minutes: TE 42; NM 12; GT 5    Total Treatment Minutes: 59      [] EVAL (LOW) 99297 (typically 20 minutes face-to-face)  [] EVAL (MOD) 21666 (typically 30 minutes face-to-face)  [] EVAL (HIGH) 15099 (typically 45 minutes face-to-face)  [] RE-EVAL     [x] QK(52864) x    [x] NMR (10209) x2    [] Manual (57866) x       [] TA (11880) x      [x] Gait Training (91432) x2      [] ES(attended) (42871)  [] ES (un) (22 153100)   [] DRY NEEDLE 1 OR 2 MUSCLES  [] DRY NEEDLE 3+ MUSCLES  [] Mech Traction (39889)  [] Ultrasound (56603)  [] Other:    GOALS: Goals established 3/24/22   Patient stated goal: achieve better balance and walk. Pt reports walking better now, could barely move her leg before. [] Progressing: [x] Met: [] Not Met: [] Adjusted    Therapist goals for Patient:   Short Term Goals: To be achieved in: 6 weeks   1. Independent in HEP and progression per patient tolerance. [] Progressing: [x] Met: [] Not Met: [] Adjusted   2. Pt will improve TUG time to 25 sec to improve functional ambulation. 21 sec w/ SPC and close SBA  [] Progressing: [x] Met: [] Not Met: [] Adjusted  3. Pt to perform transfers via stand-pivot technique with use of  small base quad cane [facility]) on left Modified Independent. [] Progressing: [x] Met: [] Not Met: [] Adjusted  4. Pt will improve Rosa to 32/56 to demonstrate reduced all risk. 38/61  [] Progressing: [x] Met: [] Not Met: [] Adjusted    Long Term Goals: To be achieved in: 12 weeks  1. Pt will improve ABC scale to 55% confidence for reduced fall risk. 93%   [] Progressing: [x] Met: [] Not Met: [] Adjusted  2. Pt to negotiate up/down 7 stairs with step to pattern w/ single rail mod I.   [] Progressing: [x] Met: [] Not Met: [] Adjusted   3. Pt to demonstrate improved gait pattern including improved R foot clearance and knee control without cues with use of AFO on right and an appropriate based cane. Pt amb mod I, using SPC as needed outside the home. [] Progressing: [x] Met: [] Not Met: [] Adjusted  4. Pt will improve Rosa to 42/56 to demonstrate reduced all risk. 49/59  [] Progressing: [x] Met: [] Not Met: [] Adjusted  5. Pt independent with HEP. [] Progressing: [x] Met: [] Not Met: [] Adjusted  6. Pt to report improved confidence with ambulating in community. [] Progressing: [x] Met: [] Not Met: [] Adjusted  7. Pt will improve TUG time to 18 sec w/ least restrictive AD to improve functional ambulation.  12.2 sec  [] Progressing: [x] Met: [] Not Met: [] Adjusted 8. Pt will demonstrate 10m walk speed to 0.80m/s.  0.80 m/s     [] Progressing: [x] Met: [] Not Met: [] Adjusted     ASSESSMENT: Pt met 4/4 ST and 8/8 LT goals. Pt demonstrates significant progress overall in gait speed and balance. She demonstrates improvements in R LE strength and reduced spasticity and ataxia. Her HEP was updated today to address areas of ongoing weakness and stretching to prevent loss of ROM due to spasticity. Pt not recommended to have AFO at this time due to sufficient foot clearance, however she does have R ankle weakness that does put her at risk for sprain. Encouraged pt to continue w/ HEP, but she may also benefit from ankle stabilizing brace at some point if has difficulty maintaining safe foot clearance. Pt is using SPC as needed on outdoor unfamiliar/uneven terrain. Pt will benefit from semi-annual, more likely annual fall risk assessment and HEP upgrade as she ages w/ chronic condition of CVA w/ spasticity. Pt in agreement w/ D/C today (pt declined to schedule final visit on POC). This patient has exhausted their Medicare cap for therapy services, but requires continued reasonable and medically necessary skilled treatment of therapist to work towards previously set goals. Treatment/Activity Tolerance:  [x] Patient tolerated treatment well [] Patient limited by fatigue  [] Patient limited by pain  [] Patient limited by other medical complications  [] Other:     Overall Progression Towards Functional goals/ Treatment Progress Update:  [x] Patient is progressing as expected towards functional goals listed. [] Progression is slowed due to complexities/Impairments listed. [] Progression has been slowed due to co-morbidities.   [] Plan just implemented, too soon to assess goals progression <30days   [] Goals require adjustment due to lack of progress  [] Patient is not progressing as expected and requires additional follow up with physician  [] Other    Prognosis for POC: [x] Good [] Fair  [] Poor    Patient requires continued skilled intervention: [x] Yes  [] No            PLAN: 3x/week for 4 weeks then 2x/week for 8 weeks (pt will not use final planned visit due to requesting to be D/C at end of the month and not go into July). [] Continue per plan of care [] Alter current plan (see comments)- plan to add 1x/week for 4 weeks after visit #24  [] Plan of care initiated [] Hold pending MD visit [x] Discharge    Electronically signed by: Ora Fabry, PT, DPT    Note: If patient does not return for scheduled/recommended follow up visits, this note will serve as a discharge from care along with the most recent update on progress.

## 2022-11-03 NOTE — PLAN OF CARE
Assessment/Plan:    Chronic obstructive pulmonary disease with acute exacerbation ([facility] )  Kriss Dumont continues to slowly improve with regards to his RSV and COPD exacerbation  Feels his breathing is close to baseline  He is still on 30 mg of prednisone continues to wean down  He is taking his Zithromax 500 t i d  as well as his all nebulized solution  I would given the flu shot on today's visit  A follow-up in 3 months  Diagnoses and all orders for this visit:    Chronic obstructive pulmonary disease with acute exacerbation (HCC)    Chronic bronchitis, unspecified chronic bronchitis type ([facility] )  -     azithromycin (ZITHROMAX) 500 MG tablet; Take 1 tablet (500 mg total) by mouth 3 (three) times a week  -     influenza vaccine, high-dose, PF 0 7 mL (FLUZONE HIGH-DOSE)    Obstructive sleep apnea syndrome in adult          Subjective:      Patient ID: Amanuel Amaya is a 67 y o  male  Kriss Dumont continues to recover from his recent RSV infection  He feels his breathing is close to baseline and he is weaning down his prednisone  primary symptoms  Associated symptoms include coughing, myalgias and a sore throat  Pertinent negatives include no chest pain, fever or headaches  The following portions of the patient's history were reviewed and updated as appropriate: allergies, current medications, past family history, past medical history, past social history, past surgical history and problem list     Review of Systems   Constitutional: Positive for appetite change  Negative for fever  HENT: Positive for postnasal drip, rhinorrhea, sneezing, sore throat and trouble swallowing  Negative for ear pain  Respiratory: Positive for cough, shortness of breath and wheezing  Cardiovascular: Negative for chest pain  Musculoskeletal: Positive for myalgias  Neurological: Negative for headaches           Objective:      BP 97/50   Pulse 79   Temp 98 4 °F (36 9 °C) (Tympanic)   Ht 5' 8" (1 727 m)   Wt 96 1 kg (211 Problem: Falls - Risk of:  Goal: Will remain free from falls  Description: Will remain free from falls  3/11/2022 2334 by Dayron Ward RN  Outcome: Ongoing  3/11/2022 1339 by Nancy Steen RN  Outcome: Ongoing  Note: RN attempted to educate Pt and her family to use her call light when she needs assistance. RN also attempted to educate Pt and her family that she should not to get up unassisted at this time. Bed alarm on when Pt in bed and chair alarm on when Pt up in chair. Non skid socks on and call light placed within reach. RN will continue to monitor Pt.     Goal: Absence of physical injury  Description: Absence of physical injury  Outcome: Ongoing     Problem: Mobility - Impaired:  Goal: Mobility will improve  Description: Mobility will improve  Outcome: Ongoing lb 12 8 oz)   SpO2 93%   BMI 32 20 kg/m²          Physical Exam  Constitutional:       Appearance: He is well-developed  HENT:      Head: Normocephalic  Eyes:      Pupils: Pupils are equal, round, and reactive to light  Cardiovascular:      Rate and Rhythm: Normal rate  Pulmonary:      Effort: Pulmonary effort is normal  No respiratory distress  Breath sounds: No wheezing or rales  Abdominal:      Palpations: Abdomen is soft  Musculoskeletal:         General: Normal range of motion  Cervical back: Neck supple  Skin:     General: Skin is warm and dry  Neurological:      Mental Status: He is alert and oriented to person, place, and time           Answers for HPI/ROS submitted by the patient on 11/3/2022  Do you have difficulty breathing?: Yes  Do you experience frequent throat clearing?: Yes  Do you have a hoarse voice?: Yes  Chronicity: chronic  When did you first notice your symptoms?: more than 1 year ago  How often do your symptoms occur?: daily  Since you first noticed this problem, how has it changed?: gradually worsening  Do you have shortness of breath that occurs with effort or exertion?: Yes  Do you have ear congestion?: Yes  Do you have heartburn?: Yes  Do you have fatigue?: Yes  Do you have nasal congestion?: Yes  Do you have shortness of breath when lying flat?: Yes  Do you have shortness of breath when you wake up?: No  Do you have sweats?: No  Have you experienced weight loss?: Yes  Which of the following makes your symptoms worse?: any activity, change in weather, exercise, exposure to smoke, lying down, minimal activity, strenuous activity, URI  Which of the following makes your symptoms better?: change in position, cold air, oral steroids, OTC cough suppressant  Risk factors for lung disease: smoking/tobacco exposure

## 2022-11-18 NOTE — PROGRESS NOTES
Mom is legal guardian and will be present dos    ProgressNote  Physical Medicine and Rehabilitation    Patient: Stas Gamboa  [id number]  Date: 3/8/2022      Chief Complaint: Loss of balance    History of Present Illness/Hospital Course:  77 y.o. female with past medical history lupus, hyperparathyroidism possibly HTN, presents with symptoms of loss of balance than began on Thursday. She denies any vertigo symptoms, just felt very much off balance. She also found herself not being well able to move her right leg and almost seeming to drag it on the ground. The right leg weakness resolved. She then had a recurrence of this however, but is able to move her leg again now. No other areas of focal weakness. No headache. No vision changes. No palpitations. No cardiac history. She is not taking any prescription med, does not smoke, takes a keto weight loss supplement and a green tea supplement. She is not on medication for high blood pressure, report its is sometimes high but comes down with taking deep breaths. Admitted for acute CVA. CT, CTA head negative. 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 Interval History:  Feels sleepy this AM. Reports persistent RLE weakness. Denies CP, SOB, N, V, Abd pain, dizziness. Plan to admit today    Prior Level of Function:  Independent for mobility, ADLs, and IADLs    Current Level of Function:  Min assist    Pertinent Social History:  Support: significant other, daughter  Home set-up: lives in a house alone with 4 steps to enter. Daughter plans on moving in following this hospitalization    Past Medical History:   Diagnosis Date    Lupus ([address]     MVP (mitral valve prolapse)        Past Surgical History:   Procedure Laterality Date    COLONOSCOPY      HYSTERECTOMY      TONSILLECTOMY         History reviewed. No pertinent family history.     Social History     Socioeconomic History  Marital status:      Spouse name: None    Number of children: None    Years of education: None    Highest education level: None   Occupational History    None   Tobacco Use    Smoking status: Never Smoker    Smokeless tobacco: Never Used   Vaping Use    Vaping Use: Never used   Substance and Sexual Activity    Alcohol use: Yes    Drug use: Never    Sexual activity: Not Currently   Other Topics Concern    None   Social History Narrative    None     Social Determinants of Health     Financial Resource Strain:     Difficulty of Paying Living Expenses: Not on file   Food Insecurity:     Worried About Running Out of Food in the Last Year: Not on file    Errol of Food in the Last Year: Not on file   Transportation Needs:     Lack of Transportation (Medical): Not on file    Lack of Transportation (Non-Medical):  Not on file   Physical Activity:     Days of Exercise per Week: Not on file    Minutes of Exercise per Session: Not on file   Stress:     Feeling of Stress : Not on file   Social Connections:     Frequency of Communication with Friends and Family: Not on file    Frequency of Social Gatherings with Friends and Family: Not on file    Attends Congregational Services: Not on file    Active Member of [address] [place] [address] or Organizations: Not on file    Attends Club or Organization Meetings: Not on file    Marital Status: Not on file   Intimate Partner Violence:     Fear of Current or Ex-Partner: Not on file    Emotionally Abused: Not on file    Physically Abused: Not on file    Sexually Abused: Not on file   Housing Stability:     Unable to Pay for Housing in the Last Year: Not on file    Number of [place] in the Last Year: Not on file    Unstable Housing in the Last Year: Not on file           REVIEW OF SYSTEMS:   CONSTITUTIONAL: negative for fevers, chills, diaphoresis, appetite change, night sweats, unexpected weight change, fatigue  EYES: negative for blurred vision, eye discharge, visual disturbance and icterus  HEENT: negative for hearing loss, tinnitus, ear drainage, sinus pressure, nasal congestion, epistaxis and snoring  RESPIRATORY: Negative for hemoptysis, cough, sputum production  CARDIOVASCULAR: negative for chest pain, palpitations, exertional chest pressure/discomfort, syncope, edema  GASTROINTESTINAL: negative for nausea, vomiting, diarrhea, blood in stool, abdominal pain, constipation  GENITOURINARY: negative for frequency, dysuria, urinary incontinence, decreased urine volume, and hematuria  HEMATOLOGIC/LYMPHATIC: negative for easy bruising, bleeding and lymphadenopathy  ALLERGIC/IMMUNOLOGIC: negative for recurrent infections, angioedema, anaphylaxis and drug reactions  ENDOCRINE: negative for weight changes and diabetic symptoms including polyuria, polydipsia and polyphagia  MUSCULOSKELETAL: negative for pain, joint swelling, decreased range of motion  NEUROLOGICAL: negative for headaches, slurred speech, +ve for RUE unilateral weakness  PSYCHIATRIC/BEHAVIORAL: negative for hallucinations, behavioral problems, confusion and agitation. Physical Examination:  Vitals:   Patient Vitals for the past 24 hrs:   BP Temp Temp src Pulse Resp SpO2   03/08/22 0630 135/85 98.1 °F (36.7 °C) Oral 85 16 99 %   03/08/22 0354 (!) 142/62 98.1 °F (36.7 °C) Oral 74 16 97 %   03/07/22 2243 (!) 161/79 98.3 °F (36.8 °C) Oral 78 16 95 %   03/07/22 1930 (!) 158/91 98.2 °F (36.8 °C) Oral 80  96 %   03/07/22 1620 (!) 168/82 98 °F (36.7 °C)  72 16    03/07/22 1300 (!) 154/102 98 °F (36.7 °C) Oral 76 14 96 %     Const: Alert. WDWN. No distress  Eyes: Conjunctiva noninjected, no icterus noted; pupils equal, round, and reactive to light. HENT: Atraumatic, normocephalic; Oral mucosa moist  Neck: Trachea midline, neck supple. No thyromegaly noted. CV: Regular rate and rhythm, no murmur rub or gallop noted  Resp: Lungs clear to auscultation bilaterally, no rales wheezes or ronchi, no retractions. Respirations unlabored. GI: Soft, nontender, nondistended. Normal bowel sounds. No palpable masses. Skin: Normal temperature and turgor. No rashes or breakdown noted. Ext: No significant edema appreciated. No varicosities. MSK: No joint tenderness, erythema, warmth noted. AROM intact. Neuro: Alert, oriented, appropriate. No cranial nerve deficits appreciated. Sensation intact to light touch. Motor examination reveals 5/5 strength in RUE, LUE, LLE. Strength 4/5 in RLE upon flexion and extension of hip. No abnormalities with finger/nose or heel/shin noted. Reflexes normal and symmetric. Psych: Stable mood, normal judgement, normal affect     Lab Results   Component Value Date    WBC 4.6 03/06/2022    HGB 14.9 03/06/2022    HCT 41.7 03/06/2022    MCV 91.3 03/06/2022     03/06/2022     Lab Results   Component Value Date    INR 1.00 03/05/2022    PROTIME 11.3 03/05/2022     Lab Results   Component Value Date    CREATININE 0.8 03/05/2022    BUN 16 03/05/2022     03/05/2022    K 3.8 03/05/2022     03/05/2022    CO2 28 03/05/2022     Lab Results   Component Value Date    ALT 16 03/05/2022    AST 15 03/05/2022    ALKPHOS 104 03/05/2022    BILITOT 0.4 03/05/2022       Most recent echocardiogram revealed:  Pending    Most recent EKG revealed:  Normal sinus rhythm rate of 72 with normal axis. NJ, QRS, QTc not prolonged. No ST elevations. Nonspecific T wave flattening in aVL. No prior for comparison. Most recent imaging studies revealed:  Acute infarct involving the deep white matter of the posterior left frontal lobe. On my review, CXR displays no acute disease. MRI brain without contrast   Final Result      Acute infarct involving the deep white matter of the posterior left frontal lobe. No significant mass effect or evidence of hemorrhage. Extensive chronic small vessel ischemic changes. XR CHEST PORTABLE   Final Result      No acute disease. CTA HEAD NECK W CONTRAST   Final Result      No evidence of flow-limiting stenosis. PROCEDURE: CT ANGIOGRAPHY HEAD WITH/WITHOUT CONTRAST      INDICATION: Stroke Symptoms      COMPARISON: none      TECHNIQUE: Axial CT imaging obtained through the head prior to and following administration of IV contrast. Axial images, multiplanar reformatted images, and maximum intensity projection images were reviewed for CT angiographic technique. IV contrast: mL Omnipaque 350. FINDINGS:      ANTERIOR CIRCULATION: Major intracranial arteries are patent. Atherosclerotic irregularity with mild narrowing of bilateral ICA cavernous/paraclinoid segments. 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 POSTERIOR CIRCULATION: The bilateral vertebral arteries, basilar artery and posterior cerebral arteries demonstrate no occlusion or stenosis. No evidence for aneurysm or arteriovenous malformation. INTRACRANIAL VENOUS SYSTEM: No evidence for intracranial venous thrombosis. IMPRESSION:      No evidence of intracranial large vessel occlusion. Atherosclerotic irregularity with mild narrowing of bilateral ICA cavernous/paraclinoid segments. 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 CT HEAD WO CONTRAST   Final Result      No CT evidence of acute stroke. Critical results reported to clinical team at 10:20 AM.                  Assessment:  1. Acute CVA   - No focal deficits on exam however patient has continued sense of being off balance with walking and was noted in ED to have some ataxia. - CT, CTA head no acute findings. 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   - Stroke team consulted, advised initiation of DAPT and admission for further work-up  - Neurology following  - Echocardiogram pending  - Risk factor modification  - Hypercoagulable panel   - PT/OT/SLP     2. HTN  - Careful blood pressure lowering allowing for permissive HTN  - Monitor    Impairments- Decreased functional mobility, Decreased ADLs    Recommendations:  Admit ARU     Patient with new functional deficits and ongoing medical complexity. Demonstrates ability to tolerate 3 hours therapy/day. Patient is a good candidate for acute inpatient rehab when medically appropriate. Mayela Mendoza  PGY-3, Internal Medicine    3/8/2022  10:19 AM        This patient has been seen, examined, and discussed with the resident. This note has been altered to reflect my own examination findings, impression, and recommendations.      Meri Harmon D.O. M.P.H  PM&R  3/8/2022  11:53 AM

## 2023-02-10 NOTE — PROGRESS NOTES
Occupational Therapy   Occupational Therapy Initial Assessment / Treatment  Date: 3/9/2022   Patient Name: Matti Cheek  MRN: [id number]     : 1945    Date of Service: 3/9/2022    Discharge Recommendations:  Continue to assess pending progress,24 hour supervision or assist,Home with Home health OT  OT Equipment Recommendations  Equipment Needed: Yes  Mobility Devices: ADL Assistive Devices  ADL Assistive Devices: Transfer Tub Bench;Grab Bars - toilet  Other: continue to assess    Assessment   Performance deficits / Impairments: Decreased functional mobility ; Decreased ADL status; Decreased balance;Decreased strength;Decreased fine motor control;Decreased high-level IADLs;Decreased coordination;Decreased safe awareness    Assessment: Pt is 77 y.o female presenting to ARU with acute CVA and R side weakness. Pt was independent in all ADLs and functional tasks prior to admittance. Pt now requires SBA-CGA for bathing task, and Min A for UB and LB dressing. Pt required Mod A for toilet transfer but Min A for all other functional transfers. Pt limited by RLE weakness and decreased safety awareness during ADLs and transfers. Pt would benefit from continued skilled OT services to maximize independence and safety for ADL tasks, cont OT per POC. Treatment Diagnosis: Impaired ADLs, mobility and R motor control s/p CVA  Prognosis: Good  Decision Making: Medium Complexity  OT Education: Transfer Training;ADL Adaptive Strategies;OT Role;Plan of Care  Patient Education: verb understanding  REQUIRES OT FOLLOW UP: Yes  Activity Tolerance  Activity Tolerance: Patient Tolerated treatment well  Safety Devices  Safety Devices in place: Yes  Type of devices: Call light within reach; Left in chair;Chair alarm in place;Nurse notified           Patient Diagnosis(es): There were no encounter diagnoses. has a past medical history of Lupus (HCC) and MVP (mitral valve prolapse).    has a past surgical history that includes Tonsillectomy; Colonoscopy; and Hysterectomy. Treatment Diagnosis: Impaired ADLs, mobility and R motor control s/p CVA      Restrictions  Restrictions/Precautions  Restrictions/Precautions: Fall Risk,Seizure  Position Activity Restriction  Other position/activity restrictions: up as tolerated    Subjective   General  Chart Reviewed: Yes  Patient assessed for rehabilitation services?: Yes  Additional Pertinent Hx: Pt is 77 y.o female admitted to [address] [place] [address] with with RLE weakness and balance deficits. Acute CVA. CT, CTA head negative. 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 Admitted to ARU 3/8.   Family / Caregiver Present: No  Referring Practitioner: Dr. Zaira Messer  Diagnosis: CVA  Subjective  Subjective: Pt seated in recliner upon arrival, pleasant and agreeable to OT eval shower  Patient Currently in Pain: Denies  Pain Assessment  Pain Assessment: 0-10  Pain Level: 0  Vital Signs  Temp: 97.8 °F (36.6 °C)  Temp Source: Oral  Pulse: 83  Heart Rate Source: Monitor  BP: (!) 149/83  BP Location: Left upper arm  Patient Position: Up in chair  Level of Consciousness: Alert (0)  Patient Currently in Pain: Denies  Oxygen Therapy  SpO2: 97 %  Pulse Oximeter Device Mode: Intermittent  Pulse Oximeter Device Location: Finger  O2 Device: None (Room air)  Social/Functional History  Social/Functional History  Lives With: Daughter  Type of Home: House  Home Layout: Able to Live on Main level with bedroom/bathroom,Laundry in basement,Multi-level  Home Access: Stairs to enter with rails  Entrance Stairs - Number of Steps: 5  Entrance Stairs - Rails: Both  Bathroom Shower/Tub: Tub/Shower unit  Bathroom Toilet: Standard (sink close for leverage)  Bathroom Equipment: Grab bars in shower  Bathroom Accessibility: Accessible  Home Equipment:  (no AD)  ADL Assistance: Independent  Homemaking Assistance: Independent  Homemaking Responsibilities: Yes  Ambulation Assistance: Independent  Transfer Assistance: Independent  Active : Yes  Occupation: Retired  Type of occupation: Cosmotologist  Leisure & Hobbies: travel  Additional Comments: Denies recent falls; daughter able to provide 24/7       Objective   Vision: Within Functional Limits  Vision Exceptions: Wears glasses for reading  Hearing: Within functional limits       Observation/Palpation  Posture: Fair  Observation: IV left arm  Balance  Sitting Balance: Stand by assistance (TTB)  Standing Balance: Minimal assistance (Min A dynamically, CGA static)  Standing Balance  Time: ~10 mins total  Activity: functional mobility and transfers, pants mgmt, oral care  Comment: Pt completed pants mgmt in stance with heavy reliance on shower GBs  Functional Mobility  Functional - Mobility Device: Rolling Walker  Activity: To/from bathroom  Assist Level: Moderate assistance (Min A-Mod Ax1)  Functional Mobility Comments: Pt ambulated to/from bathroom with RW, required Min A to bathroom however progressed to Mod A to return to recliner from bathroom due to RLE fatigue and ataxia. R knee starting to buckle at times. Toilet Transfers  Toilet - Technique: Ambulating  Equipment Used: Standard toilet  Toilet Transfer: Moderate assistance  Toilet Transfers Comments: Pt does not have GBs at home toilet. Required VCs for RW mgmt during 180 turn at toilet. Min A-Mod A for balance and to sit due to poor ecentric control. Shower Transfers  Shower - Transfer From: Horace Marcus - Transfer Type: To and From  Shower - Transfer To:  Transfer tub bench  Shower - Technique: Ambulating  Shower Transfers: Minimal assistance  Shower Transfers Comments: VCs for RW mgmt and 180 turn, use of GBs, Min A for slow controlled descent  ADL  Grooming: Contact guard assistance (Pt completed oral hygiene in stance at sink)  UE Bathing: Stand by assistance (Pt completed all UB bathing seated on TTB)  LE Bathing: Contact guard assistance (Pt wash/dried front pericare seated, washed BLEs using fig 4 and no VCs. Pt washed back arsen by lateral WSing seated, 1 lateral LOB resulting in CGA. Pt dried back arsen in stance with 1UE on GBs)  UE Dressing: Minimal assistance (Pt donned new shirt seated on TTB, able to thread BUEs thru sleeves and over shoulder, Min A to pull shirt down over back)  LE Dressing: Minimal assistance; Increased time to complete (Pt unthreaded briefs seated on toilet. Threaded new briefs/pants using fig 4 on TTB, competed pants mgmt up requiring Min A to maintain balance w 1 UE support on GBs. Doff/donned socks seated w fig 4 (SBA))  Toileting: Contact guard assistance (Pt completed briefs down in stance with CGA, Pt continent of small bm and completed back pericare seated with SBA.)  Additional Comments: At baseline, Pt takes tub baths at home and has shower GBs. Transfers  Sit to stand: Minimal assistance (recliner>RW)  Stand to sit: Minimal assistance (RW>recliner)  Transfer Comments: VCs for hand placement     Cognition  Arousal/Alertness: Appropriate responses to stimuli  Following Commands: Follows all commands without difficulty  Attention Span: Attends with cues to redirect  Memory: Appears intact  Safety Judgement: Decreased awareness of need for assistance;Decreased awareness of need for safety  Problem Solving: Able to problem solve independently  Insights: Decreased awareness of deficits  Initiation: Requires cues for some  Sequencing: Requires cues for some  Cognition Comment: Good insights to deficits, impulsive at times and requires cues for safety.         Sensation  Overall Sensation Status: WFL           LUE Strength  Gross LUE Strength: WFL  L Shoulder Flex: 5/5  L Elbow Flex: 5/5  L Wrist Flex: 5/5  L Wrist Ext: 5/5  L Hand General: 5/5  LUE Strength Comment:  Strength WFL  RUE Strength  Gross RUE Strength: Exceptions to [facility]  R Shoulder Flex: 3+/5  R Elbow Flex: 4/5  R Wrist Flex: 5/5  R Wrist Ext: 5/5  R Hand General: 5/5  RUE Strength Comment:  Strength WFL                   Plan   Plan  Times per week: 60 mins per day/ 5 days per week  Times per day: Daily  Current Treatment Recommendations: Functional Mobility Training,Safety Education & Training,Self-Care / ADL,Equipment Evaluation, Education, & procurement,Patient/Caregiver Education & Training,Strengthening,Balance Training,Neuromuscular Re-education    G-Code     OutComes Score                                                  AM-PAC Score             Goals  Short term goals  Time Frame for Short term goals: 10 days  Short term goal 1: Pt will complete all bathing componets with spvn-ongoing  Short term goal 2: Pt will complete toilet and tub transfers with CGA-ongoing  Short term goal 3: Pt will complete UB and LB dressing with SBA-ongoing  Short term goal 4: Pt will complete oral hygiene and grooming tasks standing with SBA-ongoing  Short term goal 5: Pt will complete NHPT assessment-ongoing  Long term goals  Time Frame for Long term goals : 3 weeks  Long term goal 1: Pt will complete bathing componets with Mod I-ongoing  Long term goal 2: Pt will complete toilet and tub transfers with Mod I-ongoing  Long term goal 3: Pt will complete all dressing componets with Mod I-ongoing  Long term goal 4: Pt will complete oral hygiene/grooming in stance with Mod I-ongoing       Therapy Time   Individual Concurrent Group Co-treatment   Time In 0830         Time Out 0930         Minutes 60             Total Time: 60 mins    GRISEL Padilla Detail Level: Simple Render Risk Assessment In Note?: no Additional Notes: Offered to [employer] or monitor. Patient requested to monitor for changes in 3 months and RTC for skin check.

## 2023-02-14 ENCOUNTER — OFFICE VISIT (OUTPATIENT)
Dept: INTERNAL MEDICINE CLINIC | Age: 78
End: 2023-02-14

## 2023-02-14 VITALS
BODY MASS INDEX: 22.18 KG/M2 | WEIGHT: 138 LBS | DIASTOLIC BLOOD PRESSURE: 94 MMHG | HEART RATE: 84 BPM | OXYGEN SATURATION: 100 % | SYSTOLIC BLOOD PRESSURE: 144 MMHG | HEIGHT: 66 IN

## 2023-02-14 DIAGNOSIS — I10 PRIMARY HYPERTENSION: ICD-10-CM

## 2023-02-14 DIAGNOSIS — Z86.73 H/O: STROKE: ICD-10-CM

## 2023-02-14 DIAGNOSIS — E78.2 MIXED HYPERLIPIDEMIA: Primary | ICD-10-CM

## 2023-02-14 RX ORDER — ATORVASTATIN CALCIUM 80 MG/1
80 TABLET, FILM COATED ORAL NIGHTLY
Qty: 30 TABLET | Refills: 5 | Status: SHIPPED | OUTPATIENT
Start: 2023-02-14

## 2023-02-14 RX ORDER — HYDROCHLOROTHIAZIDE 12.5 MG/1
12.5 CAPSULE, GELATIN COATED ORAL EVERY MORNING
Qty: 30 CAPSULE | Refills: 5 | Status: SHIPPED | OUTPATIENT
Start: 2023-02-14

## 2023-02-14 SDOH — ECONOMIC STABILITY: FOOD INSECURITY: WITHIN THE PAST 12 MONTHS, THE FOOD YOU BOUGHT JUST DIDN'T LAST AND YOU DIDN'T HAVE MONEY TO GET MORE.: NEVER TRUE

## 2023-02-14 SDOH — ECONOMIC STABILITY: FOOD INSECURITY: WITHIN THE PAST 12 MONTHS, YOU WORRIED THAT YOUR FOOD WOULD RUN OUT BEFORE YOU GOT MONEY TO BUY MORE.: NEVER TRUE

## 2023-02-14 SDOH — ECONOMIC STABILITY: HOUSING INSECURITY
IN THE LAST 12 MONTHS, WAS THERE A TIME WHEN YOU DID NOT HAVE A STEADY PLACE TO SLEEP OR SLEPT IN A SHELTER (INCLUDING NOW)?: NO

## 2023-02-14 SDOH — ECONOMIC STABILITY: INCOME INSECURITY: HOW HARD IS IT FOR YOU TO PAY FOR THE VERY BASICS LIKE FOOD, HOUSING, MEDICAL CARE, AND HEATING?: NOT HARD AT ALL

## 2023-02-14 ASSESSMENT — PATIENT HEALTH QUESTIONNAIRE - PHQ9
SUM OF ALL RESPONSES TO PHQ9 QUESTIONS 1 & 2: 0
SUM OF ALL RESPONSES TO PHQ QUESTIONS 1-9: 0
2. FEELING DOWN, DEPRESSED OR HOPELESS: 0
1. LITTLE INTEREST OR PLEASURE IN DOING THINGS: 0
SUM OF ALL RESPONSES TO PHQ QUESTIONS 1-9: 0

## 2023-02-14 NOTE — PROGRESS NOTES
Patient: Eduardo Alcala is a 69 y.o. female who presents today with the following Chief Complaint(s):    Chief Complaint   Patient presents with    New Patient         Jackiee is here for a check up and new to the practice. Her history includes hypertension, hyperlipidemia and stroke without focal deficit. Her b/p readings at home on average are < 130/80. Today at home 137/89. Current Outpatient Medications   Medication Sig Dispense Refill    aspirin 81 MG EC tablet Take 1 tablet by mouth daily 30 tablet 3    cetirizine (ZYRTEC) 10 MG tablet Take 1 tablet by mouth daily 60 tablet 1    lisinopril (PRINIVIL;ZESTRIL) 5 MG tablet Take 1 tablet by mouth daily 30 tablet 3    ondansetron (ZOFRAN-ODT) 4 MG disintegrating tablet Take 1 tablet by mouth every 8 hours as needed for Nausea or Vomiting (Patient not taking: Reported on 2/14/2023) 30 tablet 0    cloNIDine (CATAPRES) 0.1 MG tablet Take 1 tablet by mouth every 8 hours as needed for High Blood Pressure (sbp> 180 or DBP> 100) (Patient not taking: Reported on 2/14/2023) 60 tablet 3    hydroCHLOROthiazide (HYDRODIURIL) 25 MG tablet Take 1 tablet by mouth daily (Patient not taking: Reported on 2/14/2023) 30 tablet 3    clopidogrel (PLAVIX) 75 MG tablet Take 1 tablet by mouth daily for 20 doses 20 tablet 0    pantoprazole (PROTONIX) 40 MG tablet Take 1 tablet by mouth every morning (before breakfast) (Patient not taking: Reported on 2/14/2023) 30 tablet 3    atorvastatin (LIPITOR) 80 MG tablet Take 1 tablet by mouth nightly (Patient not taking: Reported on 2/14/2023) 30 tablet 3     No current facility-administered medications for this visit. Patient's past medical history, surgical history, family history, medications,and allergies  were all reviewed and updated as appropriate today. Review of Systems   Constitutional: Negative. HENT: Negative. Eyes: Negative. Respiratory: Negative.      Cardiovascular:         Hypertension, treated with ACEi and diuretic. Gastrointestinal: Negative. Endocrine:        Hyperlipidemia, prior . Stopped statin. Genitourinary: Negative. Musculoskeletal: Negative. Skin: Negative. Neurological:         H/O stroke. MRI demonstrates deep white matter infarct of posterior left frontal lobe 3/2022. No focality at this time. Psychiatric/Behavioral: Negative. Physical Exam  Constitutional:       General: She is not in acute distress. Appearance: She is well-developed. HENT:      Head: Normocephalic and atraumatic. Right Ear: External ear normal.      Left Ear: External ear normal.      Nose: Nose normal.   Eyes:      General: No scleral icterus. Conjunctiva/sclera: Conjunctivae normal.      Pupils: Pupils are equal, round, and reactive to light. Neck:      Thyroid: No thyromegaly. Cardiovascular:      Rate and Rhythm: Normal rate and regular rhythm. Heart sounds: Normal heart sounds. Pulmonary:      Effort: Pulmonary effort is normal.      Breath sounds: Normal breath sounds. Abdominal:      General: Bowel sounds are normal.      Palpations: Abdomen is soft. There is no mass. Musculoskeletal:      Cervical back: Normal range of motion and neck supple. Lymphadenopathy:      Cervical: No cervical adenopathy. Skin:     General: Skin is warm and dry. Neurological:      Mental Status: She is alert and oriented to person, place, and time. Deep Tendon Reflexes: Reflexes are normal and symmetric. Psychiatric:         Behavior: Behavior normal.         Thought Content: Thought content normal.         Judgment: Judgment normal.       Vitals:    02/14/23 1204   BP: (!) 150/100   Pulse: 84   SpO2: 100%       Assessment:   Diagnosis Orders   1. Mixed hyperlipidemia  atorvastatin (LIPITOR) 80 MG tablet. Heart healthy diet and statin compliance discussed. 2. Primary hypertension  hydroCHLOROthiazide (MICROZIDE) 12.5 MG capsule  DASH and low sodium diet discussed. Continue same b/p meds and home monitoring. 3. H/O: stroke  Risk factor control stressed. Plan:  See plans above.

## 2023-02-14 NOTE — PATIENT INSTRUCTIONS
[facility] Laboratory Locations - No appointment necessary. @ indicates the location is open Saturdays in addition to Monday through Friday. Call your preferred location for test preparation, business hours and other information you need. [employer] accepts [employer]. [facility]     @ [address] [facility] [address] [facility]. Rosa, [address]    Ph: [address]   [address] [facility] [place], [address]    Ph: [phone]   @ [address] [place] [address].,    [facility]    Ph: [address]    Ph: [phone]  @ [address]   Ph: [phone]  @ [address] [place] [address]. Deandre Lee [address]    Ph: [address] [place] [address] [facility] Johnnie [address]   Ph: [phone]    NORTH    @ [facility]. Mariposa Orantes.[facility] [address]    Ph: [phone]  [facility]   [address] [place] [address], [address]   Ph: [address]: [address] [facility] [address] Jesse Arshad    Ph: [phone]   [facility]   [address] [facility]. Jesse Tran   Ph: [address] [place] [address] Med [address] [facility] [address]    Ph: [phone]    [employer]. Web site. [employer] for for your and soul. Ryley Mclean.

## 2023-03-12 ASSESSMENT — ENCOUNTER SYMPTOMS
GASTROINTESTINAL NEGATIVE: 1
RESPIRATORY NEGATIVE: 1
EYES NEGATIVE: 1

## 2023-03-14 ENCOUNTER — TELEPHONE (OUTPATIENT)
Dept: INTERNAL MEDICINE CLINIC | Age: 78
End: 2023-03-14

## 2023-03-16 DIAGNOSIS — I10 PRIMARY HYPERTENSION: ICD-10-CM

## 2023-03-16 RX ORDER — HYDROCHLOROTHIAZIDE 12.5 MG/1
CAPSULE, GELATIN COATED ORAL
Qty: 30 CAPSULE | Refills: 5 | OUTPATIENT
Start: 2023-03-16

## 2023-06-01 ENCOUNTER — OFFICE VISIT (OUTPATIENT)
Dept: INTERNAL MEDICINE CLINIC | Age: 78
End: 2023-06-01
Payer: MEDICARE

## 2023-06-01 ENCOUNTER — TELEPHONE (OUTPATIENT)
Dept: INTERNAL MEDICINE CLINIC | Age: 78
End: 2023-06-01

## 2023-06-01 VITALS
WEIGHT: 137 LBS | DIASTOLIC BLOOD PRESSURE: 78 MMHG | SYSTOLIC BLOOD PRESSURE: 136 MMHG | HEIGHT: 66 IN | BODY MASS INDEX: 22.02 KG/M2 | HEART RATE: 89 BPM | OXYGEN SATURATION: 98 %

## 2023-06-01 DIAGNOSIS — I10 PRIMARY HYPERTENSION: ICD-10-CM

## 2023-06-01 DIAGNOSIS — Z78.0 MENOPAUSE: ICD-10-CM

## 2023-06-01 DIAGNOSIS — Z00.00 INITIAL MEDICARE ANNUAL WELLNESS VISIT: Primary | ICD-10-CM

## 2023-06-01 DIAGNOSIS — M32.9 LUPUS (HCC): ICD-10-CM

## 2023-06-01 DIAGNOSIS — R35.89 POLYURIA: ICD-10-CM

## 2023-06-01 DIAGNOSIS — E78.2 MIXED HYPERLIPIDEMIA: ICD-10-CM

## 2023-06-01 PROCEDURE — 81003 URINALYSIS AUTO W/O SCOPE: CPT | Performed by: INTERNAL MEDICINE

## 2023-06-01 PROCEDURE — 3074F SYST BP LT 130 MM HG: CPT | Performed by: INTERNAL MEDICINE

## 2023-06-01 PROCEDURE — G0438 PPPS, INITIAL VISIT: HCPCS | Performed by: INTERNAL MEDICINE

## 2023-06-01 PROCEDURE — 3078F DIAST BP <80 MM HG: CPT | Performed by: INTERNAL MEDICINE

## 2023-06-01 PROCEDURE — 1123F ACP DISCUSS/DSCN MKR DOCD: CPT | Performed by: INTERNAL MEDICINE

## 2023-06-01 RX ORDER — LOSARTAN POTASSIUM 50 MG/1
50 TABLET ORAL DAILY
Qty: 30 TABLET | Refills: 5 | Status: SHIPPED | OUTPATIENT
Start: 2023-06-01

## 2023-06-01 RX ORDER — LISINOPRIL 20 MG/1
20 TABLET ORAL DAILY
COMMUNITY
Start: 2023-03-23 | End: 2023-06-01

## 2023-06-01 ASSESSMENT — PATIENT HEALTH QUESTIONNAIRE - PHQ9
SUM OF ALL RESPONSES TO PHQ QUESTIONS 1-9: 0
1. LITTLE INTEREST OR PLEASURE IN DOING THINGS: 0
SUM OF ALL RESPONSES TO PHQ QUESTIONS 1-9: 0
SUM OF ALL RESPONSES TO PHQ QUESTIONS 1-9: 0
SUM OF ALL RESPONSES TO PHQ9 QUESTIONS 1 & 2: 0
2. FEELING DOWN, DEPRESSED OR HOPELESS: 0
SUM OF ALL RESPONSES TO PHQ QUESTIONS 1-9: 0

## 2023-06-01 ASSESSMENT — LIFESTYLE VARIABLES: HOW OFTEN DO YOU HAVE A DRINK CONTAINING ALCOHOL: NEVER

## 2023-06-02 LAB
ALBUMIN SERPL-MCNC: 3.8 G/DL (ref 3.4–5)
ALBUMIN/GLOB SERPL: 1.1 {RATIO} (ref 1.1–2.2)
ALP SERPL-CCNC: 79 U/L (ref 40–129)
ALT SERPL-CCNC: 20 U/L (ref 10–40)
ANION GAP SERPL CALCULATED.3IONS-SCNC: 9 MMOL/L (ref 3–16)
AST SERPL-CCNC: 37 U/L (ref 15–37)
BASOPHILS # BLD: 0 K/UL (ref 0–0.2)
BASOPHILS NFR BLD: 0.8 %
BILIRUB SERPL-MCNC: 0.7 MG/DL (ref 0–1)
BILIRUB UR QL STRIP.AUTO: NEGATIVE
BUN SERPL-MCNC: 28 MG/DL (ref 7–20)
CALCIUM SERPL-MCNC: 10.3 MG/DL (ref 8.3–10.6)
CHLORIDE SERPL-SCNC: 98 MMOL/L (ref 99–110)
CHOLEST SERPL-MCNC: 122 MG/DL (ref 0–199)
CLARITY UR: CLEAR
CO2 SERPL-SCNC: 28 MMOL/L (ref 21–32)
COLOR UR: ABNORMAL
CREAT SERPL-MCNC: 0.7 MG/DL (ref 0.6–1.2)
DEPRECATED RDW RBC AUTO: 13.4 % (ref 12.4–15.4)
EOSINOPHIL # BLD: 0.1 K/UL (ref 0–0.6)
EOSINOPHIL NFR BLD: 1.8 %
GFR SERPLBLD CREATININE-BSD FMLA CKD-EPI: >60 ML/MIN/{1.73_M2}
GLUCOSE SERPL-MCNC: 90 MG/DL (ref 70–99)
GLUCOSE UR STRIP.AUTO-MCNC: NEGATIVE MG/DL
HCT VFR BLD AUTO: 38.5 % (ref 36–48)
HDLC SERPL-MCNC: 50 MG/DL (ref 40–60)
HGB BLD-MCNC: 13.4 G/DL (ref 12–16)
HGB UR QL STRIP.AUTO: NEGATIVE
KETONES UR STRIP.AUTO-MCNC: NEGATIVE MG/DL
LDLC SERPL CALC-MCNC: 64 MG/DL
LEUKOCYTE ESTERASE UR QL STRIP.AUTO: NEGATIVE
LYMPHOCYTES # BLD: 1.4 K/UL (ref 1–5.1)
LYMPHOCYTES NFR BLD: 25.4 %
MCH RBC QN AUTO: 32.7 PG (ref 26–34)
MCHC RBC AUTO-ENTMCNC: 34.7 G/DL (ref 31–36)
MCV RBC AUTO: 94.2 FL (ref 80–100)
MONOCYTES # BLD: 0.4 K/UL (ref 0–1.3)
MONOCYTES NFR BLD: 7.9 %
NEUTROPHILS # BLD: 3.6 K/UL (ref 1.7–7.7)
NEUTROPHILS NFR BLD: 64.1 %
NITRITE UR QL STRIP.AUTO: NEGATIVE
PH UR STRIP.AUTO: 6 [PH] (ref 5–8)
PLATELET # BLD AUTO: 299 K/UL (ref 135–450)
PMV BLD AUTO: 9.1 FL (ref 5–10.5)
POTASSIUM SERPL-SCNC: 5.3 MMOL/L (ref 3.5–5.1)
PROT SERPL-MCNC: 7.4 G/DL (ref 6.4–8.2)
PROT UR STRIP.AUTO-MCNC: NEGATIVE MG/DL
RBC # BLD AUTO: 4.09 M/UL (ref 4–5.2)
SODIUM SERPL-SCNC: 135 MMOL/L (ref 136–145)
SP GR UR STRIP.AUTO: 1.02 (ref 1–1.03)
TRIGL SERPL-MCNC: 42 MG/DL (ref 0–150)
TSH SERPL DL<=0.005 MIU/L-ACNC: 0.97 UIU/ML (ref 0.27–4.2)
UA DIPSTICK W REFLEX MICRO PNL UR: ABNORMAL
URN SPEC COLLECT METH UR: ABNORMAL
UROBILINOGEN UR STRIP-ACNC: 2 E.U./DL
VLDLC SERPL CALC-MCNC: 8 MG/DL
WBC # BLD AUTO: 5.7 K/UL (ref 4–11)

## 2023-06-04 LAB
BACTERIA UR CULT: ABNORMAL
BACTERIA UR CULT: ABNORMAL
ORGANISM: ABNORMAL

## 2023-06-08 ENCOUNTER — OFFICE VISIT (OUTPATIENT)
Dept: INTERNAL MEDICINE CLINIC | Age: 78
End: 2023-06-08
Payer: MEDICARE

## 2023-06-08 ENCOUNTER — HOSPITAL ENCOUNTER (OUTPATIENT)
Age: 78
Discharge: HOME | End: 2023-06-08
Payer: MEDICARE

## 2023-06-08 ENCOUNTER — HOSPITAL ENCOUNTER (OUTPATIENT)
Dept: GENERAL RADIOLOGY | Age: 78
Discharge: HOME | End: 2023-06-08
Payer: MEDICARE

## 2023-06-08 VITALS
DIASTOLIC BLOOD PRESSURE: 74 MMHG | BODY MASS INDEX: 21.53 KG/M2 | HEART RATE: 87 BPM | SYSTOLIC BLOOD PRESSURE: 128 MMHG | HEIGHT: 66 IN | OXYGEN SATURATION: 95 % | WEIGHT: 134 LBS

## 2023-06-08 DIAGNOSIS — M47.812 CERVICAL ARTHRITIS: ICD-10-CM

## 2023-06-08 DIAGNOSIS — M62.838 NECK MUSCLE SPASM: ICD-10-CM

## 2023-06-08 DIAGNOSIS — M47.812 CERVICAL ARTHRITIS: Primary | ICD-10-CM

## 2023-06-08 PROCEDURE — 72040 X-RAY EXAM NECK SPINE 2-3 VW: CPT

## 2023-06-08 PROCEDURE — 1036F TOBACCO NON-USER: CPT | Performed by: INTERNAL MEDICINE

## 2023-06-08 PROCEDURE — G8427 DOCREV CUR MEDS BY ELIG CLIN: HCPCS | Performed by: INTERNAL MEDICINE

## 2023-06-08 PROCEDURE — 1123F ACP DISCUSS/DSCN MKR DOCD: CPT | Performed by: INTERNAL MEDICINE

## 2023-06-08 PROCEDURE — G8420 CALC BMI NORM PARAMETERS: HCPCS | Performed by: INTERNAL MEDICINE

## 2023-06-08 PROCEDURE — 99213 OFFICE O/P EST LOW 20 MIN: CPT | Performed by: INTERNAL MEDICINE

## 2023-06-08 PROCEDURE — 1090F PRES/ABSN URINE INCON ASSESS: CPT | Performed by: INTERNAL MEDICINE

## 2023-06-08 PROCEDURE — G8400 PT W/DXA NO RESULTS DOC: HCPCS | Performed by: INTERNAL MEDICINE

## 2023-06-08 RX ORDER — TIZANIDINE 2 MG/1
2 TABLET ORAL 3 TIMES DAILY PRN
Qty: 30 TABLET | Refills: 0 | Status: SHIPPED | OUTPATIENT
Start: 2023-06-08

## 2023-06-19 ENCOUNTER — TELEPHONE (OUTPATIENT)
Dept: INTERNAL MEDICINE CLINIC | Age: 78
End: 2023-06-19

## 2023-06-26 RX ORDER — LOSARTAN POTASSIUM 50 MG/1
TABLET ORAL
Qty: 30 TABLET | Refills: 5 | OUTPATIENT
Start: 2023-06-26

## 2023-07-07 ASSESSMENT — ENCOUNTER SYMPTOMS
EYES NEGATIVE: 1
GASTROINTESTINAL NEGATIVE: 1
RESPIRATORY NEGATIVE: 1

## 2023-08-02 ENCOUNTER — OFFICE VISIT (OUTPATIENT)
Dept: INTERNAL MEDICINE CLINIC | Age: 78
End: 2023-08-02
Payer: MEDICARE

## 2023-08-02 VITALS
HEART RATE: 78 BPM | SYSTOLIC BLOOD PRESSURE: 126 MMHG | WEIGHT: 134 LBS | BODY MASS INDEX: 21.53 KG/M2 | DIASTOLIC BLOOD PRESSURE: 78 MMHG | OXYGEN SATURATION: 98 % | HEIGHT: 66 IN

## 2023-08-02 DIAGNOSIS — R41.3 MEMORY CHANGE: ICD-10-CM

## 2023-08-02 DIAGNOSIS — I10 PRIMARY HYPERTENSION: ICD-10-CM

## 2023-08-02 DIAGNOSIS — M32.9 LUPUS (HCC): ICD-10-CM

## 2023-08-02 DIAGNOSIS — E78.2 MIXED HYPERLIPIDEMIA: ICD-10-CM

## 2023-08-02 DIAGNOSIS — N30.00 ACUTE CYSTITIS WITHOUT HEMATURIA: ICD-10-CM

## 2023-08-02 DIAGNOSIS — R60.0 BILATERAL LEG EDEMA: ICD-10-CM

## 2023-08-02 DIAGNOSIS — I67.9 CVD (CEREBROVASCULAR DISEASE): ICD-10-CM

## 2023-08-02 PROCEDURE — G8420 CALC BMI NORM PARAMETERS: HCPCS | Performed by: INTERNAL MEDICINE

## 2023-08-02 PROCEDURE — 3078F DIAST BP <80 MM HG: CPT | Performed by: INTERNAL MEDICINE

## 2023-08-02 PROCEDURE — 99215 OFFICE O/P EST HI 40 MIN: CPT | Performed by: INTERNAL MEDICINE

## 2023-08-02 PROCEDURE — 1123F ACP DISCUSS/DSCN MKR DOCD: CPT | Performed by: INTERNAL MEDICINE

## 2023-08-02 PROCEDURE — 3074F SYST BP LT 130 MM HG: CPT | Performed by: INTERNAL MEDICINE

## 2023-08-02 PROCEDURE — 1036F TOBACCO NON-USER: CPT | Performed by: INTERNAL MEDICINE

## 2023-08-02 PROCEDURE — G8400 PT W/DXA NO RESULTS DOC: HCPCS | Performed by: INTERNAL MEDICINE

## 2023-08-02 PROCEDURE — 1090F PRES/ABSN URINE INCON ASSESS: CPT | Performed by: INTERNAL MEDICINE

## 2023-08-02 PROCEDURE — G8427 DOCREV CUR MEDS BY ELIG CLIN: HCPCS | Performed by: INTERNAL MEDICINE

## 2023-08-02 PROCEDURE — 36415 COLL VENOUS BLD VENIPUNCTURE: CPT | Performed by: INTERNAL MEDICINE

## 2023-08-02 RX ORDER — CIPROFLOXACIN 500 MG/1
500 TABLET, FILM COATED ORAL 2 TIMES DAILY
Qty: 10 TABLET | Refills: 0 | Status: SHIPPED | OUTPATIENT
Start: 2023-08-02 | End: 2023-08-07

## 2023-08-02 NOTE — PROGRESS NOTES
Patient: Jessi Antonio is a 69 y.o. female who presents today with the following Chief Complaint(s):    Chief Complaint   Patient presents with    Follow-up    Arthritis         HPIShe is here for a check up. Complains of both ankles swelling for 1 month. Denies change in sodium consumption, weight or medication. Apparent h/o Lupus but has not nhad flare up in years. Denies pain just swelling. Knees stiff with h/o osteoarthritis. 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 Daughter is concerned about memory. Says she forgets appointments. Patient is not having problems with driving to destination, cooking is ok, pays bills. She has a h/o HTN, HLD, CVD ( past stroke ). Current Outpatient Medications   Medication Sig Dispense Refill    tiZANidine (ZANAFLEX) 2 MG tablet Take 1 tablet by mouth 3 times daily as needed (muscle spasm) 30 tablet 0    losartan (COZAAR) 50 MG tablet Take 1 tablet by mouth daily 30 tablet 5    atorvastatin (LIPITOR) 80 MG tablet Take 1 tablet by mouth nightly 30 tablet 5    aspirin 81 MG EC tablet Take 1 tablet by mouth daily 30 tablet 3    cetirizine (ZYRTEC) 10 MG tablet Take 1 tablet by mouth daily 60 tablet 1     No current facility-administered medications for this visit. Patient's past medical history, surgical history, family history, medications,and allergies  were all reviewed and updated as appropriate today. Review of Systems   Constitutional: Negative. HENT: Negative. Eyes: Negative. Respiratory: Negative. Cardiovascular:  Positive for leg swelling. See HPI. Gastrointestinal: Negative. Endocrine:        Hyperlipidemia, treated with statin. LDL 64. Genitourinary:         Uti. Musculoskeletal:         See HPI. Skin: Negative. Neurological: Negative. Stroke history. Psychiatric/Behavioral: Negative. Physical Exam  Constitutional:       General: She is not in acute distress.      Appearance: Pt here for growth ultrasound accompanied by her   States +FM  No complaints

## 2023-08-03 LAB
CRP SERPL-MCNC: <3 MG/L (ref 0–5.1)
ERYTHROCYTE [SEDIMENTATION RATE] IN BLOOD BY WESTERGREN METHOD: 18 MM/HR (ref 0–30)

## 2023-08-28 ASSESSMENT — ENCOUNTER SYMPTOMS
RESPIRATORY NEGATIVE: 1
EYES NEGATIVE: 1
GASTROINTESTINAL NEGATIVE: 1

## 2023-09-23 ENCOUNTER — HOSPITAL ENCOUNTER (EMERGENCY)
Age: 78
Discharge: HOME | End: 2023-09-23
Attending: STUDENT IN AN ORGANIZED HEALTH CARE EDUCATION/TRAINING PROGRAM
Payer: MEDICARE

## 2023-09-23 ENCOUNTER — APPOINTMENT (OUTPATIENT)
Dept: GENERAL RADIOLOGY | Age: 78
End: 2023-09-23
Payer: MEDICARE

## 2023-09-23 ENCOUNTER — APPOINTMENT (OUTPATIENT)
Dept: CT IMAGING | Age: 78
End: 2023-09-23
Payer: MEDICARE

## 2023-09-23 VITALS
DIASTOLIC BLOOD PRESSURE: 77 MMHG | OXYGEN SATURATION: 100 % | TEMPERATURE: 98 F | HEIGHT: 66 IN | HEART RATE: 69 BPM | RESPIRATION RATE: 18 BRPM | WEIGHT: 140.6 LBS | SYSTOLIC BLOOD PRESSURE: 145 MMHG | BODY MASS INDEX: 22.6 KG/M2

## 2023-09-23 DIAGNOSIS — R22.1 NECK SWELLING: Primary | ICD-10-CM

## 2023-09-23 LAB
ALBUMIN SERPL-MCNC: 3.8 G/DL (ref 3.4–5)
ALBUMIN/GLOB SERPL: 1.2 {RATIO} (ref 1.1–2.2)
ALP SERPL-CCNC: 80 U/L (ref 40–129)
ALT SERPL-CCNC: 21 U/L (ref 10–40)
ANION GAP SERPL CALCULATED.3IONS-SCNC: 8 MMOL/L (ref 3–16)
APTT BLD: 37.2 SEC (ref 22.7–35.9)
AST SERPL-CCNC: 31 U/L (ref 15–37)
BASOPHILS # BLD: 0.1 K/UL (ref 0–0.2)
BASOPHILS NFR BLD: 1.2 %
BILIRUB SERPL-MCNC: 0.7 MG/DL (ref 0–1)
BUN SERPL-MCNC: 27 MG/DL (ref 7–20)
CALCIUM SERPL-MCNC: 9.9 MG/DL (ref 8.3–10.6)
CHLORIDE SERPL-SCNC: 103 MMOL/L (ref 99–110)
CO2 SERPL-SCNC: 28 MMOL/L (ref 21–32)
CREAT SERPL-MCNC: 0.7 MG/DL (ref 0.6–1.2)
D DIMER: 1.03 UG/ML FEU (ref 0–0.6)
DEPRECATED RDW RBC AUTO: 14.1 % (ref 12.4–15.4)
EOSINOPHIL # BLD: 0.2 K/UL (ref 0–0.6)
EOSINOPHIL NFR BLD: 2.7 %
GFR SERPLBLD CREATININE-BSD FMLA CKD-EPI: >60 ML/MIN/{1.73_M2}
GLUCOSE SERPL-MCNC: 98 MG/DL (ref 70–99)
HCT VFR BLD AUTO: 43.6 % (ref 36–48)
HGB BLD-MCNC: 15.2 G/DL (ref 12–16)
INR PPP: 0.97 (ref 0.84–1.16)
LYMPHOCYTES # BLD: 1.7 K/UL (ref 1–5.1)
LYMPHOCYTES NFR BLD: 28.2 %
MCH RBC QN AUTO: 32.1 PG (ref 26–34)
MCHC RBC AUTO-ENTMCNC: 34.9 G/DL (ref 31–36)
MCV RBC AUTO: 92.1 FL (ref 80–100)
MONOCYTES # BLD: 0.4 K/UL (ref 0–1.3)
MONOCYTES NFR BLD: 7.3 %
NEUTROPHILS # BLD: 3.7 K/UL (ref 1.7–7.7)
NEUTROPHILS NFR BLD: 60.6 %
PLATELET # BLD AUTO: 292 K/UL (ref 135–450)
PMV BLD AUTO: 8.8 FL (ref 5–10.5)
POTASSIUM SERPL-SCNC: 5.1 MMOL/L (ref 3.5–5.1)
PROT SERPL-MCNC: 7.1 G/DL (ref 6.4–8.2)
PROTHROMBIN TIME: 12.9 SEC (ref 11.5–14.8)
RBC # BLD AUTO: 4.73 M/UL (ref 4–5.2)
SODIUM SERPL-SCNC: 139 MMOL/L (ref 136–145)
WBC # BLD AUTO: 6.1 K/UL (ref 4–11)

## 2023-09-23 PROCEDURE — 6370000000 HC RX 637 (ALT 250 FOR IP): Performed by: STUDENT IN AN ORGANIZED HEALTH CARE EDUCATION/TRAINING PROGRAM

## 2023-09-23 PROCEDURE — 71260 CT THORAX DX C+: CPT

## 2023-09-23 PROCEDURE — 80053 COMPREHEN METABOLIC PANEL: CPT

## 2023-09-23 PROCEDURE — 93971 EXTREMITY STUDY: CPT

## 2023-09-23 PROCEDURE — 6360000004 HC RX CONTRAST MEDICATION: Performed by: STUDENT IN AN ORGANIZED HEALTH CARE EDUCATION/TRAINING PROGRAM

## 2023-09-23 PROCEDURE — 71046 X-RAY EXAM CHEST 2 VIEWS: CPT

## 2023-09-23 PROCEDURE — 85610 PROTHROMBIN TIME: CPT

## 2023-09-23 PROCEDURE — 85379 FIBRIN DEGRADATION QUANT: CPT

## 2023-09-23 PROCEDURE — 99285 EMERGENCY DEPT VISIT HI MDM: CPT

## 2023-09-23 PROCEDURE — 85730 THROMBOPLASTIN TIME PARTIAL: CPT

## 2023-09-23 PROCEDURE — 85025 COMPLETE CBC W/AUTO DIFF WBC: CPT

## 2023-09-23 RX ORDER — DIPHENHYDRAMINE HCL 25 MG
50 TABLET ORAL ONCE
Status: COMPLETED | OUTPATIENT
Start: 2023-09-23 | End: 2023-09-23

## 2023-09-23 RX ADMIN — IOPAMIDOL 75 ML: 755 INJECTION, SOLUTION INTRAVENOUS at 10:33

## 2023-09-23 RX ADMIN — DIPHENHYDRAMINE HYDROCHLORIDE 50 MG: 25 TABLET ORAL at 08:42

## 2023-09-23 RX ADMIN — PREDNISONE 50 MG: 20 TABLET ORAL at 08:42

## 2023-09-23 ASSESSMENT — ENCOUNTER SYMPTOMS
APNEA: 0
FACIAL SWELLING: 1
ALLERGIC/IMMUNOLOGIC NEGATIVE: 1
SHORTNESS OF BREATH: 0
GASTROINTESTINAL NEGATIVE: 1
EYE ITCHING: 1
TROUBLE SWALLOWING: 0
RHINORRHEA: 1

## 2023-09-23 ASSESSMENT — PAIN - FUNCTIONAL ASSESSMENT: PAIN_FUNCTIONAL_ASSESSMENT: NONE - DENIES PAIN

## 2023-09-23 NOTE — DISCHARGE INSTRUCTIONS
You are seen in the emergency department today for swelling of your face and neck. It is unclear exactly what is causing this but it seems that you more likely have decreased drainage of lymphatic fluid on that side which may be due to abnormal drainage system either from a previous injury, old scarring or may have been something you were born with. We did evaluate your blood vessels in your neck which do not appear to have any acute blockage or blood clot. If your symptoms return, please schedule an appointment with Dr. Alexandre Lennon who is number as above. In the meantime, we do recommend that you sit up or sleep at at least a 30 degree angle to help improve your drainage. Return to the emergency department immediately if you have any dizziness, headache, associated fever, develop any significant pain with this, have any difficulty swallowing or speaking.

## 2023-09-23 NOTE — CONSULTS
PROTIME 12.9   INR 0.97     APTT:   Recent Labs     09/23/23  0802   APTT 37.2*     Liver Profile:   Lab Results   Component Value Date/Time    AST 31 09/23/2023 08:02 AM    ALT 21 09/23/2023 08:02 AM    BILITOT 0.7 09/23/2023 08:02 AM    ALKPHOS 80 09/23/2023 08:02 AM     Lab Results   Component Value Date/Time    CHOL 122 06/01/2023 12:28 PM    HDL 50 06/01/2023 12:28 PM    TRIG 42 06/01/2023 12:28 PM     UA:   Lab Results   Component Value Date/Time    COLORU DARK YELLOW 06/01/2023 12:28 PM    PHUR 6.0 06/01/2023 12:28 PM    CLARITYU Clear 06/01/2023 12:28 PM    SPECGRAV 1.020 06/01/2023 12:28 PM    LEUKOCYTESUR Negative 06/01/2023 12:28 PM    UROBILINOGEN 2.0 06/01/2023 12:28 PM    BILIRUBINUR Negative 06/01/2023 12:28 PM    BLOODU Negative 06/01/2023 12:28 PM    GLUCOSEU Negative 06/01/2023 12:28 PM       Imaging:   VL Extremity Venous Left         CT CHEST W CONTRAST   Final Result      1. There is extensive subcutaneous edema identified within the left   supraclavicular fossa, left posterior triangle of the neck, and left upper   anterior chest wall. There is also edema identified within the superior   mediastinum and middle mediastinum. Findings may be related to venous   congestion. Cellulitis is a differential consideration. 2. There is no opacification of the left subclavian vein or the left internal   jugular vein. This may be secondary to venous thrombosis or stricture. Correlate   with Doppler ultrasound. 3. There are trace layering bilateral pleural effusions. XR CHEST (2 VW)   Final Result   1. No evidence of acute cardiopulmonary disease.          -- PRELIMINARY SONOGRAPHER REPORT --      Demographics      Patient Name       Milly Crespo      Date of Study      09/23/2023         Gender              Female      Patient Number     [id number]         Date of Birth       1945      Visit Number       [id number]          Age                 69 year(s)      Accession Number   [id number] Room Number         A09      Corporate ID       [id number]            Vivian Colon RVT      Ordering Physician Lucho Giang MD         Physician           Readers     Procedure     Type of Study:      Veins:Upper Extremities Veins, VL EXTREMITY VENOUS DUPLEX LEFT. Tech Comments  Right  No evidence of deep vein thrombosis involving the right internal jugular vein  and subclavian vein. Left  Limited visualization of the cephalic and basilic veins in the forearm and  wrist.  Within the limits of this exam, there is no evidence of deep vein thrombosis  or superficial vein thrombosis of the left upper extremity. There is no previous exam for comparison. This result has not been signed. Information might be incomplete. Assessment/Plan: This is a 69 y.o. female with infection now with 48 hours of left-sided facial swelling extending towards the neck. CT was concerning for a nonfilling left internal jugular vein however Doppler did reveal flow through the vein however it is small in size. Doppler did not show any DVT. Swelling etiology unclear however appears to be improving. No acute vascular surgical invention at this time.       Honor Shone, DO  09/23/23  5:14 PM

## 2023-09-25 ENCOUNTER — TELEPHONE (OUTPATIENT)
Dept: INTERNAL MEDICINE CLINIC | Age: 78
End: 2023-09-25

## 2023-09-25 RX ORDER — LOSARTAN POTASSIUM 50 MG/1
50 TABLET ORAL DAILY
Qty: 90 TABLET | Refills: 1 | OUTPATIENT
Start: 2023-09-25

## 2023-09-25 NOTE — TELEPHONE ENCOUNTER
Patients neck is sore on the left side and the left side of the  face as well, she went to the ER and they told her they could not find anything. She says that she still has some pain and wants to know what should she do.   Please advise

## 2023-09-26 NOTE — TELEPHONE ENCOUNTER
Called patient to see if she can come in on 9/27, had to leave a VM. Waiting on patient to call back.   ANUP

## 2023-09-27 ENCOUNTER — OFFICE VISIT (OUTPATIENT)
Dept: INTERNAL MEDICINE CLINIC | Age: 78
End: 2023-09-27
Payer: MEDICARE

## 2023-09-27 VITALS
BODY MASS INDEX: 22.6 KG/M2 | OXYGEN SATURATION: 99 % | HEART RATE: 78 BPM | DIASTOLIC BLOOD PRESSURE: 86 MMHG | WEIGHT: 140 LBS | SYSTOLIC BLOOD PRESSURE: 122 MMHG

## 2023-09-27 DIAGNOSIS — I10 PRIMARY HYPERTENSION: ICD-10-CM

## 2023-09-27 DIAGNOSIS — Z86.73 H/O: STROKE: ICD-10-CM

## 2023-09-27 DIAGNOSIS — K13.0 LESION OF LIP: Primary | ICD-10-CM

## 2023-09-27 DIAGNOSIS — E78.2 MIXED HYPERLIPIDEMIA: ICD-10-CM

## 2023-09-27 PROCEDURE — 3078F DIAST BP <80 MM HG: CPT | Performed by: INTERNAL MEDICINE

## 2023-09-27 PROCEDURE — 3074F SYST BP LT 130 MM HG: CPT | Performed by: INTERNAL MEDICINE

## 2023-09-27 PROCEDURE — 1123F ACP DISCUSS/DSCN MKR DOCD: CPT | Performed by: INTERNAL MEDICINE

## 2023-09-27 PROCEDURE — G8427 DOCREV CUR MEDS BY ELIG CLIN: HCPCS | Performed by: INTERNAL MEDICINE

## 2023-09-27 PROCEDURE — G8420 CALC BMI NORM PARAMETERS: HCPCS | Performed by: INTERNAL MEDICINE

## 2023-09-27 PROCEDURE — 99214 OFFICE O/P EST MOD 30 MIN: CPT | Performed by: INTERNAL MEDICINE

## 2023-09-27 PROCEDURE — 1090F PRES/ABSN URINE INCON ASSESS: CPT | Performed by: INTERNAL MEDICINE

## 2023-09-27 PROCEDURE — G8400 PT W/DXA NO RESULTS DOC: HCPCS | Performed by: INTERNAL MEDICINE

## 2023-09-27 PROCEDURE — 1036F TOBACCO NON-USER: CPT | Performed by: INTERNAL MEDICINE

## 2023-09-27 NOTE — PROGRESS NOTES
Patient: Musa Davila is a 69 y.o. female who presents today with the following Chief Complaint(s):    Chief Complaint   Patient presents with    Follow-up         HPIleft lower lip corner of moth papular lesion, that has drained blood and some pus. Scabing, seems to be resolving. About 3/4 cm     Current Outpatient Medications   Medication Sig Dispense Refill    tiZANidine (ZANAFLEX) 2 MG tablet Take 1 tablet by mouth 3 times daily as needed (muscle spasm) 30 tablet 0    losartan (COZAAR) 50 MG tablet Take 1 tablet by mouth daily 30 tablet 5    atorvastatin (LIPITOR) 80 MG tablet Take 1 tablet by mouth nightly 30 tablet 5    aspirin 81 MG EC tablet Take 1 tablet by mouth daily 30 tablet 3    cetirizine (ZYRTEC) 10 MG tablet Take 1 tablet by mouth daily 60 tablet 1     No current facility-administered medications for this visit. Patient's past medical history, surgical history, family history, medications,and allergies  were all reviewed and updated as appropriate today. Review of Systems      Physical Exam    Vitals:    09/27/23 0902   BP: 122/86   Pulse: 78   SpO2: 99%       Assessment:  No diagnosis found. Plan:  There are no diagnoses linked to this encounter.

## 2023-10-05 ENCOUNTER — TELEPHONE (OUTPATIENT)
Dept: INTERNAL MEDICINE CLINIC | Age: 78
End: 2023-10-05

## 2023-10-05 DIAGNOSIS — E78.2 MIXED HYPERLIPIDEMIA: ICD-10-CM

## 2023-10-05 RX ORDER — ATORVASTATIN CALCIUM 80 MG/1
80 TABLET, FILM COATED ORAL NIGHTLY
Qty: 90 TABLET | Refills: 3 | Status: SHIPPED | OUTPATIENT
Start: 2023-10-05

## 2023-10-05 NOTE — TELEPHONE ENCOUNTER
Medication:   Requested Prescriptions     Pending Prescriptions Disp Refills    atorvastatin (LIPITOR) 80 MG tablet 90 tablet 3     Sig: Take 1 tablet by mouth nightly        Last Filled:  02/14/23    Patient Phone Number: [phone] (home)     Last appt: 9/27/2023   Next appt: 11/1/2023

## 2023-10-05 NOTE — TELEPHONE ENCOUNTER
Patient is out of refills on atorvastatin 80 mg, she has been out for a few days and wants to know if it could be sent over today.   She would also like to the get this in a 90 day supply  Please advise

## 2023-10-15 ASSESSMENT — ENCOUNTER SYMPTOMS
RESPIRATORY NEGATIVE: 1
EYES NEGATIVE: 1
GASTROINTESTINAL NEGATIVE: 1

## 2023-12-15 RX ORDER — LOSARTAN POTASSIUM 50 MG/1
50 TABLET ORAL DAILY
Qty: 90 TABLET | Refills: 1 | Status: SHIPPED | OUTPATIENT
Start: 2023-12-15

## 2024-01-26 ENCOUNTER — OFFICE VISIT (OUTPATIENT)
Dept: INTERNAL MEDICINE CLINIC | Age: 79
End: 2024-01-26

## 2024-01-26 VITALS
OXYGEN SATURATION: 99 % | BODY MASS INDEX: 22.76 KG/M2 | HEART RATE: 84 BPM | WEIGHT: 141 LBS | DIASTOLIC BLOOD PRESSURE: 78 MMHG | SYSTOLIC BLOOD PRESSURE: 126 MMHG

## 2024-01-26 DIAGNOSIS — Z86.73 H/O ARTERIAL ISCHEMIC STROKE: ICD-10-CM

## 2024-01-26 DIAGNOSIS — N39.3 STRESS INCONTINENCE OF URINE: ICD-10-CM

## 2024-01-26 DIAGNOSIS — E78.2 MIXED HYPERLIPIDEMIA: ICD-10-CM

## 2024-01-26 DIAGNOSIS — R73.9 ELEVATED BLOOD SUGAR: ICD-10-CM

## 2024-01-26 DIAGNOSIS — I10 PRIMARY HYPERTENSION: ICD-10-CM

## 2024-01-26 DIAGNOSIS — R19.5 CHANGE IN STOOL: ICD-10-CM

## 2024-01-26 LAB
BILIRUBIN, POC: NEGATIVE
BLOOD URINE, POC: NEGATIVE
CHP ED QC CHECK: NORMAL
CLARITY, POC: NORMAL
COLOR, POC: NORMAL
GLUCOSE BLD-MCNC: 82 MG/DL
GLUCOSE URINE, POC: NEGATIVE
KETONES, POC: NEGATIVE
LEUKOCYTE EST, POC: NEGATIVE
NITRITE, POC: NEGATIVE
PH, POC: 6
PROTEIN, POC: NEGATIVE
SPECIFIC GRAVITY, POC: 1.02
UROBILINOGEN, POC: 0.2

## 2024-01-26 ASSESSMENT — PATIENT HEALTH QUESTIONNAIRE - PHQ9
SUM OF ALL RESPONSES TO PHQ QUESTIONS 1-9: 0
1. LITTLE INTEREST OR PLEASURE IN DOING THINGS: 0
2. FEELING DOWN, DEPRESSED OR HOPELESS: 0
SUM OF ALL RESPONSES TO PHQ QUESTIONS 1-9: 0
SUM OF ALL RESPONSES TO PHQ9 QUESTIONS 1 & 2: 0

## 2024-01-26 NOTE — PROGRESS NOTES
Patient: Esther Murray is a 78 y.o. female who presents today with the following Chief Complaint(s):    Chief Complaint   Patient presents with    Follow-up         HPIif laugh, move, cough tingles on self. Goes at night 1 time. No burning . Use t=cranberry tablets. About 1 month. Doing kegle exercise. Does not want med or urology, wants to see if it gets better on gets on. BM has changed 1 year or 2. Comes out as pellets. Smoothe move, amando oil tablets. Had colonoscopy recently. Ok. Advised to  check back with GI.   not taking statin.   Current Outpatient Medications   Medication Sig Dispense Refill    aspirin 81 MG EC tablet Take 1 tablet by mouth daily 30 tablet 3    losartan (COZAAR) 50 MG tablet TAKE 1 TABLET BY MOUTH EVERY DAY 90 tablet 1    atorvastatin (LIPITOR) 80 MG tablet Take 1 tablet by mouth nightly (Patient not taking: Reported on 1/26/2024) 90 tablet 3    tiZANidine (ZANAFLEX) 2 MG tablet Take 1 tablet by mouth 3 times daily as needed (muscle spasm) (Patient not taking: Reported on 1/26/2024) 30 tablet 0    cetirizine (ZYRTEC) 10 MG tablet Take 1 tablet by mouth daily (Patient not taking: Reported on 1/26/2024) 60 tablet 1     No current facility-administered medications for this visit.       Patient's past medical history, surgical history, family history, medications,and allergies  were all reviewed and updated as appropriate today.      Review of Systems      Physical Exam    Vitals:    01/26/24 0924   BP: (!) 160/88   Pulse:    SpO2:        Assessment:  No diagnosis found.    Plan:  There are no diagnoses linked to this encounter.

## 2024-01-26 NOTE — PATIENT INSTRUCTIONS
[facility] Laboratory Locations - No appointment necessary.  ? indicates the location is open Saturdays in addition to Monday through Friday.   Call your preferred location for test preparation, business hours and other information you need.   [facility] accepts all insurances.  CENTRAL  EAST  [place]    ? [place]   [address]   [address]   [place] [address]    Ph: [phone]  [facility]   [address] [place] [address]    Ph: [phone]   ? [place]   [address]    [place] [address]    Ph: [phone]     [facility]   [address] [place] [address]    Ph: [phone] ? [address] [place] [address]   Ph: [phone]  ? [facility]   [address] [place] [address]    Ph: [phone]      [place]   [address] [place] [address]   Ph: [phone]    NORTH    ? [facility]   [address] [facility] [place] [address]    Ph: [phone]  [facility]   [address] [place] [address]   Ph: [phone]  [place]   [address] [facility] [address]    PH: [phone]    [facility]   [address] [place] [address]    Ph: [phone]  [place]  [address] [place] [address]  Ph: [phone]  [facility]   [address] [place] [address]    Ph: [phone]

## 2024-01-27 ASSESSMENT — ENCOUNTER SYMPTOMS
GASTROINTESTINAL NEGATIVE: 1
EYES NEGATIVE: 1
RESPIRATORY NEGATIVE: 1

## 2024-01-27 NOTE — PROGRESS NOTES
Patient: Esther Murray is a 78 y.o. female who presents today with the following Chief Complaint(s):    Chief Complaint   Patient presents with    Follow-up         HPIShe is here for a check up. Her history includes hypertension, hyperlipidemia and stroke without focal deficit. She admits not taking statin medication at this time. Declines restarting it.   Her b/p readings at home on average are < 130/80.         She complains of urinating on herself if she moves a certain way or coughs. Denies dysuria or vaginal discharge. Problem for about 1 month. Doing kegel exercise and taking cranberry tablets. Not interested in medication or urology consultation. Wants to see if problem just runs its course.         Also notes that BM have changed over the last year or two. Stool comes out like pellets. Colonoscopy within the last year was without significant pathology. She is now uses smooth move herbal tea and castor oil tablets.     Current Outpatient Medications   Medication Sig Dispense Refill    aspirin 81 MG EC tablet Take 1 tablet by mouth daily 30 tablet 3    losartan (COZAAR) 50 MG tablet TAKE 1 TABLET BY MOUTH EVERY DAY 90 tablet 1    atorvastatin (LIPITOR) 80 MG tablet Take 1 tablet by mouth nightly (Patient not taking: Reported on 1/26/2024) 90 tablet 3    tiZANidine (ZANAFLEX) 2 MG tablet Take 1 tablet by mouth 3 times daily as needed (muscle spasm) (Patient not taking: Reported on 1/26/2024) 30 tablet 0    cetirizine (ZYRTEC) 10 MG tablet Take 1 tablet by mouth daily (Patient not taking: Reported on 1/26/2024) 60 tablet 1     No current facility-administered medications for this visit.       Patient's past medical history, surgical history, family history, medications,and allergies  were all reviewed and updated as appropriate today.      Review of Systems   Constitutional: Negative.    HENT: Negative.     Eyes: Negative.    Respiratory: Negative.     Cardiovascular:         Hypertension, treated with ACEi and

## 2024-04-12 RX ORDER — PANTOPRAZOLE SODIUM 40 MG/1
TABLET, DELAYED RELEASE ORAL
Qty: 30 TABLET | Refills: 3 | OUTPATIENT
Start: 2024-04-12

## 2024-04-14 RX ORDER — CLOPIDOGREL BISULFATE 75 MG/1
75 TABLET ORAL DAILY
Qty: 20 TABLET | Refills: 0 | OUTPATIENT
Start: 2024-04-14

## 2024-04-14 RX ORDER — INCONTINENCE PAD,LINER,DISP
EACH MISCELLANEOUS
Qty: 30 TABLET | OUTPATIENT
Start: 2024-04-14

## 2024-04-19 ENCOUNTER — TELEPHONE (OUTPATIENT)
Dept: INTERNAL MEDICINE CLINIC | Age: 79
End: 2024-04-19

## 2024-04-19 DIAGNOSIS — R35.89 POLYURIA: Primary | ICD-10-CM

## 2024-04-19 NOTE — TELEPHONE ENCOUNTER
Patient states she has been frequently urinating all the time and it wont stop wants to be advised on what is going on and this has never happen before. Wants to come in and leave a urine sample if possible please advise.

## 2024-04-19 NOTE — TELEPHONE ENCOUNTER
She can leave a urine sample but it will have to be at a lab site given the late hour. Order in chart.

## 2024-04-22 DIAGNOSIS — R35.89 POLYURIA: ICD-10-CM

## 2024-04-23 LAB
BACTERIA UR CULT: NORMAL
BILIRUB UR QL STRIP.AUTO: NEGATIVE
CLARITY UR: CLEAR
COLOR UR: YELLOW
GLUCOSE UR STRIP.AUTO-MCNC: NEGATIVE MG/DL
HGB UR QL STRIP.AUTO: NEGATIVE
KETONES UR STRIP.AUTO-MCNC: NEGATIVE MG/DL
LEUKOCYTE ESTERASE UR QL STRIP.AUTO: NEGATIVE
NITRITE UR QL STRIP.AUTO: NEGATIVE
PH UR STRIP.AUTO: 6.5 [PH] (ref 5–8)
PROT UR STRIP.AUTO-MCNC: NEGATIVE MG/DL
SP GR UR STRIP.AUTO: 1 (ref 1–1.03)
UA DIPSTICK W REFLEX MICRO PNL UR: NORMAL
URN SPEC COLLECT METH UR: NORMAL
UROBILINOGEN UR STRIP-ACNC: 0.2 E.U./DL

## 2024-05-11 DIAGNOSIS — I10 PRIMARY HYPERTENSION: ICD-10-CM

## 2024-05-12 RX ORDER — CETIRIZINE HYDROCHLORIDE 10 MG/1
TABLET ORAL
Qty: 60 TABLET | Refills: 1 | OUTPATIENT
Start: 2024-05-12

## 2024-05-13 RX ORDER — HYDROCHLOROTHIAZIDE 12.5 MG/1
12.5 CAPSULE, GELATIN COATED ORAL EVERY MORNING
Qty: 90 CAPSULE | Refills: 1 | OUTPATIENT
Start: 2024-05-13

## 2024-05-28 ENCOUNTER — TELEPHONE (OUTPATIENT)
Dept: INTERNAL MEDICINE CLINIC | Age: 79
End: 2024-05-28

## 2024-05-28 NOTE — TELEPHONE ENCOUNTER
To whom it may concern,     Esther Murray, a patient in my medical practice, is medically stable to undergo teeth cleaning.     If there are any other questions or concerns feel free to contact me.     Ok to write this letter. I will sign.

## 2024-05-28 NOTE — TELEPHONE ENCOUNTER
Patient is requesting a letter for her new dentist stating that its okay for her to get her teeth cleaned please fax to [phone] Dr. Madrigal..

## 2024-06-05 ENCOUNTER — OFFICE VISIT (OUTPATIENT)
Dept: INTERNAL MEDICINE CLINIC | Age: 79
End: 2024-06-05

## 2024-06-05 VITALS
HEART RATE: 92 BPM | DIASTOLIC BLOOD PRESSURE: 70 MMHG | WEIGHT: 145 LBS | SYSTOLIC BLOOD PRESSURE: 104 MMHG | BODY MASS INDEX: 23.4 KG/M2 | OXYGEN SATURATION: 99 %

## 2024-06-05 DIAGNOSIS — M25.642 FINGER STIFFNESS, LEFT: ICD-10-CM

## 2024-06-05 DIAGNOSIS — Z53.20 DRUG DECLINED BY PATIENT: ICD-10-CM

## 2024-06-05 DIAGNOSIS — Z86.73 H/O: STROKE: ICD-10-CM

## 2024-06-05 DIAGNOSIS — N39.3 STRESS INCONTINENCE (FEMALE) (MALE): ICD-10-CM

## 2024-06-05 DIAGNOSIS — R05.3 CHRONIC COUGH: Primary | ICD-10-CM

## 2024-06-05 RX ORDER — LOSARTAN POTASSIUM 50 MG/1
50 TABLET ORAL DAILY
Qty: 90 TABLET | Refills: 1 | Status: SHIPPED | OUTPATIENT
Start: 2024-06-05

## 2024-06-05 SDOH — ECONOMIC STABILITY: FOOD INSECURITY: WITHIN THE PAST 12 MONTHS, YOU WORRIED THAT YOUR FOOD WOULD RUN OUT BEFORE YOU GOT MONEY TO BUY MORE.: NEVER TRUE

## 2024-06-05 SDOH — ECONOMIC STABILITY: FOOD INSECURITY: WITHIN THE PAST 12 MONTHS, THE FOOD YOU BOUGHT JUST DIDN'T LAST AND YOU DIDN'T HAVE MONEY TO GET MORE.: NEVER TRUE

## 2024-06-05 SDOH — ECONOMIC STABILITY: INCOME INSECURITY: HOW HARD IS IT FOR YOU TO PAY FOR THE VERY BASICS LIKE FOOD, HOUSING, MEDICAL CARE, AND HEATING?: NOT HARD AT ALL

## 2024-06-05 ASSESSMENT — ANXIETY QUESTIONNAIRES
IF YOU CHECKED OFF ANY PROBLEMS ON THIS QUESTIONNAIRE, HOW DIFFICULT HAVE THESE PROBLEMS MADE IT FOR YOU TO DO YOUR WORK, TAKE CARE OF THINGS AT HOME, OR GET ALONG WITH OTHER PEOPLE: NOT DIFFICULT AT ALL
6. BECOMING EASILY ANNOYED OR IRRITABLE: NOT AT ALL
1. FEELING NERVOUS, ANXIOUS, OR ON EDGE: NOT AT ALL
3. WORRYING TOO MUCH ABOUT DIFFERENT THINGS: NOT AT ALL
2. NOT BEING ABLE TO STOP OR CONTROL WORRYING: NOT AT ALL
7. FEELING AFRAID AS IF SOMETHING AWFUL MIGHT HAPPEN: NOT AT ALL
5. BEING SO RESTLESS THAT IT IS HARD TO SIT STILL: NOT AT ALL
4. TROUBLE RELAXING: NOT AT ALL
GAD7 TOTAL SCORE: 0

## 2024-06-05 ASSESSMENT — PATIENT HEALTH QUESTIONNAIRE - PHQ9
SUM OF ALL RESPONSES TO PHQ QUESTIONS 1-9: 0
1. LITTLE INTEREST OR PLEASURE IN DOING THINGS: NOT AT ALL
2. FEELING DOWN, DEPRESSED OR HOPELESS: NOT AT ALL
SUM OF ALL RESPONSES TO PHQ QUESTIONS 1-9: 0
SUM OF ALL RESPONSES TO PHQ9 QUESTIONS 1 & 2: 0

## 2024-06-05 NOTE — PROGRESS NOTES
Patient: Esther Murray is a 78 y.o. female who presents today with the following Chief Complaint(s):    Chief Complaint   Patient presents with    3 Month Follow-Up     3 month f/u    Cough     Pt complaints of cough off and on for a yr got worse in the last 6 month making her urinate on herself     Finger Pain     Pt complaints of stiff finger on Lt hand         HPIShe complains of a cough that has worsened over the last 6 months. Non productive. Not associated with audible wheezing. Denies fever or chills. Has used the herb slippery elm lozenges with some improvement. Occurs 2 to 3 times per day.         Does experience stress incontinence with the cough at times. No dysuria.         Left 3rd finger problem. Cannot flex at PIP joint. No trauma history.        Note that patient has h/o CVD. She declines taking aspirin or statin.       Current Outpatient Medications   Medication Sig Dispense Refill    losartan (COZAAR) 50 MG tablet TAKE 1 TABLET BY MOUTH EVERY DAY 90 tablet 1    aspirin 81 MG EC tablet Take 1 tablet by mouth daily 30 tablet 3    atorvastatin (LIPITOR) 80 MG tablet Take 1 tablet by mouth nightly (Patient not taking: Reported on 6/5/2024) 90 tablet 3     No current facility-administered medications for this visit.       Patient's past medical history, surgical history, family history, medications,and allergies  were all reviewed and updated as appropriate today.      Review of Systems   Constitutional: Negative.    HENT: Negative.     Eyes: Negative.    Respiratory:  Positive for cough. Negative for choking, chest tightness, shortness of breath, wheezing and stridor.         See HPI.    Cardiovascular: Negative.    Gastrointestinal: Negative.    Genitourinary:         Incontinence of urine. See HPI.    Musculoskeletal:         Finger problem. See HPI.    Skin: Negative.    Neurological:         H/O stroke. See HPI.    Psychiatric/Behavioral: Negative.           Physical Exam  Constitutional:

## 2024-06-05 NOTE — PATIENT INSTRUCTIONS
Throat coat tea.   Reduce dairy.   Hold the losartan for 2 weeks. Call if B/P 140/90 or higher.  Call after 2 weeks for follow up.

## 2024-06-26 ENCOUNTER — OFFICE VISIT (OUTPATIENT)
Dept: INTERNAL MEDICINE CLINIC | Age: 79
End: 2024-06-26

## 2024-06-26 VITALS
DIASTOLIC BLOOD PRESSURE: 78 MMHG | WEIGHT: 140 LBS | HEIGHT: 66 IN | SYSTOLIC BLOOD PRESSURE: 132 MMHG | HEART RATE: 95 BPM | BODY MASS INDEX: 22.5 KG/M2 | OXYGEN SATURATION: 98 %

## 2024-06-26 DIAGNOSIS — I10 PRIMARY HYPERTENSION: ICD-10-CM

## 2024-06-26 DIAGNOSIS — Z86.73 H/O: STROKE: ICD-10-CM

## 2024-06-26 DIAGNOSIS — R19.00 ABDOMINAL MASS, UNSPECIFIED ABDOMINAL LOCATION: ICD-10-CM

## 2024-06-26 DIAGNOSIS — Z01.818 PRE-OP EXAM: ICD-10-CM

## 2024-06-28 ENCOUNTER — TELEPHONE (OUTPATIENT)
Dept: INTERNAL MEDICINE CLINIC | Age: 79
End: 2024-06-28

## 2024-07-30 RX ORDER — HYDROCHLOROTHIAZIDE 25 MG/1
TABLET ORAL
Qty: 90 TABLET | Refills: 1 | OUTPATIENT
Start: 2024-07-30

## 2024-09-05 ENCOUNTER — OFFICE VISIT (OUTPATIENT)
Dept: INTERNAL MEDICINE CLINIC | Age: 79
End: 2024-09-05
Payer: MEDICARE

## 2024-09-05 VITALS
BODY MASS INDEX: 20.96 KG/M2 | WEIGHT: 130.4 LBS | HEIGHT: 66 IN | HEART RATE: 72 BPM | OXYGEN SATURATION: 99 % | DIASTOLIC BLOOD PRESSURE: 86 MMHG | SYSTOLIC BLOOD PRESSURE: 134 MMHG

## 2024-09-05 DIAGNOSIS — I10 PRIMARY HYPERTENSION: ICD-10-CM

## 2024-09-05 DIAGNOSIS — N83.8 MASS, OVARIAN: ICD-10-CM

## 2024-09-05 DIAGNOSIS — Z86.73 H/O: STROKE: ICD-10-CM

## 2024-09-05 DIAGNOSIS — E78.2 MIXED HYPERLIPIDEMIA: Primary | ICD-10-CM

## 2024-09-05 PROCEDURE — 3075F SYST BP GE 130 - 139MM HG: CPT | Performed by: INTERNAL MEDICINE

## 2024-09-05 PROCEDURE — 1123F ACP DISCUSS/DSCN MKR DOCD: CPT | Performed by: INTERNAL MEDICINE

## 2024-09-05 PROCEDURE — 3079F DIAST BP 80-89 MM HG: CPT | Performed by: INTERNAL MEDICINE

## 2024-09-05 PROCEDURE — G2211 COMPLEX E/M VISIT ADD ON: HCPCS | Performed by: INTERNAL MEDICINE

## 2024-09-05 PROCEDURE — 99214 OFFICE O/P EST MOD 30 MIN: CPT | Performed by: INTERNAL MEDICINE

## 2024-09-05 NOTE — PATIENT INSTRUCTIONS
For trigger finger. Aleve twice daily, as needed. Use rubber ball regularly.   Call in 1 month for follow up.

## 2024-09-05 NOTE — PROGRESS NOTES
Patient: Esther Murray is a 78 y.o. female who presents today with the following Chief Complaint(s):    Chief Complaint   Patient presents with    Hypertension     Stiff finger         HPIat home 121/80+left finger at PIP joint, trigger, sore. Does not want referral yet, advised aleve, rubber ball and reassess.   Had 20 lb abd mass removed. No cancer,   Taking losartan and aspirin, advised statin   Current Outpatient Medications   Medication Sig Dispense Refill    losartan (COZAAR) 50 MG tablet Take 1 tablet by mouth daily 90 tablet 1    atorvastatin (LIPITOR) 80 MG tablet Take 1 tablet by mouth nightly 90 tablet 3    aspirin 81 MG EC tablet Take 1 tablet by mouth daily 30 tablet 3     No current facility-administered medications for this visit.       Patient's past medical history, surgical history, family history, medications,and allergies  were all reviewed and updated as appropriate today.      Review of Systems      Physical Exam    Vitals:    09/05/24 1019   BP: (!) 144/82   Pulse: (!) 107   SpO2: 99%       Assessment:  No diagnosis found.    Plan:  There are no diagnoses linked to this encounter.

## 2024-09-16 ENCOUNTER — TELEPHONE (OUTPATIENT)
Dept: INTERNAL MEDICINE CLINIC | Age: 79
End: 2024-09-16

## 2024-09-16 NOTE — TELEPHONE ENCOUNTER
Patient is requesting a lab thyroid test done due to going to see the optomologist Dr. Wiley and he suggested she get this done. I asked patient why and she stated she didn't know he just advised it. Please advise.

## 2024-09-19 NOTE — TELEPHONE ENCOUNTER
Patient is waiting on a call from provider stating that she is sweating profusely and she knows something is wrong with her and that is why she is requesting this test. Just generally doesn't feel well Please advise.

## 2024-09-19 NOTE — TELEPHONE ENCOUNTER
Spoke with pt. Stated that she recently had surgery and that 2 weeks after surgery she started sweating profusely. Pt stated she has nothing other symptoms. Encouraged pt to go to ED just be on the safe side. Pt verbalized understanding and stated that she will be going to ED.

## 2024-09-30 ASSESSMENT — ENCOUNTER SYMPTOMS
EYES NEGATIVE: 1
GASTROINTESTINAL NEGATIVE: 1
RESPIRATORY NEGATIVE: 1

## 2024-09-30 NOTE — PROGRESS NOTES
Patient: Esther Murray is a 78 y.o. female who presents today with the following Chief Complaint(s):    Chief Complaint   Patient presents with    Hypertension     Stiff finger         HPIShe is here for a check up. Her history includes hypertension, hyperlipidemia and stroke without focal deficit. She admits not taking statin medication at this time. Declines restarting it. Medications include Losartan, and aspirin. Her b/p readings at home on average are in the 120/80 + range.         She is s/p exploratory lap and removal of pelvic mass. 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 Mass was 20 pounds.             Current Outpatient Medications   Medication Sig Dispense Refill    losartan (COZAAR) 50 MG tablet Take 1 tablet by mouth daily 90 tablet 1    atorvastatin (LIPITOR) 80 MG tablet Take 1 tablet by mouth nightly 90 tablet 3    aspirin 81 MG EC tablet Take 1 tablet by mouth daily 30 tablet 3     No current facility-administered medications for this visit.       Patient's past medical history, surgical history, family history, medications,and allergies  were all reviewed and updated as appropriate today.      Review of Systems   Constitutional: Negative.    HENT: Negative.     Eyes: Negative.    Respiratory: Negative.     Cardiovascular:         Hypertension, treated with ARB.    Gastrointestinal: Negative.    Endocrine:        Hyperlipidemia,  prior LDL 64. See HPI.    Genitourinary: Negative.         Ovarian mass removal. See HPI.   Musculoskeletal: Negative.    Skin: Negative.    Neurological:         H/O stroke. MRI demonstrates deep white matter infarct of posterior left frontal lobe 3/2022. No focality at this time although daughter has noted forgetfulness.    Psychiatric/Behavioral: Negative.           Physical Exam  Constitutional:       General: She is not in acute distress.     Appearance: She is well-developed.   HENT:      Head: Normocephalic and atraumatic.      Right Ear:

## 2024-11-07 DIAGNOSIS — E78.2 MIXED HYPERLIPIDEMIA: ICD-10-CM

## 2024-11-07 RX ORDER — ATORVASTATIN CALCIUM 80 MG/1
80 TABLET, FILM COATED ORAL NIGHTLY
Qty: 90 TABLET | Refills: 3 | Status: SHIPPED | OUTPATIENT
Start: 2024-11-07

## 2024-11-07 NOTE — TELEPHONE ENCOUNTER
Medication:   Requested Prescriptions     Pending Prescriptions Disp Refills    atorvastatin (LIPITOR) 80 MG tablet [Pharmacy Med Name: ATORVASTATIN 80 MG TABLET] 90 tablet 3     Sig: TAKE 1 TABLET BY MOUTH EVERY DAY AT NIGHT        Last Filled:      Patient Phone Number: [phone] (home)     Last appt: 9/5/2024   Next appt: 12/12/2024    Last OARRS:        No data to display

## 2024-12-02 RX ORDER — LOSARTAN POTASSIUM 50 MG/1
50 TABLET ORAL DAILY
Qty: 90 TABLET | Refills: 1 | Status: SHIPPED | OUTPATIENT
Start: 2024-12-02

## 2024-12-02 NOTE — TELEPHONE ENCOUNTER
Medication:   Requested Prescriptions     Pending Prescriptions Disp Refills    losartan (COZAAR) 50 MG tablet [Pharmacy Med Name: LOSARTAN POTASSIUM 50 MG TAB] 90 tablet 1     Sig: TAKE 1 TABLET BY MOUTH EVERY DAY        Last Filled:      Patient Phone Number: [phone] (home)     Last appt: 9/5/2024   Next appt: 12/5/2024    Last OARRS:        No data to display

## 2024-12-05 ENCOUNTER — OFFICE VISIT (OUTPATIENT)
Dept: INTERNAL MEDICINE CLINIC | Age: 79
End: 2024-12-05

## 2024-12-05 VITALS
OXYGEN SATURATION: 98 % | HEART RATE: 96 BPM | WEIGHT: 141 LBS | SYSTOLIC BLOOD PRESSURE: 115 MMHG | BODY MASS INDEX: 22.76 KG/M2 | DIASTOLIC BLOOD PRESSURE: 73 MMHG

## 2024-12-05 DIAGNOSIS — I10 PRIMARY HYPERTENSION: ICD-10-CM

## 2024-12-05 DIAGNOSIS — I10 PRIMARY HYPERTENSION: Primary | ICD-10-CM

## 2024-12-05 DIAGNOSIS — E78.2 MIXED HYPERLIPIDEMIA: ICD-10-CM

## 2024-12-05 DIAGNOSIS — Z86.73 H/O: STROKE: ICD-10-CM

## 2024-12-05 NOTE — PROGRESS NOTES
Patient: Esther Murray is a 79 y.o. female who presents today with the following Chief Complaint(s):    Chief Complaint   Patient presents with    Follow-up         HPIright basilar crackles, 2/6 SM, LSB.     Current Outpatient Medications   Medication Sig Dispense Refill    losartan (COZAAR) 50 MG tablet TAKE 1 TABLET BY MOUTH EVERY DAY 90 tablet 1    aspirin 81 MG EC tablet Take 1 tablet by mouth daily 30 tablet 3    atorvastatin (LIPITOR) 80 MG tablet TAKE 1 TABLET BY MOUTH EVERY DAY AT NIGHT (Patient not taking: Reported on 12/5/2024) 90 tablet 3     No current facility-administered medications for this visit.       Patient's past medical history, surgical history, family history, medications,and allergies  were all reviewed and updated as appropriate today.      Review of Systems      Physical Exam    Vitals:    12/05/24 1015   BP: 115/73   Pulse: 96   SpO2: 98%       Assessment:  Encounter Diagnoses   Name Primary?    Primary hypertension Yes    Mixed hyperlipidemia        Plan:  1. Primary hypertension  ***    2. Mixed hyperlipidemia  ***

## 2024-12-06 LAB
ALBUMIN SERPL-MCNC: 4.2 G/DL (ref 3.4–5)
ALBUMIN/GLOB SERPL: 1.4 {RATIO} (ref 1.1–2.2)
ALP SERPL-CCNC: 89 U/L (ref 40–129)
ALT SERPL-CCNC: 23 U/L (ref 10–40)
ANION GAP SERPL CALCULATED.3IONS-SCNC: 9 MMOL/L (ref 3–16)
ANISOCYTOSIS BLD QL SMEAR: ABNORMAL
AST SERPL-CCNC: 21 U/L (ref 15–37)
BASOPHILS # BLD: 0 K/UL (ref 0–0.2)
BASOPHILS NFR BLD: 0 %
BILIRUB SERPL-MCNC: 0.6 MG/DL (ref 0–1)
BUN SERPL-MCNC: 31 MG/DL (ref 7–20)
CALCIUM SERPL-MCNC: 10.9 MG/DL (ref 8.3–10.6)
CHLORIDE SERPL-SCNC: 96 MMOL/L (ref 99–110)
CHOLEST SERPL-MCNC: 204 MG/DL (ref 0–199)
CO2 SERPL-SCNC: 28 MMOL/L (ref 21–32)
CREAT SERPL-MCNC: 0.8 MG/DL (ref 0.6–1.2)
DACRYOCYTES BLD QL SMEAR: ABNORMAL
DEPRECATED RDW RBC AUTO: 13.7 % (ref 12.4–15.4)
EOSINOPHIL # BLD: 0.2 K/UL (ref 0–0.6)
EOSINOPHIL NFR BLD: 4 %
GFR SERPLBLD CREATININE-BSD FMLA CKD-EPI: 75 ML/MIN/{1.73_M2}
GLUCOSE SERPL-MCNC: 82 MG/DL (ref 70–99)
HCT VFR BLD AUTO: 44.6 % (ref 36–48)
HDLC SERPL-MCNC: 66 MG/DL (ref 40–60)
HGB BLD-MCNC: 15.3 G/DL (ref 12–16)
LDLC SERPL CALC-MCNC: 106 MG/DL
LYMPHOCYTES # BLD: 2.4 K/UL (ref 1–5.1)
LYMPHOCYTES NFR BLD: 39 %
MCH RBC QN AUTO: 33 PG (ref 26–34)
MCHC RBC AUTO-ENTMCNC: 34.3 G/DL (ref 31–36)
MCV RBC AUTO: 96.2 FL (ref 80–100)
MONOCYTES # BLD: 0.6 K/UL (ref 0–1.3)
MONOCYTES NFR BLD: 10 %
NEUTROPHILS # BLD: 2.6 K/UL (ref 1.7–7.7)
NEUTROPHILS NFR BLD: 38 %
NEUTS BAND NFR BLD MANUAL: 6 % (ref 0–7)
OVALOCYTES BLD QL SMEAR: ABNORMAL
PLATELET # BLD AUTO: 240 K/UL (ref 135–450)
PLATELET BLD QL SMEAR: ADEQUATE
PMV BLD AUTO: 10 FL (ref 5–10.5)
POIKILOCYTOSIS BLD QL SMEAR: ABNORMAL
POLYCHROMASIA BLD QL SMEAR: ABNORMAL
POTASSIUM SERPL-SCNC: 4.2 MMOL/L (ref 3.5–5.1)
PROT SERPL-MCNC: 7.1 G/DL (ref 6.4–8.2)
RBC # BLD AUTO: 4.63 M/UL (ref 4–5.2)
SLIDE REVIEW: ABNORMAL
SODIUM SERPL-SCNC: 133 MMOL/L (ref 136–145)
TRIGL SERPL-MCNC: 160 MG/DL (ref 0–150)
TSH SERPL DL<=0.005 MIU/L-ACNC: 1.37 UIU/ML (ref 0.27–4.2)
VARIANT LYMPHS NFR BLD MANUAL: 3 % (ref 0–6)
VLDLC SERPL CALC-MCNC: 32 MG/DL
WBC # BLD AUTO: 5.8 K/UL (ref 4–11)

## 2025-03-05 ENCOUNTER — OFFICE VISIT (OUTPATIENT)
Dept: INTERNAL MEDICINE CLINIC | Age: 80
End: 2025-03-05

## 2025-03-05 VITALS
OXYGEN SATURATION: 99 % | DIASTOLIC BLOOD PRESSURE: 76 MMHG | SYSTOLIC BLOOD PRESSURE: 120 MMHG | HEART RATE: 86 BPM | WEIGHT: 138 LBS | BODY MASS INDEX: 22.27 KG/M2

## 2025-03-05 DIAGNOSIS — Z86.73 H/O: STROKE: ICD-10-CM

## 2025-03-05 DIAGNOSIS — E78.2 MIXED HYPERLIPIDEMIA: ICD-10-CM

## 2025-03-05 DIAGNOSIS — I10 PRIMARY HYPERTENSION: Primary | ICD-10-CM

## 2025-03-05 SDOH — ECONOMIC STABILITY: FOOD INSECURITY: WITHIN THE PAST 12 MONTHS, THE FOOD YOU BOUGHT JUST DIDN'T LAST AND YOU DIDN'T HAVE MONEY TO GET MORE.: NEVER TRUE

## 2025-03-05 SDOH — ECONOMIC STABILITY: FOOD INSECURITY: WITHIN THE PAST 12 MONTHS, YOU WORRIED THAT YOUR FOOD WOULD RUN OUT BEFORE YOU GOT MONEY TO BUY MORE.: NEVER TRUE

## 2025-03-05 ASSESSMENT — PATIENT HEALTH QUESTIONNAIRE - PHQ9
SUM OF ALL RESPONSES TO PHQ QUESTIONS 1-9: 0
1. LITTLE INTEREST OR PLEASURE IN DOING THINGS: NOT AT ALL
SUM OF ALL RESPONSES TO PHQ QUESTIONS 1-9: 0
2. FEELING DOWN, DEPRESSED OR HOPELESS: NOT AT ALL

## 2025-03-05 NOTE — PATIENT INSTRUCTIONS
Ezetimibe also for cholesterol.     Beans, oats, onion, garlic, all good for lowering cholesterol.    2 avocados weekly lower cholesterol.    Herb Mandi is good cholesterol lowering.

## 2025-03-08 ASSESSMENT — ENCOUNTER SYMPTOMS
EYES NEGATIVE: 1
GASTROINTESTINAL NEGATIVE: 1
RESPIRATORY NEGATIVE: 1

## 2025-03-08 NOTE — PROGRESS NOTES
Patient: Esther Murray is a 79 y.o. female who presents today with the following Chief Complaint(s):    Chief Complaint   Patient presents with    Follow-up    Hypertension         HPIdoes not have bad days. Eating good. Exercises 2 to 3 times weekly. Gym or stepper at the house. Like elipitical. Also has a little.   Has stem cell patch on neck.walkinf is alissa.   Declines statin.   Crystal on [address]. Seeds roots and herbs.   Current Outpatient Medications   Medication Sig Dispense Refill    losartan (COZAAR) 50 MG tablet TAKE 1 TABLET BY MOUTH EVERY DAY 90 tablet 1    aspirin 81 MG EC tablet Take 1 tablet by mouth daily 30 tablet 3    atorvastatin (LIPITOR) 80 MG tablet TAKE 1 TABLET BY MOUTH EVERY DAY AT NIGHT (Patient not taking: Reported on 12/5/2024) 90 tablet 3     No current facility-administered medications for this visit.       Patient's past medical history, surgical history, family history, medications,and allergies  were all reviewed and updated as appropriate today.      Review of Systems      Physical Exam    Vitals:    03/05/25 1024   BP: 120/76   Pulse: 86   SpO2: 99%       Assessment:  No diagnosis found.    Plan:  There are no diagnoses linked to this encounter.  
Head: Normocephalic and atraumatic.      Right Ear: External ear normal.      Left Ear: External ear normal.      Nose: Nose normal.   Eyes:      General: No scleral icterus.     Conjunctiva/sclera: Conjunctivae normal.      Pupils: Pupils are equal, round, and reactive to light.   Neck:      Thyroid: No thyromegaly.   Cardiovascular:      Rate and Rhythm: Normal rate and regular rhythm.      Heart sounds: Normal heart sounds.      Comments: 2/6 SM LSB most pronounced.   Pulmonary:      Effort: Pulmonary effort is normal.      Breath sounds: Normal breath sounds.   Abdominal:      General: Bowel sounds are normal.      Palpations: Abdomen is soft. There is no mass.   Musculoskeletal:      Cervical back: Normal range of motion and neck supple.   Lymphadenopathy:      Cervical: No cervical adenopathy.   Skin:     General: Skin is warm and dry.   Neurological:      Mental Status: She is alert and oriented to person, place, and time.      Deep Tendon Reflexes: Reflexes are normal and symmetric.      Comments: Gait disturbance favoring right lower extremity.    Psychiatric:         Behavior: Behavior normal.         Thought Content: Thought content normal.         Judgment: Judgment normal.         Vitals:    03/05/25 1024   BP: 120/76   Pulse: 86   SpO2: 99%       Assessment:   Diagnosis Orders   1. Mixed hyperlipidemia  Declines statin at this time.   Discussed it's benefit  for stroke risk reduction.   Heart healthy diet discussed.       2. Primary hypertension  Continue ARB.   At b/p goal at home.   DASH and low sodium diet discussed.   Continue same b/p meds and home monitoring.      3. H/O: stroke  Residual deficit may be memory changes and altered gait.    Mental exercises encouraged.   Walking encouraged.   Risk factor control stressed.   See above.            Plan:  See plans above.

## 2025-03-21 ENCOUNTER — TELEPHONE (OUTPATIENT)
Dept: INTERNAL MEDICINE CLINIC | Age: 80
End: 2025-03-21

## 2025-03-21 NOTE — TELEPHONE ENCOUNTER
Pt call asking for PCP to give her a call concerning about a patch she has. PT is concern for her health. Please Advise

## 2025-04-10 ENCOUNTER — TELEPHONE (OUTPATIENT)
Dept: INTERNAL MEDICINE CLINIC | Age: 80
End: 2025-04-10

## 2025-04-22 NOTE — TELEPHONE ENCOUNTER
Discussed X 39 with patient. I suggested that this stem cell patch is lacking research to show its benefit. I suggested patient not stop taking medication prescribed with a h/o stroke that has proven benefit. She declines standard medication at this time.

## 2025-05-05 RX ORDER — LOSARTAN POTASSIUM 50 MG/1
50 TABLET ORAL DAILY
Qty: 90 TABLET | Refills: 1 | Status: SHIPPED | OUTPATIENT
Start: 2025-05-05

## 2025-05-05 NOTE — TELEPHONE ENCOUNTER
Medication:   Requested Prescriptions     Pending Prescriptions Disp Refills    losartan (COZAAR) 50 MG tablet [Pharmacy Med Name: LOSARTAN POTASSIUM 50 MG TAB] 90 tablet 1     Sig: TAKE 1 TABLET BY MOUTH EVERY DAY     Last Filled:  12/02/2024    Last appt: 3/5/2025   Next appt: 6/5/2025    Last OARRS:        No data to display

## 2025-06-04 ENCOUNTER — OFFICE VISIT (OUTPATIENT)
Dept: INTERNAL MEDICINE CLINIC | Age: 80
End: 2025-06-04

## 2025-06-04 VITALS
BODY MASS INDEX: 21.53 KG/M2 | HEART RATE: 88 BPM | SYSTOLIC BLOOD PRESSURE: 138 MMHG | DIASTOLIC BLOOD PRESSURE: 76 MMHG | WEIGHT: 134 LBS | HEIGHT: 66 IN | OXYGEN SATURATION: 99 %

## 2025-06-04 DIAGNOSIS — Z12.31 ENCOUNTER FOR SCREENING MAMMOGRAM FOR MALIGNANT NEOPLASM OF BREAST: ICD-10-CM

## 2025-06-04 DIAGNOSIS — Z86.73 HISTORY OF STROKE: ICD-10-CM

## 2025-06-04 DIAGNOSIS — Z00.00 MEDICARE ANNUAL WELLNESS VISIT, SUBSEQUENT: Primary | ICD-10-CM

## 2025-06-04 DIAGNOSIS — E78.2 MIXED HYPERLIPIDEMIA: ICD-10-CM

## 2025-06-04 ASSESSMENT — LIFESTYLE VARIABLES
HOW MANY STANDARD DRINKS CONTAINING ALCOHOL DO YOU HAVE ON A TYPICAL DAY: PATIENT DOES NOT DRINK
HOW OFTEN DO YOU HAVE A DRINK CONTAINING ALCOHOL: NEVER

## 2025-06-04 ASSESSMENT — PATIENT HEALTH QUESTIONNAIRE - PHQ9
2. FEELING DOWN, DEPRESSED OR HOPELESS: NOT AT ALL
SUM OF ALL RESPONSES TO PHQ QUESTIONS 1-9: 0
1. LITTLE INTEREST OR PLEASURE IN DOING THINGS: NOT AT ALL
SUM OF ALL RESPONSES TO PHQ QUESTIONS 1-9: 0

## 2025-06-04 NOTE — PROGRESS NOTES
Medicare Annual Wellness Visit    Esther Murray is here for Medicare AWV    Assessment & Plan   Medicare annual wellness visit, subsequent  Doing well.  Staying active, eating healthy  Cscope next due 2027, can do Cologuard if average risk  Mammogram ordered , pt to continue self breast exams monthly    Encounter for screening mammogram for malignant neoplasm of breast  -     RASHID DIGITAL SCREEN W OR WO CAD BILATERAL; Future    History of stroke  Doing well. Stable  Not taking Statin by choice, but compliant with Aspirin 81mg    Mixed hyperlipidemia  Not taking statin by choice  Pt is eating healthy and physically active (gardening)  -     Lipid, Fasting; Future       Return in about 1 year (around 6/4/2026) for AWV.     Subjective     Had a  stroke dt sunshine blood pressure   Colonoscopy done 2-3 yrs ago normal had a few polyps benign, next due in 2027, given every 5 years   Prefers cologuards for next  Last mammogram , not up to date.   Physically active     No major stroke deficits  No desire to take statin  Eating healthy, avoiding sweets, fatty foods    Patient's complete Health Risk Assessment and screening values have been reviewed and are found in Flowsheets. The following problems were reviewed today and where indicated follow up appointments were made and/or referrals ordered.    No Positive Risk Factors identified today.                                    Objective   Vitals:    06/04/25 1031   BP: 138/76   BP Site: Left Upper Arm   Patient Position: Sitting   BP Cuff Size: Medium Adult   Pulse: 88   SpO2: 99%   Weight: 60.8 kg (134 lb)   Height: 1.676 m (5' 6\")      Body mass index is 21.63 kg/m².                  Allergies   Allergen Reactions    Codeine     Iodine      Prior to Visit Medications    Medication Sig Taking? Authorizing Provider   losartan (COZAAR) 50 MG tablet TAKE 1 TABLET BY MOUTH EVERY DAY Yes Rick Gaines MD   aspirin 81 MG EC tablet Take 1 tablet by mouth daily Yes Dino Aggarwal, DO

## 2025-06-04 NOTE — PATIENT INSTRUCTIONS
Get lipid done fasting  Continue to eat healthy , staying hydrated continue physical activity  Call for mammogram        A Healthy Heart: Care Instructions  Overview     Coronary artery disease, also called heart disease, occurs when a substance called plaque builds up in the vessels that supply oxygen-rich blood to your heart muscle. This can narrow the blood vessels and reduce blood flow. A heart attack happens when blood flow is completely blocked. A high-fat diet, smoking, and other factors increase the risk of heart disease.  Your doctor has found that you have a chance of having heart disease. A heart-healthy lifestyle can help keep your heart healthy and prevent heart disease. This lifestyle includes eating healthy, being active, staying at a weight that's healthy for you, and not smoking or using tobacco. It also includes taking medicines as directed, managing other health conditions, and trying to get a healthy amount of sleep.  Follow-up care is a key part of your treatment and safety. Be sure to make and go to all appointments, and call your doctor if you are having problems. It's also a good idea to know your test results and keep a list of the medicines you take.  How can you care for yourself at home?  Diet    Use less salt when you cook and eat. This helps lower your blood pressure. Taste food before salting. Add only a little salt when you think you need it. With time, your taste buds will adjust to less salt.     Eat fewer snack items, fast foods, canned soups, and other high-salt, high-fat, processed foods.     Read food labels and try to avoid saturated and trans fats. They increase your risk of heart disease by raising cholesterol levels.     Limit the amount of solid fat--butter, margarine, and shortening--you eat. Use olive, peanut, or canola oil when you cook. Bake, broil, and steam foods instead of frying them.     Eat a variety of fruit and vegetables every day. Dark green, deep orange, red,

## 2025-06-13 DIAGNOSIS — E78.2 MIXED HYPERLIPIDEMIA: ICD-10-CM

## 2025-06-14 LAB
CHOLEST SERPL-MCNC: 210 MG/DL (ref 0–199)
HDLC SERPL-MCNC: 75 MG/DL (ref 40–60)
LDL CHOLESTEROL: 123 MG/DL
TRIGL SERPL-MCNC: 59 MG/DL (ref 0–150)
VLDLC SERPL CALC-MCNC: 12 MG/DL

## 2025-06-24 ENCOUNTER — RESULTS FOLLOW-UP (OUTPATIENT)
Dept: INTERNAL MEDICINE CLINIC | Age: 80
End: 2025-06-24

## 2025-08-20 ENCOUNTER — TELEPHONE (OUTPATIENT)
Dept: INTERNAL MEDICINE CLINIC | Age: 80
End: 2025-08-20

## 2025-08-21 DIAGNOSIS — Z86.73 HISTORY OF STROKE: Primary | ICD-10-CM
